# Patient Record
Sex: MALE | Race: BLACK OR AFRICAN AMERICAN | NOT HISPANIC OR LATINO | Employment: OTHER | URBAN - METROPOLITAN AREA
[De-identification: names, ages, dates, MRNs, and addresses within clinical notes are randomized per-mention and may not be internally consistent; named-entity substitution may affect disease eponyms.]

---

## 2023-06-16 LAB
C REACTIVE PROTEIN (MG/L) IN SER/PLAS: 0.15 MG/DL
CITRULLINE ANTIBODY, IGG: <1 U/ML
HEPATITIS B VIRUS SURFACE AG PRESENCE IN SERUM: NONREACTIVE
HEPATITIS C VIRUS AB PRESENCE IN SERUM: NONREACTIVE
RHEUMATOID FACTOR (IU/ML) IN SERUM OR PLASMA: <10 IU/ML (ref 0–15)
SEDIMENTATION RATE, ERYTHROCYTE: 2 MM/H (ref 0–15)

## 2023-06-19 LAB — ANTI-NUCLEAR ANTIBODY (ANA): NEGATIVE

## 2023-08-30 ENCOUNTER — HOSPITAL ENCOUNTER (OUTPATIENT)
Dept: DATA CONVERSION | Facility: HOSPITAL | Age: 43
End: 2023-08-30
Attending: RADIOLOGY
Payer: COMMERCIAL

## 2023-08-30 DIAGNOSIS — Z45.2 ENCOUNTER FOR ADJUSTMENT AND MANAGEMENT OF VASCULAR ACCESS DEVICE: ICD-10-CM

## 2023-10-02 DIAGNOSIS — D57.00 SICKLE CELL DISEASE WITH CRISIS (MULTI): Primary | ICD-10-CM

## 2023-10-02 RX ORDER — HYDROMORPHONE HYDROCHLORIDE 4 MG/1
4 TABLET ORAL EVERY 4 HOURS
COMMUNITY
End: 2023-10-02 | Stop reason: SDUPTHER

## 2023-10-02 RX ORDER — HYDROMORPHONE HYDROCHLORIDE 4 MG/1
4 TABLET ORAL EVERY 4 HOURS
Qty: 10 TABLET | Refills: 0 | Status: SHIPPED | OUTPATIENT
Start: 2023-10-02 | End: 2023-10-03 | Stop reason: SDUPTHER

## 2023-10-03 ENCOUNTER — TELEPHONE (OUTPATIENT)
Dept: HEMATOLOGY/ONCOLOGY | Facility: HOSPITAL | Age: 43
End: 2023-10-03

## 2023-10-03 DIAGNOSIS — D57.00 SICKLE CELL DISEASE WITH CRISIS (MULTI): ICD-10-CM

## 2023-10-03 RX ORDER — FAMOTIDINE 10 MG/ML
20 INJECTION INTRAVENOUS ONCE AS NEEDED
Status: CANCELLED | OUTPATIENT
Start: 2023-10-09

## 2023-10-03 RX ORDER — NITROGLYCERIN 0.4 MG/1
0.4 TABLET SUBLINGUAL ONCE
Status: CANCELLED | OUTPATIENT
Start: 2023-10-09 | End: 2023-10-09

## 2023-10-03 RX ORDER — DIPHENHYDRAMINE HYDROCHLORIDE 50 MG/ML
50 INJECTION INTRAMUSCULAR; INTRAVENOUS AS NEEDED
Status: CANCELLED | OUTPATIENT
Start: 2023-10-09

## 2023-10-03 RX ORDER — ONDANSETRON HYDROCHLORIDE 2 MG/ML
4 INJECTION, SOLUTION INTRAVENOUS ONCE
Status: CANCELLED | OUTPATIENT
Start: 2023-10-09 | End: 2023-10-09

## 2023-10-03 RX ORDER — KETOROLAC TROMETHAMINE 30 MG/ML
30 INJECTION, SOLUTION INTRAMUSCULAR; INTRAVENOUS ONCE
Status: CANCELLED | OUTPATIENT
Start: 2023-10-09 | End: 2023-10-09

## 2023-10-03 RX ORDER — HYDROMORPHONE HYDROCHLORIDE 4 MG/1
4 TABLET ORAL EVERY 4 HOURS PRN
Qty: 42 TABLET | Refills: 0 | Status: SHIPPED | OUTPATIENT
Start: 2023-10-03 | End: 2023-10-09 | Stop reason: SDUPTHER

## 2023-10-03 RX ORDER — ALBUTEROL SULFATE 0.83 MG/ML
3 SOLUTION RESPIRATORY (INHALATION) AS NEEDED
Status: CANCELLED | OUTPATIENT
Start: 2023-10-09

## 2023-10-03 RX ORDER — EPINEPHRINE 0.3 MG/.3ML
0.3 INJECTION SUBCUTANEOUS EVERY 5 MIN PRN
Status: CANCELLED | OUTPATIENT
Start: 2023-10-09

## 2023-10-03 RX ORDER — METOPROLOL TARTRATE 25 MG/1
25 TABLET, FILM COATED ORAL ONCE
Status: CANCELLED | OUTPATIENT
Start: 2023-10-09 | End: 2023-10-09

## 2023-10-07 PROBLEM — F17.200 NICOTINE USE DISORDER: Status: ACTIVE | Noted: 2023-10-07

## 2023-10-07 PROBLEM — E63.9 NUTRITIONAL DISORDER: Status: ACTIVE | Noted: 2023-10-07

## 2023-10-07 PROBLEM — F11.90 OPIOID USE DISORDER: Status: ACTIVE | Noted: 2023-10-07

## 2023-10-07 PROBLEM — M79.89 SWELLING OF RIGHT UPPER EXTREMITY: Status: ACTIVE | Noted: 2023-10-07

## 2023-10-07 PROBLEM — L30.9 DERMATITIS, UNSPECIFIED: Status: ACTIVE | Noted: 2023-07-28

## 2023-10-07 PROBLEM — Q80.0 ICHTHYOSIS VULGARIS: Status: ACTIVE | Noted: 2023-07-28

## 2023-10-07 PROBLEM — J18.9 PNEUMONIA DUE TO INFECTIOUS ORGANISM: Status: ACTIVE | Noted: 2023-10-07

## 2023-10-07 PROBLEM — D61.810 PANCYTOPENIA DUE TO CHEMOTHERAPY (CMS-HCC): Status: ACTIVE | Noted: 2023-10-07

## 2023-10-07 PROBLEM — K74.60 CIRRHOSIS OF LIVER (MULTI): Status: ACTIVE | Noted: 2023-10-07

## 2023-10-07 PROBLEM — R52 GENERALIZED PAIN: Status: ACTIVE | Noted: 2023-10-07

## 2023-10-07 PROBLEM — H40.003 GLAUCOMA SUSPECT, BOTH EYES: Status: ACTIVE | Noted: 2023-10-07

## 2023-10-07 PROBLEM — Z94.9 ORGAN OR TISSUE REPLACED BY TRANSPLANT: Status: ACTIVE | Noted: 2023-10-07

## 2023-10-07 PROBLEM — N48.30 PRIAPISM: Status: ACTIVE | Noted: 2023-10-07

## 2023-10-07 PROBLEM — F41.8 DEPRESSION WITH ANXIETY: Status: ACTIVE | Noted: 2023-10-07

## 2023-10-07 PROBLEM — L21.8 OTHER SEBORRHEIC DERMATITIS: Status: ACTIVE | Noted: 2023-07-28

## 2023-10-07 PROBLEM — A63.0 ANOGENITAL (VENEREAL) WARTS: Status: ACTIVE | Noted: 2023-07-28

## 2023-10-07 PROBLEM — M54.10 BILATERAL RADIATING LEG PAIN: Status: ACTIVE | Noted: 2023-10-07

## 2023-10-07 PROBLEM — L29.9 PRURITUS, UNSPECIFIED: Status: ACTIVE | Noted: 2023-07-28

## 2023-10-07 PROBLEM — G43.909 MIGRAINE HEADACHE: Status: ACTIVE | Noted: 2023-10-07

## 2023-10-07 PROBLEM — R93.89 ABNORMAL FINDING ON IMAGING: Status: ACTIVE | Noted: 2023-10-07

## 2023-10-07 PROBLEM — T40.2X5A CONSTIPATION DUE TO OPIOID THERAPY: Status: ACTIVE | Noted: 2023-10-07

## 2023-10-07 PROBLEM — M87.00 AVN (AVASCULAR NECROSIS OF BONE) (MULTI): Status: ACTIVE | Noted: 2023-10-07

## 2023-10-07 PROBLEM — R19.7 DIARRHEA: Status: ACTIVE | Noted: 2023-10-07

## 2023-10-07 PROBLEM — K21.9 GERD (GASTROESOPHAGEAL REFLUX DISEASE): Status: ACTIVE | Noted: 2023-10-07

## 2023-10-07 PROBLEM — M70.61 TROCHANTERIC BURSITIS OF BOTH HIPS: Status: ACTIVE | Noted: 2023-10-07

## 2023-10-07 PROBLEM — K51.00 PANCOLITIS (MULTI): Status: ACTIVE | Noted: 2023-10-07

## 2023-10-07 PROBLEM — D57.3 SICKLE CELL TRAIT (CMS-HCC): Status: ACTIVE | Noted: 2023-10-07

## 2023-10-07 PROBLEM — K61.0 PERIANAL ABSCESS: Status: ACTIVE | Noted: 2023-10-07

## 2023-10-07 PROBLEM — K12.31 MUCOSITIS DUE TO ANTINEOPLASTIC THERAPY: Status: ACTIVE | Noted: 2023-10-07

## 2023-10-07 PROBLEM — D64.9 ANEMIA: Status: ACTIVE | Noted: 2023-10-07

## 2023-10-07 PROBLEM — M71.9 BURSITIS: Status: ACTIVE | Noted: 2023-10-07

## 2023-10-07 PROBLEM — D57.1 SICKLE CELL RETINOPATHY (MULTI): Status: ACTIVE | Noted: 2023-10-07

## 2023-10-07 PROBLEM — H52.10 MYOPIA WITH ASTIGMATISM: Status: ACTIVE | Noted: 2023-10-07

## 2023-10-07 PROBLEM — E83.19 IRON OVERLOAD: Status: ACTIVE | Noted: 2023-10-07

## 2023-10-07 PROBLEM — M25.552 HIP PAIN, BILATERAL: Status: ACTIVE | Noted: 2023-10-07

## 2023-10-07 PROBLEM — R06.02 SHORTNESS OF BREATH ON EXERTION: Status: ACTIVE | Noted: 2023-10-07

## 2023-10-07 PROBLEM — M79.601 PAIN OF RIGHT UPPER EXTREMITY: Status: ACTIVE | Noted: 2023-10-07

## 2023-10-07 PROBLEM — M79.89 HAND SWELLING: Status: ACTIVE | Noted: 2023-10-07

## 2023-10-07 PROBLEM — A08.4 VIRAL GASTROENTERITIS: Status: ACTIVE | Noted: 2023-10-07

## 2023-10-07 PROBLEM — E86.0 DEHYDRATION: Status: ACTIVE | Noted: 2023-10-07

## 2023-10-07 PROBLEM — R52 ENCOUNTER FOR PAIN MANAGEMENT: Status: ACTIVE | Noted: 2023-10-07

## 2023-10-07 PROBLEM — H52.13 BILATERAL MYOPIA: Status: ACTIVE | Noted: 2023-10-07

## 2023-10-07 PROBLEM — D72.829 LEUKOCYTOSIS: Status: ACTIVE | Noted: 2023-10-07

## 2023-10-07 PROBLEM — G89.29 CHRONIC PAIN: Status: ACTIVE | Noted: 2023-10-07

## 2023-10-07 PROBLEM — K59.03 CONSTIPATION DUE TO OPIOID THERAPY: Status: ACTIVE | Noted: 2023-10-07

## 2023-10-07 PROBLEM — M54.16 LUMBAR RADICULITIS: Status: ACTIVE | Noted: 2023-10-07

## 2023-10-07 PROBLEM — M70.62 TROCHANTERIC BURSITIS OF BOTH HIPS: Status: ACTIVE | Noted: 2023-10-07

## 2023-10-07 PROBLEM — L29.9 PRURITUS: Status: ACTIVE | Noted: 2023-10-07

## 2023-10-07 PROBLEM — D70.9 NEUTROPENIC FEVER (CMS-HCC): Status: ACTIVE | Noted: 2023-10-07

## 2023-10-07 PROBLEM — M25.60 JOINT STIFFNESS: Status: ACTIVE | Noted: 2023-10-07

## 2023-10-07 PROBLEM — M25.551 HIP PAIN, BILATERAL: Status: ACTIVE | Noted: 2023-10-07

## 2023-10-07 PROBLEM — Z91.148 NONCOMPLIANCE WITH MEDICATION REGIMEN: Status: ACTIVE | Noted: 2023-10-07

## 2023-10-07 PROBLEM — R50.81 NEUTROPENIC FEVER (CMS-HCC): Status: ACTIVE | Noted: 2023-10-07

## 2023-10-07 PROBLEM — T50.905A DRUG-INDUCED NAUSEA AND VOMITING: Status: ACTIVE | Noted: 2023-10-07

## 2023-10-07 PROBLEM — K61.1 PERIRECTAL ABSCESS: Status: ACTIVE | Noted: 2023-10-07

## 2023-10-07 PROBLEM — S46.009A ROTATOR CUFF INJURY: Status: ACTIVE | Noted: 2023-10-07

## 2023-10-07 PROBLEM — L30.8 OTHER SPECIFIED DERMATITIS: Status: ACTIVE | Noted: 2023-07-28

## 2023-10-07 PROBLEM — E83.19 HEPATIC HEMOSIDEROSIS: Status: ACTIVE | Noted: 2023-10-07

## 2023-10-07 PROBLEM — R09.02 HYPOXIA: Status: ACTIVE | Noted: 2023-10-07

## 2023-10-07 PROBLEM — H52.209 MYOPIA WITH ASTIGMATISM: Status: ACTIVE | Noted: 2023-10-07

## 2023-10-07 PROBLEM — F32.9 MDD (MAJOR DEPRESSIVE DISORDER): Status: ACTIVE | Noted: 2023-10-07

## 2023-10-07 PROBLEM — H52.203 ASTIGMATISM, BILATERAL: Status: ACTIVE | Noted: 2023-10-07

## 2023-10-07 PROBLEM — R11.2 DRUG-INDUCED NAUSEA AND VOMITING: Status: ACTIVE | Noted: 2023-10-07

## 2023-10-07 PROBLEM — N52.9 ERECTILE DYSFUNCTION: Status: ACTIVE | Noted: 2023-10-07

## 2023-10-07 PROBLEM — L71.0 PERIORIFICIAL DERMATITIS: Status: ACTIVE | Noted: 2023-10-07

## 2023-10-07 PROBLEM — R51.9 HEADACHE: Status: ACTIVE | Noted: 2023-10-07

## 2023-10-07 PROBLEM — L50.9 URTICARIA, UNSPECIFIED: Status: ACTIVE | Noted: 2023-07-28

## 2023-10-07 PROBLEM — H36.89 SICKLE CELL RETINOPATHY (MULTI): Status: ACTIVE | Noted: 2023-10-07

## 2023-10-07 PROBLEM — E87.6 HYPOKALEMIA: Status: ACTIVE | Noted: 2023-10-07

## 2023-10-07 RX ORDER — NALOXONE HYDROCHLORIDE 4 MG/.1ML
4 SPRAY NASAL AS NEEDED
COMMUNITY
Start: 2020-03-19

## 2023-10-07 RX ORDER — FOLIC ACID 1 MG/1
1 TABLET ORAL DAILY
COMMUNITY
Start: 2015-12-02 | End: 2023-11-17 | Stop reason: SDUPTHER

## 2023-10-07 RX ORDER — OMEPRAZOLE 40 MG/1
40 CAPSULE, DELAYED RELEASE ORAL
COMMUNITY
Start: 2017-07-06 | End: 2023-11-17 | Stop reason: SDUPTHER

## 2023-10-07 RX ORDER — GABAPENTIN 100 MG/1
100 CAPSULE ORAL NIGHTLY
COMMUNITY
Start: 2023-01-19 | End: 2023-11-30 | Stop reason: DRUGHIGH

## 2023-10-07 RX ORDER — CETIRIZINE HYDROCHLORIDE 10 MG/1
10 TABLET ORAL DAILY
COMMUNITY
Start: 2015-10-06 | End: 2023-12-01 | Stop reason: WASHOUT

## 2023-10-07 RX ORDER — DIPHENHYDRAMINE HCL 25 MG
25 CAPSULE ORAL EVERY 6 HOURS PRN
COMMUNITY
Start: 2016-12-23

## 2023-10-07 RX ORDER — ACYCLOVIR 400 MG/1
1 TABLET ORAL 2 TIMES DAILY
COMMUNITY
Start: 2021-03-30 | End: 2023-12-01 | Stop reason: WASHOUT

## 2023-10-07 RX ORDER — DULOXETIN HYDROCHLORIDE 60 MG/1
60 CAPSULE, DELAYED RELEASE ORAL 2 TIMES DAILY
COMMUNITY
Start: 2020-05-07 | End: 2023-10-26 | Stop reason: SDUPTHER

## 2023-10-07 RX ORDER — MIRTAZAPINE 7.5 MG/1
7.5 TABLET, FILM COATED ORAL NIGHTLY
COMMUNITY
Start: 2023-01-19 | End: 2023-10-26 | Stop reason: SDUPTHER

## 2023-10-07 RX ORDER — AMITRIPTYLINE HYDROCHLORIDE 25 MG/1
25 TABLET, FILM COATED ORAL
COMMUNITY
Start: 2021-06-17

## 2023-10-07 RX ORDER — MELATONIN 10 MG/ML
1 DROPS ORAL DAILY
COMMUNITY
Start: 2023-02-21 | End: 2023-10-25 | Stop reason: SDUPTHER

## 2023-10-07 RX ORDER — TACROLIMUS 0.1% IN PSEUDOCATALASE 0.1 G/100G
CREAM TOPICAL
COMMUNITY
Start: 2021-03-30 | End: 2024-01-01

## 2023-10-07 RX ORDER — BELUMOSUDIL 200 MG/1
200 TABLET ORAL
COMMUNITY
Start: 2023-09-13 | End: 2023-10-20 | Stop reason: SDUPTHER

## 2023-10-07 RX ORDER — ERGOCALCIFEROL 1.25 MG/1
1 CAPSULE ORAL
COMMUNITY
Start: 2021-03-16

## 2023-10-07 RX ORDER — CHOLECALCIFEROL (VITAMIN D3) 50 MCG
2000 TABLET ORAL
COMMUNITY
Start: 2021-07-13 | End: 2024-05-02 | Stop reason: WASHOUT

## 2023-10-08 ENCOUNTER — HOSPITAL ENCOUNTER (EMERGENCY)
Facility: HOSPITAL | Age: 43
Discharge: HOME | End: 2023-10-09
Attending: EMERGENCY MEDICINE
Payer: COMMERCIAL

## 2023-10-08 DIAGNOSIS — D57.00 SICKLE CELL PAIN CRISIS (MULTI): Primary | ICD-10-CM

## 2023-10-08 LAB
ALBUMIN SERPL BCP-MCNC: 3.9 G/DL (ref 3.4–5)
ALP SERPL-CCNC: 110 U/L (ref 33–120)
ALT SERPL W P-5'-P-CCNC: 10 U/L (ref 10–52)
ANION GAP SERPL CALC-SCNC: 9 MMOL/L (ref 10–20)
AST SERPL W P-5'-P-CCNC: 14 U/L (ref 9–39)
BASOPHILS # BLD AUTO: 0.04 X10*3/UL (ref 0–0.1)
BASOPHILS NFR BLD AUTO: 0.3 %
BILIRUB SERPL-MCNC: 0.3 MG/DL (ref 0–1.2)
BUN SERPL-MCNC: 10 MG/DL (ref 6–23)
CALCIUM SERPL-MCNC: 8.9 MG/DL (ref 8.6–10.3)
CHLORIDE SERPL-SCNC: 106 MMOL/L (ref 98–107)
CO2 SERPL-SCNC: 27 MMOL/L (ref 21–32)
CREAT SERPL-MCNC: 0.91 MG/DL (ref 0.5–1.3)
EOSINOPHIL # BLD AUTO: 0.45 X10*3/UL (ref 0–0.7)
EOSINOPHIL NFR BLD AUTO: 3.3 %
ERYTHROCYTE [DISTWIDTH] IN BLOOD BY AUTOMATED COUNT: 12.7 % (ref 11.5–14.5)
GFR SERPL CREATININE-BSD FRML MDRD: >90 ML/MIN/1.73M*2
GLUCOSE SERPL-MCNC: 75 MG/DL (ref 74–99)
HCT VFR BLD AUTO: 40.3 % (ref 41–52)
HGB BLD-MCNC: 14.7 G/DL (ref 13.5–17.5)
HGB RETIC QN: 37 PG (ref 28–38)
IMM GRANULOCYTES # BLD AUTO: 0.05 X10*3/UL (ref 0–0.7)
IMM GRANULOCYTES NFR BLD AUTO: 0.4 % (ref 0–0.9)
IMMATURE RETIC FRACTION: 6 %
LDH SERPL L TO P-CCNC: 169 U/L (ref 84–246)
LYMPHOCYTES # BLD AUTO: 5.2 X10*3/UL (ref 1.2–4.8)
LYMPHOCYTES NFR BLD AUTO: 38.2 %
MCH RBC QN AUTO: 31.8 PG (ref 26–34)
MCHC RBC AUTO-ENTMCNC: 36.5 G/DL (ref 32–36)
MCV RBC AUTO: 87 FL (ref 80–100)
MONOCYTES # BLD AUTO: 1.13 X10*3/UL (ref 0.1–1)
MONOCYTES NFR BLD AUTO: 8.3 %
NEUTROPHILS # BLD AUTO: 6.73 X10*3/UL (ref 1.2–7.7)
NEUTROPHILS NFR BLD AUTO: 49.5 %
NRBC BLD-RTO: 0 /100 WBCS (ref 0–0)
PLATELET # BLD AUTO: 298 X10*3/UL (ref 150–450)
PMV BLD AUTO: 8.6 FL (ref 7.5–11.5)
POTASSIUM SERPL-SCNC: 4.2 MMOL/L (ref 3.5–5.3)
PROT SERPL-MCNC: 6.4 G/DL (ref 6.4–8.2)
RBC # BLD AUTO: 4.62 X10*6/UL (ref 4.5–5.9)
RETICS #: 0.05 X10*6/UL (ref 0.02–0.12)
RETICS/RBC NFR AUTO: 1.1 % (ref 0.5–2)
SODIUM SERPL-SCNC: 138 MMOL/L (ref 136–145)
WBC # BLD AUTO: 13.6 X10*3/UL (ref 4.4–11.3)

## 2023-10-08 PROCEDURE — 2500000004 HC RX 250 GENERAL PHARMACY W/ HCPCS (ALT 636 FOR OP/ED): Performed by: EMERGENCY MEDICINE

## 2023-10-08 PROCEDURE — 83615 LACTATE (LD) (LDH) ENZYME: CPT | Performed by: EMERGENCY MEDICINE

## 2023-10-08 PROCEDURE — 2580000001 HC RX 258 IV SOLUTIONS: Performed by: EMERGENCY MEDICINE

## 2023-10-08 PROCEDURE — 96376 TX/PRO/DX INJ SAME DRUG ADON: CPT

## 2023-10-08 PROCEDURE — 96375 TX/PRO/DX INJ NEW DRUG ADDON: CPT

## 2023-10-08 PROCEDURE — 85045 AUTOMATED RETICULOCYTE COUNT: CPT | Performed by: EMERGENCY MEDICINE

## 2023-10-08 PROCEDURE — 2500000001 HC RX 250 WO HCPCS SELF ADMINISTERED DRUGS (ALT 637 FOR MEDICARE OP): Performed by: EMERGENCY MEDICINE

## 2023-10-08 PROCEDURE — 99284 EMERGENCY DEPT VISIT MOD MDM: CPT | Mod: 25 | Performed by: EMERGENCY MEDICINE

## 2023-10-08 PROCEDURE — 80053 COMPREHEN METABOLIC PANEL: CPT | Performed by: EMERGENCY MEDICINE

## 2023-10-08 PROCEDURE — 85025 COMPLETE CBC W/AUTO DIFF WBC: CPT | Performed by: EMERGENCY MEDICINE

## 2023-10-08 PROCEDURE — 96374 THER/PROPH/DIAG INJ IV PUSH: CPT

## 2023-10-08 PROCEDURE — 96361 HYDRATE IV INFUSION ADD-ON: CPT

## 2023-10-08 PROCEDURE — 99291 CRITICAL CARE FIRST HOUR: CPT | Performed by: EMERGENCY MEDICINE

## 2023-10-08 RX ORDER — HYDROMORPHONE HYDROCHLORIDE 2 MG/ML
2 INJECTION, SOLUTION INTRAMUSCULAR; INTRAVENOUS; SUBCUTANEOUS EVERY 2 HOUR PRN
Status: DISCONTINUED | OUTPATIENT
Start: 2023-10-08 | End: 2023-10-09 | Stop reason: HOSPADM

## 2023-10-08 RX ORDER — HYDROMORPHONE HYDROCHLORIDE 1 MG/ML
1 INJECTION, SOLUTION INTRAMUSCULAR; INTRAVENOUS; SUBCUTANEOUS EVERY 30 MIN PRN
Status: DISCONTINUED | OUTPATIENT
Start: 2023-10-08 | End: 2023-10-09 | Stop reason: HOSPADM

## 2023-10-08 RX ORDER — DIPHENHYDRAMINE HCL 25 MG
50 CAPSULE ORAL ONCE AS NEEDED
Status: COMPLETED | OUTPATIENT
Start: 2023-10-08 | End: 2023-10-08

## 2023-10-08 RX ORDER — SODIUM CHLORIDE, SODIUM LACTATE, POTASSIUM CHLORIDE, CALCIUM CHLORIDE 600; 310; 30; 20 MG/100ML; MG/100ML; MG/100ML; MG/100ML
125 INJECTION, SOLUTION INTRAVENOUS CONTINUOUS
Status: DISCONTINUED | OUTPATIENT
Start: 2023-10-08 | End: 2023-10-09 | Stop reason: HOSPADM

## 2023-10-08 RX ORDER — ONDANSETRON HYDROCHLORIDE 2 MG/ML
4 INJECTION, SOLUTION INTRAVENOUS EVERY 30 MIN PRN
Status: DISCONTINUED | OUTPATIENT
Start: 2023-10-08 | End: 2023-10-09 | Stop reason: HOSPADM

## 2023-10-08 RX ADMIN — SODIUM CHLORIDE, POTASSIUM CHLORIDE, SODIUM LACTATE AND CALCIUM CHLORIDE 125 ML/HR: 600; 310; 30; 20 INJECTION, SOLUTION INTRAVENOUS at 23:09

## 2023-10-08 RX ADMIN — ONDANSETRON 4 MG: 2 INJECTION INTRAMUSCULAR; INTRAVENOUS at 22:25

## 2023-10-08 RX ADMIN — DIPHENHYDRAMINE HYDROCHLORIDE 50 MG: 25 CAPSULE ORAL at 22:26

## 2023-10-08 RX ADMIN — HYDROMORPHONE HYDROCHLORIDE 2 MG: 2 INJECTION INTRAMUSCULAR; INTRAVENOUS; SUBCUTANEOUS at 23:04

## 2023-10-08 ASSESSMENT — COLUMBIA-SUICIDE SEVERITY RATING SCALE - C-SSRS
1. IN THE PAST MONTH, HAVE YOU WISHED YOU WERE DEAD OR WISHED YOU COULD GO TO SLEEP AND NOT WAKE UP?: NO
2. HAVE YOU ACTUALLY HAD ANY THOUGHTS OF KILLING YOURSELF?: NO
6. HAVE YOU EVER DONE ANYTHING, STARTED TO DO ANYTHING, OR PREPARED TO DO ANYTHING TO END YOUR LIFE?: NO

## 2023-10-08 ASSESSMENT — ENCOUNTER SYMPTOMS
JOINT SWELLING: 0
COUGH: 0
EYE DISCHARGE: 0
BACK PAIN: 1
SHORTNESS OF BREATH: 0
WOUND: 0
DYSURIA: 0
NAUSEA: 0
EYE PAIN: 0
RHINORRHEA: 0
HEADACHES: 0
SORE THROAT: 0
AGITATION: 0
DIZZINESS: 0
VOMITING: 0
ABDOMINAL PAIN: 0
CONFUSION: 0
CHILLS: 0
FEVER: 0
MYALGIAS: 1
PALPITATIONS: 0

## 2023-10-08 ASSESSMENT — PAIN SCALES - GENERAL
PAINLEVEL_OUTOF10: 9
PAINLEVEL_OUTOF10: 9

## 2023-10-09 ENCOUNTER — TELEPHONE (OUTPATIENT)
Dept: ADMISSION | Facility: HOSPITAL | Age: 43
End: 2023-10-09

## 2023-10-09 ENCOUNTER — INFUSION (OUTPATIENT)
Dept: INFUSION THERAPY | Facility: CLINIC | Age: 43
End: 2023-10-09
Payer: COMMERCIAL

## 2023-10-09 VITALS
HEIGHT: 67 IN | TEMPERATURE: 98.1 F | OXYGEN SATURATION: 97 % | RESPIRATION RATE: 17 BRPM | SYSTOLIC BLOOD PRESSURE: 106 MMHG | HEART RATE: 69 BPM | DIASTOLIC BLOOD PRESSURE: 83 MMHG | BODY MASS INDEX: 22.6 KG/M2 | WEIGHT: 144 LBS

## 2023-10-09 VITALS
SYSTOLIC BLOOD PRESSURE: 135 MMHG | TEMPERATURE: 97.3 F | OXYGEN SATURATION: 97 % | DIASTOLIC BLOOD PRESSURE: 86 MMHG | RESPIRATION RATE: 16 BRPM | HEART RATE: 62 BPM

## 2023-10-09 DIAGNOSIS — D57.00 SICKLE CELL CRISIS (MULTI): ICD-10-CM

## 2023-10-09 DIAGNOSIS — R52 ENCOUNTER FOR PAIN MANAGEMENT: Primary | ICD-10-CM

## 2023-10-09 DIAGNOSIS — D57.00 SICKLE CELL DISEASE WITH CRISIS (MULTI): ICD-10-CM

## 2023-10-09 PROCEDURE — 2500000001 HC RX 250 WO HCPCS SELF ADMINISTERED DRUGS (ALT 637 FOR MEDICARE OP): Performed by: EMERGENCY MEDICINE

## 2023-10-09 PROCEDURE — 2500000004 HC RX 250 GENERAL PHARMACY W/ HCPCS (ALT 636 FOR OP/ED): Performed by: EMERGENCY MEDICINE

## 2023-10-09 PROCEDURE — 2500000004 HC RX 250 GENERAL PHARMACY W/ HCPCS (ALT 636 FOR OP/ED)

## 2023-10-09 PROCEDURE — 96368 THER/DIAG CONCURRENT INF: CPT

## 2023-10-09 PROCEDURE — 96365 THER/PROPH/DIAG IV INF INIT: CPT

## 2023-10-09 PROCEDURE — 96375 TX/PRO/DX INJ NEW DRUG ADDON: CPT

## 2023-10-09 PROCEDURE — 2500000005 HC RX 250 GENERAL PHARMACY W/O HCPCS: Performed by: NURSE PRACTITIONER

## 2023-10-09 PROCEDURE — 2500000004 HC RX 250 GENERAL PHARMACY W/ HCPCS (ALT 636 FOR OP/ED): Performed by: NURSE PRACTITIONER

## 2023-10-09 RX ORDER — NITROGLYCERIN 0.4 MG/1
0.4 TABLET SUBLINGUAL ONCE
Status: CANCELLED | OUTPATIENT
Start: 2023-10-23 | End: 2023-10-23

## 2023-10-09 RX ORDER — KETOROLAC TROMETHAMINE 30 MG/ML
INJECTION, SOLUTION INTRAMUSCULAR; INTRAVENOUS
Status: COMPLETED
Start: 2023-10-09 | End: 2023-10-09

## 2023-10-09 RX ORDER — METOPROLOL TARTRATE 25 MG/1
25 TABLET, FILM COATED ORAL ONCE
Status: CANCELLED | OUTPATIENT
Start: 2023-10-23 | End: 2023-10-23

## 2023-10-09 RX ORDER — DIPHENHYDRAMINE HYDROCHLORIDE 50 MG/ML
50 INJECTION INTRAMUSCULAR; INTRAVENOUS AS NEEDED
Status: CANCELLED | OUTPATIENT
Start: 2023-10-23

## 2023-10-09 RX ORDER — HEPARIN SODIUM,PORCINE 10 UNIT/ML
100 VIAL (ML) INTRAVENOUS ONCE
Status: ACTIVE | OUTPATIENT
Start: 2023-10-09

## 2023-10-09 RX ORDER — HYDROMORPHONE HYDROCHLORIDE 2 MG/1
8 TABLET ORAL ONCE
Status: COMPLETED | OUTPATIENT
Start: 2023-10-09 | End: 2023-10-09

## 2023-10-09 RX ORDER — HYDROMORPHONE HYDROCHLORIDE 2 MG/1
2 TABLET ORAL ONCE
Status: DISCONTINUED | OUTPATIENT
Start: 2023-10-09 | End: 2023-10-09 | Stop reason: HOSPADM

## 2023-10-09 RX ORDER — ONDANSETRON HYDROCHLORIDE 2 MG/ML
4 INJECTION, SOLUTION INTRAVENOUS ONCE
Status: CANCELLED | OUTPATIENT
Start: 2023-10-23 | End: 2023-10-23

## 2023-10-09 RX ORDER — HYDROMORPHONE HYDROCHLORIDE 4 MG/1
4 TABLET ORAL EVERY 4 HOURS PRN
Qty: 42 TABLET | Refills: 0 | Status: SHIPPED | OUTPATIENT
Start: 2023-10-09 | End: 2023-10-16 | Stop reason: SDUPTHER

## 2023-10-09 RX ORDER — HEPARIN 100 UNIT/ML
SYRINGE INTRAVENOUS
Status: COMPLETED
Start: 2023-10-09 | End: 2023-10-09

## 2023-10-09 RX ORDER — FAMOTIDINE 10 MG/ML
20 INJECTION INTRAVENOUS ONCE AS NEEDED
Status: CANCELLED | OUTPATIENT
Start: 2023-10-23

## 2023-10-09 RX ORDER — ALBUTEROL SULFATE 0.83 MG/ML
3 SOLUTION RESPIRATORY (INHALATION) AS NEEDED
Status: CANCELLED | OUTPATIENT
Start: 2023-10-23

## 2023-10-09 RX ORDER — KETOROLAC TROMETHAMINE 30 MG/ML
30 INJECTION, SOLUTION INTRAMUSCULAR; INTRAVENOUS ONCE
Status: CANCELLED | OUTPATIENT
Start: 2023-10-23 | End: 2023-10-23

## 2023-10-09 RX ORDER — EPINEPHRINE 0.3 MG/.3ML
0.3 INJECTION SUBCUTANEOUS EVERY 5 MIN PRN
Status: CANCELLED | OUTPATIENT
Start: 2023-10-23

## 2023-10-09 RX ORDER — KETOROLAC TROMETHAMINE 30 MG/ML
30 INJECTION, SOLUTION INTRAMUSCULAR; INTRAVENOUS ONCE
Status: COMPLETED | OUTPATIENT
Start: 2023-10-09 | End: 2023-10-09

## 2023-10-09 RX ADMIN — HYDROMORPHONE HYDROCHLORIDE 8 MG: 2 TABLET ORAL at 03:45

## 2023-10-09 RX ADMIN — Medication: at 10:29

## 2023-10-09 RX ADMIN — KETOROLAC TROMETHAMINE 30 MG: 30 INJECTION, SOLUTION INTRAMUSCULAR; INTRAVENOUS at 10:28

## 2023-10-09 RX ADMIN — HEPARIN 500 UNITS: 100 SYRINGE at 11:15

## 2023-10-09 RX ADMIN — HYDROMORPHONE HYDROCHLORIDE 2 MG: 2 INJECTION INTRAMUSCULAR; INTRAVENOUS; SUBCUTANEOUS at 01:12

## 2023-10-09 RX ADMIN — PROPOFOL 100 MG: 10 INJECTION, EMULSION INTRAVENOUS at 10:29

## 2023-10-09 ASSESSMENT — PAIN SCALES - GENERAL
PAINLEVEL_OUTOF10: 2
PAINLEVEL_OUTOF10: 5 - MODERATE PAIN

## 2023-10-09 ASSESSMENT — PATIENT HEALTH QUESTIONNAIRE - PHQ9
1. LITTLE INTEREST OR PLEASURE IN DOING THINGS: NOT AT ALL
SUM OF ALL RESPONSES TO PHQ9 QUESTIONS 1 AND 2: 0
2. FEELING DOWN, DEPRESSED OR HOPELESS: NOT AT ALL

## 2023-10-09 ASSESSMENT — PAIN - FUNCTIONAL ASSESSMENT: PAIN_FUNCTIONAL_ASSESSMENT: 0-10

## 2023-10-09 NOTE — ED PROVIDER NOTES
HPI   Chief Complaint   Patient presents with    Sickle Cell Pain Crisis     Pt presents to ED for SCC 9/10 generalized pain. Pt denies CP/SOB, abd pain, N/V/D.          42-year-old male, sickle cell pain.  Bilateral lower extremities typical of prior sickle cell exacerbations.  No nausea vomiting diarrhea constipation.  No fevers chills shortness of breath cough sputum or hemoptysis.  No injuries or trauma.  On Dilaudid medications at home without establish care path as best he can tell me.  No other precipitating factors identifiable.  No recent illnesses.                          No data recorded                Patient History   Past Medical History:   Diagnosis Date    Anxiety disorder, unspecified 04/17/2017    Anxiety    Depression, unspecified 04/17/2017    Depression    Family history of glaucoma 03/02/2017    Family history of glaucoma in father    Gastritis, unspecified, without bleeding 04/17/2017    Gastritis    Hematuria, unspecified 04/17/2017    Hematuria    Personal history of other diseases of the digestive system 12/02/2015    History of esophageal reflux    Priapism, unspecified 04/17/2017    Priapism    Sickle-cell disease without crisis (CMS/Former[facility]) 04/17/2017    Sickle cell anemia     Past Surgical History:   Procedure Laterality Date    GALLBLADDER SURGERY  04/12/2017    Gallbladder Surgery    IR CVC TUNNELED  4/24/2018    IR CVC TUNNELED 4/24/2018 [facility] [place] AIB LEGACY    IR CVC TUNNELED  6/5/2018    IR CVC TUNNELED 6/5/2018 [facility] [place] AIB LEGACY    IR CVC TUNNELED  3/1/2019    IR CVC TUNNELED 3/1/2019 [facility] [place] AIB LEGACY    IR CVC TUNNELED  6/4/2019    IR CVC TUNNELED 6/4/2019 [facility] CLINICAL LEGACY    IR CVC TUNNELED  4/5/2019    IR CVC TUNNELED 4/5/2019 [facility] [place] AIB LEGACY    IR CVC TUNNELED  7/17/2019    IR CVC TUNNELED 7/17/2019 [facility] [place] AIB LEGACY    IR CVC TUNNELED  8/14/2019    IR CVC TUNNELED 8/14/2019 [facility] [place] AIB LEGACY    IR CVC TUNNELED  12/18/2019    IR CVC TUNNELED 12/18/2019 [facility] CLINICAL LEGACY    IR CVC TUNNELED   10/9/2019    IR CVC TUNNELED 10/9/2019 [facility]IB LEGACY    IR CVC TUNNELED  11/13/2019    IR CVC TUNNELED 11/13/2019 [facility] CLINICAL LEGACY    IR CVC TUNNELED  1/15/2020    IR CVC TUNNELED 1/15/2020 [facility] CLINICAL LEGACY    IR CVC TUNNELED  2/19/2020    IR CVC TUNNELED 2/19/2020 [facility] CLINICAL LEGACY    IR CVC TUNNELED  1/4/2021    IR CVC TUNNELED 1/4/2021 [facility]IB [facility]GAC[facility]    IR CVC TUNNELED  1/25/2021    IR CVC TUNNELED 1/25/2021 [facility] ANCILLARY LEGACY    IR CVC TUNNELED  8/21/2018    IR CVC TUNNELED 8/21/2018 [facility]GAC[facility]    IR CVC TUNNELED  9/26/2018    IR CVC TUNNELED 9/26/2018 [facility]    IR CVC TUNNELED  11/5/2018    IR CVC TUNNELED 11/5/2018 [facility]    IR CVC TUNNELED  12/19/2018    IR CVC TUNNELED 12/19/2018 [facility]    IR VENOGRAM HEPATIC  11/8/2017    IR VENOGRAM HEPATIC 11/8/2017 [facility]    MR HEAD ANGIO WO IV CONTRAST  2/17/2017    MR HEAD ANGIO WO IV CONTRAST 2/17/2017 [facility] CLINICAL LEGACY    MR NECK ANGIO WO IV CONTRAST  2/17/2017    MR NECK ANGIO WO IV CONTRAST 2/17/2017 [facility] CLINICAL LEGACY    US GUIDED NEEDLE LIVER BIOPSY  9/8/2020    US GUIDED NEEDLE LIVER BIOPSY 9/8/2020 [facility]IB [facility]GACY     Family History   Problem Relation Name Age of Onset    Diabetes Father      Sickle cell anemia Other sibling     Cancer Other grandfather     Cancer Other uncle      Social History     Tobacco Use    Smoking status: Not on file    Smokeless tobacco: Not on file   Substance Use Topics    Alcohol use: Not on file    Drug use: Not on file       Review of Systems   Constitutional:  Negative for chills and fever.   HENT:  Negative for rhinorrhea and sore throat.    Eyes:  Negative for pain and discharge.   Respiratory:  Negative for cough and shortness of breath.    Cardiovascular:  Negative for chest pain and palpitations.   Gastrointestinal:  Negative for abdominal pain, nausea and vomiting.   Genitourinary:  Negative for dysuria and urgency.   Musculoskeletal:  Positive for back pain and  myalgias. Negative for joint swelling.        Bilateral lower extremity issues with the sickle pain as above   Skin:  Negative for rash and wound.   Neurological:  Negative for dizziness and headaches.        No focal/lateralizing weakness or numbness on review   Psychiatric/Behavioral:  Negative for agitation and confusion.         Physical Exam   ED Triage Vitals   Temp Pulse Resp BP   -- -- -- --      SpO2 Temp src Heart Rate Source Patient Position   -- -- -- --      BP Location FiO2 (%)     -- --       Physical Exam  Vitals reviewed.   Constitutional:       General: He is not in acute distress.     Appearance: He is well-developed.   HENT:      Head: Normocephalic and atraumatic.      Right Ear: External ear normal.      Left Ear: External ear normal.      Nose: Nose normal.      Mouth/Throat:      Mouth: Mucous membranes are moist.      Pharynx: Oropharynx is clear.   Eyes:      Conjunctiva/sclera: Conjunctivae normal.      Pupils: Pupils are equal, round, and reactive to light.   Neck:      Vascular: No JVD.   Cardiovascular:      Rate and Rhythm: Normal rate and regular rhythm.      Pulses: Normal pulses.   Pulmonary:      Effort: Pulmonary effort is normal. No accessory muscle usage or respiratory distress.      Breath sounds: Normal breath sounds.   Abdominal:      General: Abdomen is flat. There is no distension.      Palpations: Abdomen is soft. There is no mass.      Tenderness: There is no abdominal tenderness.   Musculoskeletal:         General: Tenderness present. No swelling, deformity or signs of injury. Normal range of motion.      Cervical back: Normal range of motion and neck supple.      Right lower leg: No edema.      Left lower leg: No edema.   Lymphadenopathy:      Cervical: No cervical adenopathy.   Skin:     General: Skin is warm and dry.      Capillary Refill: Capillary refill takes less than 2 seconds.      Comments: No zoster rash seen   Neurological:      General: No focal deficit  present.      Mental Status: He is alert. Mental status is at baseline.   Psychiatric:         Mood and Affect: Mood normal.                 ECG if performed, ordered and interpreted by ED physician: Sinus rhythm.  75 bpm.  Left axis deviation.  No acute ischemic findings.          Labs Reviewed   CBC WITH AUTO DIFFERENTIAL - Abnormal       Result Value    WBC 13.6 (*)     nRBC 0.0      RBC 4.62      Hemoglobin 14.7      Hematocrit 40.3 (*)     MCV 87      MCH 31.8      MCHC 36.5 (*)     RDW 12.7      Platelets 298      MPV 8.6      Neutrophils % 49.5      Immature Granulocytes %, Automated 0.4      Lymphocytes % 38.2      Monocytes % 8.3      Eosinophils % 3.3      Basophils % 0.3      Neutrophils Absolute 6.73      Immature Granulocytes Absolute, Automated 0.05      Lymphocytes Absolute 5.20 (*)     Monocytes Absolute 1.13 (*)     Eosinophils Absolute 0.45      Basophils Absolute 0.04     COMPREHENSIVE METABOLIC PANEL - Abnormal    Glucose 75      Sodium 138      Potassium 4.2      Chloride 106      Bicarbonate 27      Anion Gap 9 (*)     Urea Nitrogen 10      Creatinine 0.91      eGFR >90      Calcium 8.9      Albumin 3.9      Alkaline Phosphatase 110      Total Protein 6.4      AST 14      Bilirubin, Total 0.3      ALT 10     LACTATE DEHYDROGENASE - Normal         RETICULOCYTES - Normal    Retic % 1.1      Retic Absolute 0.051      Reticulocyte Hemoglobin 37      Immature Retic fraction 6.0            No orders to display            ED Course & MDM     ED Medication Administration from 10/08/2023 2124 to 10/08/2023 2346         Date/Time Order Dose Route Action Action by     10/08/2023 2210 EDT HYDROmorphone (Dilaudid) injection 1 mg 1 mg intravenous Not Given [facility], [facility]     10/08/2023 2225 EDT ondansetron (Zofran) injection 4 mg 4 mg intravenous Given [facility], [facility]     10/08/2023 2226 EDT diphenhydrAMINE (BENADryl) capsule 50 mg 50 mg oral Given [facility], [facility]     10/08/2023 2304 EDT HYDROmorphone (Dilaudid)  injection 2 mg 2 mg intravenous Given [facility]     10/08/2023 4461 EDT lactated Ringer's infusion 125 mL/hr intravenous New Bag [facility]es                   Diagnoses as of 10/08/23 2346   Sickle cell pain crisis (CMS/HCC)       Medical Decision Making  Sickle cell pain crisis typical of others.  No fevers chills.  No cough sputum hemoptysis or dyspnea or shortness of breath.  No clinical suspicion of chest syndrome.  No significant abdominal pain to suggest splenic issues acutely.  IV fluids, analgesics, antiemetics and reassessment.      Labs relatively benign for patient.  Resting comfortably overall.  Further dosing of analgesics and then likely discharged if pain controlled well enough.          Procedure  Critical Care    Performed by: Roby Rojas MD MPH  Authorized by: Roby Rojas MD MPH    Critical care provider statement:     Critical care time (minutes):  38    Critical care time was exclusive of:  Separately billable procedures and treating other patients    Critical care was necessary to treat or prevent imminent or life-threatening deterioration of the following conditions:  Dehydration (Fluid resuscitation for sickle cell pain crisis.)                   Roby Rojas MD MPH  10/08/23 2154       Roby Rojas MD MPH  10/08/23 2343       Roby Rojas MD MPH  10/08/23 2346       Roby Rojas MD MPH  10/09/23 0034       Roby Rojas MD MPH  11/05/23 0916

## 2023-10-09 NOTE — PROGRESS NOTES
S: Patient here for 3rd opioid sparing pain infusion. Patient reports he doesn't remember how much relief he got after the last infusion.     Purpose of pain infusion meds explained along with potential side effects.  Patient verbalized understanding.    B: Pain Issues    A: Patient currently has pain described on flow sheet documentation. Designated  is his wife. Patient last ate solid food 12+ hours ago, and had liquid 1 hours ago.    R: Plan; Obtain IV access, do patient risk assessment, and start opioid sparing infusion as ordered. Monitoring for S/S of adverse reactions.    1115-Post infusion teaching provided via handout and verbal instruction. Patient verbalized understanding. VSS, Patient states pain is 2/10. Will assist patient to waiting car via wheelchair.

## 2023-10-14 ENCOUNTER — HOSPITAL ENCOUNTER (EMERGENCY)
Facility: HOSPITAL | Age: 43
Discharge: HOME | End: 2023-10-14
Attending: EMERGENCY MEDICINE
Payer: COMMERCIAL

## 2023-10-14 VITALS
HEART RATE: 91 BPM | DIASTOLIC BLOOD PRESSURE: 77 MMHG | BODY MASS INDEX: 22.29 KG/M2 | WEIGHT: 142 LBS | HEIGHT: 67 IN | TEMPERATURE: 97.3 F | RESPIRATION RATE: 18 BRPM | OXYGEN SATURATION: 95 % | SYSTOLIC BLOOD PRESSURE: 116 MMHG

## 2023-10-14 DIAGNOSIS — R22.1 SWOLLEN UVULA: Primary | ICD-10-CM

## 2023-10-14 DIAGNOSIS — J02.9 ACUTE PHARYNGITIS, UNSPECIFIED ETIOLOGY: ICD-10-CM

## 2023-10-14 PROCEDURE — 99283 EMERGENCY DEPT VISIT LOW MDM: CPT | Performed by: EMERGENCY MEDICINE

## 2023-10-14 PROCEDURE — 2500000001 HC RX 250 WO HCPCS SELF ADMINISTERED DRUGS (ALT 637 FOR MEDICARE OP): Mod: SE

## 2023-10-14 PROCEDURE — 99283 EMERGENCY DEPT VISIT LOW MDM: CPT | Performed by: STUDENT IN AN ORGANIZED HEALTH CARE EDUCATION/TRAINING PROGRAM

## 2023-10-14 RX ORDER — LIDOCAINE HYDROCHLORIDE 20 MG/ML
10 SOLUTION OROPHARYNGEAL 4 TIMES DAILY
Qty: 120 ML | Refills: 0 | Status: SHIPPED | OUTPATIENT
Start: 2023-10-14 | End: 2023-10-17

## 2023-10-14 RX ORDER — HYDROMORPHONE HYDROCHLORIDE 2 MG/1
TABLET ORAL
Status: COMPLETED
Start: 2023-10-14 | End: 2023-10-14

## 2023-10-14 RX ORDER — HYDROMORPHONE HYDROCHLORIDE 2 MG/1
4 TABLET ORAL ONCE
Status: COMPLETED | OUTPATIENT
Start: 2023-10-14 | End: 2023-10-14

## 2023-10-14 RX ADMIN — HYDROMORPHONE HYDROCHLORIDE 4 MG: 2 TABLET ORAL at 16:51

## 2023-10-14 RX ADMIN — DEXAMETHASONE INTENSOL 10 MG: 1 SOLUTION, CONCENTRATE ORAL at 17:11

## 2023-10-14 ASSESSMENT — COLUMBIA-SUICIDE SEVERITY RATING SCALE - C-SSRS
6. HAVE YOU EVER DONE ANYTHING, STARTED TO DO ANYTHING, OR PREPARED TO DO ANYTHING TO END YOUR LIFE?: NO
2. HAVE YOU ACTUALLY HAD ANY THOUGHTS OF KILLING YOURSELF?: NO
1. IN THE PAST MONTH, HAVE YOU WISHED YOU WERE DEAD OR WISHED YOU COULD GO TO SLEEP AND NOT WAKE UP?: NO

## 2023-10-14 NOTE — ED PROVIDER NOTES
Emergency Department Encounter  [facility] EMERGENCY MEDICINE    Patient: Xu Maya  MRN: [id number]  : 1980  Date of Evaluation: 10/14/2023  ED Provider: CHRIS Small      Chief Complaint       Chief Complaint   Patient presents with    Sore Throat     [place]    (Location/Symptom, Timing/Onset, Context/Setting, Quality, Duration, Modifying Factors, Severity) Note limiting factors.   Limitations to History: None  Historian: Patient and wife  Records reviewed: EMR inpatient and outpatient notes, Care Everywhere      Xu Maya is a 42 y.o. male who presents to the emergency department complaining of sore throat and cough since around 3 AM today.  He reports sore throat woke him out of his sleep.  He denies any interventions.  He reports history of sickle cell, states unable to take pain medication for sickle cell due to pain with swallowing.  He denies fevers, rhinorrhea, neck swelling, inability to maintain secretions, trismus, ear pain, headache, dizziness, nausea/vomiting, abdominal pain, chest pain, shortness of breath or rash.    ROS:     Review of Systems  14 systems reviewed and otherwise acutely negative except as in the [place].          Past History     Past Medical History:   Diagnosis Date    Anxiety disorder, unspecified 2017    Anxiety    Depression, unspecified 2017    Depression    Family history of glaucoma 2017    Family history of glaucoma in father    Gastritis, unspecified, without bleeding 2017    Gastritis    Hematuria, unspecified 2017    Hematuria    Personal history of other diseases of the digestive system 2015    History of esophageal reflux    Priapism, unspecified 2017    Priapism    Sickle-cell disease without crisis (CMS/[facility]) 2017    Sickle cell anemia     Past Surgical History:   Procedure Laterality Date    GALLBLADDER SURGERY  2017    Gallbladder Surgery    IR CVC TUNNELED  2018    IR  CVC TUNNELED 4/24/2018 [facility]IB LEGACY    IR CVC TUNNELED  6/5/2018    IR CVC TUNNELED 6/5/2018 [facility]    IR CVC TUNNELED  3/1/2019    IR CVC TUNNELED 3/1/2019 [facility]    IR CVC TUNNELED  6/4/2019    IR CVC TUNNELED 6/4/2019 [facility] CLINICAL LEGACY    IR CVC TUNNELED  4/5/2019    IR CVC TUNNELED 4/5/2019 [facility]    IR CVC TUNNELED  7/17/2019    IR CVC TUNNELED 7/17/2019 [facility]    IR CVC TUNNELED  8/14/2019    IR CVC TUNNELED 8/14/2019 [facility]    IR CVC TUNNELED  12/18/2019    IR CVC TUNNELED 12/18/2019 [facility] CLINICAL LEGACY    IR CVC TUNNELED  10/9/2019    IR CVC TUNNELED 10/9/2019 [facility]    IR CVC TUNNELED  11/13/2019    IR CVC TUNNELED 11/13/2019 [facility] CLINICAL LEGACY    IR CVC TUNNELED  1/15/2020    IR CVC TUNNELED 1/15/2020 [facility] CLINICAL LEGACY    IR CVC TUNNELED  2/19/2020    IR CVC TUNNELED 2/19/2020 [facility] CLINICAL LEGACY    IR CVC TUNNELED  1/4/2021    IR CVC TUNNELED 1/4/2021 [facility]    IR CVC TUNNELED  1/25/2021    IR CVC TUNNELED 1/25/2021 [facility]CILLARY LEGAC[facility]    IR CVC TUNNELED  8/21/2018    IR CVC TUNNELED 8/21/2018 [facility]    IR CVC TUNNELED  9/26/2018    IR CVC TUNNELED 9/26/2018 [facility]    IR CVC TUNNELED  11/5/2018    IR CVC TUNNELED 11/5/2018 [facility]    IR CVC TUNNELED  12/19/2018    IR CVC TUNNELED 12/19/2018 [facility]    IR VENOGRAM HEPATIC  11/8/2017    IR VENOGRAM HEPATIC 11/8/2017 [facility]    MR HEAD ANGIO WO IV CONTRAST  2/17/2017    MR HEAD ANGIO WO IV CONTRAST 2/17/2017 [facility] CLINICAL LEGACY    MR NECK ANGIO WO IV CONTRAST  2/17/2017    MR NECK ANGIO WO IV CONTRAST 2/17/2017 [facility] CLINICAL LEGACY    US GUIDED NEEDLE LIVER BIOPSY  9/8/2020    US GUIDED NEEDLE LIVER BIOPSY 9/8/2020 [facility]     Social History     Socioeconomic History    Marital status:      Spouse name: None    Number of children: None    Years of education: None    Highest education level: None   Occupational History    None   Tobacco Use     Smoking status: Every Day     Types: Cigarettes    Smokeless tobacco: Never   Substance and Sexual Activity    Alcohol use: None    Drug use: None    Sexual activity: None   Other Topics Concern    None   Social History Narrative    None     Social Determinants of Health     Financial Resource Strain: Not on file   Food Insecurity: Not on file   Transportation Needs: Not on file   Physical Activity: Not on file   Stress: Not on file   Social Connections: Not on file   Intimate Partner Violence: Not on file   Housing Stability: Not on file       Medications/Allergies     Previous Medications    ACYCLOVIR (ZOVIRAX) 400 MG TABLET    Take 1 tablet (400 mg) by mouth 2 times a day.    AMITRIPTYLINE (ELAVIL) 25 MG TABLET    Take 1 tablet (25 mg) by mouth.    CETIRIZINE (ZYRTEC) 10 MG TABLET    Take 1 tablet (10 mg) by mouth once daily.    CHOLECALCIFEROL (VITAMIN D-3) 50 MCG (2000 UT) TABLET    Take 1 tablet (2,000 Units) by mouth.    CHOLECALCIFEROL, VITAMIN D3, 50 MCG (2,000 UNIT) TABLET,CHEWABLE    Chew 1 tablet once daily.    DIPHENHYDRAMINE (BENADRYL) 25 MG CAPSULE    Take 1 capsule (25 mg) by mouth every 6 hours if needed.    DULOXETINE (CYMBALTA) 60 MG DR CAPSULE    Take 1 capsule (60 mg) by mouth 2 times a day.    ERGOCALCIFEROL (VITAMIN D-2) 1.25 MG (53125 UT) CAPSULE    Take 1 capsule (1,250 mcg) by mouth 1 (one) time per week.    FOLIC ACID (FOLVITE) 1 MG TABLET    Take 1 tablet (1 mg) by mouth once daily.    GABAPENTIN (NEURONTIN) 100 MG CAPSULE    Take 1 capsule (100 mg) by mouth once daily at bedtime.  1 CAP(S) ORALLY AT BEDTIME FOR 7 DAYS THEN INCREASE TO TWICE A DAY    HYDROMORPHONE (DILAUDID) 4 MG TABLET    Take 1 tablet (4 mg) by mouth every 4 hours if needed for severe pain (7 - 10) for up to 7 days.    MIRTAZAPINE (REMERON) 7.5 MG TABLET    Take 1 tablet (7.5 mg) by mouth once daily at bedtime.    NALOXONE (NARCAN) 4 MG/0.1 ML NASAL SPRAY    Administer 1 spray (4 mg) into affected nostril(s) if  needed for opioid reversal or respiratory depression. 1 spray to nostril for overdose, may repeat every 2-3 minutes until medical assistance arrives    OMEPRAZOLE (PRILOSEC) 40 MG DR CAPSULE    Take 1 capsule (40 mg) by mouth once daily in the morning. Take before meals.  TAKE ONE CAPSULE BY MOUTH EVERY DAY IN THE MORNING BEFORE EATING    REZUROCK 200 MG TABLET    Take 200 mg by mouth.    TACROLIMUS-VEHICLE BASE NO.238 0.1 % CREAM    Apply topically.  Take as directed     Allergies   Allergen Reactions    Hydrocodone-Acetaminophen Unknown    Naproxen Unknown        Physical Exam       ED Triage Vitals [10/14/23 1431]   Temp Heart Rate Resp BP   36.3 °C (97.3 °F) 91 18 116/77      SpO2 Temp src Heart Rate Source Patient Position   (!) 9 % -- -- Sitting      BP Location FiO2 (%)     Right arm --         Physical Exam  Vitals and nursing note reviewed.   Constitutional:       General: He is not in acute distress.     Appearance: He is well-developed. He is not toxic-appearing.   HENT:      Head: Normocephalic and atraumatic.      Right Ear: Tympanic membrane and ear canal normal.      Left Ear: Tympanic membrane and ear canal normal.      Nose: No congestion or rhinorrhea.      Mouth/Throat:      Mouth: Mucous membranes are moist. No oral lesions.      Pharynx: Uvula midline. Posterior oropharyngeal erythema and uvula swelling present. No oropharyngeal exudate.      Tonsils: No tonsillar exudate or tonsillar abscesses. 2+ on the right. 2+ on the left.   Eyes:      Conjunctiva/sclera: Conjunctivae normal.      Pupils: Pupils are equal, round, and reactive to light.   Cardiovascular:      Rate and Rhythm: Normal rate and regular rhythm.      Heart sounds: Normal heart sounds.   Pulmonary:      Effort: Pulmonary effort is normal.      Breath sounds: Normal breath sounds.   Abdominal:      General: Bowel sounds are normal.      Palpations: Abdomen is soft.   Musculoskeletal:      Cervical back: Normal range of motion and  neck supple.   Skin:     General: Skin is warm and dry.   Neurological:      General: No focal deficit present.      Mental Status: He is alert and oriented to person, place, and time.   Psychiatric:         Mood and Affect: Mood normal.         Behavior: Behavior normal.       Diagnostics   Labs:  Labs Reviewed - No data to display  Radiographs:  No orders to display       Procedures: N/A      EKG: N/A          In brief, Xu Maya is a 42 y.o. male who presented to the emergency department with chief complaint of sore throat and cough since around 3 AM today.  Vital signs reviewed and within normal limits.  Afebrile.  Nontoxic well-appearing.  No acute distress noted.  Physical exam unremarkable, except tonsillar Erythema and swelling to uvula. Uvula is midline. No adenopathy. Centor score 0, Rapid strep Negative.  No sublingual or lingual swelling, no angioedema, lungs clear throughout, no hypoxia or dyspnea, no tachycardia, no tachypnea, no adenopathy, no swelling. No evidence of PTA, deep neck infection, Philip's angina, or Sepsis. Likely pharyngitis due to history and physical exam findings. No further labs or imaging indicated at this time.  Patient requested sickle cell pain medication for lower leg pain, states unable to take prior to to arrival due to discomfort with swallowing.  Denies chest pain or shortness of breath.  Oral dexamethasone 10 mg and Dilaudid 4 mg p.o. administered, patient tolerated well.  Care reviewed with Dr. Jason Weber. home-going with viscous lidocaine. Discussed with patient the Differential diagnoses.Educated patient on symptoms that require immediate medical intervention, Such as inability to maintain secretions, drooling, and neck swelling or sensation of throat closing. Encouraged patient to continue supportive care measures and symptom management. Educated to gargle with warm salt water, lozenges and honey or lemon tea. Encouraged patient to stay well-hydrated and  "educated on Tylenol/ibuprofen if fever develops. Educated patient on handwashing. Advised patient to follow up with PCP in3-5 days or return to clinic or go to ED with any new or worsening symptoms such as inability to maintain secretions, drooling, and neck swelling, sensation of throat closing, high fevers, chest pain or SOB. Patient verbalized understanding , agreed with plan of care and left in stable condition.        Plan   Uvula swelling/pharyngitis  1.  Dexamethasone 10 mg  2.  Viscous lidocaine  3.  Follow-up with PCP  Differentials   Swelling uvula  Strep pharyngitis  Pharyngitis  Angioedema  epiglottitis,   PTA    Ludwigs angina      ED Course   Visit Vitals  /77 (BP Location: Right arm, Patient Position: Sitting)   Pulse 91   Temp 36.3 °C (97.3 °F)   Resp 18   Ht 1.702 m (5' 7\")   Wt 64.4 kg (142 lb)   SpO2 95%   BMI 22.24 kg/m²   Smoking Status Every Day   BSA 1.74 m²       Medications   dexAMETHasone (Decadron) oral CONCENTRATE 10 mg (has no administration in time range)   HYDROmorphone (Dilaudid) tablet 4 mg (4 mg oral Given 10/14/23 1651)       Plan of care discussed with patient and wife.  Case reviewed with Dr. Jason Weber.      Final Impression      1. Swollen uvula          DISPOSITION  Disposition: Discharge  Patient condition is: Stable    Comment: Please note this report has been produced using speech recognition software and may contain errors related to that system including errors in grammar, punctuation, and spelling, as well as words and phrases that may be inappropriate.  If there are any questions or concerns please feel free to contact the dictating provider for clarification.    CHRIS Small APRN-CNP  10/14/23 1710    "

## 2023-10-14 NOTE — ED TRIAGE NOTES
Patient presents to the Emergency department with a chief complaint of sore throat since this morning

## 2023-10-14 NOTE — DISCHARGE INSTRUCTIONS
May use viscous lidocaine 4 times a day as needed for discomfort.  Drink cool fluids for comfort  May take ibuprofen/Tylenol for discomfort.  Follow-up with PCP as needed.  Return to ER for new symptoms or worsening symptoms Including inability to maintain secretions or compromise in airway.

## 2023-10-16 ENCOUNTER — OFFICE VISIT (OUTPATIENT)
Dept: HEMATOLOGY/ONCOLOGY | Facility: HOSPITAL | Age: 43
End: 2023-10-16
Payer: COMMERCIAL

## 2023-10-16 ENCOUNTER — TELEPHONE (OUTPATIENT)
Dept: ADMISSION | Facility: HOSPITAL | Age: 43
End: 2023-10-16

## 2023-10-16 VITALS
TEMPERATURE: 97.7 F | OXYGEN SATURATION: 97 % | HEART RATE: 86 BPM | DIASTOLIC BLOOD PRESSURE: 76 MMHG | SYSTOLIC BLOOD PRESSURE: 128 MMHG | RESPIRATION RATE: 18 BRPM

## 2023-10-16 DIAGNOSIS — D57.00 SICKLE CELL DISEASE WITH CRISIS (MULTI): ICD-10-CM

## 2023-10-16 DIAGNOSIS — D57.00 SICKLE CELL CRISIS (MULTI): Primary | ICD-10-CM

## 2023-10-16 DIAGNOSIS — D57.00 SICKLE CELL CRISIS (MULTI): ICD-10-CM

## 2023-10-16 DIAGNOSIS — J02.9 SORE THROAT: ICD-10-CM

## 2023-10-16 LAB
ALBUMIN SERPL BCP-MCNC: 3.7 G/DL (ref 3.4–5)
ALP SERPL-CCNC: 106 U/L (ref 33–120)
ALT SERPL W P-5'-P-CCNC: 11 U/L (ref 10–52)
ANION GAP SERPL CALC-SCNC: 12 MMOL/L (ref 10–20)
AST SERPL W P-5'-P-CCNC: 13 U/L (ref 9–39)
BASOPHILS # BLD AUTO: 0.03 X10*3/UL (ref 0–0.1)
BASOPHILS NFR BLD AUTO: 0.2 %
BILIRUB SERPL-MCNC: 0.4 MG/DL (ref 0–1.2)
BUN SERPL-MCNC: 8 MG/DL (ref 6–23)
BURR CELLS BLD QL SMEAR: NORMAL
CALCIUM SERPL-MCNC: 8.9 MG/DL (ref 8.6–10.3)
CHLORIDE SERPL-SCNC: 105 MMOL/L (ref 98–107)
CO2 SERPL-SCNC: 26 MMOL/L (ref 21–32)
CREAT SERPL-MCNC: 0.93 MG/DL (ref 0.5–1.3)
DACRYOCYTES BLD QL SMEAR: NORMAL
EOSINOPHIL # BLD AUTO: 0.16 X10*3/UL (ref 0–0.7)
EOSINOPHIL NFR BLD AUTO: 1.1 %
ERYTHROCYTE [DISTWIDTH] IN BLOOD BY AUTOMATED COUNT: 13.3 % (ref 11.5–14.5)
GFR SERPL CREATININE-BSD FRML MDRD: >90 ML/MIN/1.73M*2
GLUCOSE SERPL-MCNC: 142 MG/DL (ref 74–99)
HCT VFR BLD AUTO: 43.7 % (ref 41–52)
HGB BLD-MCNC: 15.5 G/DL (ref 13.5–17.5)
HGB RETIC QN: 37 PG (ref 28–38)
IMM GRANULOCYTES # BLD AUTO: 0.05 X10*3/UL (ref 0–0.7)
IMM GRANULOCYTES NFR BLD AUTO: 0.3 % (ref 0–0.9)
IMMATURE RETIC FRACTION: 11.5 %
LDH SERPL L TO P-CCNC: 164 U/L (ref 84–246)
LYMPHOCYTES # BLD AUTO: 5.45 X10*3/UL (ref 1.2–4.8)
LYMPHOCYTES NFR BLD AUTO: 36.4 %
MCH RBC QN AUTO: 31.3 PG (ref 26–34)
MCHC RBC AUTO-ENTMCNC: 35.5 G/DL (ref 32–36)
MCV RBC AUTO: 88 FL (ref 80–100)
MONOCYTES # BLD AUTO: 1.13 X10*3/UL (ref 0.1–1)
MONOCYTES NFR BLD AUTO: 7.5 %
NEUTROPHILS # BLD AUTO: 8.15 X10*3/UL (ref 1.2–7.7)
NEUTROPHILS NFR BLD AUTO: 54.5 %
NRBC BLD-RTO: 0 /100 WBCS (ref 0–0)
PAPPENHEIMER BOD BLD QL SMEAR: PRESENT
PLATELET # BLD AUTO: 273 X10*3/UL (ref 150–450)
PMV BLD AUTO: 8.6 FL (ref 7.5–11.5)
POLYCHROMASIA BLD QL SMEAR: NORMAL
POTASSIUM SERPL-SCNC: 3.4 MMOL/L (ref 3.5–5.3)
PROT SERPL-MCNC: 6.5 G/DL (ref 6.4–8.2)
RBC # BLD AUTO: 4.95 X10*6/UL (ref 4.5–5.9)
RBC MORPH BLD: NORMAL
RETICS #: 0.08 X10*6/UL (ref 0.02–0.12)
RETICS/RBC NFR AUTO: 1.6 % (ref 0.5–2)
SARS-COV-2 RNA RESP QL NAA+PROBE: NOT DETECTED
SODIUM SERPL-SCNC: 140 MMOL/L (ref 136–145)
TARGETS BLD QL SMEAR: NORMAL
WBC # BLD AUTO: 15 X10*3/UL (ref 4.4–11.3)

## 2023-10-16 PROCEDURE — 80053 COMPREHEN METABOLIC PANEL: CPT | Performed by: NURSE PRACTITIONER

## 2023-10-16 PROCEDURE — 85025 COMPLETE CBC W/AUTO DIFF WBC: CPT | Performed by: NURSE PRACTITIONER

## 2023-10-16 PROCEDURE — 87635 SARS-COV-2 COVID-19 AMP PRB: CPT | Mod: CMCLAB | Performed by: NURSE PRACTITIONER

## 2023-10-16 PROCEDURE — 96523 IRRIG DRUG DELIVERY DEVICE: CPT

## 2023-10-16 PROCEDURE — 36591 DRAW BLOOD OFF VENOUS DEVICE: CPT

## 2023-10-16 PROCEDURE — 83615 LACTATE (LD) (LDH) ENZYME: CPT | Performed by: NURSE PRACTITIONER

## 2023-10-16 PROCEDURE — 2500000001 HC RX 250 WO HCPCS SELF ADMINISTERED DRUGS (ALT 637 FOR MEDICARE OP): Performed by: NURSE PRACTITIONER

## 2023-10-16 PROCEDURE — 85045 AUTOMATED RETICULOCYTE COUNT: CPT | Performed by: NURSE PRACTITIONER

## 2023-10-16 PROCEDURE — 99215 OFFICE O/P EST HI 40 MIN: CPT | Performed by: NURSE PRACTITIONER

## 2023-10-16 RX ORDER — HYDROMORPHONE HYDROCHLORIDE 4 MG/1
12 TABLET ORAL ONCE
Status: COMPLETED | OUTPATIENT
Start: 2023-10-16 | End: 2023-10-16

## 2023-10-16 RX ORDER — HYDROMORPHONE HYDROCHLORIDE 4 MG/1
4 TABLET ORAL EVERY 4 HOURS PRN
Qty: 42 TABLET | Refills: 0 | Status: SHIPPED | OUTPATIENT
Start: 2023-10-16 | End: 2023-10-23 | Stop reason: SDUPTHER

## 2023-10-16 RX ORDER — CYCLOBENZAPRINE HCL 10 MG
10 TABLET ORAL 3 TIMES DAILY
Qty: 30 TABLET | Refills: 0 | Status: SHIPPED | OUTPATIENT
Start: 2023-10-16 | End: 2023-11-10 | Stop reason: SDUPTHER

## 2023-10-16 RX ORDER — CYCLOBENZAPRINE HCL 10 MG
10 TABLET ORAL ONCE
Status: COMPLETED | OUTPATIENT
Start: 2023-10-16 | End: 2023-10-16

## 2023-10-16 RX ADMIN — HYDROMORPHONE HYDROCHLORIDE 12 MG: 4 TABLET ORAL at 13:31

## 2023-10-16 RX ADMIN — HYDROMORPHONE HYDROCHLORIDE 12 MG: 4 TABLET ORAL at 10:52

## 2023-10-16 RX ADMIN — HYDROMORPHONE HYDROCHLORIDE 12 MG: 4 TABLET ORAL at 12:27

## 2023-10-16 RX ADMIN — CYCLOBENZAPRINE 10 MG: 10 TABLET, FILM COATED ORAL at 10:52

## 2023-10-16 ASSESSMENT — PAIN SCALES - GENERAL
PAINLEVEL_OUTOF10: 9
PAINLEVEL_OUTOF10: 5 - MODERATE PAIN
PAINLEVEL_OUTOF10: 9

## 2023-10-16 ASSESSMENT — ENCOUNTER SYMPTOMS
EYES NEGATIVE: 1
BACK PAIN: 1
GASTROINTESTINAL NEGATIVE: 1
PSYCHIATRIC NEGATIVE: 1
SORE THROAT: 1
HEMATOLOGIC/LYMPHATIC NEGATIVE: 1
RESPIRATORY NEGATIVE: 1
ENDOCRINE NEGATIVE: 1
FATIGUE: 1
NEUROLOGICAL NEGATIVE: 1
CARDIOVASCULAR NEGATIVE: 1

## 2023-10-16 ASSESSMENT — PAIN SCALES - PAIN ASSESSMENT IN ADVANCED DEMENTIA (PAINAD): BODYLANGUAGE: TENSE, DISTRESSED PACING, FIDGETING
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
## 2023-10-16 NOTE — PROGRESS NOTES
Patient ID: Xu Maya is a 42 y.o. male.  Referring Physician: No referring provider defined for this encounter.  Primary Care Provider: No primary care provider on file.      Subjective    HPI    Review of Systems - Oncology     Objective   BSA: There is no height or weight on file to calculate BSA.  There were no vitals taken for this visit.    Family History   Problem Relation Name Age of Onset    Diabetes Father      Sickle cell anemia Other sibling     Cancer Other grandfather     Cancer Other uncle      Oncology History    No history exists.       Xu Maya  reports that he has been smoking cigarettes. He has never used smokeless tobacco.  He  has no history on file for alcohol use.  He  has no history on file for drug use.    Physical Exam    Performance Status:  {ECOG performance status:49656}    Assessment/Plan      {TIP  This patient is 42 y.o. and has current or future reproductive potential. Ask the patient about fertility preservation.   If an oncofertility consultation is indicated, place a referral.(Optional):64139}            {Assess/PlanSmartLinks:04208}    {TIP  To refer patient to the [facility] access the Wrap-Up Tab, go to Communications and use the [facility] template or click here  [facility] Referral (Optional):33840}               [facility] ACUTE CARE CLINIC

## 2023-10-19 DIAGNOSIS — Z94.84 STEM CELLS TRANSPLANT STATUS (MULTI): Primary | ICD-10-CM

## 2023-10-20 ENCOUNTER — LAB (OUTPATIENT)
Dept: LAB | Facility: HOSPITAL | Age: 43
End: 2023-10-20
Payer: COMMERCIAL

## 2023-10-20 ENCOUNTER — APPOINTMENT (OUTPATIENT)
Dept: HEMATOLOGY/ONCOLOGY | Facility: HOSPITAL | Age: 43
End: 2023-10-20
Payer: COMMERCIAL

## 2023-10-20 ENCOUNTER — OFFICE VISIT (OUTPATIENT)
Dept: HEMATOLOGY/ONCOLOGY | Facility: HOSPITAL | Age: 43
End: 2023-10-20
Payer: COMMERCIAL

## 2023-10-20 VITALS
WEIGHT: 143.3 LBS | TEMPERATURE: 97.5 F | HEART RATE: 94 BPM | SYSTOLIC BLOOD PRESSURE: 114 MMHG | BODY MASS INDEX: 22.49 KG/M2 | OXYGEN SATURATION: 97 % | DIASTOLIC BLOOD PRESSURE: 82 MMHG | RESPIRATION RATE: 17 BRPM | HEIGHT: 67 IN

## 2023-10-20 DIAGNOSIS — Z94.84 STEM CELLS TRANSPLANT STATUS (MULTI): ICD-10-CM

## 2023-10-20 DIAGNOSIS — D89.811 CHRONIC GVHD (MULTI): Primary | ICD-10-CM

## 2023-10-20 LAB
ALBUMIN SERPL BCP-MCNC: 4 G/DL (ref 3.4–5)
ALP SERPL-CCNC: 109 U/L (ref 33–120)
ALT SERPL W P-5'-P-CCNC: 14 U/L (ref 10–52)
ANION GAP SERPL CALC-SCNC: 10 MMOL/L (ref 10–20)
AST SERPL W P-5'-P-CCNC: 13 U/L (ref 9–39)
BASOPHILS # BLD AUTO: 0.02 X10*3/UL (ref 0–0.1)
BASOPHILS NFR BLD AUTO: 0.2 %
BILIRUB SERPL-MCNC: 0.4 MG/DL (ref 0–1.2)
BUN SERPL-MCNC: 7 MG/DL (ref 6–23)
CALCIUM SERPL-MCNC: 9.4 MG/DL (ref 8.6–10.3)
CHLORIDE SERPL-SCNC: 108 MMOL/L (ref 98–107)
CO2 SERPL-SCNC: 27 MMOL/L (ref 21–32)
CREAT SERPL-MCNC: 0.79 MG/DL (ref 0.5–1.3)
EOSINOPHIL # BLD AUTO: 0.37 X10*3/UL (ref 0–0.7)
EOSINOPHIL NFR BLD AUTO: 3.5 %
ERYTHROCYTE [DISTWIDTH] IN BLOOD BY AUTOMATED COUNT: 13.6 % (ref 11.5–14.5)
GFR SERPL CREATININE-BSD FRML MDRD: >90 ML/MIN/1.73M*2
GLUCOSE SERPL-MCNC: 137 MG/DL (ref 74–99)
HCT VFR BLD AUTO: 42.9 % (ref 41–52)
HGB BLD-MCNC: 15.7 G/DL (ref 13.5–17.5)
IMM GRANULOCYTES # BLD AUTO: 0.03 X10*3/UL (ref 0–0.7)
IMM GRANULOCYTES NFR BLD AUTO: 0.3 % (ref 0–0.9)
LYMPHOCYTES # BLD AUTO: 3.71 X10*3/UL (ref 1.2–4.8)
LYMPHOCYTES NFR BLD AUTO: 34.8 %
MCH RBC QN AUTO: 32 PG (ref 26–34)
MCHC RBC AUTO-ENTMCNC: 36.6 G/DL (ref 32–36)
MCV RBC AUTO: 87 FL (ref 80–100)
MONOCYTES # BLD AUTO: 0.84 X10*3/UL (ref 0.1–1)
MONOCYTES NFR BLD AUTO: 7.9 %
NEUTROPHILS # BLD AUTO: 5.68 X10*3/UL (ref 1.2–7.7)
NEUTROPHILS NFR BLD AUTO: 53.3 %
NRBC BLD-RTO: 0 /100 WBCS (ref 0–0)
PLATELET # BLD AUTO: 240 X10*3/UL (ref 150–450)
PMV BLD AUTO: 8.5 FL (ref 7.5–11.5)
POTASSIUM SERPL-SCNC: 3.8 MMOL/L (ref 3.5–5.3)
PROT SERPL-MCNC: 6.6 G/DL (ref 6.4–8.2)
RBC # BLD AUTO: 4.91 X10*6/UL (ref 4.5–5.9)
SODIUM SERPL-SCNC: 141 MMOL/L (ref 136–145)
WBC # BLD AUTO: 10.7 X10*3/UL (ref 4.4–11.3)

## 2023-10-20 PROCEDURE — 99215 OFFICE O/P EST HI 40 MIN: CPT | Performed by: STUDENT IN AN ORGANIZED HEALTH CARE EDUCATION/TRAINING PROGRAM

## 2023-10-20 PROCEDURE — 85025 COMPLETE CBC W/AUTO DIFF WBC: CPT

## 2023-10-20 PROCEDURE — 80053 COMPREHEN METABOLIC PANEL: CPT

## 2023-10-20 PROCEDURE — 87799 DETECT AGENT NOS DNA QUANT: CPT | Mod: CMCLAB

## 2023-10-20 PROCEDURE — 36415 COLL VENOUS BLD VENIPUNCTURE: CPT

## 2023-10-20 RX ORDER — BELUMOSUDIL 200 MG/1
200 TABLET ORAL 2 TIMES DAILY
Qty: 60 TABLET | Refills: 0 | Status: SHIPPED | OUTPATIENT
Start: 2023-10-20 | End: 2023-12-20 | Stop reason: SDUPTHER

## 2023-10-20 ASSESSMENT — ENCOUNTER SYMPTOMS
DEPRESSION: 0
LOSS OF SENSATION IN FEET: 0
OCCASIONAL FEELINGS OF UNSTEADINESS: 0

## 2023-10-20 ASSESSMENT — PAIN SCALES - GENERAL: PAINLEVEL: 4

## 2023-10-22 LAB
CMV DNA SERPL NAA+PROBE-LOG IU: NORMAL {LOG_IU}/ML
LABORATORY COMMENT REPORT: NOT DETECTED
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
## 2023-10-23 ENCOUNTER — APPOINTMENT (OUTPATIENT)
Dept: HEMATOLOGY/ONCOLOGY | Facility: HOSPITAL | Age: 43
End: 2023-10-23
Payer: COMMERCIAL

## 2023-10-23 ENCOUNTER — TELEPHONE (OUTPATIENT)
Dept: ADMISSION | Facility: HOSPITAL | Age: 43
End: 2023-10-23
Payer: COMMERCIAL

## 2023-10-23 DIAGNOSIS — D57.00 SICKLE CELL DISEASE WITH CRISIS (MULTI): ICD-10-CM

## 2023-10-23 LAB
EBV DNA BLD NAA+PROBE-ACNC: 3245 IU/ML
EBV DNA BLD NAA+PROBE-LOG IU: 3.51 LOG IU/ML
LABORATORY COMMENT REPORT: DETECTED

## 2023-10-23 RX ORDER — HYDROMORPHONE HYDROCHLORIDE 4 MG/1
4 TABLET ORAL EVERY 4 HOURS PRN
Qty: 42 TABLET | Refills: 0 | Status: SHIPPED | OUTPATIENT
Start: 2023-10-23 | End: 2023-10-30 | Stop reason: SDUPTHER

## 2023-10-23 NOTE — TELEPHONE ENCOUNTER
Pt requesting a refill of dilaudid 4mg q4hrs prn, #42.  Last rx'd 10/16.  He uses [facility] on [address], [facility].

## 2023-10-25 ENCOUNTER — INFUSION (OUTPATIENT)
Dept: INFUSION THERAPY | Facility: CLINIC | Age: 43
End: 2023-10-25
Payer: COMMERCIAL

## 2023-10-25 VITALS
OXYGEN SATURATION: 94 % | HEART RATE: 82 BPM | DIASTOLIC BLOOD PRESSURE: 71 MMHG | SYSTOLIC BLOOD PRESSURE: 102 MMHG | RESPIRATION RATE: 13 BRPM | TEMPERATURE: 97.3 F

## 2023-10-25 DIAGNOSIS — D57.00 SICKLE CELL CRISIS (MULTI): ICD-10-CM

## 2023-10-25 PROCEDURE — 96365 THER/PROPH/DIAG IV INF INIT: CPT | Mod: INF

## 2023-10-25 PROCEDURE — 2500000004 HC RX 250 GENERAL PHARMACY W/ HCPCS (ALT 636 FOR OP/ED): Performed by: NURSE PRACTITIONER

## 2023-10-25 PROCEDURE — 2500000004 HC RX 250 GENERAL PHARMACY W/ HCPCS (ALT 636 FOR OP/ED)

## 2023-10-25 PROCEDURE — 2500000005 HC RX 250 GENERAL PHARMACY W/O HCPCS: Performed by: NURSE PRACTITIONER

## 2023-10-25 PROCEDURE — 96375 TX/PRO/DX INJ NEW DRUG ADDON: CPT | Mod: INF

## 2023-10-25 PROCEDURE — 96368 THER/DIAG CONCURRENT INF: CPT | Mod: INF

## 2023-10-25 RX ORDER — KETOROLAC TROMETHAMINE 30 MG/ML
30 INJECTION, SOLUTION INTRAMUSCULAR; INTRAVENOUS ONCE
Status: COMPLETED | OUTPATIENT
Start: 2023-10-25 | End: 2023-10-25

## 2023-10-25 RX ORDER — KETOROLAC TROMETHAMINE 30 MG/ML
INJECTION, SOLUTION INTRAMUSCULAR; INTRAVENOUS
Status: COMPLETED
Start: 2023-10-25 | End: 2023-10-25

## 2023-10-25 RX ORDER — EPINEPHRINE 0.3 MG/.3ML
0.3 INJECTION SUBCUTANEOUS EVERY 5 MIN PRN
OUTPATIENT
Start: 2023-11-06

## 2023-10-25 RX ORDER — DIPHENHYDRAMINE HYDROCHLORIDE 50 MG/ML
50 INJECTION INTRAMUSCULAR; INTRAVENOUS AS NEEDED
OUTPATIENT
Start: 2023-11-06

## 2023-10-25 RX ORDER — HEPARIN SODIUM (PORCINE) LOCK FLUSH IV SOLN 100 UNIT/ML 100 UNIT/ML
5 SOLUTION INTRAVENOUS AS NEEDED
Status: ACTIVE | OUTPATIENT
Start: 2023-10-25

## 2023-10-25 RX ORDER — FAMOTIDINE 10 MG/ML
20 INJECTION INTRAVENOUS ONCE AS NEEDED
OUTPATIENT
Start: 2023-11-06

## 2023-10-25 RX ORDER — METOPROLOL TARTRATE 25 MG/1
25 TABLET, FILM COATED ORAL ONCE
OUTPATIENT
Start: 2023-11-06 | End: 2023-11-06

## 2023-10-25 RX ORDER — KETOROLAC TROMETHAMINE 30 MG/ML
30 INJECTION, SOLUTION INTRAMUSCULAR; INTRAVENOUS ONCE
OUTPATIENT
Start: 2023-11-06 | End: 2023-11-06

## 2023-10-25 RX ORDER — ALBUTEROL SULFATE 0.83 MG/ML
3 SOLUTION RESPIRATORY (INHALATION) AS NEEDED
OUTPATIENT
Start: 2023-11-06

## 2023-10-25 RX ORDER — NITROGLYCERIN 0.4 MG/1
0.4 TABLET SUBLINGUAL ONCE
OUTPATIENT
Start: 2023-11-06 | End: 2023-11-06

## 2023-10-25 RX ORDER — ONDANSETRON HYDROCHLORIDE 2 MG/ML
4 INJECTION, SOLUTION INTRAVENOUS ONCE
OUTPATIENT
Start: 2023-11-06 | End: 2023-11-06

## 2023-10-25 RX ORDER — HEPARIN 100 UNIT/ML
SYRINGE INTRAVENOUS
Status: COMPLETED
Start: 2023-10-25 | End: 2023-10-25

## 2023-10-25 RX ADMIN — KETOROLAC TROMETHAMINE 30 MG: 30 INJECTION, SOLUTION INTRAMUSCULAR; INTRAVENOUS at 11:28

## 2023-10-25 RX ADMIN — PROPOFOL 100 MG: 10 INJECTION, EMULSION INTRAVENOUS at 11:29

## 2023-10-25 RX ADMIN — Medication: at 11:29

## 2023-10-25 RX ADMIN — HEPARIN 500 UNITS: 100 SYRINGE at 12:06

## 2023-10-25 RX ADMIN — KETOROLAC TROMETHAMINE 30 MG: 30 INJECTION INTRAMUSCULAR; INTRAVENOUS at 11:28

## 2023-10-25 ASSESSMENT — ENCOUNTER SYMPTOMS
LOSS OF SENSATION IN FEET: 0
OCCASIONAL FEELINGS OF UNSTEADINESS: 0
DEPRESSION: 1

## 2023-10-25 ASSESSMENT — PAIN SCALES - GENERAL
PAINLEVEL_OUTOF10: 7
PAINLEVEL_OUTOF10: 7

## 2023-10-25 ASSESSMENT — PATIENT HEALTH QUESTIONNAIRE - PHQ9
2. FEELING DOWN, DEPRESSED OR HOPELESS: NOT AT ALL
SUM OF ALL RESPONSES TO PHQ9 QUESTIONS 1 AND 2: 0
1. LITTLE INTEREST OR PLEASURE IN DOING THINGS: NOT AT ALL

## 2023-10-25 ASSESSMENT — PAIN - FUNCTIONAL ASSESSMENT: PAIN_FUNCTIONAL_ASSESSMENT: 0-10

## 2023-10-25 ASSESSMENT — PAIN DESCRIPTION - DESCRIPTORS: DESCRIPTORS: ACHING

## 2023-10-25 NOTE — PROGRESS NOTES
[facility]   infusion Clinic Note   Date: 2023   Name: Xu Maya  : 1980   MRN: [id number]         Reason for Visit: No chief complaint on file.      Accompanied by:Significant Other   Visit Type:: Infusion   Diagnosis: Sickle cell crisis (CMS/HCC)    Allergies:   Allergies as of 10/25/2023 - Reviewed 10/25/2023   Allergen Reaction Noted    Hydrocodone-acetaminophen Unknown 10/06/2015    Naproxen Unknown 10/06/2015      Current Meds has a current medication list which includes the following prescription(s): amitriptyline, cetirizine, cholecalciferol, cyclobenzaprine, diphenhydramine, duloxetine, ergocalciferol, folic acid, gabapentin, hydromorphone, mirtazapine, naloxone, omeprazole, rezurock, acyclovir, and tacrolimus-vehicle base no.238, and the following Facility-Administered Medications: heparin lock flush (porcine).        Vitals:  Vitals:    10/25/23 1130 10/25/23 1145 10/25/23 1200 10/25/23 1208   BP: 110/78 122/84 108/74 102/71   BP Location: Right arm      Patient Position: Sitting      Pulse: 84 80 80 82   Resp: 16 14 12 13   Temp: 36.3 °C (97.3 °F)      TempSrc: Skin      SpO2: 96% 96% 94% 94%      Infusion Pre-procedure Checklist   Allergies reviewed: yes   Medications reviewed: yes   Contraindications to treatment:No   Previous reaction to current treatment:No   Current Health Issues: None   Pain: '    Is the pain different from normal: No   Is the pain tolerable: yes   Is your Doctor aware: n/a   Contraindications based on patient history: No   Provider notified: Not applicable   Labs: None   Fall Risk Screening:      Review of Systems - Oncology   Negative for complaint: [] all other systems have been reviewed and are negative for complaint   Infusion Readiness:   Assessment Concerns Related to Infusion: No  Provider notified: n/a  Assess patient for the concerns below. Document provider notification as appropriate:  - Does not meet criteria to treat  N/A  - Has an active or recent infection with/without current antibiotic use N/A  - Has recent/planned dental work N/A  - Has recent/planned surgeries N/A  - Has recently received or plans to receive vaccinations N/A  - Has treatment related toxicities N/A  - Is pregnant (unless noted otherwise) N/A    Initiated By: PRABHJOT Garcia-CNP   Time: 12:12 PM     We administered (ketamine 30 mg, lidocaine (Xylocaine) 20 mg/mL (2 %) 300 mg in sodium chloride 0.9% 500 mL IV), propofol (Diprivan) 100 mg in dextrose 5 % in water (D5W) 25 mL, ketorolac, ketorolac, and heparin flush.

## 2023-10-25 NOTE — PROGRESS NOTES
S: Patient here for 4th opioid sparing pain infusion. Zero relief reported from last infusion.    Purpose of pain infusion meds explained along with potential side effects.  Patient verbalized understanding.    B: Pain Issues    A: Patient currently has pain described on flow sheet documentation. Designated  is wife. Patient last ate solid food >6 hours ago, and had liquid >1 hours ago.    R: Plan; Obtain IV access, do patient risk assessment, and start opioid sparing infusion as ordered. Monitoring for S/S of adverse reactions.    1150: Patient awake, tolerating pain infusion well, no s/s of adverse reactions.

## 2023-10-25 NOTE — PATIENT INSTRUCTIONS
Post infusion teaching provided via handout and verbal instruction. Patient verbalized understanding. VSS, Patient states pain is Unchanged. Will assist patient to waiting car via wheelchair.

## 2023-10-26 ENCOUNTER — TELEMEDICINE (OUTPATIENT)
Dept: BEHAVIORAL HEALTH | Facility: HOSPITAL | Age: 43
End: 2023-10-26
Payer: COMMERCIAL

## 2023-10-26 ENCOUNTER — PHARMACY VISIT (OUTPATIENT)
Dept: PHARMACY | Facility: CLINIC | Age: 43
End: 2023-10-26
Payer: MEDICAID

## 2023-10-26 DIAGNOSIS — G89.4 CHRONIC PAIN DISORDER: ICD-10-CM

## 2023-10-26 DIAGNOSIS — F41.9 ANXIETY: ICD-10-CM

## 2023-10-26 DIAGNOSIS — F33.1 MODERATE EPISODE OF RECURRENT MAJOR DEPRESSIVE DISORDER (MULTI): ICD-10-CM

## 2023-10-26 PROBLEM — F41.8 DEPRESSION WITH ANXIETY: Status: RESOLVED | Noted: 2023-10-07 | Resolved: 2023-10-26

## 2023-10-26 PROCEDURE — 90833 PSYTX W PT W E/M 30 MIN: CPT | Performed by: PSYCHIATRY & NEUROLOGY

## 2023-10-26 PROCEDURE — 99214 OFFICE O/P EST MOD 30 MIN: CPT | Performed by: PSYCHIATRY & NEUROLOGY

## 2023-10-26 PROCEDURE — RXMED WILLOW AMBULATORY MEDICATION CHARGE

## 2023-10-26 RX ORDER — ARIPIPRAZOLE 2 MG/1
2 TABLET ORAL DAILY
Qty: 30 TABLET | Refills: 2 | Status: SHIPPED | OUTPATIENT
Start: 2023-10-26 | End: 2023-11-28

## 2023-10-26 RX ORDER — MIRTAZAPINE 7.5 MG/1
7.5 TABLET, FILM COATED ORAL NIGHTLY
Qty: 30 TABLET | Refills: 2 | Status: SHIPPED | OUTPATIENT
Start: 2023-10-26 | End: 2024-01-04 | Stop reason: SDUPTHER

## 2023-10-26 RX ORDER — DULOXETIN HYDROCHLORIDE 60 MG/1
60 CAPSULE, DELAYED RELEASE ORAL 2 TIMES DAILY
Qty: 60 CAPSULE | Refills: 2 | Status: SHIPPED | OUTPATIENT
Start: 2023-10-26 | End: 2023-11-13 | Stop reason: SDUPTHER

## 2023-10-26 ASSESSMENT — PATIENT HEALTH QUESTIONNAIRE - PHQ9
8. MOVING OR SPEAKING SO SLOWLY THAT OTHER PEOPLE COULD HAVE NOTICED. OR THE OPPOSITE, BEING SO FIGETY OR RESTLESS THAT YOU HAVE BEEN MOVING AROUND A LOT MORE THAN USUAL: SEVERAL DAYS
9. THOUGHTS THAT YOU WOULD BE BETTER OFF DEAD, OR OF HURTING YOURSELF: NOT AT ALL
4. FEELING TIRED OR HAVING LITTLE ENERGY: MORE THAN HALF THE DAYS
1. LITTLE INTEREST OR PLEASURE IN DOING THINGS: SEVERAL DAYS
6. FEELING BAD ABOUT YOURSELF - OR THAT YOU ARE A FAILURE OR HAVE LET YOURSELF OR YOUR FAMILY DOWN: SEVERAL DAYS
7. TROUBLE CONCENTRATING ON THINGS, SUCH AS READING THE NEWSPAPER OR WATCHING TELEVISION: NOT AT ALL
3. TROUBLE FALLING OR STAYING ASLEEP OR SLEEPING TOO MUCH: SEVERAL DAYS
2. FEELING DOWN, DEPRESSED OR HOPELESS: NEARLY EVERY DAY
5. POOR APPETITE OR OVEREATING: SEVERAL DAYS
10. IF YOU CHECKED OFF ANY PROBLEMS, HOW DIFFICULT HAVE THESE PROBLEMS MADE IT FOR YOU TO DO YOUR WORK, TAKE CARE OF THINGS AT HOME, OR GET ALONG WITH OTHER PEOPLE: SOMEWHAT DIFFICULT

## 2023-10-26 ASSESSMENT — ANXIETY QUESTIONNAIRES
2. NOT BEING ABLE TO STOP OR CONTROL WORRYING: NOT AT ALL
4. TROUBLE RELAXING: SEVERAL DAYS
5. BEING SO RESTLESS THAT IT IS HARD TO SIT STILL: SEVERAL DAYS
6. BECOMING EASILY ANNOYED OR IRRITABLE: SEVERAL DAYS
IF YOU CHECKED OFF ANY PROBLEMS ON THIS QUESTIONNAIRE, HOW DIFFICULT HAVE THESE PROBLEMS MADE IT FOR YOU TO DO YOUR WORK, TAKE CARE OF THINGS AT HOME, OR GET ALONG WITH OTHER PEOPLE: SOMEWHAT DIFFICULT
3. WORRYING TOO MUCH ABOUT DIFFERENT THINGS: NEARLY EVERY DAY
7. FEELING AFRAID AS IF SOMETHING AWFUL MIGHT HAPPEN: SEVERAL DAYS
1. FEELING NERVOUS, ANXIOUS, OR ON EDGE: SEVERAL DAYS
GAD7 TOTAL SCORE: 8

## 2023-10-26 NOTE — PROGRESS NOTES
Outpatient Psychiatry FUV      Subjective   Xu Maya, a 42 y.o. male, for virtual FUV, started on video but changed to phone 2/2 connection      Assessment/Plan   Patient Discussion:  continue duloxetine 60mg 2x day  Continue mirtazapine 7.5 mg at bedtime  START aripiprazole 2mg in the AM  monitor for excess serotonin    RETURN to clinic 1/4 at 11:30AM for virtual FUV    call with questions or concerns. [phone]     Assessment:   42 y.o. BM with SCC disease with depression, sickle cell disease now s/p BMT. Previously on suboxone for management of pain/high utilization but now off since transplant, still having pain and working to start ketamine     Since last appt, pt notes increased stress related to work and home. Interested in adjusting meds, given duloxetine to help with chronic pain, will augment with abilify and continue with mirtazapine for sleep. no si/sx of excess serotonin. Follow up 2 months sooner if needed    Diagnosis:   MDD, recurrent. Moderate  WILFRID  Chronic pain disorder    Treatment Plan/Recommendations:   1. Safety Assessment: no current SI, no h/o SI/SA. has guns at home but unloaded and locked with kids at home. PF include , no previous attempts, kids, Hoahaoism. RF include depression, pain, father with completed suicide. Pt has moderate baseline risk based on static factors but is not an imminent risk and safety plan addressed    2. MDD, WILFRID  CONTINUE duloxetine 60mg PO BID, r/b/ae discussed including higher dose of SNRI, si/sx excess serotonin/SS  CONTINUE mirtazapine 7.5mg PO QHS PRN insomnia  START aripiprazole 2mg PO daily     continue supportive therapy during appt    3. opioid misuse in the context of chronic pain 2/2 sickle cell disease with acute exacerbations  continues to struggle with chronic pain, working on starting ketamine    nicotine use disorder --previous success with chantix but stopped and now smoking 3-4/day. monitor for now    4. Medical: SCC, back pain,  GERD with h/o PUD: recent notes and labs reviewed. stable.   s/p BMT 1/2021  notes and labs reviewed,   coordinate care as needed with sickle cell team, BMT as needed    5. Social: lives with kids (8th/10th grade)and wife, moved to [place] after transplant, [place]. stepfather passed away from leukemia, sister passed from sickle cell. More stress at work and home    Reason for Visit:   FUV depression and anxiety    Subjective:  Since last visit notes increased stress   owner moved and now isn't paying bills etc  Did ketamine yesterday and didn't find beneficial for pain at all  Going to talk with Jeremy at next appt to see next options  Stress at home with son  Considering opening own  with wife    Would like to change medications given increased stress impacting mood    PHQ9 10, GAD7 8    No feelings of not wanting to go     Current Medications:    Current Outpatient Medications:     acyclovir (Zovirax) 400 mg tablet, Take 1 tablet (400 mg) by mouth 2 times a day., Disp: , Rfl:     amitriptyline (Elavil) 25 mg tablet, Take 1 tablet (25 mg) by mouth., Disp: , Rfl:     cetirizine (ZyrTEC) 10 mg tablet, Take 1 tablet (10 mg) by mouth once daily., Disp: , Rfl:     cholecalciferol (Vitamin D-3) 50 MCG (2000 UT) tablet, Take 1 tablet (2,000 Units) by mouth., Disp: , Rfl:     cyclobenzaprine (Flexeril) 10 mg tablet, Take 1 tablet (10 mg) by mouth 3 times a day for 10 days., Disp: 30 tablet, Rfl: 0    diphenhydrAMINE (BENADryl) 25 mg capsule, Take 1 capsule (25 mg) by mouth every 6 hours if needed., Disp: , Rfl:     DULoxetine (Cymbalta) 60 mg DR capsule, Take 1 capsule (60 mg) by mouth 2 times a day., Disp: , Rfl:     ergocalciferol (Vitamin D-2) 1.25 MG (97006 UT) capsule, Take 1 capsule (1,250 mcg) by mouth 1 (one) time per week., Disp: , Rfl:     folic acid (Folvite) 1 mg tablet, Take 1 tablet (1 mg) by mouth once daily., Disp: , Rfl:     gabapentin (Neurontin) 100 mg capsule, Take 1  capsule (100 mg) by mouth once daily at bedtime.  1 CAP(S) ORALLY AT BEDTIME FOR 7 DAYS THEN INCREASE TO TWICE A DAY, Disp: , Rfl:     HYDROmorphone (Dilaudid) 4 mg tablet, Take 1 tablet (4 mg) by mouth every 4 hours if needed for severe pain (7 - 10) for up to 7 days., Disp: 42 tablet, Rfl: 0    mirtazapine (Remeron) 7.5 mg tablet, Take 1 tablet (7.5 mg) by mouth once daily at bedtime., Disp: , Rfl:     naloxone (Narcan) 4 mg/0.1 mL nasal spray, Administer 1 spray (4 mg) into affected nostril(s) if needed for opioid reversal or respiratory depression. 1 spray to nostril for overdose, may repeat every 2-3 minutes until medical assistance arrives, Disp: , Rfl:     omeprazole (PriLOSEC) 40 mg DR capsule, Take 1 capsule (40 mg) by mouth once daily in the morning. Take before meals.  TAKE ONE CAPSULE BY MOUTH EVERY DAY IN THE MORNING BEFORE EATING, Disp: , Rfl:     Rezurock 200 mg tablet, Take 200 mg by mouth 2 times a day., Disp: 60 tablet, Rfl: 0    tacrolimus-vehicle base no.238 0.1 % cream, Apply topically.  Take as directed, Disp: , Rfl:     Current Facility-Administered Medications:     heparin lock flush (porcine) 10 unit/mL injection 100 Units, 100 Units, intra-catheter, Once, CHRIS Garcia    heparin lock flush (porcine) injection 500 Units, 5 mL, intravenous, PRN, CHRIS Garcia  Medical History:  Past Medical History:   Diagnosis Date    Anxiety disorder, unspecified 04/17/2017    Anxiety    Depression, unspecified 04/17/2017    Depression    Family history of glaucoma 03/02/2017    Family history of glaucoma in father    Gastritis, unspecified, without bleeding 04/17/2017    Gastritis    Hematuria, unspecified 04/17/2017    Hematuria    Personal history of other diseases of the digestive system 12/02/2015    History of esophageal reflux    Priapism, unspecified 04/17/2017    Priapism    Sickle-cell disease without crisis (CMS/[facility]) 04/17/2017    Sickle cell anemia       Surgical History:  Past  Surgical History:   Procedure Laterality Date    GALLBLADDER SURGERY  04/12/2017    Gallbladder Surgery    IR CVC TUNNELED  4/24/2018    IR CVC TUNNELED 4/24/2018 [facility]    IR CVC TUNNELED  6/5/2018    IR CVC TUNNELED 6/5/2018 [facility]    IR CVC TUNNELED  3/1/2019    IR CVC TUNNELED 3/1/2019 [facility]    IR CVC TUNNELED  6/4/2019    IR CVC TUNNELED 6/4/2019 [facility]    IR CVC TUNNELED  4/5/2019    IR CVC TUNNELED 4/5/2019 [facility]    IR CVC TUNNELED  7/17/2019    IR CVC TUNNELED 7/17/2019 [facility]    IR CVC TUNNELED  8/14/2019    IR CVC TUNNELED 8/14/2019 [facility]    IR CVC TUNNELED  12/18/2019    IR CVC TUNNELED 12/18/2019 [facility]    IR CVC TUNNELED  10/9/2019    IR CVC TUNNELED 10/9/2019 [facility]    IR CVC TUNNELED  11/13/2019    IR CVC TUNNELED 11/13/2019 [facility]    IR CVC TUNNELED  1/15/2020    IR CVC TUNNELED 1/15/2020 [facility]    IR CVC TUNNELED  2/19/2020    IR CVC TUNNELED 2/19/2020 [facility]    IR CVC TUNNELED  1/4/2021    IR CVC TUNNELED 1/4/2021 [facility]    IR CVC TUNNELED  1/25/2021    IR CVC TUNNELED 1/25/2021 [facility]    IR CVC TUNNELED  8/21/2018    IR CVC TUNNELED 8/21/2018 [facility]    IR CVC TUNNELED  9/26/2018    IR CVC TUNNELED 9/26/2018 [facility]    IR CVC TUNNELED  11/5/2018    IR CVC TUNNELED 11/5/2018 [facility]    IR CVC TUNNELED  12/19/2018    IR CVC TUNNELED 12/19/2018 [facility]    IR VENOGRAM HEPATIC  11/8/2017    IR VENOGRAM HEPATIC 11/8/2017 [facility]    MR HEAD ANGIO WO IV CONTRAST  2/17/2017    MR HEAD ANGIO WO IV CONTRAST 2/17/2017 [facility]    MR NECK ANGIO WO IV CONTRAST  2/17/2017    MR NECK ANGIO WO IV CONTRAST 2/17/2017 [facility]    US GUIDED NEEDLE LIVER BIOPSY  9/8/2020    US GUIDED NEEDLE LIVER BIOPSY 9/8/2020 [facility]       Family History:  Family History   Problem Relation Name Age of Onset    Diabetes  "Father      Sickle cell anemia Other sibling     Cancer Other grandfather     Cancer Other uncle        Social History:  Social History     Socioeconomic History    Marital status:      Spouse name: Not on file    Number of children: Not on file    Years of education: Not on file    Highest education level: Not on file   Occupational History    Not on file   Tobacco Use    Smoking status: Every Day     Types: Cigarettes     Passive exposure: Current    Smokeless tobacco: Never   Substance and Sexual Activity    Alcohol use: Yes    Drug use: Yes     Types: Marijuana    Sexual activity: Not on file   Other Topics Concern    Not on file   Social History Narrative    Not on file     Social Determinants of Health     Financial Resource Strain: Not on file   Food Insecurity: Not on file   Transportation Needs: Not on file   Physical Activity: Not on file   Stress: Not on file   Social Connections: Not on file   Intimate Partner Violence: Not on file   Housing Stability: Not on file              Medical Review Of Systems:      Psychiatric Review Of Systems:         Objective   Mental Status Exam:   Appearance: appropriate g/h.   Attitude: cooperative and engaged.   Behavior: sitting in chair, good eye contact.   Motor Activity: no tremor, normal tone.   Speech: regular in rate, volume, low tone, no dysarthria, no aphasia.   Mood: \"down\"  Affect: congruent, brighter, appropriate range.   Thought Process: linear, goal directed.   Thought Content: no delusions, no SI/HI.   Thought Perception: no AVH.   Cognition: alert, oriented to person, place, time. attn intact.   Insight: fair.   Judgment: fair/intact.       Vitals:  There were no vitals filed for this visit.  No results found. However, due to the size of the patient record, not all encounters were searched. Please check Results Review for a complete set of results.  Lab Results   Component Value Date    WBC 10.7 10/20/2023    HGB 15.7 10/20/2023    HCT 42.9 " 10/20/2023    MCV 87 10/20/2023     10/20/2023     Lab Results   Component Value Date    GLUCOSE 137 (H) 10/20/2023    CALCIUM 9.4 10/20/2023     10/20/2023    K 3.8 10/20/2023    CO2 27 10/20/2023     (H) 10/20/2023    BUN 7 10/20/2023    CREATININE 0.79 10/20/2023       Time spent in therapy   supportive psychotherapy provided to target depression and anxiety, discussed recent stressors. Discussed resilience. continue with goal setting, problem solving. Discussed scare for BMT failure. Plan for continued supportive therapy at next appt with goal of decreased sx burden. Good response.   Total time spent 21 minutes    Yola Contreras MD

## 2023-10-27 ENCOUNTER — TELEPHONE (OUTPATIENT)
Dept: HEMATOLOGY/ONCOLOGY | Facility: HOSPITAL | Age: 43
End: 2023-10-27

## 2023-10-27 ENCOUNTER — OFFICE VISIT (OUTPATIENT)
Dept: PAIN MEDICINE | Facility: CLINIC | Age: 43
End: 2023-10-27
Payer: COMMERCIAL

## 2023-10-27 ENCOUNTER — SPECIALTY PHARMACY (OUTPATIENT)
Dept: PHARMACY | Facility: CLINIC | Age: 43
End: 2023-10-27

## 2023-10-27 VITALS
SYSTOLIC BLOOD PRESSURE: 113 MMHG | RESPIRATION RATE: 16 BRPM | HEART RATE: 74 BPM | WEIGHT: 148 LBS | OXYGEN SATURATION: 99 % | BODY MASS INDEX: 23.23 KG/M2 | HEIGHT: 67 IN | DIASTOLIC BLOOD PRESSURE: 73 MMHG | TEMPERATURE: 97.3 F

## 2023-10-27 DIAGNOSIS — M79.7 SECONDARY FIBROMYALGIA: ICD-10-CM

## 2023-10-27 DIAGNOSIS — G89.4 CHRONIC PAIN SYNDROME: ICD-10-CM

## 2023-10-27 DIAGNOSIS — D57.00 SICKLE CELL CRISIS (MULTI): Primary | ICD-10-CM

## 2023-10-27 PROCEDURE — 99214 OFFICE O/P EST MOD 30 MIN: CPT | Performed by: ANESTHESIOLOGY

## 2023-10-27 ASSESSMENT — PATIENT HEALTH QUESTIONNAIRE - PHQ9
SUM OF ALL RESPONSES TO PHQ9 QUESTIONS 1 AND 2: 0
1. LITTLE INTEREST OR PLEASURE IN DOING THINGS: NOT AT ALL
2. FEELING DOWN, DEPRESSED OR HOPELESS: NOT AT ALL

## 2023-10-27 ASSESSMENT — ENCOUNTER SYMPTOMS
HEMATOLOGIC/LYMPHATIC NEGATIVE: 1
LOSS OF SENSATION IN FEET: 0
MYALGIAS: 1
DEPRESSION: 0
JOINT SWELLING: 1
PSYCHIATRIC NEGATIVE: 1
CARDIOVASCULAR NEGATIVE: 1
GASTROINTESTINAL NEGATIVE: 1
EYES NEGATIVE: 1
RESPIRATORY NEGATIVE: 1
ARTHRALGIAS: 1
NEUROLOGICAL NEGATIVE: 1
OCCASIONAL FEELINGS OF UNSTEADINESS: 0
ENDOCRINE NEGATIVE: 1

## 2023-10-27 ASSESSMENT — PAIN SCALES - GENERAL: PAINLEVEL: 5

## 2023-10-27 ASSESSMENT — LIFESTYLE VARIABLES: TOTAL SCORE: 0

## 2023-10-27 NOTE — TELEPHONE ENCOUNTER
Patient is scheduled in a 0900 ARNEX spot but needs a treatment appointment 10/30.  RN noticed he has a 1:00pm appointment with Annamaria Muniz. RN called patient and left a message asking what time he would like to come for his appointment.  Voicemail left.    Callback numbers given    RN: [phone]  : [phone]

## 2023-10-27 NOTE — PROGRESS NOTES
SUBJECTIVE:  This is a pleasant 42 y.o.  male with PMH of sickle cell anemia with a chronic pain on hydromorphone 8 mg 4 times daily who is here following up for his IV infusion therapy he did not know that he is not my patient and he is Dr. Osorio patient.  This is a patient of Dr. Osorio was placed on my schedule probably by mistake who gets IV infusion therapy for sickle cell anemia pain who is on hydromorphone 4 mg x 8/day.  The IV infusion therapy only helped him for few hours.  We had today a very juana and clear discussion with him and his wife about his chronic pain management.  I explained to them that high-dose opioid may be the source of his pain and could causes opioid-induced hyperalgesia.  The patient usually do not response to IV infusion therapy if they have such high dose of opioid and their blood.  We discussed the idea of going on Suboxone for couple months and if the pain does not get better we can restart the IV infusion therapy and I showed that it would work much better.  I am sure the team from heme-onc will read my note and I will forward the note as well.      Prior office visit:  Dr. Osorio note:  This 42-year-old male here for evaluation of bilateral lower extremity, low back pain. He has been experiencing the symptoms for years, has been diagnosed with sickle cell disease currently being managed by hematology, he is getting Dilaudid for acute sickle cell crisis. He is referred here by Dr. Rajani Carter for consideration of opioid sparing ketamine, lidocaine, propofol infusion. He does not have any significant contraindications. Discussed risks, and benefits associated with ketamine, lidocaine and propofol infusion. Patient agrees to proceed with the opioid sparing infusion. We will schedule him for 1 infusions every 15 days with total of 4 infusions continue, would see him for follow-up to assess the efficacy of the infusion. Follow-up with hematology for management of his sickle cell  "disease.     Procedures:  IV infusion therapy the patient has had a 80% improvement in pain and function but only for few hours      Portions of record reviewed for pertinent issues: active problem list, medication list, allergies, family history, social history, notes from last encounter, encounters, lab results, imaging and other available records.      I have personally reviewed the [facility] report for this patient. This report is scanned into the electronic medical record. I have considered the risks of abuse, dependence, addiction and diversion. It showed: Hydromorphone 4 mg x 8 daily from palliative care  OPIOID RISK ASSESSMENT SCORE 0/26  Aberrant behavior: None        /73 (BP Location: Right arm, Patient Position: Sitting, BP Cuff Size: Adult)   Pulse 74   Temp 36.3 °C (97.3 °F) (Temporal)   Resp 16   Ht 1.702 m (5' 7\")   Wt 67.1 kg (148 lb)   SpO2 99%   BMI 23.18 kg/m²    Review of Systems   HENT: Negative.     Eyes: Negative.    Respiratory: Negative.     Cardiovascular: Negative.    Gastrointestinal: Negative.    Endocrine: Negative.    Genitourinary: Negative.    Musculoskeletal:  Positive for arthralgias, joint swelling and myalgias.   Skin: Negative.    Neurological: Negative.    Hematological: Negative.    Psychiatric/Behavioral: Negative.        Physical Exam  Vitals and nursing note reviewed.   Constitutional:       Appearance: Normal appearance.   HENT:      Head: Normocephalic and atraumatic.      Nose: Nose normal.   Eyes:      Extraocular Movements: Extraocular movements intact.      Conjunctiva/sclera: Conjunctivae normal.      Pupils: Pupils are equal, round, and reactive to light.   Cardiovascular:      Rate and Rhythm: Normal rate and regular rhythm.      Pulses: Normal pulses.      Heart sounds: Normal heart sounds.   Pulmonary:      Effort: Pulmonary effort is normal.      Breath sounds: Normal breath sounds.   Abdominal:      General: Abdomen is flat. Bowel sounds are normal.    "   Palpations: Abdomen is soft.   Musculoskeletal:         General: Swelling and tenderness present.   Skin:     General: Skin is warm.   Neurological:      General: No focal deficit present.      Mental Status: He is alert and oriented to person, place, and time.   Psychiatric:         Mood and Affect: Mood normal.         Behavior: Behavior normal.                        Plan  At least 50% of the visit was involved in the discussion of the options for treatment. We discussed exercises, medication, interventional therapies and surgery. Healthy life style is essential with patient hard work to achieve the wellness. In addition; discussion with the patient and/or family about any of the diagnostic results, impressions and/or recommended diagnostic studies, prognosis, risks and benefits of treatment options, instructions for treatment and/or follow-up, importance of compliance with chosen treatment options, risk-factor reduction, and patient/family education.         Pool therapy, walking in the pool, at least 3x per week for 30 minutes  Continue self-directed physical therapy  No more IV infusion therapy at this time  We discussed with the patient the possibility of going on the Suboxone by his palliative care team and after 2 months if his pain is still the same we can restart the IV infusion therapy  Alternative chronic pain therapies was discussed, encouraged and information was handed  Return to Clinic as needed     *Please note this report has been produced using speech recognition software and may contain errors related to that system including grammar, punctuation and spelling as well as words and phrases that may be inappropriate. If there are questions or concerns, please feel free to contact me to clarify.    Yuan Benedict MD

## 2023-10-27 NOTE — PROGRESS NOTES
This is 42 y.o.  male with who has been treated for  leg pain  . Pain is unchanged, The pain is described as achiness and throbbing sharp shooting  and is relieved by nothing. Here for follow-up.     Chief Complaint   Patient presents with    Follow-up     Ketamine infusions       Pain Therapies:  Ketamine infusion therapy patient's had 4 treatments states that he gets 60% pain relief for about 3 to 4 hours and then the pain returns all of a sudden.    Opioid Risk Assessment Score 0/26

## 2023-10-30 ENCOUNTER — TELEPHONE (OUTPATIENT)
Dept: ADMISSION | Facility: HOSPITAL | Age: 43
End: 2023-10-30
Payer: COMMERCIAL

## 2023-10-30 ENCOUNTER — TELEPHONE (OUTPATIENT)
Dept: HEMATOLOGY/ONCOLOGY | Facility: HOSPITAL | Age: 43
End: 2023-10-30

## 2023-10-30 ENCOUNTER — OFFICE VISIT (OUTPATIENT)
Dept: HEMATOLOGY/ONCOLOGY | Facility: HOSPITAL | Age: 43
End: 2023-10-30
Payer: COMMERCIAL

## 2023-10-30 ENCOUNTER — INFUSION (OUTPATIENT)
Dept: HEMATOLOGY/ONCOLOGY | Facility: HOSPITAL | Age: 43
End: 2023-10-30
Payer: COMMERCIAL

## 2023-10-30 ENCOUNTER — APPOINTMENT (OUTPATIENT)
Dept: HEMATOLOGY/ONCOLOGY | Facility: HOSPITAL | Age: 43
End: 2023-10-30
Payer: COMMERCIAL

## 2023-10-30 VITALS
DIASTOLIC BLOOD PRESSURE: 77 MMHG | WEIGHT: 144.18 LBS | SYSTOLIC BLOOD PRESSURE: 101 MMHG | BODY MASS INDEX: 22.58 KG/M2 | HEART RATE: 95 BPM | OXYGEN SATURATION: 100 % | TEMPERATURE: 97.3 F | RESPIRATION RATE: 20 BRPM

## 2023-10-30 DIAGNOSIS — Z91.89 AT RISK FOR INFERTILITY: ICD-10-CM

## 2023-10-30 DIAGNOSIS — Z94.84 HISTORY OF STEM CELL TRANSPLANT (MULTI): ICD-10-CM

## 2023-10-30 DIAGNOSIS — Z31.62 ENCOUNTER FOR FERTILITY PRESERVATION COUNSELING: ICD-10-CM

## 2023-10-30 DIAGNOSIS — D57.00 SICKLE CELL DISEASE WITH CRISIS (MULTI): ICD-10-CM

## 2023-10-30 DIAGNOSIS — D57.09 SICKLE CELL DISEASE WITH CRISIS AND OTHER COMPLICATION (MULTI): Primary | ICD-10-CM

## 2023-10-30 DIAGNOSIS — D57.00 SICKLE CELL CRISIS (MULTI): Primary | ICD-10-CM

## 2023-10-30 DIAGNOSIS — D89.811 CHRONIC GVHD (MULTI): ICD-10-CM

## 2023-10-30 DIAGNOSIS — Z94.84 STEM CELLS TRANSPLANT STATUS (MULTI): ICD-10-CM

## 2023-10-30 LAB
ALBUMIN SERPL BCP-MCNC: 4.1 G/DL (ref 3.4–5)
ALP SERPL-CCNC: 110 U/L (ref 33–120)
ALT SERPL W P-5'-P-CCNC: 11 U/L (ref 10–52)
ANION GAP SERPL CALC-SCNC: 12 MMOL/L (ref 10–20)
AST SERPL W P-5'-P-CCNC: 12 U/L (ref 9–39)
BASOPHILS # BLD AUTO: 0.04 X10*3/UL (ref 0–0.1)
BASOPHILS NFR BLD AUTO: 0.3 %
BILIRUB SERPL-MCNC: 0.6 MG/DL (ref 0–1.2)
BUN SERPL-MCNC: 8 MG/DL (ref 6–23)
CALCIUM SERPL-MCNC: 9.4 MG/DL (ref 8.6–10.3)
CHLORIDE SERPL-SCNC: 103 MMOL/L (ref 98–107)
CO2 SERPL-SCNC: 27 MMOL/L (ref 21–32)
CREAT SERPL-MCNC: 0.82 MG/DL (ref 0.5–1.3)
EOSINOPHIL # BLD AUTO: 0.21 X10*3/UL (ref 0–0.7)
EOSINOPHIL NFR BLD AUTO: 1.8 %
ERYTHROCYTE [DISTWIDTH] IN BLOOD BY AUTOMATED COUNT: 13.7 % (ref 11.5–14.5)
GFR SERPL CREATININE-BSD FRML MDRD: >90 ML/MIN/1.73M*2
GLUCOSE SERPL-MCNC: 103 MG/DL (ref 74–99)
HCT VFR BLD AUTO: 43.4 % (ref 41–52)
HGB BLD-MCNC: 15.9 G/DL (ref 13.5–17.5)
HGB RETIC QN: 35 PG (ref 28–38)
IMM GRANULOCYTES # BLD AUTO: 0.03 X10*3/UL (ref 0–0.7)
IMM GRANULOCYTES NFR BLD AUTO: 0.3 % (ref 0–0.9)
IMMATURE RETIC FRACTION: 11.5 %
LDH SERPL L TO P-CCNC: 148 U/L (ref 84–246)
LYMPHOCYTES # BLD AUTO: 3.12 X10*3/UL (ref 1.2–4.8)
LYMPHOCYTES NFR BLD AUTO: 26.4 %
MCH RBC QN AUTO: 32.3 PG (ref 26–34)
MCHC RBC AUTO-ENTMCNC: 36.6 G/DL (ref 32–36)
MCV RBC AUTO: 88 FL (ref 80–100)
MONOCYTES # BLD AUTO: 0.88 X10*3/UL (ref 0.1–1)
MONOCYTES NFR BLD AUTO: 7.5 %
NEUTROPHILS # BLD AUTO: 7.53 X10*3/UL (ref 1.2–7.7)
NEUTROPHILS NFR BLD AUTO: 63.7 %
NRBC BLD-RTO: 0 /100 WBCS (ref 0–0)
PLATELET # BLD AUTO: 316 X10*3/UL (ref 150–450)
PMV BLD AUTO: 8.4 FL (ref 7.5–11.5)
POTASSIUM SERPL-SCNC: 3.8 MMOL/L (ref 3.5–5.3)
PROT SERPL-MCNC: 7.1 G/DL (ref 6.4–8.2)
RBC # BLD AUTO: 4.93 X10*6/UL (ref 4.5–5.9)
RETICS #: 0.07 X10*6/UL (ref 0.02–0.12)
RETICS/RBC NFR AUTO: 1.5 % (ref 0.5–2)
SODIUM SERPL-SCNC: 138 MMOL/L (ref 136–145)
WBC # BLD AUTO: 11.8 X10*3/UL (ref 4.4–11.3)

## 2023-10-30 PROCEDURE — 90686 IIV4 VACC NO PRSV 0.5 ML IM: CPT | Performed by: NURSE PRACTITIONER

## 2023-10-30 PROCEDURE — 99215 OFFICE O/P EST HI 40 MIN: CPT | Performed by: NURSE PRACTITIONER

## 2023-10-30 PROCEDURE — 85045 AUTOMATED RETICULOCYTE COUNT: CPT | Performed by: NURSE PRACTITIONER

## 2023-10-30 PROCEDURE — 80053 COMPREHEN METABOLIC PANEL: CPT | Performed by: NURSE PRACTITIONER

## 2023-10-30 PROCEDURE — 96372 THER/PROPH/DIAG INJ SC/IM: CPT

## 2023-10-30 PROCEDURE — 85025 COMPLETE CBC W/AUTO DIFF WBC: CPT | Performed by: NURSE PRACTITIONER

## 2023-10-30 PROCEDURE — 90471 IMMUNIZATION ADMIN: CPT | Performed by: NURSE PRACTITIONER

## 2023-10-30 PROCEDURE — 2500000004 HC RX 250 GENERAL PHARMACY W/ HCPCS (ALT 636 FOR OP/ED): Performed by: NURSE PRACTITIONER

## 2023-10-30 PROCEDURE — 2500000001 HC RX 250 WO HCPCS SELF ADMINISTERED DRUGS (ALT 637 FOR MEDICARE OP): Performed by: NURSE PRACTITIONER

## 2023-10-30 PROCEDURE — 83615 LACTATE (LD) (LDH) ENZYME: CPT | Performed by: NURSE PRACTITIONER

## 2023-10-30 PROCEDURE — 36591 DRAW BLOOD OFF VENOUS DEVICE: CPT

## 2023-10-30 PROCEDURE — 99417 PROLNG OP E/M EACH 15 MIN: CPT | Performed by: NURSE PRACTITIONER

## 2023-10-30 RX ORDER — HYDROMORPHONE HYDROCHLORIDE 4 MG/1
4 TABLET ORAL EVERY 4 HOURS PRN
Qty: 42 TABLET | Refills: 0 | Status: SHIPPED | OUTPATIENT
Start: 2023-10-30 | End: 2023-11-06 | Stop reason: SDUPTHER

## 2023-10-30 RX ORDER — HEPARIN 100 UNIT/ML
500 SYRINGE INTRAVENOUS AS NEEDED
OUTPATIENT
Start: 2023-10-30

## 2023-10-30 RX ORDER — HYDROMORPHONE HYDROCHLORIDE 4 MG/1
12 TABLET ORAL ONCE
Status: COMPLETED | OUTPATIENT
Start: 2023-10-30 | End: 2023-10-30

## 2023-10-30 RX ORDER — HEPARIN SODIUM,PORCINE/PF 10 UNIT/ML
50 SYRINGE (ML) INTRAVENOUS AS NEEDED
OUTPATIENT
Start: 2023-10-30

## 2023-10-30 RX ORDER — CYCLOBENZAPRINE HCL 10 MG
10 TABLET ORAL ONCE
Status: COMPLETED | OUTPATIENT
Start: 2023-10-30 | End: 2023-10-30

## 2023-10-30 RX ADMIN — HYDROMORPHONE HYDROCHLORIDE 12 MG: 4 TABLET ORAL at 09:46

## 2023-10-30 RX ADMIN — HYDROMORPHONE HYDROCHLORIDE 12 MG: 4 TABLET ORAL at 11:53

## 2023-10-30 RX ADMIN — INFLUENZA VIRUS VACCINE 0.5 ML: 15; 15; 15; 15 SUSPENSION INTRAMUSCULAR at 11:52

## 2023-10-30 RX ADMIN — CYCLOBENZAPRINE 10 MG: 10 TABLET, FILM COATED ORAL at 09:46

## 2023-10-30 RX ADMIN — HYDROMORPHONE HYDROCHLORIDE 12 MG: 4 TABLET ORAL at 10:47

## 2023-10-30 ASSESSMENT — PAIN SCALES - GENERAL
PAINLEVEL_OUTOF10: 7
PAINLEVEL_OUTOF10: 7
PAINLEVEL: 9
PAINLEVEL_OUTOF10: 9

## 2023-10-30 ASSESSMENT — ENCOUNTER SYMPTOMS
EYES NEGATIVE: 1
RESPIRATORY NEGATIVE: 1
FATIGUE: 1
ARTHRALGIAS: 0
SLEEP DISTURBANCE: 0
DEPRESSION: 0
PSYCHIATRIC NEGATIVE: 1
NEUROLOGICAL NEGATIVE: 1
CARDIOVASCULAR NEGATIVE: 1
NERVOUS/ANXIOUS: 0
BACK PAIN: 1
BACK PAIN: 0
HEMATOLOGIC/LYMPHATIC NEGATIVE: 1
ENDOCRINE NEGATIVE: 1
GASTROINTESTINAL NEGATIVE: 1

## 2023-10-30 NOTE — TELEPHONE ENCOUNTER
Xu P Crayton called the refill line for Dilaudid. Requesting refills be sent to [facility] pharmacy; message sent to Sickle Cell team to submit.
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
## 2023-10-30 NOTE — PROGRESS NOTES
Patient ID: Xu Maya is a 42 y.o. male.  Referring Physician: Ev Luna MD PhD  [id number] [place] [address]      Subjective    Hematologic History: SSD since birth on hydroxyurea for 10+ years prior to transplant, with associated SSD complications. 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      PMH: Hemoglobin SS disease, gastritis/peptic ulcer disease, TRV with normal cardiac catheterization , chronic pain,   Psych History: Anxiety and Depression followed by onco-psychiatry  Surg Hx: No history of head or groin trauma    Sexual Development History:   Age at puberty: 12  Hx of STI/ tract infections: chlamydia in past tx with abx  Current sexual activity/Libido: no current issues with intimacy or sexual function, hx of priapism  Hx of infertility: with current partner x 16 years -- together they have been unable to achieve pregnancy though he has been on hydrea or s/p transplant, has achieved pregnancy in 3 other women but one had an  and two others had miscarriages  Prior SA:  none    Family History:  Hinduism heritage: none  Ethnic background:  [place]  MA breast cancer MGF cancer of unk type  VTE < 50 years: sister with blood clot in her 40's  MI or stroke < 50: unk  CF: no  MR: unk  Sickle cell disease: parents both with SST, brother with SST, 1/2 sister with SSD who ended up dying from disease, niece with SSD  SIblings: 3 full siblings 5 1/2 siblings, Children: no biologic children, Trouble conceiving or miscarriages: yes 1/2 sister with ssd had miscarriages  Genetic diseases/birth defects: unk    Social History: grew up in [place], has been with partner Leidy x 16 years and  for 4, they live in [facility] with her 2 biologic sons 16 and 13, work in   industry, rare ETOH, does smoke tobacco daily ~10/cigarettes/day has quit in the past, rare marijuana, no other recreational drug use    Partner History: Leidy Elkins  : 1989  PMH: neuromuscular optica spectrum disorder NMOSD, normal menses, easily achieved pregnancy in past  Family Hx: multiple family members with cancer including breast, lung, father with head/neck cancer  Previous pregnancies: 2 sons  Issues with intercourse: no    Review of Systems   Musculoskeletal:  Negative for arthralgias and back pain.   Psychiatric/Behavioral:  Negative for depression and sleep disturbance. The patient is not nervous/anxious.         Objective   Vital signs reviewed in ACC encounter    Performance Status:  Symptomatic; fully ambulatory    Current Outpatient Medications   Medication Instructions    acyclovir (Zovirax) 400 mg tablet 1 tablet, oral, 2 times daily    amitriptyline (ELAVIL) 25 mg, oral    ARIPiprazole (ABILIFY) 2 mg, oral, Daily    belumosudil 200 mg tablet 1 TAB(S) ORALLY 2 TIMES A DAY    belumosudil 200 mg tablet TAKE ONE (1) TABLET BY MOUTH ONCE DAILY.    cetirizine (ZYRTEC) 10 mg, oral, Daily    cholecalciferol (VITAMIN D-3) 2,000 Units, oral    cyclobenzaprine (FLEXERIL) 10 mg, oral, 3 times daily    diphenhydrAMINE (BENADRYL) 25 mg, oral, Every 6 hours PRN    DULoxetine (CYMBALTA) 60 mg, oral, 2 times daily    ergocalciferol (Vitamin D-2) 1.25 MG (74175 UT) capsule 1 capsule, oral, Weekly    folic acid (Folvite) 1 mg tablet 1 tablet, oral, Daily    gabapentin (NEURONTIN) 100 mg, oral, Nightly,  1 CAP(S) ORALLY AT BEDTIME FOR 7 DAYS THEN INCREASE TO TWICE A DAY    HYDROmorphone (DILAUDID) 4 mg, oral, Every 4 hours PRN    mirtazapine (REMERON) 7.5 mg, oral, Nightly    naloxone (NARCAN) 4 mg, nasal, As needed, 1 spray to nostril for overdose, may repeat every 2-3 minutes until medical assistance arrives<BR>    omeprazole (PRILOSEC) 40 mg, oral, Daily before breakfast,  TAKE  ONE CAPSULE BY MOUTH EVERY DAY IN THE MORNING BEFORE EATING    Rezurock 200 mg tablet Take one (1) tablet by mouth Twice Daily.    tacrolimus-vehicle base no.238 0.1 % cream Topical,  Take as directed       Family History   Problem Relation Name Age of Onset    Diabetes Father      Sickle cell anemia Other sibling     Cancer Other grandfather     Cancer Other uncle        Xu Maya  reports that he has been smoking cigarettes. He has been exposed to tobacco smoke. He has never used smokeless tobacco.  He  reports current alcohol use.  He  reports current drug use. Drug: Marijuana.    Physical Exam  Constitutional:       General: He is not in acute distress.     Appearance: Normal appearance. He is not ill-appearing.      Comments: Appears uncomfortable at times   Neurological:      Mental Status: He is alert.   Psychiatric:         Mood and Affect: Mood normal.         Behavior: Behavior normal.         Thought Content: Thought content normal.         Judgment: Judgment normal.      Comments: Worried affect       No visits with results within 1 Week(s) from this visit.   Latest known visit with results is:   Lab on 10/20/2023   Component Date Value Ref Range Status    WBC 10/20/2023 10.7  4.4 - 11.3 x10*3/uL Final    nRBC 10/20/2023 0.0  0.0 - 0.0 /100 WBCs Final    RBC 10/20/2023 4.91  4.50 - 5.90 x10*6/uL Final    Hemoglobin 10/20/2023 15.7  13.5 - 17.5 g/dL Final    Hematocrit 10/20/2023 42.9  41.0 - 52.0 % Final    MCV 10/20/2023 87  80 - 100 fL Final    MCH 10/20/2023 32.0  26.0 - 34.0 pg Final    MCHC 10/20/2023 36.6 (H)  32.0 - 36.0 g/dL Final    RDW 10/20/2023 13.6  11.5 - 14.5 % Final    Platelets 10/20/2023 240  150 - 450 x10*3/uL Final    MPV 10/20/2023 8.5  7.5 - 11.5 fL Final    Neutrophils % 10/20/2023 53.3  40.0 - 80.0 % Final    Immature Granulocytes %, Automated 10/20/2023 0.3  0.0 - 0.9 % Final    Immature Granulocyte Count (IG) includes promyelocytes, myelocytes and metamyelocytes but does  not include bands. Percent differential counts (%) should be interpreted in the context of the absolute cell counts (cells/UL).    Lymphocytes % 10/20/2023 34.8  13.0 - 44.0 % Final    Monocytes % 10/20/2023 7.9  2.0 - 10.0 % Final    Eosinophils % 10/20/2023 3.5  0.0 - 6.0 % Final    Basophils % 10/20/2023 0.2  0.0 - 2.0 % Final    Neutrophils Absolute 10/20/2023 5.68  1.20 - 7.70 x10*3/uL Final    Percent differential counts (%) should be interpreted in the context of the absolute cell counts (cells/uL).    Immature Granulocytes Absolute, Au* 10/20/2023 0.03  0.00 - 0.70 x10*3/uL Final    Lymphocytes Absolute 10/20/2023 3.71  1.20 - 4.80 x10*3/uL Final    Monocytes Absolute 10/20/2023 0.84  0.10 - 1.00 x10*3/uL Final    Eosinophils Absolute 10/20/2023 0.37  0.00 - 0.70 x10*3/uL Final    Basophils Absolute 10/20/2023 0.02  0.00 - 0.10 x10*3/uL Final    Glucose 10/20/2023 137 (H)  74 - 99 mg/dL Final    Sodium 10/20/2023 141  136 - 145 mmol/L Final    Potassium 10/20/2023 3.8  3.5 - 5.3 mmol/L Final    Chloride 10/20/2023 108 (H)  98 - 107 mmol/L Final    Bicarbonate 10/20/2023 27  21 - 32 mmol/L Final    Anion Gap 10/20/2023 10  10 - 20 mmol/L Final    Urea Nitrogen 10/20/2023 7  6 - 23 mg/dL Final    Creatinine 10/20/2023 0.79  0.50 - 1.30 mg/dL Final    eGFR 10/20/2023 >90  >60 mL/min/1.73m*2 Final    Calculations of estimated GFR are performed using the 2021 CKD-EPI Study Refit equation without the race variable for the IDMS-Traceable creatinine methods.  https://jasn.asnjournals.org/content/early/2021/09/22/ASN.3248542027    Calcium 10/20/2023 9.4  8.6 - 10.3 mg/dL Final    Albumin 10/20/2023 4.0  3.4 - 5.0 g/dL Final    Alkaline Phosphatase 10/20/2023 109  33 - 120 U/L Final    Total Protein 10/20/2023 6.6  6.4 - 8.2 g/dL Final    AST 10/20/2023 13  9 - 39 U/L Final    Bilirubin, Total 10/20/2023 0.4  0.0 - 1.2 mg/dL Final    ALT 10/20/2023 14  10 - 52 U/L Final    Patients treated with Sulfasalazine may  generate falsely decreased results for ALT.    Cytomegalovirus DNA, PCR Log IU/ML 10/20/2023    Final    Not calculated    CMV DNA Result 10/20/2023 Not Detected  Not Detected Final    EBV PCR, Quant, Whole Blood 10/20/2023 3,245 (H)  Not Detected IU/mL Final    To convert IU/mL to copies/mL multiply result by 0.49.    EBV PCR Whole Blood LOG IU/mL 10/20/2023 3.51  Log IU/mL Final    EBV Whole Blood DNA Result 10/20/2023 Detected (A)  Not Detected Final        Assessment/Plan    Xu Maya is a 42 y.o. 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     #Risk for infertility:   - We discussed the damaging effects SSD and chemotherapy/radiation therapy can have on sperm production, quantity, and quality and the potential impact of infertility  - Discussed potential for chemotherapy/radiation treatments to effect male hormones, ejaculation and/or cause erectile dysfunction  - Discussed that prior treatment with hydrea may have affected sperm counts and made it difficult for him to achieve pregnancy prior to his transplant  - Current risk to fertility is HIGH based on treatment with HSCT preparative regimen specifically high dose cytoxan and TBI   - We discussed plan for evaluating fertility with semen analysis and discussed possible findings -- high likelihood that we will find an absence of sperm, though low possibility that we will see sperm in small numbers present  - Reviewed process of submitting a sample for evaluation -- this involves collecting a sample of his semen via masturbation. This can be done at  fertility center or at home if he lives within 40 min of the [facility]. -- he prefers to collect in person.  - Patient should abstain from intercourse and masturbation for a period of 2-7 days to allow for optimal sample  - Sample will be analyzed and we will arrange a follow up to discuss findings  and next steps  - Pending presence of sperm we briefly discussed avenues for assisted reproductive technologies for him and his partner    #Psychosocial:   - Acknowledged challenges he faces as a young person who has been dealing with chronic illness and long term side effects for the entirety of his young adult life  - Encouraged patient to follow up with identified therapist for 1:1 counseling, should he want additional resources/referrals he was instructed to contact me directly    Follow up to be determined after semen analysis completed         Annamaria Muniz, APRN-CNP

## 2023-11-01 ENCOUNTER — ANCILLARY PROCEDURE (OUTPATIENT)
Dept: ENDOCRINOLOGY | Facility: CLINIC | Age: 43
End: 2023-11-01
Payer: COMMERCIAL

## 2023-11-01 DIAGNOSIS — Z94.84 HISTORY OF STEM CELL TRANSPLANT (MULTI): ICD-10-CM

## 2023-11-01 DIAGNOSIS — Z91.89 AT RISK FOR INFERTILITY: ICD-10-CM

## 2023-11-01 DIAGNOSIS — D57.09 SICKLE CELL DISEASE WITH CRISIS AND OTHER COMPLICATION (MULTI): ICD-10-CM

## 2023-11-01 DIAGNOSIS — Z31.62 ENCOUNTER FOR FERTILITY PRESERVATION COUNSELING: ICD-10-CM

## 2023-11-01 LAB
ABSTINENCE (DAYS): 3 DAYS (ref 2–7)
AGGLUTINATION (SEMEN): NO
ANALYZED TIME:: ABNORMAL
ANDROLOGY LAB ID#: ABNORMAL
CLUMPS (SEMEN): NO
COLLECTED COMPLETELY: YES
COLLECTION LOCATION:: ABNORMAL
COLLECTION METHOD:: ABNORMAL
CONCENTRATION CN (POST-WASH): 0 MILL/ML
CONCENTRATION(SEMEN): 0 MILL/ML (ref 15–?)
DEBRIS (SEMEN): YES
NON PROG. MOTILITY (SEMEN): 0 %
NON PROG. MOTILITY CN (POST-WASH): 0 %
PROG. MOTILITY (SEMEN): 0 % (ref 32–?)
PROG. MOTILITY CN (POST-WASH): 0 %
RECEIVED TIME:: ABNORMAL
SEMEN APPEARANCE: NORMAL
SEMEN LIQUEFACTION: NORMAL
SEMEN VISCOSITY: ABNORMAL
TOTAL MOTILITY (SEMEN): 0 % (ref 40–?)
TOTAL MOTILITY CN (POST-WASH): 0 %
TOTAL NO OF MOTILE (SEMEN): 0 MILL
TOTAL NO OF MOTILE CN (POST-WASH): 0 MILL
TOTAL NO OF SPERM (SEMEN): 0 MILL (ref 39–?)
TOTAL NO OF SPERM CN (POST-WASH): 0 MILL
VOLUME (SEMEN): 2.1 ML (ref 1.5–?)
VOLUME CN (POST-WASH): 0.2 ML

## 2023-11-01 PROCEDURE — 89321 SEMEN ANAL SPERM DETECTION: CPT | Performed by: OBSTETRICS & GYNECOLOGY

## 2023-11-06 DIAGNOSIS — D57.00 SICKLE CELL DISEASE WITH CRISIS (MULTI): ICD-10-CM

## 2023-11-06 RX ORDER — HYDROMORPHONE HYDROCHLORIDE 4 MG/1
4 TABLET ORAL EVERY 4 HOURS PRN
Qty: 42 TABLET | Refills: 0 | Status: SHIPPED | OUTPATIENT
Start: 2023-11-06 | End: 2023-11-10 | Stop reason: SDUPTHER

## 2023-11-06 NOTE — TELEPHONE ENCOUNTER
Patient would like a nurse to call him he states he has been in pain all day and would like his medication sent over

## 2023-11-10 ENCOUNTER — TELEMEDICINE (OUTPATIENT)
Dept: HEMATOLOGY/ONCOLOGY | Facility: HOSPITAL | Age: 43
End: 2023-11-10
Payer: COMMERCIAL

## 2023-11-10 ENCOUNTER — TELEPHONE (OUTPATIENT)
Dept: ADMISSION | Facility: HOSPITAL | Age: 43
End: 2023-11-10

## 2023-11-10 DIAGNOSIS — D57.00 SICKLE CELL CRISIS (MULTI): ICD-10-CM

## 2023-11-10 DIAGNOSIS — D57.1 SICKLE CELL DISEASE WITHOUT CRISIS (MULTI): Primary | ICD-10-CM

## 2023-11-10 DIAGNOSIS — F41.9 ANXIETY: ICD-10-CM

## 2023-11-10 DIAGNOSIS — D57.00 SICKLE CELL DISEASE WITH CRISIS (MULTI): ICD-10-CM

## 2023-11-10 DIAGNOSIS — F33.1 MODERATE EPISODE OF RECURRENT MAJOR DEPRESSIVE DISORDER (MULTI): ICD-10-CM

## 2023-11-10 DIAGNOSIS — Z94.81 S/P ALLOGENEIC BONE MARROW TRANSPLANT (MULTI): ICD-10-CM

## 2023-11-10 PROCEDURE — 99213 OFFICE O/P EST LOW 20 MIN: CPT | Mod: GT | Performed by: NURSE PRACTITIONER

## 2023-11-10 PROCEDURE — 99213 OFFICE O/P EST LOW 20 MIN: CPT | Performed by: NURSE PRACTITIONER

## 2023-11-10 RX ORDER — CYCLOBENZAPRINE HCL 10 MG
10 TABLET ORAL 3 TIMES DAILY
Qty: 30 TABLET | Refills: 0 | Status: SHIPPED | OUTPATIENT
Start: 2023-11-10 | End: 2023-12-08 | Stop reason: SDUPTHER

## 2023-11-10 RX ORDER — HYDROMORPHONE HYDROCHLORIDE 4 MG/1
4 TABLET ORAL EVERY 4 HOURS PRN
Qty: 42 TABLET | Refills: 0 | Status: SHIPPED | OUTPATIENT
Start: 2023-11-10 | End: 2023-11-17 | Stop reason: SDUPTHER

## 2023-11-10 ASSESSMENT — ENCOUNTER SYMPTOMS
BACK PAIN: 0
NERVOUS/ANXIOUS: 0
SLEEP DISTURBANCE: 0
DEPRESSION: 0
ARTHRALGIAS: 0

## 2023-11-10 NOTE — PROGRESS NOTES
Patient ID: Xu Maya is a 43 y.o. male.  Referring Physician: No referring provider defined for this encounter.      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    Returns today in follow up to discuss fertility evaluation and semen analysis.     Review of Systems   Musculoskeletal:  Negative for arthralgias and back pain.   Psychiatric/Behavioral:  Negative for depression and sleep disturbance. The patient is not nervous/anxious.       Objective   No VS obtained due to the telehealth nature of this visit     Performance Status:  Symptomatic; fully ambulatory    Current Outpatient Medications   Medication Instructions    acyclovir (Zovirax) 400 mg tablet 1 tablet, oral, 2 times daily    amitriptyline (ELAVIL) 25 mg, oral    ARIPiprazole (ABILIFY) 2 mg, oral, Daily    belumosudil 200 mg tablet 1 TAB(S) ORALLY 2 TIMES A DAY    belumosudil 200 mg tablet TAKE ONE (1) TABLET BY MOUTH ONCE DAILY.    cetirizine (ZYRTEC) 10 mg, oral, Daily    cholecalciferol (VITAMIN D-3) 2,000 Units, oral    cyclobenzaprine (FLEXERIL) 10 mg, oral, 3 times daily    diphenhydrAMINE (BENADRYL) 25 mg, oral, Every 6 hours PRN    DULoxetine (CYMBALTA) 60 mg, oral, 2 times daily    ergocalciferol (Vitamin D-2) 1.25 MG (68816 UT) capsule 1 capsule, oral, Weekly    folic acid (Folvite) 1 mg tablet 1 tablet, oral, Daily    gabapentin (NEURONTIN) 100 mg, oral, Nightly,  1 CAP(S) ORALLY AT BEDTIME FOR 7 DAYS THEN INCREASE TO TWICE A DAY    HYDROmorphone (DILAUDID) 4 mg, oral, Every 4 hours PRN    mirtazapine (REMERON) 7.5 mg, oral, Nightly    naloxone (NARCAN) 4 mg, nasal, As needed, 1 spray to nostril for overdose, may repeat  every 2-3 minutes until medical assistance arrives<BR>    omeprazole (PRILOSEC) 40 mg, oral, Daily before breakfast,  TAKE ONE CAPSULE BY MOUTH EVERY DAY IN THE MORNING BEFORE EATING    Rezurock 200 mg tablet Take one (1) tablet by mouth Twice Daily.    tacrolimus-vehicle base no.238 0.1 % cream Topical,  Take as directed       Family History   Problem Relation Name Age of Onset    Diabetes Father      Sickle cell anemia Other sibling     Cancer Other grandfather     Cancer Other uncle        Xu Maya  reports that he has been smoking cigarettes. He has been exposed to tobacco smoke. He has never used smokeless tobacco.  He  reports current alcohol use.  He  reports current drug use. Drug: Marijuana.    Physical Exam  Constitutional:       General: He is not in acute distress.     Appearance: Normal appearance. He is not ill-appearing.   Neurological:      Mental Status: He is alert.   Psychiatric:         Mood and Affect: Mood normal.         Behavior: Behavior normal.         Thought Content: Thought content normal.         Judgment: Judgment normal.      Comments: Worried affect     No visits with results within 1 Week(s) from this visit.   Latest known visit with results is:   Orders Only on 10/30/2023   Component Date Value Ref Range Status    Andrology Lab ID# 11/01/2023 [id number]   Final    Abstinence (days) 11/01/2023 3  2 - 7 days Final    Collected Completely 11/01/2023 Yes   Final    Collection Location 11/01/2023 Center   Final    Collection Method 11/01/2023 Masturbation   Final    Received time 11/01/2023 2:16 PM   Final    Analyzed Time 11/01/2023 2:34 PM   Final    Semen Appearance 11/01/2023 Normal   Final    Semen Viscosity 11/01/2023 Abnormal   Final    Semen Liquefaction 11/01/2023 Normal   Final    Debris (Semen) 11/01/2023 Yes   Final    Clumps (Semen) 11/01/2023 No   Final    Agglutination (Semen) 11/01/2023 No   Final    Volume (Semen) 11/01/2023 2.1  1.5 mL Final     Concentration(Semen) 11/01/2023 0 (A)  15 mill/mL Final    Total Motility (Semen) 11/01/2023 0 (A)  40 % Final    Prog. Motility (Semen) 11/01/2023 0 (A)  32 % Final    Prog. Motility (Semen) 11/01/2023 0  % Final    Total No of Sperm (Semen) 11/01/2023 0 (A)  39 mill Final    Total No of Motile (Semen) 11/01/2023 0  mill Final    VOLUME CN (Post-Wash) 11/01/2023 0.20  mL Final    CONCENTRATION CN (Post-Wash) 11/01/2023 0  mill/mL Final    TOTAL MOTILITY CN (Post-Wash) 11/01/2023 0  % Final    PROG. MOTILITY CN (Post-Wash) 11/01/2023 0  % Final    NON PROG. MOTILITY CN (Post-Wash) 11/01/2023 0  % Final    TOTAL NO OF SPERM CN (Post-Wash) 11/01/2023 0  mill Final    TOTAL NO OF MOTILE CN (Psot-wash) 11/01/2023 0  mill Final      Assessment/Plan    Xu Maya is a 42 y.o. 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     #Risk for infertility:   - We previously discussed the damaging effects SSD and chemotherapy/radiation therapy can have on sperm production, quantity, and quality and the potential impact of infertility  - Previously discussed potential for chemotherapy/radiation treatments to effect male hormones, ejaculation and/or cause erectile dysfunction  - Discussed that prior treatment with hydrea may have affected sperm counts and made it difficult for him to achieve pregnancy prior to his transplant  - Current risk to fertility is HIGH based on treatment with HSCT preparative regimen specifically high dose cytoxan and TBI   - We reviewed the results from 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  - Acknowledged the loss of fertility and the emotions that surround the repeated losses as a young person going through a chronic illness  - We briefly touched on alternative family building options including use of donor sperm which he was against and also adoption -- he was open to considering adoption  in the future once he has had some space and time to process his loss of fertility  - Will look into any resources (financial or practical) for sickle cell patients to assist with alternative family building services    #Psychosocial:   - Acknowledged challenges he faces as a young person who has been dealing with chronic illness and long term side effects for the entirety of his young adult life  - Discussed plan for continued follow up to help support him as a young adult d/t his unique needs  - Discussed working on the emotional aspect of his suffering and that perhaps with improvement in mood he may experience an improvement in his pain and perception of symptoms -- he was agreeable to exploring this  - Emailed  psychology providers to see if they are interested in 1:1 therapy for the YA and sickle cell population  - Connected with Sandee MILLARD for sickle cell population re the above    Follow up at in person visit in next month         Annamaria Muniz, PRABHJOT-CNP

## 2023-11-13 DIAGNOSIS — F41.9 ANXIETY: ICD-10-CM

## 2023-11-13 DIAGNOSIS — F33.1 MODERATE EPISODE OF RECURRENT MAJOR DEPRESSIVE DISORDER (MULTI): ICD-10-CM

## 2023-11-13 RX ORDER — DULOXETIN HYDROCHLORIDE 60 MG/1
60 CAPSULE, DELAYED RELEASE ORAL 2 TIMES DAILY
Qty: 60 CAPSULE | Refills: 2 | Status: SHIPPED | OUTPATIENT
Start: 2023-11-13 | End: 2023-11-24 | Stop reason: SDUPTHER

## 2023-11-17 ENCOUNTER — TELEPHONE (OUTPATIENT)
Dept: ADMISSION | Facility: HOSPITAL | Age: 43
End: 2023-11-17

## 2023-11-17 ENCOUNTER — OFFICE VISIT (OUTPATIENT)
Dept: DERMATOLOGY | Facility: CLINIC | Age: 43
End: 2023-11-17
Payer: COMMERCIAL

## 2023-11-17 DIAGNOSIS — D57.00 SICKLE CELL DISEASE WITH CRISIS (MULTI): ICD-10-CM

## 2023-11-17 DIAGNOSIS — A63.0 CONDYLOMA ACUMINATUM: Primary | ICD-10-CM

## 2023-11-17 DIAGNOSIS — D57.3 SICKLE CELL TRAIT (CMS-HCC): Primary | ICD-10-CM

## 2023-11-17 PROCEDURE — 99213 OFFICE O/P EST LOW 20 MIN: CPT | Performed by: STUDENT IN AN ORGANIZED HEALTH CARE EDUCATION/TRAINING PROGRAM

## 2023-11-17 PROCEDURE — 54056 CRYOSURGERY PENIS LESION(S): CPT | Performed by: STUDENT IN AN ORGANIZED HEALTH CARE EDUCATION/TRAINING PROGRAM

## 2023-11-17 RX ORDER — HYDROMORPHONE HYDROCHLORIDE 4 MG/1
4 TABLET ORAL EVERY 4 HOURS PRN
Qty: 42 TABLET | Refills: 0 | Status: SHIPPED | OUTPATIENT
Start: 2023-11-17 | End: 2023-11-24 | Stop reason: SDUPTHER

## 2023-11-17 RX ORDER — FOLIC ACID 1 MG/1
1 TABLET ORAL DAILY
Qty: 30 TABLET | Refills: 11 | Status: SHIPPED | OUTPATIENT
Start: 2023-11-17 | End: 2024-01-31 | Stop reason: ALTCHOICE

## 2023-11-17 RX ORDER — OMEPRAZOLE 40 MG/1
40 CAPSULE, DELAYED RELEASE ORAL
Qty: 30 CAPSULE | Refills: 2 | Status: SHIPPED | OUTPATIENT
Start: 2023-11-17 | End: 2023-11-17 | Stop reason: SDUPTHER

## 2023-11-17 RX ORDER — OMEPRAZOLE 40 MG/1
40 CAPSULE, DELAYED RELEASE ORAL
Qty: 30 CAPSULE | Refills: 2 | Status: SHIPPED | OUTPATIENT
Start: 2023-11-17 | End: 2024-02-19 | Stop reason: SDUPTHER

## 2023-11-17 NOTE — TELEPHONE ENCOUNTER
Medication Refill-  Folic acid  Omeprazole  dilaudid      Pharmacy-  [facility] [id number]

## 2023-11-17 NOTE — PROGRESS NOTES
Subjective     Xu Maya is a 43 y.o. male who presents for the following: Skin Check (Follow up).     Review of Systems:  No other skin or systemic complaints other than what is documented elsewhere in the note.    The following portions of the chart were reviewed this encounter and updated as appropriate:          Skin Cancer History  No skin cancer on file.      Specialty Problems          Dermatology Problems    Dermatitis, unspecified    Ichthyosis vulgaris    Other seborrheic dermatitis    Other specified dermatitis    Pruritus, unspecified    Urticaria, unspecified    Periorificial dermatitis    Pruritus        Objective   Well appearing patient in no apparent distress; mood and affect are within normal limits.    A focused skin examination was performed. All findings within normal limits unless otherwise noted below.    Assessment/Plan   1. Condyloma acuminatum  Pubic    Compounded rx podofilox miguelitoguicho Plunkett rx    6 lesions treated with ln2    Destr of lesion - Pubic  Complexity: simple    Destruction method: cryotherapy    Informed consent: discussed and consent obtained    Debridement: hyperkeratotic portion removed with sharp debridement    Lesion destroyed using liquid nitrogen: Yes    Cryotherapy cycles:  3  Outcome: patient tolerated procedure well with no complications    Post-procedure details: wound care instructions given          Side effects of rx regimen was reviewed  Recommended use podofilox q 2-3 days thin layer, can cover with bandaid, until reaction occurs than pause for 2-3 weeks and repeat as needed  Risk of post inflammatory changes reviewed

## 2023-11-22 ENCOUNTER — SPECIALTY PHARMACY (OUTPATIENT)
Dept: PHARMACY | Facility: CLINIC | Age: 43
End: 2023-11-22

## 2023-11-22 ENCOUNTER — PHARMACY VISIT (OUTPATIENT)
Dept: PHARMACY | Facility: CLINIC | Age: 43
End: 2023-11-22
Payer: MEDICAID

## 2023-11-22 PROCEDURE — RXMED WILLOW AMBULATORY MEDICATION CHARGE

## 2023-11-24 ENCOUNTER — TELEPHONE (OUTPATIENT)
Dept: HEMATOLOGY/ONCOLOGY | Facility: HOSPITAL | Age: 43
End: 2023-11-24
Payer: COMMERCIAL

## 2023-11-24 ENCOUNTER — APPOINTMENT (OUTPATIENT)
Dept: LAB | Facility: HOSPITAL | Age: 43
End: 2023-11-24
Payer: COMMERCIAL

## 2023-11-24 ENCOUNTER — TELEPHONE (OUTPATIENT)
Dept: ADMISSION | Facility: HOSPITAL | Age: 43
End: 2023-11-24
Payer: COMMERCIAL

## 2023-11-24 ENCOUNTER — OFFICE VISIT (OUTPATIENT)
Dept: HEMATOLOGY/ONCOLOGY | Facility: HOSPITAL | Age: 43
End: 2023-11-24
Payer: COMMERCIAL

## 2023-11-24 VITALS
OXYGEN SATURATION: 99 % | DIASTOLIC BLOOD PRESSURE: 73 MMHG | SYSTOLIC BLOOD PRESSURE: 114 MMHG | WEIGHT: 143.74 LBS | TEMPERATURE: 98.2 F | RESPIRATION RATE: 18 BRPM | BODY MASS INDEX: 22.51 KG/M2 | HEART RATE: 79 BPM

## 2023-11-24 DIAGNOSIS — F33.1 MODERATE EPISODE OF RECURRENT MAJOR DEPRESSIVE DISORDER (MULTI): ICD-10-CM

## 2023-11-24 DIAGNOSIS — Z94.84 HISTORY OF STEM CELL TRANSPLANT (MULTI): Primary | ICD-10-CM

## 2023-11-24 DIAGNOSIS — D57.00 SICKLE CELL CRISIS (MULTI): ICD-10-CM

## 2023-11-24 DIAGNOSIS — F41.9 ANXIETY: ICD-10-CM

## 2023-11-24 DIAGNOSIS — D57.00 SICKLE CELL DISEASE WITH CRISIS (MULTI): ICD-10-CM

## 2023-11-24 LAB
ALBUMIN SERPL BCP-MCNC: 4.2 G/DL (ref 3.4–5)
ALP SERPL-CCNC: 117 U/L (ref 33–120)
ALT SERPL W P-5'-P-CCNC: 12 U/L (ref 10–52)
ANION GAP SERPL CALC-SCNC: 11 MMOL/L (ref 10–20)
AST SERPL W P-5'-P-CCNC: 13 U/L (ref 9–39)
BASOPHILS # BLD AUTO: 0.03 X10*3/UL (ref 0–0.1)
BASOPHILS NFR BLD AUTO: 0.2 %
BILIRUB SERPL-MCNC: 0.6 MG/DL (ref 0–1.2)
BUN SERPL-MCNC: 9 MG/DL (ref 6–23)
CALCIUM SERPL-MCNC: 9.5 MG/DL (ref 8.6–10.3)
CHLORIDE SERPL-SCNC: 107 MMOL/L (ref 98–107)
CO2 SERPL-SCNC: 25 MMOL/L (ref 21–32)
CREAT SERPL-MCNC: 0.84 MG/DL (ref 0.5–1.3)
EOSINOPHIL # BLD AUTO: 0.15 X10*3/UL (ref 0–0.7)
EOSINOPHIL NFR BLD AUTO: 1.1 %
ERYTHROCYTE [DISTWIDTH] IN BLOOD BY AUTOMATED COUNT: 14.3 % (ref 11.5–14.5)
GFR SERPL CREATININE-BSD FRML MDRD: >90 ML/MIN/1.73M*2
GLUCOSE SERPL-MCNC: 117 MG/DL (ref 74–99)
HCT VFR BLD AUTO: 46.8 % (ref 41–52)
HGB BLD-MCNC: 16.4 G/DL (ref 13.5–17.5)
HGB RETIC QN: 34 PG (ref 28–38)
IMM GRANULOCYTES # BLD AUTO: 0.04 X10*3/UL (ref 0–0.7)
IMM GRANULOCYTES NFR BLD AUTO: 0.3 % (ref 0–0.9)
IMMATURE RETIC FRACTION: 7.1 %
LDH SERPL L TO P-CCNC: 149 U/L (ref 84–246)
LYMPHOCYTES # BLD AUTO: 3.53 X10*3/UL (ref 1.2–4.8)
LYMPHOCYTES NFR BLD AUTO: 25.8 %
MCH RBC QN AUTO: 32 PG (ref 26–34)
MCHC RBC AUTO-ENTMCNC: 35 G/DL (ref 32–36)
MCV RBC AUTO: 91 FL (ref 80–100)
MONOCYTES # BLD AUTO: 0.6 X10*3/UL (ref 0.1–1)
MONOCYTES NFR BLD AUTO: 4.4 %
NEUTROPHILS # BLD AUTO: 9.32 X10*3/UL (ref 1.2–7.7)
NEUTROPHILS NFR BLD AUTO: 68.2 %
NRBC BLD-RTO: 0 /100 WBCS (ref 0–0)
PLATELET # BLD AUTO: 286 X10*3/UL (ref 150–450)
POTASSIUM SERPL-SCNC: 3.5 MMOL/L (ref 3.5–5.3)
PROT SERPL-MCNC: 7.4 G/DL (ref 6.4–8.2)
RBC # BLD AUTO: 5.12 X10*6/UL (ref 4.5–5.9)
RETICS #: 0.07 X10*6/UL (ref 0.02–0.12)
RETICS/RBC NFR AUTO: 1.4 % (ref 0.5–2)
SODIUM SERPL-SCNC: 139 MMOL/L (ref 136–145)
WBC # BLD AUTO: 13.7 X10*3/UL (ref 4.4–11.3)

## 2023-11-24 PROCEDURE — 99417 PROLNG OP E/M EACH 15 MIN: CPT | Performed by: NURSE PRACTITIONER

## 2023-11-24 PROCEDURE — 2500000005 HC RX 250 GENERAL PHARMACY W/O HCPCS: Performed by: NURSE PRACTITIONER

## 2023-11-24 PROCEDURE — 83615 LACTATE (LD) (LDH) ENZYME: CPT

## 2023-11-24 PROCEDURE — 85045 AUTOMATED RETICULOCYTE COUNT: CPT

## 2023-11-24 PROCEDURE — 85025 COMPLETE CBC W/AUTO DIFF WBC: CPT

## 2023-11-24 PROCEDURE — 80053 COMPREHEN METABOLIC PANEL: CPT

## 2023-11-24 PROCEDURE — 36415 COLL VENOUS BLD VENIPUNCTURE: CPT | Performed by: NURSE PRACTITIONER

## 2023-11-24 PROCEDURE — 2500000001 HC RX 250 WO HCPCS SELF ADMINISTERED DRUGS (ALT 637 FOR MEDICARE OP): Performed by: NURSE PRACTITIONER

## 2023-11-24 PROCEDURE — 99215 OFFICE O/P EST HI 40 MIN: CPT | Mod: 25 | Performed by: NURSE PRACTITIONER

## 2023-11-24 RX ORDER — DIPHENHYDRAMINE HCL 25 MG
25 CAPSULE ORAL ONCE
Status: COMPLETED | OUTPATIENT
Start: 2023-11-24 | End: 2023-11-24

## 2023-11-24 RX ORDER — NALOXONE HYDROCHLORIDE 0.4 MG/ML
0.4 INJECTION, SOLUTION INTRAMUSCULAR; INTRAVENOUS; SUBCUTANEOUS
Status: DISCONTINUED | OUTPATIENT
Start: 2023-11-24 | End: 2023-11-24 | Stop reason: HOSPADM

## 2023-11-24 RX ORDER — HYDROMORPHONE HYDROCHLORIDE 4 MG/1
4 TABLET ORAL EVERY 4 HOURS PRN
Qty: 42 TABLET | Refills: 0 | Status: SHIPPED | OUTPATIENT
Start: 2023-11-24 | End: 2023-12-01 | Stop reason: SDUPTHER

## 2023-11-24 RX ORDER — ONDANSETRON HYDROCHLORIDE 8 MG/1
8 TABLET, FILM COATED ORAL ONCE
Status: COMPLETED | OUTPATIENT
Start: 2023-11-24 | End: 2023-11-24

## 2023-11-24 RX ORDER — DULOXETIN HYDROCHLORIDE 60 MG/1
60 CAPSULE, DELAYED RELEASE ORAL 2 TIMES DAILY
Qty: 60 CAPSULE | Refills: 2 | Status: SHIPPED | OUTPATIENT
Start: 2023-11-24 | End: 2023-12-22 | Stop reason: SDUPTHER

## 2023-11-24 RX ORDER — CYCLOBENZAPRINE HCL 10 MG
10 TABLET ORAL ONCE
Status: COMPLETED | OUTPATIENT
Start: 2023-11-24 | End: 2023-11-24

## 2023-11-24 RX ORDER — HYDROMORPHONE HYDROCHLORIDE 4 MG/1
12 TABLET ORAL
Status: COMPLETED | OUTPATIENT
Start: 2023-11-24 | End: 2023-11-24

## 2023-11-24 RX ORDER — ACETAMINOPHEN 325 MG/1
650 TABLET ORAL ONCE
Status: COMPLETED | OUTPATIENT
Start: 2023-11-24 | End: 2023-11-24

## 2023-11-24 RX ADMIN — HYDROMORPHONE HYDROCHLORIDE 12 MG: 4 TABLET ORAL at 14:05

## 2023-11-24 RX ADMIN — DIPHENHYDRAMINE HYDROCHLORIDE 25 MG: 25 CAPSULE ORAL at 11:27

## 2023-11-24 RX ADMIN — HYDROMORPHONE HYDROCHLORIDE 12 MG: 4 TABLET ORAL at 11:27

## 2023-11-24 RX ADMIN — CYCLOBENZAPRINE 10 MG: 10 TABLET, FILM COATED ORAL at 11:27

## 2023-11-24 RX ADMIN — ONDANSETRON HYDROCHLORIDE 8 MG: 8 TABLET, FILM COATED ORAL at 11:27

## 2023-11-24 RX ADMIN — HYDROMORPHONE HYDROCHLORIDE 12 MG: 4 TABLET ORAL at 12:32

## 2023-11-24 RX ADMIN — ACETAMINOPHEN 650 MG: 325 TABLET ORAL at 12:32

## 2023-11-24 ASSESSMENT — PAIN SCALES - GENERAL
PAINLEVEL_OUTOF10: 10 - WORST POSSIBLE PAIN
PAINLEVEL: 10-WORST PAIN EVER
PAINLEVEL_OUTOF10: 10 - WORST POSSIBLE PAIN

## 2023-11-24 ASSESSMENT — PAIN - FUNCTIONAL ASSESSMENT
PAIN_FUNCTIONAL_ASSESSMENT: 0-10
PAIN_FUNCTIONAL_ASSESSMENT: 0-10

## 2023-11-24 NOTE — PROGRESS NOTES
Patient ID:  Xu Maya is a 43 y.o. male.    Past Medical History:   Past Medical History:  04/17/2017: Anxiety disorder, unspecified      Comment:  Anxiety  04/17/2017: Depression, unspecified      Comment:  Depression  03/02/2017: Family history of glaucoma      Comment:  Family history of glaucoma in father  04/17/2017: Gastritis, unspecified, without bleeding      Comment:  Gastritis  04/17/2017: Hematuria, unspecified      Comment:  Hematuria  12/02/2015: Personal history of other diseases of the digestive system      Comment:  History of esophageal reflux  04/17/2017: Priapism, unspecified      Comment:  Priapism  04/17/2017: Sickle-cell disease without crisis (CMS/[facility])      Comment:  Sickle cell anemia   Surgical History:    Past Surgical History:   Procedure Laterality Date    GALLBLADDER SURGERY  04/12/2017    Gallbladder Surgery    IR CVC TUNNELED  4/24/2018    IR CVC TUNNELED 4/24/2018 [facility]    IR CVC TUNNELED  6/5/2018    IR CVC TUNNELED 6/5/2018 [facility]    IR CVC TUNNELED  3/1/2019    IR CVC TUNNELED 3/1/2019 [facility]    IR CVC TUNNELED  6/4/2019    IR CVC TUNNELED 6/4/2019 [facility]    IR CVC TUNNELED  4/5/2019    IR CVC TUNNELED 4/5/2019 [facility]    IR CVC TUNNELED  7/17/2019    IR CVC TUNNELED 7/17/2019 [facility]    IR CVC TUNNELED  8/14/2019    IR CVC TUNNELED 8/14/2019 [facility]    IR CVC TUNNELED  12/18/2019    IR CVC TUNNELED 12/18/2019 [facility]    IR CVC TUNNELED  10/9/2019    IR CVC TUNNELED 10/9/2019 [facility]    IR CVC TUNNELED  11/13/2019    IR CVC TUNNELED 11/13/2019 [facility]    IR CVC TUNNELED  1/15/2020    IR CVC TUNNELED 1/15/2020 [facility]    IR CVC TUNNELED  2/19/2020    IR CVC TUNNELED 2/19/2020 [facility]    IR CVC TUNNELED  1/4/2021    IR CVC TUNNELED 1/4/2021 [facility]    IR CVC TUNNELED  1/25/2021    IR CVC TUNNELED 1/25/2021 [facility]    IR CVC TUNNELED   8/21/2018    IR CVC TUNNELED 8/21/2018 [facility]    IR CVC TUNNELED  9/26/2018    IR CVC TUNNELED 9/26/2018 [facility]    IR CVC TUNNELED  11/5/2018    IR CVC TUNNELED 11/5/2018 [facility]    IR CVC TUNNELED  12/19/2018    IR CVC TUNNELED 12/19/2018 [facility]    IR VENOGRAM HEPATIC  11/8/2017    IR VENOGRAM HEPATIC 11/8/2017 [facility]    MR HEAD ANGIO WO IV CONTRAST  2/17/2017    MR HEAD ANGIO WO IV CONTRAST 2/17/2017 [facility] CL[facility]ICAL LEGAC[facility]    MR NECK ANGIO WO IV CONTRAST  2/17/2017    MR NECK ANGIO WO IV CONTRAST 2/17/2017 [facility] CL[facility]    US GUIDED NEEDLE LIVER BIOPSY  9/8/2020    US GUIDED NEEDLE LIVER BIOPSY 9/8/2020 [facility]      Family History:    Family History   Problem Relation Name Age of Onset    Diabetes Father      Sickle cell anemia Other sibling     Cancer Other grandfather     Cancer Other uncle      Family Oncology History:    Cancer-related family history includes Cancer in some other family members.  Social History:    Social History     Tobacco Use    Smoking status: Every Day     Types: Cigarettes     Passive exposure: Current    Smokeless tobacco: Never   Substance Use Topics    Alcohol use: Yes     Comment: Occasional    Drug use: Yes     Types: Marijuana          Subjective   Chief Complaint: Uncontrolled Pain    SHANKAR Mcnair is a 43 y.o. male with pmh of sickle cell disease s/p transplant, who presents to [facility] with uncontrolled pain. He reports the pain started last night. It is his typical BLE and lower back pain uncontrolled with home dilaudid. He says he last took about 4am and has a few left but is running low and called for a refill. Pt denies chest pain, cough, SOB, headaches, blurry vision, falls, fever or chills, n/v/d/abd pain, or urinary complaints.      He reports he is having a hard time recently because it is coming up on the anniversary of his sister and step father's death, he had an aunt die recently. He is also having a hard time at work. He  also reports he stopped ketamine due to it being ineffective. He plans to talk to Dr. Ricks soon about his pain plan going forward.     ROS  Review of Systems - Oncology     Allergies  Allergies   Allergen Reactions    Hydrocodone-Acetaminophen Unknown    Naproxen Unknown        Medications  Current Outpatient Medications   Medication Instructions    acyclovir (Zovirax) 400 mg tablet 1 tablet, oral, 2 times daily    amitriptyline (ELAVIL) 25 mg, oral    ARIPiprazole (ABILIFY) 2 mg, oral, Daily    belumosudil 200 mg tablet Take one (1) tablet by mouth twice daily.    belumosudil 200 mg tablet TAKE ONE (1) TABLET BY MOUTH ONCE DAILY.    cetirizine (ZYRTEC) 10 mg, oral, Daily    cholecalciferol (VITAMIN D-3) 2,000 Units, oral    cyclobenzaprine (FLEXERIL) 10 mg, oral, 3 times daily    diphenhydrAMINE (BENADRYL) 25 mg, oral, Every 6 hours PRN    DULoxetine (CYMBALTA) 60 mg, oral, 2 times daily    ergocalciferol (Vitamin D-2) 1.25 MG (49040 UT) capsule 1 capsule, oral, Weekly    folic acid (FOLVITE) 1 mg, oral, Daily    gabapentin (NEURONTIN) 100 mg, oral, Nightly,  1 CAP(S) ORALLY AT BEDTIME FOR 7 DAYS THEN INCREASE TO TWICE A DAY    HYDROmorphone (DILAUDID) 4 mg, oral, Every 4 hours PRN    mirtazapine (REMERON) 7.5 mg, oral, Nightly    naloxone (NARCAN) 4 mg, nasal, As needed, 1 spray to nostril for overdose, may repeat every 2-3 minutes until medical assistance arrives<BR>    omeprazole (PRILOSEC) 40 mg, oral, Daily before breakfast, TAKE ONE CAPSULE BY MOUTH EVERY DAY IN THE MORNING BEFORE EATING    Rezurock 200 mg tablet Take one (1) tablet by mouth Twice Daily.    tacrolimus-vehicle base no.238 0.1 % cream Topical,  Take as directed          Objective   Vitals: /73 (BP Location: Right arm, Patient Position: Sitting, BP Cuff Size: Adult)   Pulse 79   Temp 36.8 °C (98.2 °F) (Temporal)   Resp 18   Wt 65.2 kg (143 lb 11.8 oz)   SpO2 99%   BMI 22.51 kg/m²   Weight:   Vitals:    11/24/23 1108   Weight: 65.2 kg  (143 lb 11.8 oz)       Physical Exam  Vitals reviewed.   Constitutional:       Appearance: Normal appearance.   HENT:      Head: Normocephalic and atraumatic.      Nose: Nose normal.      Mouth/Throat:      Mouth: Mucous membranes are moist.   Eyes:      Conjunctiva/sclera: Conjunctivae normal.      Pupils: Pupils are equal, round, and reactive to light.   Cardiovascular:      Rate and Rhythm: Normal rate and regular rhythm.   Pulmonary:      Effort: Pulmonary effort is normal.      Breath sounds: Normal breath sounds.   Abdominal:      General: Abdomen is flat. Bowel sounds are normal.      Palpations: Abdomen is soft.   Musculoskeletal:         General: Normal range of motion.      Cervical back: Normal range of motion.   Skin:     General: Skin is warm and dry.   Neurological:      General: No focal deficit present.      Mental Status: He is alert.   Psychiatric:         Mood and Affect: Mood normal.         Behavior: Behavior normal.         Diagnostic Results     Labs                               Lab Results   Component Value Date    HGB 15.9 10/30/2023    HGB 15.7 10/20/2023    HGB 15.5 10/16/2023     10/30/2023     10/20/2023     10/16/2023     10/30/2023     10/16/2023     10/08/2023       Images  === 05/16/23 ===    CHEST 2 VIEW    - Impression -  No evidence of acute cardiopulmonary process.    I personally reviewed the images/study and I agree with the findings  as stated by resident physician Dr. Vidal Early.   === 10/23/21 ===    CT ABDOMEN PELVIS W IV CONTRAST    - Impression -  There is a pancolitis.  In a sickle cell patient who has received a  bone marrow transplant, differential includes graft versus host  disease as well as ischemic/venoocclusive colitis.  Inflammatory or  infectious colitis would also be in the differential.  No bowel  obstruction.  There is a small amount of pelvic free fluid.  No  pneumatosis or portal venous gas.    The appendix  is seen with no evidence of acute appendicitis.    Minimal patchy opacity in the lower lobes, raising the possibility of  changes of [id number] disease, pneumonia or sickle cell crisis.  Consider early or mild interstitial fibrosis.    Cholecystectomy.  No biliary ductal dilatation.  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    Consider cystitis which can also represent a manifestation of  ehcvu-[id number]-[id number] disease as well as infection.  Clinical correlation  is needed.    Osseous changes consistent with known sickle cell disease.  Mild  cardiomegaly consistent with known sickle cell disease.    NOTIFICATION:  The critical results of the study were discussed with,  and acknowledged by Dr. Bety Carr  by telephone on 10/23/2021 at  1322 hours.            Signed by Allyson Padgett MD     No echocardiogram results found for the past 12 months       Assessment/Plan   Xu Maya is a 43 y.o. male with pmh of sickle cell disease s/p transplant who presents to [facility] for evaluation of BLE pain.        ACC Course  - VSS  - Hemolysis labs near baseline, no indication of acute vaso-occlusive crisis or indication for blood transfusion   - Flexeril 10mg, given for msk pain  - dilaudid 12mg PO x3 doses given for sickle cell related pain  - Zofran 8mg PO once for opioid induced nausea/vomiting  - Benadryl 25mg PO once for opioid induced pruritus     Disposition  - Patient discharged home with no further needs following ACC Course  - Return to clinic/ED instructions given            PRABHJOT Ames-CNP

## 2023-11-25 ENCOUNTER — HOSPITAL ENCOUNTER (EMERGENCY)
Facility: HOSPITAL | Age: 43
Discharge: HOME | End: 2023-11-25
Attending: STUDENT IN AN ORGANIZED HEALTH CARE EDUCATION/TRAINING PROGRAM
Payer: COMMERCIAL

## 2023-11-25 VITALS
OXYGEN SATURATION: 92 % | DIASTOLIC BLOOD PRESSURE: 80 MMHG | RESPIRATION RATE: 10 BRPM | WEIGHT: 144 LBS | TEMPERATURE: 98.1 F | HEIGHT: 67 IN | BODY MASS INDEX: 22.6 KG/M2 | HEART RATE: 71 BPM | SYSTOLIC BLOOD PRESSURE: 115 MMHG

## 2023-11-25 DIAGNOSIS — D57.00 SICKLE CELL PAIN CRISIS (MULTI): Primary | ICD-10-CM

## 2023-11-25 PROCEDURE — 96375 TX/PRO/DX INJ NEW DRUG ADDON: CPT

## 2023-11-25 PROCEDURE — 2500000004 HC RX 250 GENERAL PHARMACY W/ HCPCS (ALT 636 FOR OP/ED): Performed by: STUDENT IN AN ORGANIZED HEALTH CARE EDUCATION/TRAINING PROGRAM

## 2023-11-25 PROCEDURE — 96361 HYDRATE IV INFUSION ADD-ON: CPT

## 2023-11-25 PROCEDURE — 99284 EMERGENCY DEPT VISIT MOD MDM: CPT | Mod: 25 | Performed by: STUDENT IN AN ORGANIZED HEALTH CARE EDUCATION/TRAINING PROGRAM

## 2023-11-25 PROCEDURE — 96374 THER/PROPH/DIAG INJ IV PUSH: CPT

## 2023-11-25 PROCEDURE — 96376 TX/PRO/DX INJ SAME DRUG ADON: CPT

## 2023-11-25 RX ORDER — HYDROMORPHONE HYDROCHLORIDE 1 MG/ML
1 INJECTION, SOLUTION INTRAMUSCULAR; INTRAVENOUS; SUBCUTANEOUS
Status: DISCONTINUED | OUTPATIENT
Start: 2023-11-25 | End: 2023-11-25

## 2023-11-25 RX ORDER — KETOROLAC TROMETHAMINE 30 MG/ML
30 INJECTION, SOLUTION INTRAMUSCULAR; INTRAVENOUS ONCE
Status: COMPLETED | OUTPATIENT
Start: 2023-11-25 | End: 2023-11-25

## 2023-11-25 RX ORDER — HYDROMORPHONE HYDROCHLORIDE 2 MG/ML
2 INJECTION, SOLUTION INTRAMUSCULAR; INTRAVENOUS; SUBCUTANEOUS
Status: COMPLETED | OUTPATIENT
Start: 2023-11-25 | End: 2023-11-25

## 2023-11-25 RX ADMIN — SODIUM CHLORIDE, SODIUM LACTATE, POTASSIUM CHLORIDE, AND CALCIUM CHLORIDE 1000 ML: 600; 310; 30; 20 INJECTION, SOLUTION INTRAVENOUS at 19:14

## 2023-11-25 RX ADMIN — HYDROMORPHONE HYDROCHLORIDE 2 MG: 2 INJECTION, SOLUTION INTRAMUSCULAR; INTRAVENOUS; SUBCUTANEOUS at 20:45

## 2023-11-25 RX ADMIN — KETOROLAC TROMETHAMINE 30 MG: 30 INJECTION, SOLUTION INTRAMUSCULAR; INTRAVENOUS at 18:54

## 2023-11-25 RX ADMIN — HYDROMORPHONE HYDROCHLORIDE 2 MG: 2 INJECTION, SOLUTION INTRAMUSCULAR; INTRAVENOUS; SUBCUTANEOUS at 21:30

## 2023-11-25 RX ADMIN — HYDROMORPHONE HYDROCHLORIDE 1 MG: 1 INJECTION, SOLUTION INTRAMUSCULAR; INTRAVENOUS; SUBCUTANEOUS at 18:54

## 2023-11-25 RX ADMIN — HYDROMORPHONE HYDROCHLORIDE 2 MG: 2 INJECTION, SOLUTION INTRAMUSCULAR; INTRAVENOUS; SUBCUTANEOUS at 19:40

## 2023-11-25 ASSESSMENT — PAIN - FUNCTIONAL ASSESSMENT
PAIN_FUNCTIONAL_ASSESSMENT: 0-10

## 2023-11-25 ASSESSMENT — PAIN SCALES - GENERAL
PAINLEVEL_OUTOF10: 4
PAINLEVEL_OUTOF10: 8
PAINLEVEL_OUTOF10: 8

## 2023-11-25 ASSESSMENT — PAIN DESCRIPTION - ORIENTATION: ORIENTATION: LEFT;RIGHT

## 2023-11-25 ASSESSMENT — PAIN DESCRIPTION - LOCATION: LOCATION: LEG

## 2023-11-25 ASSESSMENT — COLUMBIA-SUICIDE SEVERITY RATING SCALE - C-SSRS
1. IN THE PAST MONTH, HAVE YOU WISHED YOU WERE DEAD OR WISHED YOU COULD GO TO SLEEP AND NOT WAKE UP?: NO
6. HAVE YOU EVER DONE ANYTHING, STARTED TO DO ANYTHING, OR PREPARED TO DO ANYTHING TO END YOUR LIFE?: NO
2. HAVE YOU ACTUALLY HAD ANY THOUGHTS OF KILLING YOURSELF?: NO

## 2023-11-26 NOTE — ED PROVIDER NOTES
HPI:    History provided by: Patient    Patient is a 43-year-old male with past medical history of sickle cell disease who presents to the emergency department for leg pain.  Patient endorses that he has been having pain in the right lower extremity.  He endorses this feels similar to his history of sickle cell pain crisis.  Notes that he was seen at acute care yesterday, but his pain is just been worsening since then.  No traumatic injury.  Denies any leg swelling.  He is currently 8 out of 10 the pain scale, unrelieved with oxycodone.  Denies any chest pain, shortness of breath.  Endorses he had labs done yesterday which were reassuring.  Denies any other symptoms at this time.    ROS: All pertinent positives and negatives reviewed in HPI    PMHx: As above  PSHx: As above  Social: no Etoh, no tobacco, no social drugs  Social Determinants of Health impacting care: NA  Allergies: Reviewed in EMR  FMHx: Noncontributory to patient's chief complaint  Medications: Reviewed        Vital signs and triage note reviewed per nursing documentation    Physical Exam:     GEN: Sitting in no acute distress. Well-appearing, appears stated age  HEAD: Atraumatic, normocephalic  EYES: EOMI. Pupils equal and reactive.   HEENT: MMM. No oropharyngeal erythema, exudates, or uvular deviation.   Neck: Supple, full ROM  CVS: Regular rate and rhythm, no murmurs, gallops, or rubs  PULM: Clear to auscultation bilaterally. No wheezes, rales, rhonchi.   ABD: Soft, nontender to palpation. No rigidity, guarding, or tympany  BACK: No step offs or deformities, no CVA tenderness to palpation  EXT: +2 radial and DP pulses bilaterally. No pitting edema noted. No varicose veins  NEURO: Alert, keenly responsive. Moving all 4 extremities spontaneously with normal strength. Normal sensation in all 4 extremities     Emergency Department Course    Medications Ordered: IV dilaudid, IV toradol, IV LR    EKG: NA    MDM    This is a 43-year-old male with past  medical history of sickle cell disease presenting to the emergency department for right lower extremity pain.  My assessment is hemodynamically stable, uncomfortable appearing male otherwise in no acute distress.  Has no calf swelling, no varicose veins, no calf tenderness so I do not believe that he has a DVT at this time.  Labs yesterday demonstrate no evidence of aplastic anemia, so I do not see the need to repeat them at this time.  He was given analgesia per his care plan with 2 mg of Dilaudid every hour along with Toradol, IV fluids.  No signs or symptoms of acute chest at this time.  He was signed out to oncoming physician pending reevaluation after third dose of pain medication with disposition accordingly afterwards.      Consultations:    NA    Medications Prescribed:    NA    Disposition:    Discharged         Kartik Steven MD  11/26/23 [id number]

## 2023-11-26 NOTE — PROGRESS NOTES
I received Xu Maya in signout from Dr. Steven.  Please see the previous note for all HPI, PE, ED Course and MDM up until the time of signout.    In brief Xu Maya is an 43 y.o. male with history of sickle cell disease presenting to the emergency department for worsening pain consistent with typical sickle cell pain. He's had recent labs so these were not repeated today. Pain meds ordered and reevaluation pending at signout.     After receiving 3 doses of pain medications, he reported feeling better and was comfortable going home.  He remains well-appearing and has no other concerns at this time.  He was advised to follow-up with his sickle cell provider.  Discharged in stable condition.     Signed by RACHANA TRAN MD

## 2023-11-28 DIAGNOSIS — F33.1 MODERATE EPISODE OF RECURRENT MAJOR DEPRESSIVE DISORDER (MULTI): ICD-10-CM

## 2023-11-28 RX ORDER — ARIPIPRAZOLE 2 MG/1
2 TABLET ORAL DAILY
Qty: 90 TABLET | Refills: 1 | Status: SHIPPED | OUTPATIENT
Start: 2023-11-28 | End: 2024-01-04 | Stop reason: SDUPTHER

## 2023-11-29 ENCOUNTER — APPOINTMENT (OUTPATIENT)
Dept: HEMATOLOGY/ONCOLOGY | Facility: HOSPITAL | Age: 43
End: 2023-11-29
Payer: COMMERCIAL

## 2023-11-30 ENCOUNTER — HOSPITAL ENCOUNTER (OUTPATIENT)
Dept: RADIOLOGY | Facility: HOSPITAL | Age: 43
Discharge: HOME | End: 2023-11-30
Payer: COMMERCIAL

## 2023-11-30 ENCOUNTER — OFFICE VISIT (OUTPATIENT)
Dept: HEMATOLOGY/ONCOLOGY | Facility: HOSPITAL | Age: 43
End: 2023-11-30
Payer: COMMERCIAL

## 2023-11-30 ENCOUNTER — TELEPHONE (OUTPATIENT)
Dept: HEMATOLOGY/ONCOLOGY | Facility: HOSPITAL | Age: 43
End: 2023-11-30

## 2023-11-30 VITALS
OXYGEN SATURATION: 100 % | RESPIRATION RATE: 16 BRPM | DIASTOLIC BLOOD PRESSURE: 67 MMHG | BODY MASS INDEX: 21.93 KG/M2 | SYSTOLIC BLOOD PRESSURE: 105 MMHG | HEART RATE: 87 BPM | TEMPERATURE: 98.6 F | WEIGHT: 139.99 LBS

## 2023-11-30 DIAGNOSIS — R52 ACUTE PAIN: Primary | ICD-10-CM

## 2023-11-30 DIAGNOSIS — D57.00 SICKLE CELL CRISIS (MULTI): ICD-10-CM

## 2023-11-30 DIAGNOSIS — R05.1 ACUTE COUGH: ICD-10-CM

## 2023-11-30 LAB
ALBUMIN SERPL BCP-MCNC: 4.1 G/DL (ref 3.4–5)
ALP SERPL-CCNC: 103 U/L (ref 33–120)
ALT SERPL W P-5'-P-CCNC: 10 U/L (ref 10–52)
ANION GAP SERPL CALC-SCNC: 14 MMOL/L (ref 10–20)
AST SERPL W P-5'-P-CCNC: 20 U/L (ref 9–39)
BASOPHILS # BLD AUTO: 0.02 X10*3/UL (ref 0–0.1)
BASOPHILS NFR BLD AUTO: 0.2 %
BILIRUB SERPL-MCNC: 0.4 MG/DL (ref 0–1.2)
BUN SERPL-MCNC: 8 MG/DL (ref 6–23)
CALCIUM SERPL-MCNC: 9.3 MG/DL (ref 8.6–10.3)
CHLORIDE SERPL-SCNC: 106 MMOL/L (ref 98–107)
CO2 SERPL-SCNC: 23 MMOL/L (ref 21–32)
CREAT SERPL-MCNC: 0.71 MG/DL (ref 0.5–1.3)
EOSINOPHIL # BLD AUTO: 0.17 X10*3/UL (ref 0–0.7)
EOSINOPHIL NFR BLD AUTO: 2 %
ERYTHROCYTE [DISTWIDTH] IN BLOOD BY AUTOMATED COUNT: 14.4 % (ref 11.5–14.5)
FLUAV RNA RESP QL NAA+PROBE: DETECTED
FLUBV RNA RESP QL NAA+PROBE: NOT DETECTED
GFR SERPL CREATININE-BSD FRML MDRD: >90 ML/MIN/1.73M*2
GLUCOSE SERPL-MCNC: 96 MG/DL (ref 74–99)
HCT VFR BLD AUTO: 45.4 % (ref 41–52)
HGB BLD-MCNC: 16.2 G/DL (ref 13.5–17.5)
HGB RETIC QN: 32 PG (ref 28–38)
IMM GRANULOCYTES # BLD AUTO: 0.02 X10*3/UL (ref 0–0.7)
IMM GRANULOCYTES NFR BLD AUTO: 0.2 % (ref 0–0.9)
IMMATURE RETIC FRACTION: 3.1 %
LDH SERPL L TO P-CCNC: 219 U/L (ref 84–246)
LYMPHOCYTES # BLD AUTO: 3.48 X10*3/UL (ref 1.2–4.8)
LYMPHOCYTES NFR BLD AUTO: 40.6 %
MCH RBC QN AUTO: 32 PG (ref 26–34)
MCHC RBC AUTO-ENTMCNC: 35.7 G/DL (ref 32–36)
MCV RBC AUTO: 90 FL (ref 80–100)
MONOCYTES # BLD AUTO: 0.68 X10*3/UL (ref 0.1–1)
MONOCYTES NFR BLD AUTO: 7.9 %
NEUTROPHILS # BLD AUTO: 4.21 X10*3/UL (ref 1.2–7.7)
NEUTROPHILS NFR BLD AUTO: 49.1 %
NRBC BLD-RTO: 0 /100 WBCS (ref 0–0)
PLATELET # BLD AUTO: 250 X10*3/UL (ref 150–450)
POTASSIUM SERPL-SCNC: 3.9 MMOL/L (ref 3.5–5.3)
PROT SERPL-MCNC: 7.2 G/DL (ref 6.4–8.2)
RBC # BLD AUTO: 5.06 X10*6/UL (ref 4.5–5.9)
RETICS #: 0.04 X10*6/UL (ref 0.02–0.12)
RETICS/RBC NFR AUTO: 0.9 % (ref 0.5–2)
SARS-COV-2 ORF1AB RESP QL NAA+PROBE: NOT DETECTED
SODIUM SERPL-SCNC: 139 MMOL/L (ref 136–145)
WBC # BLD AUTO: 8.6 X10*3/UL (ref 4.4–11.3)

## 2023-11-30 PROCEDURE — 2500000001 HC RX 250 WO HCPCS SELF ADMINISTERED DRUGS (ALT 637 FOR MEDICARE OP): Performed by: NURSE PRACTITIONER

## 2023-11-30 PROCEDURE — 85025 COMPLETE CBC W/AUTO DIFF WBC: CPT

## 2023-11-30 PROCEDURE — 71046 X-RAY EXAM CHEST 2 VIEWS: CPT

## 2023-11-30 PROCEDURE — 71046 X-RAY EXAM CHEST 2 VIEWS: CPT | Mod: FOREIGN READ | Performed by: RADIOLOGY

## 2023-11-30 PROCEDURE — 36415 COLL VENOUS BLD VENIPUNCTURE: CPT

## 2023-11-30 PROCEDURE — 83615 LACTATE (LD) (LDH) ENZYME: CPT

## 2023-11-30 PROCEDURE — 87636 SARSCOV2 & INF A&B AMP PRB: CPT

## 2023-11-30 PROCEDURE — 99215 OFFICE O/P EST HI 40 MIN: CPT | Mod: 25,ZK | Performed by: NURSE PRACTITIONER

## 2023-11-30 PROCEDURE — 80053 COMPREHEN METABOLIC PANEL: CPT

## 2023-11-30 PROCEDURE — 85045 AUTOMATED RETICULOCYTE COUNT: CPT

## 2023-11-30 PROCEDURE — 2500000005 HC RX 250 GENERAL PHARMACY W/O HCPCS: Performed by: NURSE PRACTITIONER

## 2023-11-30 RX ORDER — GABAPENTIN 300 MG/1
300 CAPSULE ORAL 2 TIMES DAILY
Qty: 60 CAPSULE | Refills: 0 | Status: SHIPPED | OUTPATIENT
Start: 2023-11-30 | End: 2024-01-03 | Stop reason: SDUPTHER

## 2023-11-30 RX ORDER — DIPHENHYDRAMINE HCL 25 MG
25 CAPSULE ORAL ONCE
Status: COMPLETED | OUTPATIENT
Start: 2023-11-30 | End: 2023-11-30

## 2023-11-30 RX ORDER — ONDANSETRON HYDROCHLORIDE 8 MG/1
8 TABLET, FILM COATED ORAL ONCE
Status: COMPLETED | OUTPATIENT
Start: 2023-11-30 | End: 2023-11-30

## 2023-11-30 RX ORDER — CYCLOBENZAPRINE HCL 10 MG
10 TABLET ORAL ONCE
Status: COMPLETED | OUTPATIENT
Start: 2023-11-30 | End: 2023-11-30

## 2023-11-30 RX ORDER — NALOXONE HYDROCHLORIDE 0.4 MG/ML
0.4 INJECTION, SOLUTION INTRAMUSCULAR; INTRAVENOUS; SUBCUTANEOUS
Status: DISCONTINUED | OUTPATIENT
Start: 2023-11-30 | End: 2023-12-11 | Stop reason: HOSPADM

## 2023-11-30 RX ORDER — HYDROMORPHONE HYDROCHLORIDE 4 MG/1
12 TABLET ORAL
Status: COMPLETED | OUTPATIENT
Start: 2023-11-30 | End: 2023-11-30

## 2023-11-30 RX ADMIN — HYDROMORPHONE HYDROCHLORIDE 12 MG: 4 TABLET ORAL at 12:23

## 2023-11-30 RX ADMIN — CYCLOBENZAPRINE 10 MG: 10 TABLET, FILM COATED ORAL at 11:14

## 2023-11-30 RX ADMIN — DIPHENHYDRAMINE HYDROCHLORIDE 25 MG: 25 CAPSULE ORAL at 11:14

## 2023-11-30 RX ADMIN — ONDANSETRON HYDROCHLORIDE 8 MG: 8 TABLET, FILM COATED ORAL at 11:14

## 2023-11-30 RX ADMIN — HYDROMORPHONE HYDROCHLORIDE 12 MG: 4 TABLET ORAL at 13:39

## 2023-11-30 RX ADMIN — HYDROMORPHONE HYDROCHLORIDE 12 MG: 4 TABLET ORAL at 11:14

## 2023-11-30 ASSESSMENT — PAIN SCALES - GENERAL
PAINLEVEL_OUTOF10: 7
PAINLEVEL: 8
PAINLEVEL_OUTOF10: 7
PAINLEVEL_OUTOF10: 8

## 2023-11-30 ASSESSMENT — PAIN DESCRIPTION - LOCATION
LOCATION: LEG
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
## 2023-11-30 NOTE — PROGRESS NOTES
Patient ID:  Xu Maya is a 43 y.o. male.    Past Medical History:   Past Medical History:  04/17/2017: Anxiety disorder, unspecified      Comment:  Anxiety  04/17/2017: Depression, unspecified      Comment:  Depression  03/02/2017: Family history of glaucoma      Comment:  Family history of glaucoma in father  04/17/2017: Gastritis, unspecified, without bleeding      Comment:  Gastritis  04/17/2017: Hematuria, unspecified      Comment:  Hematuria  12/02/2015: Personal history of other diseases of the digestive system      Comment:  History of esophageal reflux  04/17/2017: Priapism, unspecified      Comment:  Priapism  04/17/2017: Sickle-cell disease without crisis (CMS/[facility])      Comment:  Sickle cell anemia   Surgical History:    Past Surgical History:   Procedure Laterality Date    GALLBLADDER SURGERY  04/12/2017    Gallbladder Surgery    IR CVC TUNNELED  4/24/2018    IR CVC TUNNELED 4/24/2018 [facility]    IR CVC TUNNELED  6/5/2018    IR CVC TUNNELED 6/5/2018 [facility]    IR CVC TUNNELED  3/1/2019    IR CVC TUNNELED 3/1/2019 [facility]    IR CVC TUNNELED  6/4/2019    IR CVC TUNNELED 6/4/2019 [facility]    IR CVC TUNNELED  4/5/2019    IR CVC TUNNELED 4/5/2019 [facility]    IR CVC TUNNELED  7/17/2019    IR CVC TUNNELED 7/17/2019 [facility]    IR CVC TUNNELED  8/14/2019    IR CVC TUNNELED 8/14/2019 [facility]    IR CVC TUNNELED  12/18/2019    IR CVC TUNNELED 12/18/2019 [facility]    IR CVC TUNNELED  10/9/2019    IR CVC TUNNELED 10/9/2019 [facility]    IR CVC TUNNELED  11/13/2019    IR CVC TUNNELED 11/13/2019 [facility]    IR CVC TUNNELED  1/15/2020    IR CVC TUNNELED 1/15/2020 [facility]    IR CVC TUNNELED  2/19/2020    IR CVC TUNNELED 2/19/2020 [facility]    IR CVC TUNNELED  1/4/2021    IR CVC TUNNELED 1/4/2021 [facility]    IR CVC TUNNELED  1/25/2021    IR CVC TUNNELED 1/25/2021 [facility]    IR CVC TUNNELED   8/21/2018    IR CVC TUNNELED 8/21/2018 [facility]    IR CVC TUNNELED  9/26/2018    IR CVC TUNNELED 9/26/2018 [facility]    IR CVC TUNNELED  11/5/2018    IR CVC TUNNELED 11/5/2018 [facility]    IR CVC TUNNELED  12/19/2018    IR CVC TUNNELED 12/19/2018 [facility]    IR VENOGRAM HEPATIC  11/8/2017    IR VENOGRAM HEPATIC 11/8/2017 [facility]    MR HEAD ANGIO WO IV CONTRAST  2/17/2017    MR HEAD ANGIO WO IV CONTRAST 2/17/2017 [facility]    MR NECK ANGIO WO IV CONTRAST  2/17/2017    MR NECK ANGIO WO IV CONTRAST 2/17/2017 [facility]    US GUIDED NEEDLE LIVER BIOPSY  9/8/2020    US GUIDED NEEDLE LIVER BIOPSY 9/8/2020 [facility]      Family History:    Family History   Problem Relation Name Age of Onset    Diabetes Father      Sickle cell anemia Other sibling     Cancer Other grandfather     Cancer Other uncle      Family Oncology History:    Cancer-related family history includes Cancer in some other family members.  Social History:    Social History     Tobacco Use    Smoking status: Every Day     Types: Cigarettes     Passive exposure: Current    Smokeless tobacco: Never   Substance Use Topics    Alcohol use: Yes     Comment: Occasional    Drug use: Yes     Types: Marijuana          Subjective   Chief Complaint: Uncontrolled Pain    SHANKAR Mcnair is a 43 y.o. male with  with pmh of sickle cell disease s/p transplant, who presents to [facility] with uncontrolled pain. He reports the pain started last night. It is his typical BLE and lower back pain uncontrolled with home dilaudid. He says he last took 8 mg around 6 am and has some remaining. He rates his pain as 8/10 currently, 4/10 is an acceptable level for him. Pt denies chest pain, SOB, blurry vision, falls, fever or chills, n/v/d/abd pain, or urinary complaints.  He does confirm cough and intermittent headache, he said he questioned if he had flu, he had sore throat congestion, cough that started about a week ago. He was last seen in  ED on 11/25 in ED but was not tested for flu or covid at that time.          ROS  Review of Systems - Oncology     Allergies  Allergies   Allergen Reactions    Hydrocodone-Acetaminophen Unknown    Naproxen Unknown        Medications  Current Outpatient Medications   Medication Instructions    acyclovir (Zovirax) 400 mg tablet 1 tablet, oral, 2 times daily    amitriptyline (ELAVIL) 25 mg, oral    ARIPiprazole (ABILIFY) 2 mg, oral, Daily    belumosudil 200 mg tablet Take one (1) tablet by mouth twice daily.    belumosudil 200 mg tablet TAKE ONE (1) TABLET BY MOUTH ONCE DAILY.    cetirizine (ZYRTEC) 10 mg, oral, Daily    cholecalciferol (VITAMIN D-3) 2,000 Units, oral    cyclobenzaprine (FLEXERIL) 10 mg, oral, 3 times daily    diphenhydrAMINE (BENADRYL) 25 mg, oral, Every 6 hours PRN    DULoxetine (CYMBALTA) 60 mg, oral, 2 times daily    ergocalciferol (Vitamin D-2) 1.25 MG (52388 UT) capsule 1 capsule, oral, Weekly    folic acid (FOLVITE) 1 mg, oral, Daily    gabapentin (NEURONTIN) 300 mg, oral, 2 times daily    HYDROmorphone (DILAUDID) 4 mg, oral, Every 4 hours PRN    mirtazapine (REMERON) 7.5 mg, oral, Nightly    naloxone (NARCAN) 4 mg, nasal, As needed, 1 spray to nostril for overdose, may repeat every 2-3 minutes until medical assistance arrives<BR>    omeprazole (PRILOSEC) 40 mg, oral, Daily before breakfast, TAKE ONE CAPSULE BY MOUTH EVERY DAY IN THE MORNING BEFORE EATING    tacrolimus-vehicle base no.238 0.1 % cream Topical,  Take as directed          Objective   Vitals: There were no vitals taken for this visit.  Weight: There were no vitals filed for this visit.    Physical Exam    Diagnostic Results     Labs  Results from last 7 days   Lab Units 11/30/23  1105 11/24/23  1320   WBC AUTO x10*3/uL 8.6 13.7*   HEMOGLOBIN g/dL 16.2 16.4   HEMATOCRIT % 45.4 46.8   PLATELETS AUTO x10*3/uL 250 286   NEUTROS ABS x10*3/uL 4.21 9.32*   LYMPHS ABS AUTO x10*3/uL 3.48 3.53   MONOS ABS AUTO x10*3/uL 0.68 0.60   EOS ABS  AUTO x10*3/uL 0.17 0.15   NEUTROS PCT AUTO % 49.1 68.2   LYMPHS PCT AUTO % 40.6 25.8   MONOS PCT AUTO % 7.9 4.4   EOS PCT AUTO % 2.0 1.1      Results from last 7 days   Lab Units 11/30/23  1105 11/24/23  1327   GLUCOSE mg/dL 96 117*   SODIUM mmol/L 139 139   POTASSIUM mmol/L 3.9 3.5   CHLORIDE mmol/L 106 107   CO2 mmol/L 23 25   BUN mg/dL 8 9   CREATININE mg/dL 0.71 0.84   EGFR mL/min/1.73m*2 >90 >90   CALCIUM mg/dL 9.3 9.5   ALBUMIN g/dL 4.1 4.2   PROTEIN TOTAL g/dL 7.2 7.4     Results from last 7 days   Lab Units 11/30/23  1105 11/24/23  1327   BILIRUBIN TOTAL mg/dL 0.4 0.6   ALK PHOS U/L 103 117   ALT U/L 10 12   AST U/L 20 13         Results from last 7 days   Lab Units 11/30/23  1105 11/24/23  1320   RETIC CT PCT % 0.9 1.4   RETIC CT ABS x10*6/uL 0.044 0.069   IMMATURE RETIC FRACTION % 3.1 7.1   RETIC HGB pg 32 34     Results from last 7 days   Lab Units 11/30/23  1105 11/24/23  1327   LD U/L 219 149         Lab Results   Component Value Date    HGB 16.2 11/30/2023    HGB 16.4 11/24/2023    HGB 15.9 10/30/2023     11/30/2023     11/24/2023     10/30/2023     11/30/2023     11/24/2023     10/30/2023       Images  === 11/30/23 ===    XR CHEST 2 VIEWS    - Impression -  No findings of an acute cardiopulmonary process.  Signed by Rashaad Gold MD     No echocardiogram results found for the past 12 months       Assessment/Plan   Xu Maya is a 43 y.o. male with pmh of sickle cell disease s/p transplant who presents to [facility] for evaluation of BLE pain.     Diagnoses and all orders for this visit:  Acute pain  -     Lactate Dehydrogenase; Future  -     Reticulocytes; Future  -     CBC and Auto Differential; Future  -     Comprehensive Metabolic Panel; Future  -     Sars-CoV-2 and Influenza A/B PCR; Future  -     gabapentin (Neurontin) 300 mg capsule; Take 1 capsule (300 mg) by mouth 2 times a day.  Acute cough  -     XR chest 2 views  Other orders  -     naloxone  (Narcan) injection 0.4 mg  -     HYDROmorphone (Dilaudid) tablet 12 mg  -     cyclobenzaprine (Flexeril) tablet 10 mg  -     ondansetron (Zofran) tablet 8 mg  -     diphenhydrAMINE (BENADryl) capsule 25 mg       ACC Course  - VSS  -  Hemolysis labs near baseline, no indication of acute vaso-occlusive crisis or indication for blood transfusion   - Flexeril 10 mg, given for AVN  - 12 mg dilaudid po x 3 doses  given for sickle cell related pain  - Zofran 8mg  once for opioid induced nausea/vomiting  - Benadryl 25mg once for opioid induced pruritus   - CXR ordered to r/o pneumonia with recent symptoms - negative  - Covid/flu swab sent with recent symptoms - pending results     Disposition  - Patient discharged home with no further needs following ACC Course  - Return to clinic/ED instructions given            PRABHJOT Cline-CNP

## 2023-12-01 ENCOUNTER — TELEPHONE (OUTPATIENT)
Dept: HEMATOLOGY/ONCOLOGY | Facility: HOSPITAL | Age: 43
End: 2023-12-01
Payer: COMMERCIAL

## 2023-12-01 ENCOUNTER — TELEPHONE (OUTPATIENT)
Dept: ADMISSION | Facility: HOSPITAL | Age: 43
End: 2023-12-01
Payer: COMMERCIAL

## 2023-12-01 ENCOUNTER — OFFICE VISIT (OUTPATIENT)
Dept: HEMATOLOGY/ONCOLOGY | Facility: HOSPITAL | Age: 43
End: 2023-12-01
Payer: COMMERCIAL

## 2023-12-01 VITALS
DIASTOLIC BLOOD PRESSURE: 77 MMHG | SYSTOLIC BLOOD PRESSURE: 110 MMHG | BODY MASS INDEX: 21.68 KG/M2 | HEART RATE: 91 BPM | RESPIRATION RATE: 17 BRPM | TEMPERATURE: 98.2 F | WEIGHT: 138.4 LBS | OXYGEN SATURATION: 97 %

## 2023-12-01 DIAGNOSIS — D89.811 CHRONIC GVHD (MULTI): Primary | ICD-10-CM

## 2023-12-01 DIAGNOSIS — D57.00 SICKLE CELL DISEASE WITH CRISIS (MULTI): ICD-10-CM

## 2023-12-01 DIAGNOSIS — Z94.84 STEM CELLS TRANSPLANT STATUS (MULTI): ICD-10-CM

## 2023-12-01 DIAGNOSIS — Z23 NEED FOR PROPHYLACTIC VACCINATION AND INOCULATION AGAINST INFLUENZA: ICD-10-CM

## 2023-12-01 PROCEDURE — 99215 OFFICE O/P EST HI 40 MIN: CPT | Performed by: NURSE PRACTITIONER

## 2023-12-01 RX ORDER — HYDROMORPHONE HYDROCHLORIDE 4 MG/1
4 TABLET ORAL EVERY 4 HOURS PRN
Qty: 42 TABLET | Refills: 0 | Status: SHIPPED | OUTPATIENT
Start: 2023-12-01 | End: 2023-12-08 | Stop reason: SDUPTHER

## 2023-12-01 RX ORDER — OSELTAMIVIR PHOSPHATE 75 MG/1
75 CAPSULE ORAL EVERY 12 HOURS
Qty: 10 CAPSULE | Refills: 0 | Status: SHIPPED | OUTPATIENT
Start: 2023-12-01 | End: 2023-12-06

## 2023-12-01 ASSESSMENT — ENCOUNTER SYMPTOMS
GASTROINTESTINAL NEGATIVE: 1
NEUROLOGICAL NEGATIVE: 1
DEPRESSION: 0
LOSS OF SENSATION IN FEET: 0
FATIGUE: 1
OCCASIONAL FEELINGS OF UNSTEADINESS: 0
COUGH: 1
CARDIOVASCULAR NEGATIVE: 1
HEMATOLOGIC/LYMPHATIC NEGATIVE: 1
EYES NEGATIVE: 1
PSYCHIATRIC NEGATIVE: 1
MUSCULOSKELETAL NEGATIVE: 1
ENDOCRINE NEGATIVE: 1

## 2023-12-01 ASSESSMENT — PAIN SCALES - GENERAL: PAINLEVEL: 0-NO PAIN

## 2023-12-01 NOTE — TELEPHONE ENCOUNTER
Pt requesting a refill of dilaudid 4mg every 4hrs prn, #42.  Preferred pharmacy is [facility] on [address], [facility].

## 2023-12-01 NOTE — TELEPHONE ENCOUNTER
Called and spoke to Xu and informed him of his positive lab result for influenza A. Encouraged him to wear a mask around others until asymptomatic. Encouraged to avoid spending time with young children until feeling better (he volunteers at a childcare center). Communicated to call back to clinic or go to ER if dramatic change in symptoms. Symptoms began about a week ago so  outside window for anything but supportive treatment.

## 2023-12-07 ENCOUNTER — OFFICE VISIT (OUTPATIENT)
Dept: HEMATOLOGY/ONCOLOGY | Facility: HOSPITAL | Age: 43
End: 2023-12-07
Payer: COMMERCIAL

## 2023-12-07 ENCOUNTER — TELEPHONE (OUTPATIENT)
Dept: HEMATOLOGY/ONCOLOGY | Facility: HOSPITAL | Age: 43
End: 2023-12-07

## 2023-12-07 VITALS
BODY MASS INDEX: 21.51 KG/M2 | RESPIRATION RATE: 18 BRPM | SYSTOLIC BLOOD PRESSURE: 111 MMHG | TEMPERATURE: 98.1 F | DIASTOLIC BLOOD PRESSURE: 72 MMHG | OXYGEN SATURATION: 100 % | HEART RATE: 91 BPM | WEIGHT: 137.35 LBS

## 2023-12-07 DIAGNOSIS — D57.00 SICKLE CELL CRISIS (MULTI): ICD-10-CM

## 2023-12-07 DIAGNOSIS — R52 ACUTE PAIN: Primary | ICD-10-CM

## 2023-12-07 DIAGNOSIS — D57.00 SICKLE CELL DISEASE WITH CRISIS (MULTI): ICD-10-CM

## 2023-12-07 LAB
ALBUMIN SERPL BCP-MCNC: 4 G/DL (ref 3.4–5)
ALP SERPL-CCNC: 106 U/L (ref 33–120)
ALT SERPL W P-5'-P-CCNC: 11 U/L (ref 10–52)
ANION GAP SERPL CALC-SCNC: 12 MMOL/L (ref 10–20)
AST SERPL W P-5'-P-CCNC: 14 U/L (ref 9–39)
BASOPHILS # BLD AUTO: 0 X10*3/UL (ref 0–0.1)
BASOPHILS NFR BLD AUTO: 0 %
BILIRUB SERPL-MCNC: 0.6 MG/DL (ref 0–1.2)
BUN SERPL-MCNC: 4 MG/DL (ref 6–23)
CALCIUM SERPL-MCNC: 9.3 MG/DL (ref 8.6–10.3)
CHLORIDE SERPL-SCNC: 106 MMOL/L (ref 98–107)
CO2 SERPL-SCNC: 25 MMOL/L (ref 21–32)
CREAT SERPL-MCNC: 0.72 MG/DL (ref 0.5–1.3)
EOSINOPHIL # BLD AUTO: 0.12 X10*3/UL (ref 0–0.7)
EOSINOPHIL NFR BLD AUTO: 1 %
ERYTHROCYTE [DISTWIDTH] IN BLOOD BY AUTOMATED COUNT: 12.7 % (ref 11.5–14.5)
GFR SERPL CREATININE-BSD FRML MDRD: >90 ML/MIN/1.73M*2
GLUCOSE SERPL-MCNC: 166 MG/DL (ref 74–99)
HCT VFR BLD AUTO: 40.2 % (ref 41–52)
HGB BLD-MCNC: 15.3 G/DL (ref 13.5–17.5)
HGB RETIC QN: 33 PG (ref 28–38)
IMM GRANULOCYTES # BLD AUTO: 0.03 X10*3/UL (ref 0–0.7)
IMM GRANULOCYTES NFR BLD AUTO: 0.3 % (ref 0–0.9)
IMMATURE RETIC FRACTION: 7.4 %
LDH SERPL L TO P-CCNC: 191 U/L (ref 84–246)
LYMPHOCYTES # BLD AUTO: 4.79 X10*3/UL (ref 1.2–4.8)
LYMPHOCYTES NFR BLD AUTO: 41.9 %
MCH RBC QN AUTO: 31.6 PG (ref 26–34)
MCHC RBC AUTO-ENTMCNC: 38.1 G/DL (ref 32–36)
MCV RBC AUTO: 83 FL (ref 80–100)
MONOCYTES # BLD AUTO: 0.79 X10*3/UL (ref 0.1–1)
MONOCYTES NFR BLD AUTO: 6.9 %
NEUTROPHILS # BLD AUTO: 5.7 X10*3/UL (ref 1.2–7.7)
NEUTROPHILS NFR BLD AUTO: 49.9 %
NRBC BLD-RTO: 0 /100 WBCS (ref 0–0)
PLATELET # BLD AUTO: 342 X10*3/UL (ref 150–450)
POTASSIUM SERPL-SCNC: 3.1 MMOL/L (ref 3.5–5.3)
PROT SERPL-MCNC: 6.8 G/DL (ref 6.4–8.2)
RBC # BLD AUTO: 4.84 X10*6/UL (ref 4.5–5.9)
RETICS #: 0.05 X10*6/UL (ref 0.02–0.12)
RETICS/RBC NFR AUTO: 1.1 % (ref 0.5–2)
SODIUM SERPL-SCNC: 140 MMOL/L (ref 136–145)
WBC # BLD AUTO: 11.4 X10*3/UL (ref 4.4–11.3)

## 2023-12-07 PROCEDURE — 80053 COMPREHEN METABOLIC PANEL: CPT

## 2023-12-07 PROCEDURE — 2500000004 HC RX 250 GENERAL PHARMACY W/ HCPCS (ALT 636 FOR OP/ED): Mod: MUE | Performed by: NURSE PRACTITIONER

## 2023-12-07 PROCEDURE — 2500000001 HC RX 250 WO HCPCS SELF ADMINISTERED DRUGS (ALT 637 FOR MEDICARE OP): Performed by: NURSE PRACTITIONER

## 2023-12-07 PROCEDURE — 2500000005 HC RX 250 GENERAL PHARMACY W/O HCPCS: Performed by: NURSE PRACTITIONER

## 2023-12-07 PROCEDURE — 36415 COLL VENOUS BLD VENIPUNCTURE: CPT

## 2023-12-07 PROCEDURE — 85025 COMPLETE CBC W/AUTO DIFF WBC: CPT

## 2023-12-07 PROCEDURE — 85045 AUTOMATED RETICULOCYTE COUNT: CPT

## 2023-12-07 PROCEDURE — 99215 OFFICE O/P EST HI 40 MIN: CPT | Mod: 25 | Performed by: NURSE PRACTITIONER

## 2023-12-07 PROCEDURE — 99417 PROLNG OP E/M EACH 15 MIN: CPT | Performed by: NURSE PRACTITIONER

## 2023-12-07 PROCEDURE — 83615 LACTATE (LD) (LDH) ENZYME: CPT

## 2023-12-07 RX ORDER — CYCLOBENZAPRINE HCL 10 MG
10 TABLET ORAL ONCE
Status: COMPLETED | OUTPATIENT
Start: 2023-12-07 | End: 2023-12-07

## 2023-12-07 RX ORDER — DIPHENHYDRAMINE HCL 25 MG
25 CAPSULE ORAL ONCE
Status: COMPLETED | OUTPATIENT
Start: 2023-12-07 | End: 2023-12-07

## 2023-12-07 RX ORDER — HYDROMORPHONE HYDROCHLORIDE 4 MG/1
12 TABLET ORAL
Status: COMPLETED | OUTPATIENT
Start: 2023-12-07 | End: 2023-12-07

## 2023-12-07 RX ORDER — POTASSIUM CHLORIDE 750 MG/1
40 TABLET, FILM COATED, EXTENDED RELEASE ORAL ONCE
Status: COMPLETED | OUTPATIENT
Start: 2023-12-07 | End: 2023-12-07

## 2023-12-07 RX ORDER — ACETAMINOPHEN 325 MG/1
650 TABLET ORAL ONCE
Status: COMPLETED | OUTPATIENT
Start: 2023-12-07 | End: 2023-12-07

## 2023-12-07 RX ORDER — ONDANSETRON HYDROCHLORIDE 8 MG/1
8 TABLET, FILM COATED ORAL ONCE
Status: COMPLETED | OUTPATIENT
Start: 2023-12-07 | End: 2023-12-07

## 2023-12-07 RX ORDER — NALOXONE HYDROCHLORIDE 0.4 MG/ML
0.4 INJECTION, SOLUTION INTRAMUSCULAR; INTRAVENOUS; SUBCUTANEOUS
Status: DISCONTINUED | OUTPATIENT
Start: 2023-12-07 | End: 2023-12-07 | Stop reason: HOSPADM

## 2023-12-07 RX ADMIN — POTASSIUM CHLORIDE 40 MEQ: 750 TABLET, FILM COATED, EXTENDED RELEASE ORAL at 13:12

## 2023-12-07 RX ADMIN — ACETAMINOPHEN 650 MG: 325 TABLET ORAL at 12:09

## 2023-12-07 RX ADMIN — HYDROMORPHONE HYDROCHLORIDE 12 MG: 4 TABLET ORAL at 11:06

## 2023-12-07 RX ADMIN — ONDANSETRON HYDROCHLORIDE 8 MG: 8 TABLET, FILM COATED ORAL at 11:06

## 2023-12-07 RX ADMIN — HYDROMORPHONE HYDROCHLORIDE 12 MG: 4 TABLET ORAL at 12:09

## 2023-12-07 RX ADMIN — HYDROMORPHONE HYDROCHLORIDE 12 MG: 4 TABLET ORAL at 13:12

## 2023-12-07 RX ADMIN — CYCLOBENZAPRINE 10 MG: 10 TABLET, FILM COATED ORAL at 11:06

## 2023-12-07 RX ADMIN — DIPHENHYDRAMINE HYDROCHLORIDE 25 MG: 25 CAPSULE ORAL at 11:06

## 2023-12-07 ASSESSMENT — ENCOUNTER SYMPTOMS
DEPRESSION: 0
OCCASIONAL FEELINGS OF UNSTEADINESS: 0
LOSS OF SENSATION IN FEET: 0

## 2023-12-07 ASSESSMENT — PAIN SCALES - GENERAL
PAINLEVEL_OUTOF10: 7
PAINLEVEL: 0-NO PAIN
PAINLEVEL_OUTOF10: 8
PAINLEVEL_OUTOF10: 8

## 2023-12-07 ASSESSMENT — PAIN DESCRIPTION - LOCATION
LOCATION: LEG
LOCATION: LEG

## 2023-12-07 NOTE — PROGRESS NOTES
Patient ID:  Xu Maya is a 43 y.o. male.  Subjective   Chief Complaint: Uncontrolled Pain    HPI  Xu is a 43 y.o. male with pmh of sickle cell disease s/p transplant and cure, who presents to [facility] with uncontrolled pain. He reports his pain is in his back and legs similar to his typical pain. It started yesterday and is unrelieved by home dilaudid. He reports his cough is improving. He has a mild headache without any vision changes. He also had some loose stool this week that is also improving. Pt denies chest pain, SOB, falls, fever or chills, or urinary complaints.        ROS  Review of Systems - Oncology     Allergies  Allergies   Allergen Reactions    Hydrocodone-Acetaminophen Unknown    Naproxen Unknown        Medications  Current Outpatient Medications   Medication Instructions    amitriptyline (ELAVIL) 25 mg, oral    ARIPiprazole (ABILIFY) 2 mg, oral, Daily    cholecalciferol (VITAMIN D-3) 2,000 Units, oral    cyclobenzaprine (FLEXERIL) 10 mg, oral, 3 times daily    diphenhydrAMINE (BENADRYL) 25 mg, oral, Every 6 hours PRN    DULoxetine (CYMBALTA) 60 mg, oral, 2 times daily    ergocalciferol (Vitamin D-2) 1.25 MG (79024 UT) capsule 1 capsule, oral, Weekly    folic acid (FOLVITE) 1 mg, oral, Daily    gabapentin (NEURONTIN) 300 mg, oral, 2 times daily    HYDROmorphone (DILAUDID) 4 mg, oral, Every 4 hours PRN    mirtazapine (REMERON) 7.5 mg, oral, Nightly    naloxone (NARCAN) 4 mg, nasal, As needed, 1 spray to nostril for overdose, may repeat every 2-3 minutes until medical assistance arrives<BR>    omeprazole (PRILOSEC) 40 mg, oral, Daily before breakfast, TAKE ONE CAPSULE BY MOUTH EVERY DAY IN THE MORNING BEFORE EATING    tacrolimus-vehicle base no.238 0.1 % cream Topical,  Take as directed        Past Medical History:   Past Medical History:  04/17/2017: Anxiety disorder, unspecified      Comment:  Anxiety  04/17/2017: Depression, unspecified      Comment:  Depression  03/02/2017: Family history of  glaucoma      Comment:  Family history of glaucoma in father  04/17/2017: Gastritis, unspecified, without bleeding      Comment:  Gastritis  04/17/2017: Hematuria, unspecified      Comment:  Hematuria  12/02/2015: Personal history of other diseases of the digestive system      Comment:  History of esophageal reflux  04/17/2017: Priapism, unspecified      Comment:  Priapism  04/17/2017: Sickle-cell disease without crisis (CMS/[facility])      Comment:  Sickle cell anemia   Surgical History:    Past Surgical History:   Procedure Laterality Date    GALLBLADDER SURGERY  04/12/2017    Gallbladder Surgery    IR CVC TUNNELED  4/24/2018    IR CVC TUNNELED 4/24/2018 [facility]    IR CVC TUNNELED  6/5/2018    IR CVC TUNNELED 6/5/2018 [facility]    IR CVC TUNNELED  3/1/2019    IR CVC TUNNELED 3/1/2019 [facility]    IR CVC TUNNELED  6/4/2019    IR CVC TUNNELED 6/4/2019 [facility] CLINICAL LEGACY    IR CVC TUNNELED  4/5/2019    IR CVC TUNNELED 4/5/2019 [facility]    IR CVC TUNNELED  7/17/2019    IR CVC TUNNELED 7/17/2019 [facility]    IR CVC TUNNELED  8/14/2019    IR CVC TUNNELED 8/14/2019 [facility]    IR CVC TUNNELED  12/18/2019    IR CVC TUNNELED 12/18/2019 [facility] CLINICAL LEGACY    IR CVC TUNNELED  10/9/2019    IR CVC TUNNELED 10/9/2019 [facility]    IR CVC TUNNELED  11/13/2019    IR CVC TUNNELED 11/13/2019 [facility] CLINICAL LEGACY    IR CVC TUNNELED  1/15/2020    IR CVC TUNNELED 1/15/2020 [facility] CLINICAL LEGACY    IR CVC TUNNELED  2/19/2020    IR CVC TUNNELED 2/19/2020 [facility] CLINICAL LEGACY    IR CVC TUNNELED  1/4/2021    IR CVC TUNNELED 1/4/2021 [facility]    IR CVC TUNNELED  1/25/2021    IR CVC TUNNELED 1/25/2021 [facility]    IR CVC TUNNELED  8/21/2018    IR CVC TUNNELED 8/21/2018 [facility] AIB LEGACY    IR CVC TUNNELED  9/26/2018    IR CVC TUNNELED 9/26/2018 [facility] LEGAC[facility]    IR CVC TUNNELED  11/5/2018    IR CVC TUNNELED 11/5/2018 [facility]GAC[facility]    IR CVC TUNNELED  12/19/2018    IR CVC TUNNELED 12/19/2018  [facility] AIB LEGACY    IR VENOGRAM HEPATIC  11/8/2017    IR VENOGRAM HEPATIC 11/8/2017 [facility] AIB LEGACY    MR HEAD ANGIO WO IV CONTRAST  2/17/2017    MR HEAD ANGIO WO IV CONTRAST 2/17/2017 [facility] CLINICAL LEGACY    MR NECK ANGIO WO IV CONTRAST  2/17/2017    MR NECK ANGIO WO IV CONTRAST 2/17/2017 [facility] CLINICAL LEGACY    US GUIDED NEEDLE LIVER BIOPSY  9/8/2020    US GUIDED NEEDLE LIVER BIOPSY 9/8/2020 [facility]IB LEGAC[facility]      Family History:    Family History   Problem Relation Name Age of Onset    Diabetes Father      Sickle cell anemia Other sibling     Cancer Other grandfather     Cancer Other uncle      Family Oncology History:    Cancer-related family history includes Cancer in some other family members.  Social History:    Social History     Tobacco Use    Smoking status: Every Day     Types: Cigarettes     Passive exposure: Current    Smokeless tobacco: Never   Substance Use Topics    Alcohol use: Yes     Comment: Occasional    Drug use: Yes     Types: Marijuana        Objective   Vitals: /72 (BP Location: Right arm, Patient Position: Sitting, BP Cuff Size: Adult)   Pulse 91   Temp 36.7 °C (98.1 °F) (Core)   Resp 18   Wt 62.3 kg (137 lb 5.6 oz)   SpO2 100%   BMI 21.51 kg/m²   Weight:   Vitals:    12/07/23 1040   Weight: 62.3 kg (137 lb 5.6 oz)       Physical Exam  Vitals reviewed.   Constitutional:       Appearance: Normal appearance.   HENT:      Head: Normocephalic and atraumatic.      Nose: Nose normal.      Mouth/Throat:      Mouth: Mucous membranes are moist.   Eyes:      Conjunctiva/sclera: Conjunctivae normal.      Pupils: Pupils are equal, round, and reactive to light.   Cardiovascular:      Rate and Rhythm: Normal rate and regular rhythm.   Pulmonary:      Effort: Pulmonary effort is normal.      Breath sounds: Normal breath sounds.   Abdominal:      General: Abdomen is flat. Bowel sounds are normal.      Palpations: Abdomen is soft.   Musculoskeletal:         General: Normal range of motion.       Cervical back: Normal range of motion.   Skin:     General: Skin is warm and dry.   Neurological:      General: No focal deficit present.      Mental Status: He is alert.   Psychiatric:         Mood and Affect: Mood normal.         Behavior: Behavior normal.       Diagnostic Results     Labs                               Lab Results   Component Value Date    HGB 16.2 11/30/2023    HGB 16.4 11/24/2023    HGB 15.9 10/30/2023     11/30/2023     11/24/2023     10/30/2023     11/30/2023     11/24/2023     10/30/2023       Images  === 11/30/23 ===    XR CHEST 2 VIEWS    - Impression -  No findings of an acute cardiopulmonary process.  Signed by Rashaad Gold MD   === 10/23/21 ===      No echocardiogram results found for the past 12 months       Assessment/Plan   Xu Maya is a 43 y.o. male with pmh sickle cell disease s/p transplant who presents to [facility] for uncontrolled pain         ACC Course  - VSS  - Hemolysis labs near baseline, no indication of acute vaso-occlusive crisis or indication for blood transfusion   - hypokalemia 2/2 diarrhea, replace K  - Flexeril 10mg, given for AVN  - dilaudid 12mg PO x3 doses given for sickle cell related pain  - Zofran 8mg po once for opioid induced nausea/vomiting  - Benadryl 25mg po once for opioid induced pruritus   - tylenol once for headache likely 2/2 recovering flu A infection    Disposition  - Patient discharged home with no further needs following ACC Course  - Return to clinic/ED instructions given        PRABHJOT Ames-CNP

## 2023-12-08 RX ORDER — CYCLOBENZAPRINE HCL 10 MG
10 TABLET ORAL 3 TIMES DAILY
Qty: 30 TABLET | Refills: 0 | Status: SHIPPED | OUTPATIENT
Start: 2023-12-08 | End: 2024-01-03 | Stop reason: SDUPTHER

## 2023-12-08 RX ORDER — HYDROMORPHONE HYDROCHLORIDE 4 MG/1
4 TABLET ORAL EVERY 4 HOURS PRN
Qty: 42 TABLET | Refills: 0 | Status: SHIPPED | OUTPATIENT
Start: 2023-12-08 | End: 2023-12-15 | Stop reason: SDUPTHER

## 2023-12-08 NOTE — TELEPHONE ENCOUNTER
Refill request received for Dilaudid and Cyclobenzaprine 10mg.  Preferred pharmacy is [facility] is [facility] [address] in [place].  Message sent to Sickle Cell team.

## 2023-12-15 ENCOUNTER — TELEPHONE (OUTPATIENT)
Dept: ADMISSION | Facility: HOSPITAL | Age: 43
End: 2023-12-15
Payer: COMMERCIAL

## 2023-12-15 ENCOUNTER — HOSPITAL ENCOUNTER (EMERGENCY)
Facility: HOSPITAL | Age: 43
Discharge: HOME | End: 2023-12-15
Attending: EMERGENCY MEDICINE
Payer: COMMERCIAL

## 2023-12-15 VITALS
RESPIRATION RATE: 16 BRPM | TEMPERATURE: 98.4 F | OXYGEN SATURATION: 96 % | SYSTOLIC BLOOD PRESSURE: 110 MMHG | DIASTOLIC BLOOD PRESSURE: 78 MMHG | HEART RATE: 71 BPM

## 2023-12-15 DIAGNOSIS — D57.00 SICKLE CELL DISEASE WITH CRISIS (MULTI): ICD-10-CM

## 2023-12-15 DIAGNOSIS — D57.00 SICKLE CELL DISEASE WITH CRISIS (MULTI): Primary | ICD-10-CM

## 2023-12-15 LAB
ANION GAP SERPL CALC-SCNC: 12 MMOL/L (ref 10–20)
BASOPHILS # BLD AUTO: 0.02 X10*3/UL (ref 0–0.1)
BASOPHILS NFR BLD AUTO: 0.2 %
BUN SERPL-MCNC: 5 MG/DL (ref 6–23)
CALCIUM SERPL-MCNC: 9.3 MG/DL (ref 8.6–10.6)
CHLORIDE SERPL-SCNC: 109 MMOL/L (ref 98–107)
CO2 SERPL-SCNC: 26 MMOL/L (ref 21–32)
CREAT SERPL-MCNC: 0.83 MG/DL (ref 0.5–1.3)
EOSINOPHIL # BLD AUTO: 0.19 X10*3/UL (ref 0–0.7)
EOSINOPHIL NFR BLD AUTO: 2.1 %
ERYTHROCYTE [DISTWIDTH] IN BLOOD BY AUTOMATED COUNT: 13.6 % (ref 11.5–14.5)
GFR SERPL CREATININE-BSD FRML MDRD: >90 ML/MIN/1.73M*2
GLUCOSE SERPL-MCNC: 120 MG/DL (ref 74–99)
HCT VFR BLD AUTO: 40.1 % (ref 41–52)
HGB BLD-MCNC: 14.8 G/DL (ref 13.5–17.5)
HGB RETIC QN: 34 PG (ref 28–38)
IMM GRANULOCYTES # BLD AUTO: 0.02 X10*3/UL (ref 0–0.7)
IMM GRANULOCYTES NFR BLD AUTO: 0.2 % (ref 0–0.9)
IMMATURE RETIC FRACTION: 11.1 %
LDH SERPL L TO P-CCNC: 164 U/L (ref 84–246)
LYMPHOCYTES # BLD AUTO: 3.92 X10*3/UL (ref 1.2–4.8)
LYMPHOCYTES NFR BLD AUTO: 42.6 %
MCH RBC QN AUTO: 32.2 PG (ref 26–34)
MCHC RBC AUTO-ENTMCNC: 36.9 G/DL (ref 32–36)
MCV RBC AUTO: 87 FL (ref 80–100)
MONOCYTES # BLD AUTO: 0.84 X10*3/UL (ref 0.1–1)
MONOCYTES NFR BLD AUTO: 9.1 %
NEUTROPHILS # BLD AUTO: 4.21 X10*3/UL (ref 1.2–7.7)
NEUTROPHILS NFR BLD AUTO: 45.8 %
NRBC BLD-RTO: 0 /100 WBCS (ref 0–0)
PLATELET # BLD AUTO: 354 X10*3/UL (ref 150–450)
POTASSIUM SERPL-SCNC: 3.5 MMOL/L (ref 3.5–5.3)
RBC # BLD AUTO: 4.6 X10*6/UL (ref 4.5–5.9)
RETICS #: 0.07 X10*6/UL (ref 0.02–0.12)
RETICS/RBC NFR AUTO: 1.6 % (ref 0.5–2)
SODIUM SERPL-SCNC: 143 MMOL/L (ref 136–145)
WBC # BLD AUTO: 9.2 X10*3/UL (ref 4.4–11.3)

## 2023-12-15 PROCEDURE — 99284 EMERGENCY DEPT VISIT MOD MDM: CPT | Performed by: EMERGENCY MEDICINE

## 2023-12-15 PROCEDURE — 2500000004 HC RX 250 GENERAL PHARMACY W/ HCPCS (ALT 636 FOR OP/ED): Mod: SE

## 2023-12-15 PROCEDURE — 85025 COMPLETE CBC W/AUTO DIFF WBC: CPT

## 2023-12-15 PROCEDURE — 99285 EMERGENCY DEPT VISIT HI MDM: CPT | Performed by: EMERGENCY MEDICINE

## 2023-12-15 PROCEDURE — 36415 COLL VENOUS BLD VENIPUNCTURE: CPT

## 2023-12-15 PROCEDURE — 99283 EMERGENCY DEPT VISIT LOW MDM: CPT

## 2023-12-15 PROCEDURE — 96372 THER/PROPH/DIAG INJ SC/IM: CPT

## 2023-12-15 PROCEDURE — 80048 BASIC METABOLIC PNL TOTAL CA: CPT

## 2023-12-15 PROCEDURE — 83615 LACTATE (LD) (LDH) ENZYME: CPT

## 2023-12-15 PROCEDURE — 85045 AUTOMATED RETICULOCYTE COUNT: CPT

## 2023-12-15 RX ORDER — HYDROMORPHONE HYDROCHLORIDE 1 MG/ML
2 INJECTION, SOLUTION INTRAMUSCULAR; INTRAVENOUS; SUBCUTANEOUS ONCE
Status: COMPLETED | OUTPATIENT
Start: 2023-12-15 | End: 2023-12-15

## 2023-12-15 RX ORDER — HYDROMORPHONE HYDROCHLORIDE 1 MG/ML
2 INJECTION, SOLUTION INTRAMUSCULAR; INTRAVENOUS; SUBCUTANEOUS ONCE
Status: DISCONTINUED | OUTPATIENT
Start: 2023-12-15 | End: 2023-12-15

## 2023-12-15 RX ORDER — KETOROLAC TROMETHAMINE 15 MG/ML
INJECTION, SOLUTION INTRAMUSCULAR; INTRAVENOUS
Status: COMPLETED
Start: 2023-12-15 | End: 2023-12-15

## 2023-12-15 RX ORDER — HYDROMORPHONE HYDROCHLORIDE 4 MG/1
4 TABLET ORAL EVERY 4 HOURS PRN
Qty: 42 TABLET | Refills: 0 | Status: SHIPPED | OUTPATIENT
Start: 2023-12-15 | End: 2023-12-21 | Stop reason: SDUPTHER

## 2023-12-15 RX ORDER — KETOROLAC TROMETHAMINE 15 MG/ML
15 INJECTION, SOLUTION INTRAMUSCULAR; INTRAVENOUS ONCE
Status: COMPLETED | OUTPATIENT
Start: 2023-12-15 | End: 2023-12-15

## 2023-12-15 RX ORDER — HYDROMORPHONE HYDROCHLORIDE 1 MG/ML
2 INJECTION, SOLUTION INTRAMUSCULAR; INTRAVENOUS; SUBCUTANEOUS
Status: DISCONTINUED | OUTPATIENT
Start: 2023-12-15 | End: 2023-12-15

## 2023-12-15 RX ORDER — KETOROLAC TROMETHAMINE 15 MG/ML
15 INJECTION, SOLUTION INTRAMUSCULAR; INTRAVENOUS ONCE
Status: DISCONTINUED | OUTPATIENT
Start: 2023-12-15 | End: 2023-12-15

## 2023-12-15 RX ORDER — HYDROMORPHONE HYDROCHLORIDE 1 MG/ML
INJECTION, SOLUTION INTRAMUSCULAR; INTRAVENOUS; SUBCUTANEOUS
Status: COMPLETED
Start: 2023-12-15 | End: 2023-12-15

## 2023-12-15 RX ORDER — HYDROMORPHONE HYDROCHLORIDE 1 MG/ML
2 INJECTION, SOLUTION INTRAMUSCULAR; INTRAVENOUS; SUBCUTANEOUS ONCE
Status: DISCONTINUED | OUTPATIENT
Start: 2023-12-15 | End: 2023-12-15 | Stop reason: HOSPADM

## 2023-12-15 RX ORDER — HYDROMORPHONE HYDROCHLORIDE 1 MG/ML
2 INJECTION, SOLUTION INTRAMUSCULAR; INTRAVENOUS; SUBCUTANEOUS
Status: COMPLETED | OUTPATIENT
Start: 2023-12-15 | End: 2023-12-15

## 2023-12-15 RX ADMIN — HYDROMORPHONE HYDROCHLORIDE 2 MG: 1 INJECTION, SOLUTION INTRAMUSCULAR; INTRAVENOUS; SUBCUTANEOUS at 16:56

## 2023-12-15 RX ADMIN — KETOROLAC TROMETHAMINE 15 MG: 15 INJECTION INTRAMUSCULAR; INTRAVENOUS at 17:01

## 2023-12-15 RX ADMIN — HYDROMORPHONE HYDROCHLORIDE 2 MG: 1 INJECTION, SOLUTION INTRAMUSCULAR; INTRAVENOUS; SUBCUTANEOUS at 18:19

## 2023-12-15 RX ADMIN — HYDROMORPHONE HYDROCHLORIDE 2 MG: 1 INJECTION, SOLUTION INTRAMUSCULAR; INTRAVENOUS; SUBCUTANEOUS at 19:37

## 2023-12-15 RX ADMIN — KETOROLAC TROMETHAMINE 15 MG: 15 INJECTION, SOLUTION INTRAMUSCULAR; INTRAVENOUS at 17:01

## 2023-12-15 ASSESSMENT — PAIN - FUNCTIONAL ASSESSMENT: PAIN_FUNCTIONAL_ASSESSMENT: 0-10

## 2023-12-15 ASSESSMENT — PAIN SCALES - GENERAL
PAINLEVEL_OUTOF10: 7
PAINLEVEL_OUTOF10: 7

## 2023-12-15 ASSESSMENT — COLUMBIA-SUICIDE SEVERITY RATING SCALE - C-SSRS
6. HAVE YOU EVER DONE ANYTHING, STARTED TO DO ANYTHING, OR PREPARED TO DO ANYTHING TO END YOUR LIFE?: NO
1. IN THE PAST MONTH, HAVE YOU WISHED YOU WERE DEAD OR WISHED YOU COULD GO TO SLEEP AND NOT WAKE UP?: NO
2. HAVE YOU ACTUALLY HAD ANY THOUGHTS OF KILLING YOURSELF?: NO

## 2023-12-15 NOTE — ED PROVIDER NOTES
CC: Sickle Cell Pain Crisis     HPI: Xu Maya is an 43 y.o. male with history including sickle cell disease presenting to the emergency department for pain crisis.  Patient reports pain in his bilateral lower extremity that began this morning.  He has been taking Dilaudid at home with his last dose of being 8 AM without improvement.  Denies weakness or numbness.  Denies chest pain, shortness of breath, fevers or chills or cough.    Limitations to History: None  Additional History Obtained from: Chart review of prior ED visits to determine care feeling    PMHx/PSHx:  Per HPI.   - has a past medical history of Anxiety disorder, unspecified (04/17/2017), Depression, unspecified (04/17/2017), Family history of glaucoma (03/02/2017), Gastritis, unspecified, without bleeding (04/17/2017), Hematuria, unspecified (04/17/2017), Personal history of other diseases of the digestive system (12/02/2015), Priapism, unspecified (04/17/2017), and Sickle-cell disease without crisis (CMS/HCC) (04/17/2017).  - has a past surgical history that includes Gallbladder surgery (04/12/2017); IR CVC tunneled (4/24/2018); IR CVC tunneled (6/5/2018); IR CVC tunneled (3/1/2019); IR CVC tunneled (6/4/2019); IR CVC tunneled (4/5/2019); IR CVC tunneled (7/17/2019); IR CVC tunneled (8/14/2019); IR CVC tunneled (12/18/2019); IR CVC tunneled (10/9/2019); IR CVC tunneled (11/13/2019); IR CVC tunneled (1/15/2020); IR CVC tunneled (2/19/2020); US guided needle liver biopsy (9/8/2020); IR CVC tunneled (1/4/2021); IR CVC tunneled (1/25/2021); MR angio head wo IV contrast (2/17/2017); MR angio neck wo IV contrast (2/17/2017); IR venogram hepatic (11/8/2017); IR CVC tunneled (8/21/2018); IR CVC tunneled (9/26/2018); IR CVC tunneled (11/5/2018); and IR CVC tunneled (12/19/2018).    Social History:  - Tobacco:  reports that he has been smoking cigarettes. He has been exposed to tobacco smoke. He has never used smokeless tobacco.   - Alcohol:  reports  current alcohol use.   - Drugs:  reports current drug use. Drug: Marijuana.     Medications: Reviewed in EMR.     Allergies:  Hydrocodone-acetaminophen and Naproxen    ???????????????????????????????????????????????????????????????  Triage Vitals:  T 36.9 °C (98.4 °F)  HR 77  /78  RR 18  O2 99 %      Physical Exam  Constitutional:       General: He is not in acute distress.  HENT:      Head: Normocephalic.   Cardiovascular:      Rate and Rhythm: Normal rate.      Comments: 2+ DP and PT pulses.  Pulmonary:      Effort: Pulmonary effort is normal. No respiratory distress.   Abdominal:      General: Abdomen is flat.   Musculoskeletal:         General: Normal range of motion.   Skin:     General: Skin is dry.   Neurological:      Mental Status: He is alert and oriented to person, place, and time. Mental status is at baseline.       ???????????????????????????????????????????????????????????????    ED Course/Medical Decision Making:    Diagnoses as of 12/15/23 1934   Sickle cell disease with crisis (CMS/[facility])      Xu Maya is a 43 y.o. male with a past medical history of sickle cell disease who is presenting with a sickle cell pain crisis. Low concern for acute chest, stroke, splenic sequestration, avascular necrosis and acute limb ischemia. Low concern for traumatic injury, infection,  MSK pain or thrombosis. Patient treated per their care plan with dilaudid 2mg q1h toradol and IVF.  Patient was improved after their pain medications and discharged home with follow up with their hematologist.      External records reviewed: recent inpatient, clinic, and prior ED notes  Diagnostic imaging independently reviewed/interpreted by me (as reflected in MDM) includes: lab wokr  Social Determinants Affecting Care:   Discussion of management with other providers: na  Prescription Drug Consideration: dilaudid, toradol  Escalation of Care: na    Impression:   Sickle cell pain crisis    Disposition: dc      Procedures  ? SmartLinks last updated 12/15/2023 4:13 PM     Attending Note:  The patient was seen by the resident/fellow/FLORENTINO.  I have personally performed a substantive portion of the encounter.  I have seen and examined the patient; agree with the workup, evaluation, MDM, management and diagnosis with the exception/addition of the following.  The care plan has been discussed with the resident/fellow; I have reviewed the resident/fellow’s note and agree with the documented findings with the exception/addition of the following:       HPI:   Xu Maya is a 43 year old male with history of anxiety, depression, gastritis/GERD, priapism, and sickle cell disease presenting to the ED for concern for pain crisis. The patient reports pain consistent with prior sickle cell pain crisis in bilateral lower extremities starting this AM.  He tried home Dilaudid without resolution of pain, with last dose approximately 0800 per patient. He denies focal weakness, numbness, parasthesia, leg swelling, chest pain, SOB, back pain, nausea, vomiting or diarrhea.  Denies active cough, reports he recovered from the flu approximately 2 weeks ago, but denies residual symptoms.  No fevers, chills or night sweats.     Additional History Obtained from: See HPI       Physical Exam:  General: Grossly well-developed, awake, alert, NAD    Head: Normocephalic, no overt external signs of trauma    ENT: Mucus membranes moist, nares patent    Neck: Supple, trachea midline    Neurologic: A&Ox4, FS, TM, EOMI, PERRL, CALZADA x 4 with grossly symmetric strength, 5/5, SILT grossly intact BUE and BLE    Cardiovascular: RRR, pulses grossly symmetric    Respiratory: CTA B/L, no wheeze, rales or rhonchi    Abdomen: Soft, not appreciably tender, no evident rebound/guarding, no pulsatile masses palpable.     Back: No apparent CVA TTP, no midline TLS spine TTP, stepoffs, or acute deformities    MSK: No gross bony deformities in BUE and BLE. No calf pain or palpable cord.  Grossly symmetric radial and pedal pulses.     Skin/Integumentary: Warm and dry    Psychiatric: Calm and cooperative. Normal mood and affect.      Differential Diagnoses Considered:  See MDM below    Chronic Medical Conditions Significantly Affecting Care:  See MDM below    External Records Reviewed:  I reviewed recent and relevant outside records as noted in HPI above and MDM below.     Independent Interpretation of Studies:    Laboratory/Imaging Studies:  Laboratory results personally reviewed and independently interpreted:  CBC without significant anemia, thrombocytopenia or leukocytosis  Metabolic panel without BELA, HAGMA or significant electrolyte anomalies  LDH WNL  Retic 1.6% (prior 1.1)    Social Determinants of Health Significantly Affecting Care:  See HPI/MDM    Prescription Drug Consideration:  See MDM    Diagnostic testing considered:  See MDM and relevant sections      Medical Decision Making/Course:  43 year old male with history of anxiety, depression, gastritis/GERD, priapism, and sickle cell disease presenting to the ED for concern for pain crisis. The patient reports typical pain in extremities. He is neurovascularly intact without signs of arterial insufficiency, radiculopathy, fracture, or DVT.  He had no clear signs of acute chest (denies CP), no signs of aplastic crisis, nor signs of acute infectious etiology at this time.  Given care plan analgesia with improvement of symptoms. He requested discharge home. Strict warning signs/precautions for return to the ED were discussed.  Instructed to follow-up with hematology and PCP/FP/IM clinic.  The patient was instructed to follow-up as discussed.  All available ED results from this visit were discussed. Any new prescription medications from the ED were discussed including common side effects/instructions for use. They were also instructed to return to the ED immediately if symptoms worsened, failed to improve, or if they developed any new  symptoms/concerns.  An opportunity to ask questions was provided and all were addressed at that time.  The patient verbalized understanding and was in agreement with plan.          Heidi Amato MD  Resident  12/15/23 1935       Orlin Gordillo MD  12/18/23 1925

## 2023-12-16 NOTE — DISCHARGE INSTRUCTIONS
You are seen today for your sickle cell pain.  Please take your opioids as prescribed by your sickle cell clinic doctor.  Please return to the ED if your pain is uncontrolled, chest pain, shortness of breath, fevers or chills.

## 2023-12-19 ENCOUNTER — APPOINTMENT (OUTPATIENT)
Dept: HEMATOLOGY/ONCOLOGY | Facility: HOSPITAL | Age: 43
End: 2023-12-19
Payer: COMMERCIAL

## 2023-12-20 DIAGNOSIS — D89.811 CHRONIC GVHD (MULTI): ICD-10-CM

## 2023-12-20 RX ORDER — BELUMOSUDIL 200 MG/1
200 TABLET ORAL 2 TIMES DAILY
Qty: 60 TABLET | Refills: 0 | Status: SHIPPED | OUTPATIENT
Start: 2023-12-20 | End: 2024-02-04 | Stop reason: SDUPTHER

## 2023-12-21 ENCOUNTER — SPECIALTY PHARMACY (OUTPATIENT)
Dept: PHARMACY | Facility: CLINIC | Age: 43
End: 2023-12-21

## 2023-12-21 ENCOUNTER — TELEPHONE (OUTPATIENT)
Dept: ADMISSION | Facility: HOSPITAL | Age: 43
End: 2023-12-21
Payer: COMMERCIAL

## 2023-12-21 DIAGNOSIS — D57.00 SICKLE CELL DISEASE WITH CRISIS (MULTI): ICD-10-CM

## 2023-12-21 PROCEDURE — RXMED WILLOW AMBULATORY MEDICATION CHARGE

## 2023-12-21 RX ORDER — HYDROMORPHONE HYDROCHLORIDE 4 MG/1
4 TABLET ORAL EVERY 4 HOURS PRN
Qty: 42 TABLET | Refills: 0 | Status: SHIPPED | OUTPATIENT
Start: 2023-12-21 | End: 2023-12-28 | Stop reason: SDUPTHER

## 2023-12-21 NOTE — TELEPHONE ENCOUNTER
Xu P Crayton called the refill line for Dilaudid. Requesting refills be sent to [facility] pharmacy; message sent to Sickle Cell team to submit.

## 2023-12-22 ENCOUNTER — TELEPHONE (OUTPATIENT)
Dept: ADMISSION | Facility: HOSPITAL | Age: 43
End: 2023-12-22
Payer: COMMERCIAL

## 2023-12-22 DIAGNOSIS — F41.9 ANXIETY: ICD-10-CM

## 2023-12-22 DIAGNOSIS — F33.1 MODERATE EPISODE OF RECURRENT MAJOR DEPRESSIVE DISORDER (MULTI): ICD-10-CM

## 2023-12-22 RX ORDER — DULOXETIN HYDROCHLORIDE 60 MG/1
60 CAPSULE, DELAYED RELEASE ORAL 2 TIMES DAILY
Qty: 60 CAPSULE | Refills: 2 | Status: SHIPPED | OUTPATIENT
Start: 2023-12-22 | End: 2024-02-01 | Stop reason: SDUPTHER

## 2023-12-22 NOTE — TELEPHONE ENCOUNTER
Pt requesting that his Duloxetine 60mg be resent to the [facility] on [address]. It is showing as 2 refills on file, but he said he spoke to them and they are saying they don't have any. Message sent to the team.

## 2023-12-28 ENCOUNTER — OFFICE VISIT (OUTPATIENT)
Dept: HEMATOLOGY/ONCOLOGY | Facility: HOSPITAL | Age: 43
End: 2023-12-28
Payer: COMMERCIAL

## 2023-12-28 ENCOUNTER — TELEPHONE (OUTPATIENT)
Dept: HEMATOLOGY/ONCOLOGY | Facility: HOSPITAL | Age: 43
End: 2023-12-28

## 2023-12-28 ENCOUNTER — TELEPHONE (OUTPATIENT)
Dept: ADMISSION | Facility: HOSPITAL | Age: 43
End: 2023-12-28
Payer: COMMERCIAL

## 2023-12-28 VITALS
OXYGEN SATURATION: 95 % | BODY MASS INDEX: 21.82 KG/M2 | WEIGHT: 139.33 LBS | HEART RATE: 92 BPM | DIASTOLIC BLOOD PRESSURE: 80 MMHG | RESPIRATION RATE: 18 BRPM | TEMPERATURE: 98.6 F | SYSTOLIC BLOOD PRESSURE: 117 MMHG

## 2023-12-28 DIAGNOSIS — D57.00 SICKLE CELL CRISIS (MULTI): Primary | ICD-10-CM

## 2023-12-28 DIAGNOSIS — R52 ACUTE PAIN: Primary | ICD-10-CM

## 2023-12-28 DIAGNOSIS — D57.00 SICKLE CELL DISEASE WITH CRISIS (MULTI): ICD-10-CM

## 2023-12-28 LAB
ALBUMIN SERPL BCP-MCNC: 4.1 G/DL (ref 3.4–5)
ALP SERPL-CCNC: 112 U/L (ref 33–120)
ALT SERPL W P-5'-P-CCNC: 10 U/L (ref 10–52)
ANION GAP SERPL CALC-SCNC: 11 MMOL/L (ref 10–20)
AST SERPL W P-5'-P-CCNC: 16 U/L (ref 9–39)
BASOPHILS # BLD AUTO: 0.01 X10*3/UL (ref 0–0.1)
BASOPHILS NFR BLD AUTO: 0.1 %
BILIRUB SERPL-MCNC: 0.6 MG/DL (ref 0–1.2)
BUN SERPL-MCNC: 6 MG/DL (ref 6–23)
CALCIUM SERPL-MCNC: 9.1 MG/DL (ref 8.6–10.3)
CHLORIDE SERPL-SCNC: 104 MMOL/L (ref 98–107)
CO2 SERPL-SCNC: 26 MMOL/L (ref 21–32)
CREAT SERPL-MCNC: 0.84 MG/DL (ref 0.5–1.3)
EOSINOPHIL # BLD AUTO: 0.24 X10*3/UL (ref 0–0.7)
EOSINOPHIL NFR BLD AUTO: 2.4 %
ERYTHROCYTE [DISTWIDTH] IN BLOOD BY AUTOMATED COUNT: 14.6 % (ref 11.5–14.5)
GFR SERPL CREATININE-BSD FRML MDRD: >90 ML/MIN/1.73M*2
GLUCOSE SERPL-MCNC: 110 MG/DL (ref 74–99)
HCT VFR BLD AUTO: 43.1 % (ref 41–52)
HGB BLD-MCNC: 15.4 G/DL (ref 13.5–17.5)
HGB RETIC QN: 35 PG (ref 28–38)
IMM GRANULOCYTES # BLD AUTO: 0.02 X10*3/UL (ref 0–0.7)
IMM GRANULOCYTES NFR BLD AUTO: 0.2 % (ref 0–0.9)
IMMATURE RETIC FRACTION: 9.3 %
LDH SERPL L TO P-CCNC: 219 U/L (ref 84–246)
LYMPHOCYTES # BLD AUTO: 3.7 X10*3/UL (ref 1.2–4.8)
LYMPHOCYTES NFR BLD AUTO: 37.1 %
MCH RBC QN AUTO: 32.2 PG (ref 26–34)
MCHC RBC AUTO-ENTMCNC: 35.7 G/DL (ref 32–36)
MCV RBC AUTO: 90 FL (ref 80–100)
MONOCYTES # BLD AUTO: 0.66 X10*3/UL (ref 0.1–1)
MONOCYTES NFR BLD AUTO: 6.6 %
NEUTROPHILS # BLD AUTO: 5.35 X10*3/UL (ref 1.2–7.7)
NEUTROPHILS NFR BLD AUTO: 53.6 %
NRBC BLD-RTO: 0 /100 WBCS (ref 0–0)
PLATELET # BLD AUTO: 283 X10*3/UL (ref 150–450)
POTASSIUM SERPL-SCNC: 4.4 MMOL/L (ref 3.5–5.3)
PROT SERPL-MCNC: 6.9 G/DL (ref 6.4–8.2)
RBC # BLD AUTO: 4.79 X10*6/UL (ref 4.5–5.9)
RETICS #: 0.07 X10*6/UL (ref 0.02–0.12)
RETICS/RBC NFR AUTO: 1.5 % (ref 0.5–2)
SODIUM SERPL-SCNC: 137 MMOL/L (ref 136–145)
WBC # BLD AUTO: 10 X10*3/UL (ref 4.4–11.3)

## 2023-12-28 PROCEDURE — 83615 LACTATE (LD) (LDH) ENZYME: CPT

## 2023-12-28 PROCEDURE — 85045 AUTOMATED RETICULOCYTE COUNT: CPT

## 2023-12-28 PROCEDURE — 36415 COLL VENOUS BLD VENIPUNCTURE: CPT

## 2023-12-28 PROCEDURE — 99417 PROLNG OP E/M EACH 15 MIN: CPT | Performed by: NURSE PRACTITIONER

## 2023-12-28 PROCEDURE — 2500000005 HC RX 250 GENERAL PHARMACY W/O HCPCS: Performed by: NURSE PRACTITIONER

## 2023-12-28 PROCEDURE — 80053 COMPREHEN METABOLIC PANEL: CPT

## 2023-12-28 PROCEDURE — 99215 OFFICE O/P EST HI 40 MIN: CPT | Mod: 25 | Performed by: NURSE PRACTITIONER

## 2023-12-28 PROCEDURE — 2500000001 HC RX 250 WO HCPCS SELF ADMINISTERED DRUGS (ALT 637 FOR MEDICARE OP): Performed by: NURSE PRACTITIONER

## 2023-12-28 PROCEDURE — 85025 COMPLETE CBC W/AUTO DIFF WBC: CPT

## 2023-12-28 RX ORDER — HYDROMORPHONE HYDROCHLORIDE 4 MG/1
4 TABLET ORAL EVERY 4 HOURS PRN
Qty: 42 TABLET | Refills: 0 | Status: SHIPPED | OUTPATIENT
Start: 2023-12-28 | End: 2024-01-03 | Stop reason: SDUPTHER

## 2023-12-28 RX ORDER — ONDANSETRON HYDROCHLORIDE 8 MG/1
8 TABLET, FILM COATED ORAL ONCE
Status: COMPLETED | OUTPATIENT
Start: 2023-12-28 | End: 2023-12-28

## 2023-12-28 RX ORDER — HYDROMORPHONE HYDROCHLORIDE 4 MG/1
12 TABLET ORAL
Status: COMPLETED | OUTPATIENT
Start: 2023-12-28 | End: 2023-12-28

## 2023-12-28 RX ORDER — ACETAMINOPHEN 325 MG/1
650 TABLET ORAL ONCE
Status: COMPLETED | OUTPATIENT
Start: 2023-12-28 | End: 2023-12-28

## 2023-12-28 RX ORDER — NALOXONE HYDROCHLORIDE 0.4 MG/ML
0.4 INJECTION, SOLUTION INTRAMUSCULAR; INTRAVENOUS; SUBCUTANEOUS
Status: DISCONTINUED | OUTPATIENT
Start: 2023-12-28 | End: 2023-12-28 | Stop reason: HOSPADM

## 2023-12-28 RX ORDER — CYCLOBENZAPRINE HCL 10 MG
10 TABLET ORAL ONCE
Status: COMPLETED | OUTPATIENT
Start: 2023-12-28 | End: 2023-12-28

## 2023-12-28 RX ORDER — DIPHENHYDRAMINE HCL 25 MG
25 CAPSULE ORAL ONCE
Status: COMPLETED | OUTPATIENT
Start: 2023-12-28 | End: 2023-12-28

## 2023-12-28 RX ADMIN — HYDROMORPHONE HYDROCHLORIDE 12 MG: 4 TABLET ORAL at 13:21

## 2023-12-28 RX ADMIN — DIPHENHYDRAMINE HYDROCHLORIDE 25 MG: 25 CAPSULE ORAL at 12:15

## 2023-12-28 RX ADMIN — ACETAMINOPHEN 650 MG: 325 TABLET ORAL at 12:36

## 2023-12-28 RX ADMIN — HYDROMORPHONE HYDROCHLORIDE 12 MG: 4 TABLET ORAL at 12:15

## 2023-12-28 RX ADMIN — HYDROMORPHONE HYDROCHLORIDE 12 MG: 4 TABLET ORAL at 14:23

## 2023-12-28 RX ADMIN — ONDANSETRON HYDROCHLORIDE 8 MG: 8 TABLET, FILM COATED ORAL at 12:15

## 2023-12-28 RX ADMIN — CYCLOBENZAPRINE 10 MG: 10 TABLET, FILM COATED ORAL at 12:15

## 2023-12-28 ASSESSMENT — ENCOUNTER SYMPTOMS
LOSS OF SENSATION IN FEET: 0
OCCASIONAL FEELINGS OF UNSTEADINESS: 0
DEPRESSION: 0

## 2023-12-28 ASSESSMENT — PAIN SCALES - GENERAL
PAINLEVEL_OUTOF10: 8
PAINLEVEL_OUTOF10: 3
PAINLEVEL: 8
PAINLEVEL_OUTOF10: 7
PAINLEVEL_OUTOF10: 6

## 2023-12-28 ASSESSMENT — PAIN - FUNCTIONAL ASSESSMENT
PAIN_FUNCTIONAL_ASSESSMENT: 0-10

## 2023-12-28 ASSESSMENT — PAIN DESCRIPTION - LOCATION
LOCATION: OTHER (COMMENT)
LOCATION: HEAD

## 2023-12-28 NOTE — PROGRESS NOTES
Patient ID:  Xu Maya is a 43 y.o. male.  Subjective   Chief Complaint: Uncontrolled Pain    HPI  Xu is a 43 y.o. male with pmh of sickle cell disease s/p transplant, who presents to [facility] with BLE and R shoulder pain typical of his baseline pain. He reports it started yesterday he has take his home dilaudid 3x without enough relief and is running out. Pt denies chest pain, cough, SOB, headaches, blurry vision, falls, fever or chills, n/v/d/abd pain, or urinary complaints. He also reports he has been extra tired lately, dozing off easily. He reports getting 8+ hours of sleep most nights. 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 He is taking his neurontin but does not think that is the cause of his fatigue. He denies any illicit drug use.       ROS  Review of Systems - Oncology     Allergies  Allergies   Allergen Reactions    Hydrocodone-Acetaminophen Unknown    Naproxen Unknown        Medications  Current Outpatient Medications   Medication Instructions    amitriptyline (ELAVIL) 25 mg, oral    ARIPiprazole (ABILIFY) 2 mg, oral, Daily    cholecalciferol (VITAMIN D-3) 2,000 Units, oral    cyclobenzaprine (FLEXERIL) 10 mg, oral, 3 times daily    diphenhydrAMINE (BENADRYL) 25 mg, oral, Every 6 hours PRN    DULoxetine (CYMBALTA) 60 mg, oral, 2 times daily    ergocalciferol (Vitamin D-2) 1.25 MG (29021 UT) capsule 1 capsule, oral, Weekly    folic acid (FOLVITE) 1 mg, oral, Daily    gabapentin (NEURONTIN) 300 mg, oral, 2 times daily    HYDROmorphone (DILAUDID) 4 mg, oral, Every 4 hours PRN, Must attend next appt for any additional refills    mirtazapine (REMERON) 7.5 mg, oral, Nightly    naloxone (NARCAN) 4 mg, nasal, As needed, 1 spray to nostril for overdose, may repeat every 2-3 minutes until medical assistance arrives<BR>    omeprazole (PRILOSEC) 40 mg, oral, Daily before breakfast, TAKE ONE CAPSULE BY MOUTH EVERY DAY IN THE MORNING BEFORE EATING    Rezurock 200 mg tablet Take one (1) tablet  by mouth twice daily    tacrolimus-vehicle base no.238 0.1 % cream Topical,  Take as directed        Past Medical History:   Past Medical History:  04/17/2017: Anxiety disorder, unspecified      Comment:  Anxiety  04/17/2017: Depression, unspecified      Comment:  Depression  03/02/2017: Family history of glaucoma      Comment:  Family history of glaucoma in father  04/17/2017: Gastritis, unspecified, without bleeding      Comment:  Gastritis  04/17/2017: Hematuria, unspecified      Comment:  Hematuria  12/02/2015: Personal history of other diseases of the digestive system      Comment:  History of esophageal reflux  04/17/2017: Priapism, unspecified      Comment:  Priapism  04/17/2017: Sickle-cell disease without crisis (CMS/[facility])      Comment:  Sickle cell anemia   Surgical History:    Past Surgical History:   Procedure Laterality Date    GALLBLADDER SURGERY  04/12/2017    Gallbladder Surgery    IR CVC TUNNELED  4/24/2018    IR CVC TUNNELED 4/24/2018 [facility]IB LEGACY    IR CVC TUNNELED  6/5/2018    IR CVC TUNNELED 6/5/2018 [facility]    IR CVC TUNNELED  3/1/2019    IR CVC TUNNELED 3/1/2019 [facility] AIB LEGACY    IR CVC TUNNELED  6/4/2019    IR CVC TUNNELED 6/4/2019 [facility] CLINICAL LEGACY    IR CVC TUNNELED  4/5/2019    IR CVC TUNNELED 4/5/2019 [facility]    IR CVC TUNNELED  7/17/2019    IR CVC TUNNELED 7/17/2019 [facility]    IR CVC TUNNELED  8/14/2019    IR CVC TUNNELED 8/14/2019 [facility]    IR CVC TUNNELED  12/18/2019    IR CVC TUNNELED 12/18/2019 [facility] CLINICAL LEGACY    IR CVC TUNNELED  10/9/2019    IR CVC TUNNELED 10/9/2019 [facility]    IR CVC TUNNELED  11/13/2019    IR CVC TUNNELED 11/13/2019 [facility] CLINICAL LEGACY    IR CVC TUNNELED  1/15/2020    IR CVC TUNNELED 1/15/2020 [facility] CLINICAL LEGACY    IR CVC TUNNELED  2/19/2020    IR CVC TUNNELED 2/19/2020 [facility] CLINICAL LEGACY    IR CVC TUNNELED  1/4/2021    IR CVC TUNNELED 1/4/2021 [facility]    IR CVC TUNNELED  1/25/2021    IR CVC TUNNELED  1/25/2021 [facility] ANCILLARY LEGACY    IR CVC TUNNELED  8/21/2018    IR CVC TUNNELED 8/21/2018 [facility]    IR CVC TUNNELED  9/26/2018    IR CVC TUNNELED 9/26/2018 [facility]    IR CVC TUNNELED  11/5/2018    IR CVC TUNNELED 11/5/2018 [facility]    IR CVC TUNNELED  12/19/2018    IR CVC TUNNELED 12/19/2018 [facility]    IR VENOGRAM HEPATIC  11/8/2017    IR VENOGRAM HEPATIC 11/8/2017 [facility]    MR HEAD ANGIO WO IV CONTRAST  2/17/2017    MR HEAD ANGIO WO IV CONTRAST 2/17/2017 [email or web address] CLINICAL LEGACY    MR NECK ANGIO WO IV CONTRAST  2/17/2017    MR NECK ANGIO WO IV CONTRAST 2/17/2017 [email or web address] CLINICAL LEGACY    US GUIDED NEEDLE LIVER BIOPSY  9/8/2020    US GUIDED NEEDLE LIVER BIOPSY 9/8/2020 [facility]      Family History:    Family History   Problem Relation Name Age of Onset    Diabetes Father      Sickle cell anemia Other sibling     Cancer Other grandfather     Cancer Other uncle      Family Oncology History:    Cancer-related family history includes Cancer in some other family members.  Social History:    Social History     Tobacco Use    Smoking status: Every Day     Types: Cigarettes     Passive exposure: Current    Smokeless tobacco: Never   Substance Use Topics    Alcohol use: Yes     Comment: Occasional    Drug use: Yes     Types: Marijuana        Objective   Vitals: /80 (BP Location: Right arm, Patient Position: Sitting)   Pulse 92   Temp 37 °C (98.6 °F) (Core)   Resp 18   Wt 63.2 kg (139 lb 5.3 oz)   SpO2 95%   BMI 21.82 kg/m²   Weight:   Vitals:    12/28/23 1202   Weight: 63.2 kg (139 lb 5.3 oz)       Physical Exam  Vitals reviewed.   Constitutional:       Appearance: Normal appearance.   HENT:      Head: Normocephalic and atraumatic.      Nose: Nose normal.      Mouth/Throat:      Mouth: Mucous membranes are moist.   Eyes:      Conjunctiva/sclera: Conjunctivae normal.      Pupils: Pupils are equal, round, and reactive to light.   Cardiovascular:      Rate and Rhythm: Normal rate  and regular rhythm.   Pulmonary:      Effort: Pulmonary effort is normal.      Breath sounds: Normal breath sounds.   Abdominal:      General: Abdomen is flat. Bowel sounds are normal.      Palpations: Abdomen is soft.   Musculoskeletal:         General: Normal range of motion.      Cervical back: Normal range of motion.   Skin:     General: Skin is warm and dry.   Neurological:      General: No focal deficit present.      Mental Status: He is alert.   Psychiatric:         Mood and Affect: Mood normal.         Behavior: Behavior normal.         Diagnostic Results     Labs  Results from last 7 days   Lab Units 12/28/23  1211   WBC AUTO x10*3/uL 10.0   HEMOGLOBIN g/dL 15.4   HEMATOCRIT % 43.1   PLATELETS AUTO x10*3/uL 283   NEUTROS ABS x10*3/uL 5.35   LYMPHS ABS AUTO x10*3/uL 3.70   MONOS ABS AUTO x10*3/uL 0.66   EOS ABS AUTO x10*3/uL 0.24   NEUTROS PCT AUTO % 53.6   LYMPHS PCT AUTO % 37.1   MONOS PCT AUTO % 6.6   EOS PCT AUTO % 2.4      Results from last 7 days   Lab Units 12/28/23  1211   GLUCOSE mg/dL 110*   SODIUM mmol/L 137   POTASSIUM mmol/L 4.4   CHLORIDE mmol/L 104   CO2 mmol/L 26   BUN mg/dL 6   CREATININE mg/dL 0.84   EGFR mL/min/1.73m*2 >90   CALCIUM mg/dL 9.1   ALBUMIN g/dL 4.1   PROTEIN TOTAL g/dL 6.9     Results from last 7 days   Lab Units 12/28/23  1211   BILIRUBIN TOTAL mg/dL 0.6   ALK PHOS U/L 112   ALT U/L 10   AST U/L 16         Results from last 7 days   Lab Units 12/28/23  1211   RETIC CT PCT % 1.5   RETIC CT ABS x10*6/uL 0.073   IMMATURE RETIC FRACTION % 9.3   RETIC HGB pg 35     Results from last 7 days   Lab Units 12/28/23  1211   LD U/L 219         Lab Results   Component Value Date    HGB 15.4 12/28/2023    HGB 14.8 12/15/2023    HGB 15.3 12/07/2023     12/28/2023     12/15/2023     12/07/2023     12/28/2023     12/15/2023     12/07/2023       Images  === 11/30/23 ===    XR CHEST 2 VIEWS    - Impression -  No findings of an acute cardiopulmonary  process.  Signed by Rashaad Gold MD   === 10/23/21 ===    CT ABDOMEN PELVIS W IV CONTRAST    - Impression -  There is a pancolitis.  In a sickle cell patient who has received a  bone marrow transplant, differential includes graft versus host  disease as well as ischemic/venoocclusive colitis.  Inflammatory or  infectious colitis would also be in the differential.  No bowel  obstruction.  There is a small amount of pelvic free fluid.  No  pneumatosis or portal venous gas.    The appendix is seen with no evidence of acute appendicitis.    Minimal patchy opacity in the lower lobes, raising the possibility of  changes of hpwlj-dvtwjk-kmev disease, pneumonia or sickle cell crisis.  Consider early or mild interstitial fibrosis.    Cholecystectomy.  No biliary ductal dilatation.  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    Consider cystitis which can also represent a manifestation of  kmmnk-tbgyqi-eecv disease as well as infection.  Clinical correlation  is needed.    Osseous changes consistent with known sickle cell disease.  Mild  cardiomegaly consistent with known sickle cell disease.    NOTIFICATION:  The critical results of the study were discussed with,  and acknowledged by Dr. Bety Carr  by telephone on 10/23/2021 at  1322 hours.            Signed by Allyson Padgett MD     No echocardiogram results found for the past 12 months       Assessment/Plan   Xu Maya is a 43 y.o. male with pmh sickle cell disease s/p transplant who presents to [facility] for uncontrolled pain.          ACC Course  - VSS  -  Hemolysis labs near baseline, no indication of acute vaso-occlusive crisis or indication for blood transfusion   - Flexeril 10mg and tylenol 650mg once given for AVN  - dilaudid 12mg PO x3 given for sickle cell related pain   - Zofran 8mg po once for opioid induced nausea/vomiting  - Benadryl 25mg po once for opioid induced pruritus   - b12, vit D and TSH added on for ongoing  fatigue    Disposition  - Patient discharged home with no further needs following ACC Course  - Return to clinic/ED instructions given  - pt given list of future appointments and encouraged to schedule with ortho for recurrent shoulder pain          Nelida Willis, APRN-CNP

## 2023-12-28 NOTE — TELEPHONE ENCOUNTER
Pt called requesting a refill on his Dilaudid 4mg to be sent to [facility] on file. Message sent to the team.

## 2024-01-01 PROBLEM — G47.00 INSOMNIA, UNSPECIFIED: Status: ACTIVE | Noted: 2023-09-19

## 2024-01-01 PROBLEM — D89.813: Status: ACTIVE | Noted: 2023-09-19

## 2024-01-01 PROBLEM — D69.6 THROMBOCYTOPENIA, UNSPECIFIED (CMS-HCC): Status: ACTIVE | Noted: 2023-09-19

## 2024-01-01 PROBLEM — K59.09 OTHER CONSTIPATION: Status: ACTIVE | Noted: 2023-09-19

## 2024-01-01 PROBLEM — F41.9 ANXIETY DISORDER, UNSPECIFIED: Status: ACTIVE | Noted: 2023-09-19

## 2024-01-01 PROBLEM — F32.A DEPRESSION, UNSPECIFIED: Status: ACTIVE | Noted: 2023-09-19

## 2024-01-01 PROBLEM — K21.00 GASTRO-ESOPHAGEAL REFLUX DISEASE WITH ESOPHAGITIS, WITHOUT BLEEDING: Status: ACTIVE | Noted: 2023-09-19

## 2024-01-01 PROBLEM — G89.29 OTHER CHRONIC PAIN: Status: ACTIVE | Noted: 2023-09-19

## 2024-01-01 PROBLEM — F17.200 NICOTINE DEPENDENCE, UNSPECIFIED, UNCOMPLICATED: Status: ACTIVE | Noted: 2023-09-19

## 2024-01-01 PROBLEM — M54.9 DORSALGIA, UNSPECIFIED: Status: ACTIVE | Noted: 2023-09-19

## 2024-01-01 PROBLEM — T45.1X5A ANTINEOPLASTIC CHEMOTHERAPY INDUCED PANCYTOPENIA (CMS-HCC): Status: ACTIVE | Noted: 2023-09-19

## 2024-01-01 PROBLEM — D57.1 SICKLE CELL DISEASE WITHOUT CRISIS (MULTI): Status: ACTIVE | Noted: 2023-09-19

## 2024-01-01 PROBLEM — M25.511 PAIN IN RIGHT SHOULDER: Status: ACTIVE | Noted: 2023-09-19

## 2024-01-01 PROBLEM — D61.810 ANTINEOPLASTIC CHEMOTHERAPY INDUCED PANCYTOPENIA (CMS-HCC): Status: ACTIVE | Noted: 2023-09-19

## 2024-01-01 PROBLEM — E55.9 VITAMIN D DEFICIENCY, UNSPECIFIED: Status: ACTIVE | Noted: 2023-09-19

## 2024-01-01 PROBLEM — M79.604 PAIN IN RIGHT LEG: Status: ACTIVE | Noted: 2023-09-19

## 2024-01-01 PROBLEM — M79.605 PAIN IN LEFT LEG: Status: ACTIVE | Noted: 2023-09-19

## 2024-01-03 ENCOUNTER — TELEPHONE (OUTPATIENT)
Dept: ADMISSION | Facility: HOSPITAL | Age: 44
End: 2024-01-03
Payer: COMMERCIAL

## 2024-01-03 DIAGNOSIS — R52 ACUTE PAIN: ICD-10-CM

## 2024-01-03 DIAGNOSIS — D57.00 SICKLE CELL DISEASE WITH CRISIS (MULTI): ICD-10-CM

## 2024-01-03 DIAGNOSIS — D57.00 SICKLE CELL CRISIS (MULTI): ICD-10-CM

## 2024-01-03 RX ORDER — GABAPENTIN 300 MG/1
300 CAPSULE ORAL 2 TIMES DAILY
Qty: 60 CAPSULE | Refills: 0 | Status: SHIPPED | OUTPATIENT
Start: 2024-01-03 | End: 2024-01-31 | Stop reason: SDUPTHER

## 2024-01-03 RX ORDER — CYCLOBENZAPRINE HCL 10 MG
10 TABLET ORAL 3 TIMES DAILY
Qty: 30 TABLET | Refills: 0 | Status: SHIPPED | OUTPATIENT
Start: 2024-01-03 | End: 2024-01-31 | Stop reason: SDUPTHER

## 2024-01-03 RX ORDER — HYDROMORPHONE HYDROCHLORIDE 4 MG/1
4 TABLET ORAL EVERY 4 HOURS PRN
Qty: 42 TABLET | Refills: 0 | Status: SHIPPED | OUTPATIENT
Start: 2024-01-03 | End: 2024-01-10 | Stop reason: SDUPTHER

## 2024-01-03 NOTE — TELEPHONE ENCOUNTER
Patient requesting refills:  Dilaudid 4mg q4h prn  Gabapentin 300mg twice daily  Flexeril 10mg three times daily  Pharmacy on file verified.

## 2024-01-04 ENCOUNTER — TELEMEDICINE (OUTPATIENT)
Dept: BEHAVIORAL HEALTH | Facility: HOSPITAL | Age: 44
End: 2024-01-04
Payer: COMMERCIAL

## 2024-01-04 ENCOUNTER — PHARMACY VISIT (OUTPATIENT)
Dept: PHARMACY | Facility: CLINIC | Age: 44
End: 2024-01-04
Payer: MEDICAID

## 2024-01-04 DIAGNOSIS — F33.1 MODERATE EPISODE OF RECURRENT MAJOR DEPRESSIVE DISORDER (MULTI): ICD-10-CM

## 2024-01-04 DIAGNOSIS — F41.9 ANXIETY: ICD-10-CM

## 2024-01-04 PROCEDURE — 99214 OFFICE O/P EST MOD 30 MIN: CPT | Performed by: PSYCHIATRY & NEUROLOGY

## 2024-01-04 PROCEDURE — 90833 PSYTX W PT W E/M 30 MIN: CPT | Performed by: PSYCHIATRY & NEUROLOGY

## 2024-01-04 RX ORDER — ARIPIPRAZOLE 5 MG/1
5 TABLET ORAL DAILY
Qty: 30 TABLET | Refills: 2 | Status: SHIPPED | OUTPATIENT
Start: 2024-01-04 | End: 2024-02-01 | Stop reason: SDUPTHER

## 2024-01-04 RX ORDER — MIRTAZAPINE 7.5 MG/1
7.5 TABLET, FILM COATED ORAL NIGHTLY
Qty: 30 TABLET | Refills: 2 | Status: SHIPPED | OUTPATIENT
Start: 2024-01-04

## 2024-01-04 ASSESSMENT — PATIENT HEALTH QUESTIONNAIRE - PHQ9
SUM OF ALL RESPONSES TO PHQ9 QUESTIONS 1 & 2: 2
1. LITTLE INTEREST OR PLEASURE IN DOING THINGS: SEVERAL DAYS
2. FEELING DOWN, DEPRESSED OR HOPELESS: SEVERAL DAYS
10. IF YOU CHECKED OFF ANY PROBLEMS, HOW DIFFICULT HAVE THESE PROBLEMS MADE IT FOR YOU TO DO YOUR WORK, TAKE CARE OF THINGS AT HOME, OR GET ALONG WITH OTHER PEOPLE: SOMEWHAT DIFFICULT

## 2024-01-04 NOTE — PROGRESS NOTES
Outpatient Psychiatry FUV      Subjective   Xu Maya, a 43 y.o. male, for virtual FUV      Assessment/Plan   Patient Discussion:  continue duloxetine 60mg 2x day  Continue mirtazapine 7.5 mg at bedtime  INCREASE to aripiprazole 5mg in the AM  monitor for excess serotonin    RETURN to clinic 2/1 at 10AM for virtual FUV    call with questions or concerns. [phone]     Assessment:   43 y.o. BM with SCC disease with depression, sickle cell disease now s/p BMT. Previously on suboxone for management of pain/high utilization but now off since transplant, still having pain and working to start ketamine     Since last appt, pt notes limited change with addition of abilify but no a/e. Will further titrate and continue monthly follow up to address dynamics at home. no si/sx of excess serotonin. Follow up 1 months sooner if needed    Diagnosis:   MDD, recurrent. Moderate  WILFRID  Chronic pain disorder    Treatment Plan/Recommendations:   1. Safety Assessment: no current SI, no h/o SI/SA. has guns at home but unloaded and locked with kids at home. PF include , no previous attempts, kids, Gnosticism. RF include depression, pain, father with completed suicide. Pt has moderate baseline risk based on static factors but is not an imminent risk and safety plan addressed    2. MDD, WILFRID  CONTINUE duloxetine 60mg PO BID, r/b/ae discussed including higher dose of SNRI, si/sx excess serotonin/SS  CONTINUE mirtazapine 7.5mg PO QHS PRN insomnia  INCREASE to aripiprazole 5mg PO daily     continue supportive therapy during appt    3. opioid misuse in the context of chronic pain 2/2 sickle cell disease with acute exacerbations  continues to struggle with chronic pain, working on starting ketamine    nicotine use disorder --previous success with chantix but stopped and now smoking 3-4/day. monitor for now    4. Medical: SCC, back pain, GERD with h/o PUD: recent notes and labs reviewed. stable.   s/p BMT 1/2021  notes and labs  reviewed,   coordinate care as needed with sickle cell team, BMT as needed  Ongoing pain, managed by sickle cell    5. Social: lives with kids and wife, moved to [place] after transplant, [place]. stepfather passed away from leukemia, sister passed from sickle cell. 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    Ongoing stress at home, feels stuck and not sure how to move forward  Discussed considering family therapy, not sure if others will be receptive but will send resources    Rates depression 5/10    No feelings of not wanting to go     Current Medications:    Current Outpatient Medications:     amitriptyline (Elavil) 25 mg tablet, Take 1 tablet (25 mg) by mouth., Disp: , Rfl:     ARIPiprazole (Abilify) 2 mg tablet, TAKE 1 TABLET BY MOUTH ONCE DAILY., Disp: 90 tablet, Rfl: 1    cholecalciferol (Vitamin D-3) 50 MCG (2000 UT) tablet, Take 1 tablet (2,000 Units) by mouth., Disp: , Rfl:     cyclobenzaprine (Flexeril) 10 mg tablet, Take 1 tablet (10 mg) by mouth 3 times a day for 10 days., Disp: 30 tablet, Rfl: 0    diphenhydrAMINE (BENADryl) 25 mg capsule, Take 1 capsule (25 mg) by mouth every 6 hours if needed., Disp: , Rfl:     DULoxetine (Cymbalta) 60 mg DR capsule, Take 1 capsule (60 mg) by mouth 2 times a day., Disp: 60 capsule, Rfl: 2    ergocalciferol (Vitamin D-2) 1.25 MG (55054 UT) capsule, Take 1 capsule (1,250 mcg) by mouth 1 (one) time per week., Disp: , Rfl:     folic acid (Folvite) 1 mg tablet, Take 1 tablet (1 mg) by mouth once daily., Disp: 30 tablet, Rfl: 11    gabapentin (Neurontin) 300 mg capsule, Take 1 capsule (300 mg) by mouth 2 times a day., Disp: 60 capsule, Rfl: 0    HYDROmorphone (Dilaudid) 4 mg tablet, Take 1 tablet (4 mg) by mouth every 4 hours if needed for  severe pain (7 - 10) for up to 7 days. Must attend next appt for any additional refills, Disp: 42 tablet, Rfl: 0    mirtazapine (Remeron) 7.5 mg tablet, Take 1 tablet (7.5 mg) by mouth once daily at bedtime., Disp: 30 tablet, Rfl: 2    naloxone (Narcan) 4 mg/0.1 mL nasal spray, Administer 1 spray (4 mg) into affected nostril(s) if needed for opioid reversal or respiratory depression. 1 spray to nostril for overdose, may repeat every 2-3 minutes until medical assistance arrives, Disp: , Rfl:     omeprazole (PriLOSEC) 40 mg DR capsule, Take 1 capsule (40 mg) by mouth once daily in the morning. Take before meals. TAKE ONE CAPSULE BY MOUTH EVERY DAY IN THE MORNING BEFORE EATING, Disp: 30 capsule, Rfl: 2    Rezurock 200 mg tablet, Take one (1) tablet by mouth twice daily, Disp: 60 tablet, Rfl: 0    Current Facility-Administered Medications:     heparin lock flush (porcine) 10 unit/mL injection 100 Units, 100 Units, intra-catheter, Once, CHRIS Garcia    heparin lock flush (porcine) injection 500 Units, 5 mL, intravenous, PRN, CHRIS Garcia  Medical History:  Past Medical History:   Diagnosis Date    Anxiety disorder, unspecified 04/17/2017    Anxiety    Depression, unspecified 04/17/2017    Depression    Family history of glaucoma 03/02/2017    Family history of glaucoma in father    Gastritis, unspecified, without bleeding 04/17/2017    Gastritis    Hematuria, unspecified 04/17/2017    Hematuria    Personal history of other diseases of the digestive system 12/02/2015    History of esophageal reflux    Priapism, unspecified 04/17/2017    Priapism    Sickle-cell disease without crisis (CMS/[facility]) 04/17/2017    Sickle cell anemia       Surgical History:  Past Surgical History:   Procedure Laterality Date    GALLBLADDER SURGERY  04/12/2017    Gallbladder Surgery    IR CVC TUNNELED  4/24/2018    IR CVC TUNNELED 4/24/2018 [facility]    IR CVC TUNNELED  6/5/2018    IR CVC TUNNELED 6/5/2018 [facility]     IR CVC TUNNELED  3/1/2019    IR CVC TUNNELED 3/1/2019 [facility]    IR CVC TUNNELED  6/4/2019    IR CVC TUNNELED 6/4/2019 [facility] CLINICAL LEGACY    IR CVC TUNNELED  4/5/2019    IR CVC TUNNELED 4/5/2019 [facility]    IR CVC TUNNELED  7/17/2019    IR CVC TUNNELED 7/17/2019 [facility]    IR CVC TUNNELED  8/14/2019    IR CVC TUNNELED 8/14/2019 [facility]    IR CVC TUNNELED  12/18/2019    IR CVC TUNNELED 12/18/2019 [facility] CLINICAL LEGACY    IR CVC TUNNELED  10/9/2019    IR CVC TUNNELED 10/9/2019 [facility]    IR CVC TUNNELED  11/13/2019    IR CVC TUNNELED 11/13/2019 [facility] CLINICAL LEGACY    IR CVC TUNNELED  1/15/2020    IR CVC TUNNELED 1/15/2020 [facility] CLINICAL LEGACY    IR CVC TUNNELED  2/19/2020    IR CVC TUNNELED 2/19/2020 [facility] CLINICAL LEGACY    IR CVC TUNNELED  1/4/2021    IR CVC TUNNELED 1/4/2021 [facility]    IR CVC TUNNELED  1/25/2021    IR CVC TUNNELED 1/25/2021 [facility]CILLARY LEGACY    IR CVC TUNNELED  8/21/2018    IR CVC TUNNELED 8/21/2018 [facility]GAC[facility]    IR CVC TUNNELED  9/26/2018    IR CVC TUNNELED 9/26/2018 [facility]    IR CVC TUNNELED  11/5/2018    IR CVC TUNNELED 11/5/2018 [facility]    IR CVC TUNNELED  12/19/2018    IR CVC TUNNELED 12/19/2018 [facility]    IR VENOGRAM HEPATIC  11/8/2017    IR VENOGRAM HEPATIC 11/8/2017 [facility]    MR HEAD ANGIO WO IV CONTRAST  2/17/2017    MR HEAD ANGIO WO IV CONTRAST 2/17/2017 [facility] CLINICAL LEGACY    MR NECK ANGIO WO IV CONTRAST  2/17/2017    MR NECK ANGIO WO IV CONTRAST 2/17/2017 [facility] CLINICAL LEGACY    US GUIDED NEEDLE LIVER BIOPSY  9/8/2020    US GUIDED NEEDLE LIVER BIOPSY 9/8/2020 [facility]       Family History:  Family History   Problem Relation Name Age of Onset    Diabetes Father      Sickle cell anemia Other sibling     Cancer Other grandfather     Cancer Other uncle        Social History:  Social History     Socioeconomic History    Marital status:      Spouse name: Not on file    Number of children: Not on file  "   Years of education: Not on file    Highest education level: Not on file   Occupational History    Not on file   Tobacco Use    Smoking status: Every Day     Types: Cigarettes     Passive exposure: Current    Smokeless tobacco: Never   Substance and Sexual Activity    Alcohol use: Yes     Comment: Occasional    Drug use: Yes     Types: Marijuana    Sexual activity: Not on file   Other Topics Concern    Not on file   Social History Narrative    Not on file     Social Determinants of Health     Financial Resource Strain: Not on file   Food Insecurity: Not on file   Transportation Needs: Not on file   Physical Activity: Not on file   Stress: Not on file   Social Connections: Not on file   Intimate Partner Violence: Not on file   Housing Stability: Not on file              Medical Review Of Systems:  +pain worse with weather  +increased break outs    Psychiatric Review Of Systems:  Depression unchanged       Objective   Mental Status Exam:   Appearance: appropriate g/h.   Attitude: cooperative and engaged.   Behavior: sitting in chair, good eye contact.   Motor Activity: no tremor, normal tone.   Speech: regular in rate, volume, low tone, no dysarthria, no aphasia.   Mood: \"down\"  Affect: congruent, dysthymic, appropriate range.   Thought Process: linear, goal directed.   Thought Content: no delusions, no SI/HI.   Thought Perception: no AVH.   Cognition: alert, oriented to person, place, time. attn intact.   Insight: fair.   Judgment: fair/intact.       Vitals:  There were no vitals filed for this visit.  No results found. However, due to the size of the patient record, not all encounters were searched. Please check Results Review for a complete set of results.  Lab Results   Component Value Date    WBC 10.0 12/28/2023    HGB 15.4 12/28/2023    HCT 43.1 12/28/2023    MCV 90 12/28/2023     12/28/2023     Lab Results   Component Value Date    GLUCOSE 110 (H) 12/28/2023    CALCIUM 9.1 12/28/2023     " 12/28/2023    K 4.4 12/28/2023    CO2 26 12/28/2023     12/28/2023    BUN 6 12/28/2023    CREATININE 0.84 12/28/2023       Time spent in therapy 22 minutes  Type: supportive, insight oriented  Target: mood, anxiety  Strategies: problem solving, insight oriented  Goal: decreased sx burden  Follow up: next visit 1 month  Response: mayuri Contreras MD

## 2024-01-05 ENCOUNTER — APPOINTMENT (OUTPATIENT)
Dept: HEMATOLOGY/ONCOLOGY | Facility: HOSPITAL | Age: 44
End: 2024-01-05
Payer: COMMERCIAL

## 2024-01-10 ENCOUNTER — TELEPHONE (OUTPATIENT)
Dept: ADMISSION | Facility: HOSPITAL | Age: 44
End: 2024-01-10
Payer: COMMERCIAL

## 2024-01-10 RX ORDER — HYDROMORPHONE HYDROCHLORIDE 4 MG/1
4 TABLET ORAL EVERY 4 HOURS PRN
Qty: 42 TABLET | Refills: 0 | Status: SHIPPED | OUTPATIENT
Start: 2024-01-10 | End: 2024-01-17 | Stop reason: SDUPTHER

## 2024-01-10 NOTE — TELEPHONE ENCOUNTER
Pt requests a refill of dilaudid 4mg every 4hrs prn, #42.  Last prescription written 1/3/24.  Preferred pharmacy is [facility] on [address], [facility].

## 2024-01-17 ENCOUNTER — TELEPHONE (OUTPATIENT)
Dept: ADMISSION | Facility: HOSPITAL | Age: 44
End: 2024-01-17
Payer: COMMERCIAL

## 2024-01-17 DIAGNOSIS — D57.00 SICKLE CELL DISEASE WITH CRISIS (MULTI): ICD-10-CM

## 2024-01-17 RX ORDER — HYDROMORPHONE HYDROCHLORIDE 4 MG/1
4 TABLET ORAL EVERY 4 HOURS PRN
Qty: 42 TABLET | Refills: 0 | Status: SHIPPED | OUTPATIENT
Start: 2024-01-17 | End: 2024-01-24 | Stop reason: SDUPTHER

## 2024-01-17 NOTE — TELEPHONE ENCOUNTER
Refill request received for Dilaudid 4mg.  Preferred pharmacy is [facility] at [id number] [place] [address] in [place].  Message sent to Sickle Cell team.

## 2024-01-19 ENCOUNTER — OFFICE VISIT (OUTPATIENT)
Dept: DERMATOLOGY | Facility: CLINIC | Age: 44
End: 2024-01-19
Payer: COMMERCIAL

## 2024-01-19 DIAGNOSIS — R21 RASH AND OTHER NONSPECIFIC SKIN ERUPTION: ICD-10-CM

## 2024-01-19 DIAGNOSIS — A63.0 CONDYLOMA: Primary | ICD-10-CM

## 2024-01-19 PROCEDURE — 99214 OFFICE O/P EST MOD 30 MIN: CPT | Performed by: STUDENT IN AN ORGANIZED HEALTH CARE EDUCATION/TRAINING PROGRAM

## 2024-01-19 PROCEDURE — 54056 CRYOSURGERY PENIS LESION(S): CPT | Performed by: STUDENT IN AN ORGANIZED HEALTH CARE EDUCATION/TRAINING PROGRAM

## 2024-01-19 PROCEDURE — 17110 DESTRUCTION B9 LES UP TO 14: CPT

## 2024-01-19 RX ORDER — TACROLIMUS 1 MG/G
OINTMENT TOPICAL
Qty: 30 G | Refills: 1 | Status: SHIPPED | OUTPATIENT
Start: 2024-01-19 | End: 2024-01-30

## 2024-01-19 RX ORDER — KETOCONAZOLE 20 MG/G
CREAM TOPICAL 2 TIMES DAILY
Qty: 30 G | Refills: 11 | Status: SHIPPED | OUTPATIENT
Start: 2024-01-19

## 2024-01-19 NOTE — PATIENT INSTRUCTIONS
-START Ketoconazole 2% cream: apply to rash on face twice a day   -START tacrolimus ointment: apply to rash on face 1-2 times per week

## 2024-01-19 NOTE — PROGRESS NOTES
Subjective     Xu Maya is a 43 y.o. male who presents for the following: Condyloma acuminatum (Follow up). Patient states he was not able to get the podofilox to use at home.     He has also noticed a recurrence of the rash on the cheeks. He has restarted sing desonide twice a day for the last month with no change in the rash.     Review of Systems:  No other skin or systemic complaints other than what is documented elsewhere in the note.    The following portions of the chart were reviewed this encounter and updated as appropriate:          Skin Cancer History  No skin cancer on file.      Specialty Problems          Dermatology Problems    Dermatitis, unspecified    Ichthyosis vulgaris    Other seborrheic dermatitis    Other specified dermatitis    Pruritus, unspecified    Urticaria, unspecified    Periorificial dermatitis    Pruritus        Objective   Well appearing patient in no apparent distress; mood and affect are within normal limits.    A focused skin examination was performed. All findings within normal limits unless otherwise noted below.    Assessment/Plan   1. Condyloma  Pubic  Keratotic papules of the suprapubic region     -patient was not able to receive podofilox from [facility]   -Gave patient [facility] phone number so he could have it filled and delivered   Recommended use podofilox q 2-3 days thin layer, can cover with bandaid, until reaction occurs than pause for 2-3 weeks and repeat as needed  Risk of post inflammatory changes reviewed    Destr of lesion - Pubic    Destruction method: cryotherapy    Informed consent: discussed and consent obtained    Lesion destroyed using liquid nitrogen: Yes    Outcome: patient tolerated procedure well with no complications      Related Procedures  Follow Up In Dermatology - Established Patient    2. Rash and other nonspecific skin eruption  Head - Anterior (Face)  Flesh colored papules with background hypopigmented patches     Suspect Seborrheic  Dermatitis   -Instructed to discontinue desonide   -START Ketoconazole 2% cream: apply to rash on face twice a day   -START tacrolimus ointment: apply to rash on face 1-2 times per week     ketoconazole (NIZOral) 2 % cream - Head - Anterior (Face)  Apply topically 2 times a day.    tacrolimus (Protopic) 0.1 % ointment - Head - Anterior (Face)  Apply 1-2 times per week      Follow up in 2 months.     Rae Rooney DO   Dermatology Resident, PGY-2     I was present during all portions of visit and supervised resident directly   Including any discussion and performing of procedures as well as medical management and follow up care

## 2024-01-24 ENCOUNTER — TELEPHONE (OUTPATIENT)
Dept: ADMISSION | Facility: HOSPITAL | Age: 44
End: 2024-01-24
Payer: COMMERCIAL

## 2024-01-24 DIAGNOSIS — D57.00 SICKLE CELL DISEASE WITH CRISIS (MULTI): ICD-10-CM

## 2024-01-24 RX ORDER — HYDROMORPHONE HYDROCHLORIDE 4 MG/1
4 TABLET ORAL EVERY 4 HOURS PRN
Qty: 42 TABLET | Refills: 0 | Status: SHIPPED | OUTPATIENT
Start: 2024-01-24 | End: 2024-01-31 | Stop reason: SDUPTHER

## 2024-01-24 NOTE — TELEPHONE ENCOUNTER
Xu P Crayton called the refill line for Dilaudid (asking for an early refill; due tomorrow but needs it filled today due to increased pain/inability to come to [facility]). Requesting refills be sent to [facility] pharmacy; message sent to Sickle Cell team to submit.

## 2024-01-28 ENCOUNTER — HOSPITAL ENCOUNTER (EMERGENCY)
Facility: HOSPITAL | Age: 44
Discharge: HOME | End: 2024-01-28
Attending: STUDENT IN AN ORGANIZED HEALTH CARE EDUCATION/TRAINING PROGRAM
Payer: COMMERCIAL

## 2024-01-28 VITALS
HEART RATE: 85 BPM | TEMPERATURE: 98.5 F | WEIGHT: 142 LBS | HEIGHT: 67 IN | BODY MASS INDEX: 22.29 KG/M2 | RESPIRATION RATE: 17 BRPM | DIASTOLIC BLOOD PRESSURE: 75 MMHG | SYSTOLIC BLOOD PRESSURE: 110 MMHG | OXYGEN SATURATION: 96 %

## 2024-01-28 DIAGNOSIS — D57.00 SICKLE CELL PAIN CRISIS (MULTI): Primary | ICD-10-CM

## 2024-01-28 PROCEDURE — 2500000004 HC RX 250 GENERAL PHARMACY W/ HCPCS (ALT 636 FOR OP/ED): Performed by: STUDENT IN AN ORGANIZED HEALTH CARE EDUCATION/TRAINING PROGRAM

## 2024-01-28 PROCEDURE — 96372 THER/PROPH/DIAG INJ SC/IM: CPT

## 2024-01-28 PROCEDURE — 99283 EMERGENCY DEPT VISIT LOW MDM: CPT

## 2024-01-28 PROCEDURE — 99284 EMERGENCY DEPT VISIT MOD MDM: CPT | Performed by: STUDENT IN AN ORGANIZED HEALTH CARE EDUCATION/TRAINING PROGRAM

## 2024-01-28 RX ORDER — HYDROMORPHONE HYDROCHLORIDE 2 MG/ML
2 INJECTION, SOLUTION INTRAMUSCULAR; INTRAVENOUS; SUBCUTANEOUS
Status: COMPLETED | OUTPATIENT
Start: 2024-01-28 | End: 2024-01-28

## 2024-01-28 RX ORDER — KETOROLAC TROMETHAMINE 30 MG/ML
15 INJECTION, SOLUTION INTRAMUSCULAR; INTRAVENOUS ONCE
Status: COMPLETED | OUTPATIENT
Start: 2024-01-28 | End: 2024-01-28

## 2024-01-28 RX ADMIN — HYDROMORPHONE HYDROCHLORIDE 2 MG: 2 INJECTION INTRAMUSCULAR; INTRAVENOUS; SUBCUTANEOUS at 05:14

## 2024-01-28 RX ADMIN — KETOROLAC TROMETHAMINE 15 MG: 30 INJECTION, SOLUTION INTRAMUSCULAR; INTRAVENOUS at 04:38

## 2024-01-28 RX ADMIN — HYDROMORPHONE HYDROCHLORIDE 2 MG: 2 INJECTION INTRAMUSCULAR; INTRAVENOUS; SUBCUTANEOUS at 05:49

## 2024-01-28 RX ADMIN — HYDROMORPHONE HYDROCHLORIDE 2 MG: 2 INJECTION INTRAMUSCULAR; INTRAVENOUS; SUBCUTANEOUS at 04:38

## 2024-01-28 ASSESSMENT — PAIN SCALES - GENERAL: PAINLEVEL_OUTOF10: 8

## 2024-01-28 ASSESSMENT — PAIN - FUNCTIONAL ASSESSMENT: PAIN_FUNCTIONAL_ASSESSMENT: 0-10

## 2024-01-29 NOTE — ED PROVIDER NOTES
HPI:    History provided by: Patient      43-year-old male with a past medical history of sickle cell disease who presents to the emergency department for sickle cell pain crisis.  He states that he has been experiencing pain in his left leg throughout the day, now progressing to his right leg as well.  Unrelieved with home analgesia.  He states that he usually takes oral Dilaudid, and this has not been working.  He notes that he thinks it is the weather change that is caused his pain crisis at this time.  No leg swelling, chest pain, shortness of breath, fevers, cough.  Pain is currently 8 of 10 the pain scale, sharp in nature and he states that he would like to get it down to a 4 out of 10 to feel comfortable going home.  No issues with ambulation, no trauma.        ROS: All pertinent positives and negatives reviewed in HPI    PMHx: Reviewed  PSHx: Reviewed  Social: no Etoh, no tobacco, no social drugs  Social Determinants of Health impacting care: NA  Allergies: Reviewed in EMR  FMHx: Noncontributory to patient's chief complaint  Medications: Reviewed        Vital signs and triage note reviewed per nursing documentation    Physical Exam:     GEN: Sitting in no acute distress. Well-appearing, appears stated age  HEAD: Atraumatic, normocephalic  EYES: EOMI. Pupils equal and reactive.   HEENT: MMM. No oropharyngeal erythema, exudates, or uvular deviation.   Neck: Supple, full ROM  CVS: Regular rate and rhythm, no murmurs, gallops, or rubs  PULM: Clear to auscultation bilaterally. No wheezes, rales, rhonchi.   ABD: Soft, nontender to palpation. No rigidity, guarding, or tympany  EXT: +2 radial and DP pulses bilaterally. No pitting edema noted. No varicose veins  NEURO: Alert, keenly responsive. Moving all 4 extremities spontaneously with normal strength. Normal sensation in all 4 extremities     Emergency Department Course    Medications Ordered: subq dilaudid, IM toradol    EKG: NA    MDM      This is a 43-year-old  male with history of sickle cell disease who presents to the emergency department for sickle cell pain crisis with pain in the bilateral lower extremities.  My assessment feels a hemodynamically stable, well-appearing male in no acute distress.  No signs or symptoms of aplastic anemia at this time.  Calves are nonswollen, and he does not have any calf pain so I am not concerned for deep venous thrombus.  Symptoms consistent with his chronic history of sickle cell disease, so he will be treated with his care plan with 3 doses of Dilaudid, along with IM ketorolac.  Patient's pain significantly improved after doses of medications, and he will be discharged home with sickle cell clinic follow-up.    Consultations:    LAN    Medications Prescribed:    NA    Disposition:    Discharged         Kartik Steven MD  01/28/24 6527

## 2024-01-30 ENCOUNTER — TELEPHONE (OUTPATIENT)
Dept: HEMATOLOGY/ONCOLOGY | Facility: HOSPITAL | Age: 44
End: 2024-01-30

## 2024-01-30 ENCOUNTER — OFFICE VISIT (OUTPATIENT)
Dept: HEMATOLOGY/ONCOLOGY | Facility: HOSPITAL | Age: 44
End: 2024-01-30
Payer: COMMERCIAL

## 2024-01-30 ENCOUNTER — TELEPHONE (OUTPATIENT)
Dept: ADMISSION | Facility: HOSPITAL | Age: 44
End: 2024-01-30

## 2024-01-30 VITALS
SYSTOLIC BLOOD PRESSURE: 117 MMHG | RESPIRATION RATE: 18 BRPM | OXYGEN SATURATION: 99 % | WEIGHT: 146.39 LBS | TEMPERATURE: 98.1 F | HEART RATE: 71 BPM | BODY MASS INDEX: 22.93 KG/M2 | DIASTOLIC BLOOD PRESSURE: 82 MMHG

## 2024-01-30 DIAGNOSIS — D57.00 SICKLE CELL CRISIS (MULTI): ICD-10-CM

## 2024-01-30 DIAGNOSIS — D57.00 SICKLE CELL CRISIS (MULTI): Primary | ICD-10-CM

## 2024-01-30 DIAGNOSIS — R52 ACUTE PAIN: Primary | ICD-10-CM

## 2024-01-30 LAB
ALBUMIN SERPL BCP-MCNC: 3.9 G/DL (ref 3.4–5)
ALP SERPL-CCNC: 107 U/L (ref 33–120)
ALT SERPL W P-5'-P-CCNC: 15 U/L (ref 10–52)
ANION GAP SERPL CALC-SCNC: 12 MMOL/L (ref 10–20)
AST SERPL W P-5'-P-CCNC: 16 U/L (ref 9–39)
BASOPHILS # BLD AUTO: 0.03 X10*3/UL (ref 0–0.1)
BASOPHILS NFR BLD AUTO: 0.2 %
BILIRUB SERPL-MCNC: 0.6 MG/DL (ref 0–1.2)
BUN SERPL-MCNC: 7 MG/DL (ref 6–23)
CALCIUM SERPL-MCNC: 8.8 MG/DL (ref 8.6–10.3)
CHLORIDE SERPL-SCNC: 109 MMOL/L (ref 98–107)
CO2 SERPL-SCNC: 24 MMOL/L (ref 21–32)
CREAT SERPL-MCNC: 0.77 MG/DL (ref 0.5–1.3)
EGFRCR SERPLBLD CKD-EPI 2021: >90 ML/MIN/1.73M*2
EOSINOPHIL # BLD AUTO: 0.13 X10*3/UL (ref 0–0.7)
EOSINOPHIL NFR BLD AUTO: 0.9 %
ERYTHROCYTE [DISTWIDTH] IN BLOOD BY AUTOMATED COUNT: 13.5 % (ref 11.5–14.5)
GLUCOSE SERPL-MCNC: 94 MG/DL (ref 74–99)
HCT VFR BLD AUTO: 40.9 % (ref 41–52)
HGB BLD-MCNC: 14.6 G/DL (ref 13.5–17.5)
HGB RETIC QN: 34 PG (ref 28–38)
IMM GRANULOCYTES # BLD AUTO: 0.04 X10*3/UL (ref 0–0.7)
IMM GRANULOCYTES NFR BLD AUTO: 0.3 % (ref 0–0.9)
IMMATURE RETIC FRACTION: 7 %
LDH SERPL L TO P-CCNC: 155 U/L (ref 84–246)
LYMPHOCYTES # BLD AUTO: 3.26 X10*3/UL (ref 1.2–4.8)
LYMPHOCYTES NFR BLD AUTO: 21.4 %
MCH RBC QN AUTO: 31.8 PG (ref 26–34)
MCHC RBC AUTO-ENTMCNC: 35.7 G/DL (ref 32–36)
MCV RBC AUTO: 89 FL (ref 80–100)
MONOCYTES # BLD AUTO: 0.87 X10*3/UL (ref 0.1–1)
MONOCYTES NFR BLD AUTO: 5.7 %
NEUTROPHILS # BLD AUTO: 10.93 X10*3/UL (ref 1.2–7.7)
NEUTROPHILS NFR BLD AUTO: 71.5 %
NRBC BLD-RTO: 0 /100 WBCS (ref 0–0)
PLATELET # BLD AUTO: 278 X10*3/UL (ref 150–450)
POTASSIUM SERPL-SCNC: 3.6 MMOL/L (ref 3.5–5.3)
PROT SERPL-MCNC: 6.4 G/DL (ref 6.4–8.2)
RBC # BLD AUTO: 4.59 X10*6/UL (ref 4.5–5.9)
RETICS #: 0.05 X10*6/UL (ref 0.02–0.12)
RETICS/RBC NFR AUTO: 1.2 % (ref 0.5–2)
SODIUM SERPL-SCNC: 141 MMOL/L (ref 136–145)
WBC # BLD AUTO: 15.3 X10*3/UL (ref 4.4–11.3)

## 2024-01-30 PROCEDURE — 99215 OFFICE O/P EST HI 40 MIN: CPT | Mod: 25,ZK | Performed by: NURSE PRACTITIONER

## 2024-01-30 PROCEDURE — 99215 OFFICE O/P EST HI 40 MIN: CPT | Performed by: NURSE PRACTITIONER

## 2024-01-30 PROCEDURE — 85045 AUTOMATED RETICULOCYTE COUNT: CPT

## 2024-01-30 PROCEDURE — 2500000005 HC RX 250 GENERAL PHARMACY W/O HCPCS: Performed by: NURSE PRACTITIONER

## 2024-01-30 PROCEDURE — 83615 LACTATE (LD) (LDH) ENZYME: CPT

## 2024-01-30 PROCEDURE — 80053 COMPREHEN METABOLIC PANEL: CPT

## 2024-01-30 PROCEDURE — 2500000001 HC RX 250 WO HCPCS SELF ADMINISTERED DRUGS (ALT 637 FOR MEDICARE OP): Performed by: NURSE PRACTITIONER

## 2024-01-30 PROCEDURE — 85025 COMPLETE CBC W/AUTO DIFF WBC: CPT

## 2024-01-30 PROCEDURE — 99417 PROLNG OP E/M EACH 15 MIN: CPT | Performed by: NURSE PRACTITIONER

## 2024-01-30 PROCEDURE — 99417 PROLNG OP E/M EACH 15 MIN: CPT | Mod: ZK | Performed by: NURSE PRACTITIONER

## 2024-01-30 PROCEDURE — 36415 COLL VENOUS BLD VENIPUNCTURE: CPT

## 2024-01-30 RX ORDER — ONDANSETRON HYDROCHLORIDE 8 MG/1
8 TABLET, FILM COATED ORAL ONCE
Status: COMPLETED | OUTPATIENT
Start: 2024-01-30 | End: 2024-01-30

## 2024-01-30 RX ORDER — TACROLIMUS 1 MG/G
OINTMENT TOPICAL
Qty: 30 G | Refills: 1 | Status: SHIPPED | OUTPATIENT
Start: 2024-01-30

## 2024-01-30 RX ORDER — NALOXONE HYDROCHLORIDE 0.4 MG/ML
0.4 INJECTION, SOLUTION INTRAMUSCULAR; INTRAVENOUS; SUBCUTANEOUS
Status: DISCONTINUED | OUTPATIENT
Start: 2024-01-30 | End: 2024-01-30 | Stop reason: HOSPADM

## 2024-01-30 RX ORDER — DIPHENHYDRAMINE HCL 25 MG
25 CAPSULE ORAL ONCE
Status: COMPLETED | OUTPATIENT
Start: 2024-01-30 | End: 2024-01-30

## 2024-01-30 RX ORDER — ACETAMINOPHEN 325 MG/1
650 TABLET ORAL ONCE
Status: COMPLETED | OUTPATIENT
Start: 2024-01-30 | End: 2024-01-30

## 2024-01-30 RX ORDER — HYDROMORPHONE HYDROCHLORIDE 4 MG/1
12 TABLET ORAL
Status: COMPLETED | OUTPATIENT
Start: 2024-01-30 | End: 2024-01-30

## 2024-01-30 RX ADMIN — ACETAMINOPHEN 650 MG: 325 TABLET ORAL at 10:19

## 2024-01-30 RX ADMIN — ONDANSETRON HYDROCHLORIDE 8 MG: 8 TABLET, FILM COATED ORAL at 10:12

## 2024-01-30 RX ADMIN — HYDROMORPHONE HYDROCHLORIDE 12 MG: 4 TABLET ORAL at 11:14

## 2024-01-30 RX ADMIN — DIPHENHYDRAMINE HYDROCHLORIDE 25 MG: 25 CAPSULE ORAL at 10:12

## 2024-01-30 RX ADMIN — HYDROMORPHONE HYDROCHLORIDE 12 MG: 4 TABLET ORAL at 10:12

## 2024-01-30 RX ADMIN — HYDROMORPHONE HYDROCHLORIDE 12 MG: 4 TABLET ORAL at 12:12

## 2024-01-30 ASSESSMENT — PAIN - FUNCTIONAL ASSESSMENT
PAIN_FUNCTIONAL_ASSESSMENT: 0-10

## 2024-01-30 ASSESSMENT — PAIN DESCRIPTION - LOCATION
LOCATION: HEAD
LOCATION: BACK

## 2024-01-30 ASSESSMENT — PAIN SCALES - GENERAL
PAINLEVEL: 8
PAINLEVEL_OUTOF10: 8
PAINLEVEL_OUTOF10: 7
PAINLEVEL_OUTOF10: 5 - MODERATE PAIN
PAINLEVEL_OUTOF10: 6

## 2024-01-30 ASSESSMENT — ENCOUNTER SYMPTOMS
LOSS OF SENSATION IN FEET: 0
DEPRESSION: 0
OCCASIONAL FEELINGS OF UNSTEADINESS: 0

## 2024-01-30 ASSESSMENT — PATIENT HEALTH QUESTIONNAIRE - PHQ9
1. LITTLE INTEREST OR PLEASURE IN DOING THINGS: NOT AT ALL
2. FEELING DOWN, DEPRESSED OR HOPELESS: NOT AT ALL
SUM OF ALL RESPONSES TO PHQ9 QUESTIONS 1 AND 2: 0

## 2024-01-30 NOTE — PROGRESS NOTES
Patient ID:  Xu Maya is a 43 y.o. male.  Subjective   Chief Complaint: Uncontrolled Pain    HPI  Xu is a 43 y.o. male with history of anxiety, depression, gastritis/GERD, priapism, and sickle cell disease, who presents to [facility] with uncontrolled pain. He reports the pain started 2 days ago. It is his typical BLE and lower back pain uncontrolled with home dilaudid. . He rates his pain as 8/10 currently, 4/10 is an acceptable level for him. Pt denies chest pain, SOB, blurry vision, falls, fever or chills, n/v/d/abd pain, or urinary complaints       ROS  Review of Systems - Oncology     Allergies  Allergies   Allergen Reactions    Hydrocodone-Acetaminophen Unknown    Naproxen Unknown        Medications  Current Outpatient Medications   Medication Instructions    amitriptyline (ELAVIL) 25 mg, oral    ARIPiprazole (ABILIFY) 5 mg, oral, Daily    cholecalciferol (VITAMIN D-3) 2,000 Units, oral    cyclobenzaprine (FLEXERIL) 10 mg, oral, 3 times daily    diphenhydrAMINE (BENADRYL) 25 mg, oral, Every 6 hours PRN    DULoxetine (CYMBALTA) 60 mg, oral, 2 times daily    ergocalciferol (Vitamin D-2) 1.25 MG (14686 UT) capsule 1 capsule, oral, Weekly    folic acid (FOLVITE) 1 mg, oral, Daily    gabapentin (NEURONTIN) 300 mg, oral, 2 times daily    HYDROmorphone (DILAUDID) 4 mg, oral, Every 4 hours PRN, Must attend next appt for any additional refills    ketoconazole (NIZOral) 2 % cream Topical, 2 times daily    mirtazapine (REMERON) 7.5 mg, oral, Nightly    naloxone (NARCAN) 4 mg, nasal, As needed, 1 spray to nostril for overdose, may repeat every 2-3 minutes until medical assistance arrives<BR>    omeprazole (PRILOSEC) 40 mg, oral, Daily before breakfast, TAKE ONE CAPSULE BY MOUTH EVERY DAY IN THE MORNING BEFORE EATING    Rezurock 200 mg tablet Take one (1) tablet by mouth twice daily    tacrolimus (Protopic) 0.1 % ointment APPLY 1-2 TIMES PER WEEK        Past Medical History:   Past Medical History:  04/17/2017: Anxiety  disorder, unspecified      Comment:  Anxiety  04/17/2017: Depression, unspecified      Comment:  Depression  03/02/2017: Family history of glaucoma      Comment:  Family history of glaucoma in father  04/17/2017: Gastritis, unspecified, without bleeding      Comment:  Gastritis  04/17/2017: Hematuria, unspecified      Comment:  Hematuria  12/02/2015: Personal history of other diseases of the digestive system      Comment:  History of esophageal reflux  04/17/2017: Priapism, unspecified      Comment:  Priapism  04/17/2017: Sickle-cell disease without crisis (CMS/[facility])      Comment:  Sickle cell anemia   Surgical History:    Past Surgical History:   Procedure Laterality Date    GALLBLADDER SURGERY  04/12/2017    Gallbladder Surgery    IR CVC TUNNELED  4/24/2018    IR CVC TUNNELED 4/24/2018 [facility]    IR CVC TUNNELED  6/5/2018    IR CVC TUNNELED 6/5/2018 [facility]    IR CVC TUNNELED  3/1/2019    IR CVC TUNNELED 3/1/2019 [facility] AIB LEGAC[facility]    IR CVC TUNNELED  6/4/2019    IR CVC TUNNELED 6/4/2019 [facility] CLINICAL LEGAC[facility]    IR CVC TUNNELED  4/5/2019    IR CVC TUNNELED 4/5/2019 [facility] AIB LEGAC[facility]    IR CVC TUNNELED  7/17/2019    IR CVC TUNNELED 7/17/2019 [facility] A[facility]GAC[facility]    IR CVC TUNNELED  8/14/2019    IR CVC TUNNELED 8/14/2019 [facility]    IR CVC TUNNELED  12/18/2019    IR CVC TUNNELED 12/18/2019 [facility] CL[facility]ICAL [facility]GAC[facility]    IR CVC TUNNELED  10/9/2019    IR CVC TUNNELED 10/9/2019 [facility]    IR CVC TUNNELED  11/13/2019    IR CVC TUNNELED 11/13/2019 [facility] CLINICAL LEGAC[facility]    IR CVC TUNNELED  1/15/2020    IR CVC TUNNELED 1/15/2020 [facility] CL[facility]ICAL [facility]GAC[facility]    IR CVC TUNNELED  2/19/2020    IR CVC TUNNELED 2/19/2020 [facility] CL[facility]ICAL [facility]GAC[facility]    IR CVC TUNNELED  1/4/2021    IR CVC TUNNELED 1/4/2021 [facility] AIB LEGAC[facility]    IR CVC TUNNELED  1/25/2021    IR CVC TUNNELED 1/25/2021 [facility]    IR CVC TUNNELED  8/21/2018    IR CVC TUNNELED 8/21/2018 [facility]    IR CVC TUNNELED  9/26/2018    IR CVC TUNNELED 9/26/2018 [facility]    IR CVC TUNNELED  11/5/2018    IR CVC TUNNELED 11/5/2018 [facility]    IR CVC TUNNELED  12/19/2018    IR CVC TUNNELED 12/19/2018 [facility]    IR VENOGRAM HEPATIC  11/8/2017    IR VENOGRAM HEPATIC 11/8/2017 [facility]    MR HEAD ANGIO WO IV CONTRAST  2/17/2017    MR HEAD ANGIO WO IV CONTRAST 2/17/2017 [facility] CLINICAL LEGACY    MR NECK ANGIO WO IV CONTRAST  2/17/2017    MR NECK ANGIO WO IV CONTRAST 2/17/2017 [facility] CLINICAL LEGAC[facility]    US GUIDED NEEDLE LIVER BIOPSY  9/8/2020    US GUIDED NEEDLE LIVER BIOPSY 9/8/2020 [facility]      Family History:    Family History   Problem Relation Name Age of Onset    Diabetes Father      Sickle cell anemia Other sibling     Cancer Other grandfather     Cancer Other uncle      Family Oncology History:    Cancer-related family history includes Cancer in some other family members.  Social History:    Social History     Tobacco Use    Smoking status: Every Day     Types: Cigarettes     Passive exposure: Current    Smokeless tobacco: Never   Substance Use Topics    Alcohol use: Yes     Comment: Occasional    Drug use: Yes     Types: Marijuana        Objective   Vitals: /82 (BP Location: Right arm, Patient Position: Sitting, BP Cuff Size: Adult)   Pulse 71   Temp 36.7 °C (98.1 °F) (Temporal)   Resp 18   Wt 66.4 kg (146 lb 6.2 oz)   SpO2 99%   BMI 22.93 kg/m²   Weight:   Vitals:    01/30/24 0958   Weight: 66.4 kg (146 lb 6.2 oz)       Physical Exam  Vitals reviewed.   Constitutional:       Appearance: He is normal weight.   HENT:      Mouth/Throat:      Mouth: Mucous membranes are moist.      Pharynx: Oropharynx is clear.   Eyes:      Conjunctiva/sclera: Conjunctivae normal.      Pupils: Pupils are equal, round, and reactive to light.   Cardiovascular:      Rate and Rhythm: Normal rate and regular rhythm.      Pulses: Normal pulses.      Heart sounds: Normal heart sounds.   Pulmonary:      Effort: Pulmonary effort is normal.      Breath sounds: Normal  breath sounds.   Abdominal:      General: Abdomen is flat. Bowel sounds are normal.      Palpations: Abdomen is soft.   Musculoskeletal:         General: Normal range of motion.   Skin:     General: Skin is warm and dry.   Neurological:      General: No focal deficit present.      Mental Status: He is alert and oriented to person, place, and time.   Psychiatric:         Behavior: Behavior normal.         Diagnostic Results     Labs  Results from last 7 days   Lab Units 01/30/24  1008   WBC AUTO x10*3/uL 15.3*   HEMOGLOBIN g/dL 14.6   HEMATOCRIT % 40.9*   PLATELETS AUTO x10*3/uL 278   NEUTROS ABS x10*3/uL 10.93*   LYMPHS ABS AUTO x10*3/uL 3.26   MONOS ABS AUTO x10*3/uL 0.87   EOS ABS AUTO x10*3/uL 0.13   NEUTROS PCT AUTO % 71.5   LYMPHS PCT AUTO % 21.4   MONOS PCT AUTO % 5.7   EOS PCT AUTO % 0.9      Results from last 7 days   Lab Units 01/30/24  1008   GLUCOSE mg/dL 94   SODIUM mmol/L 141   POTASSIUM mmol/L 3.6   CHLORIDE mmol/L 109*   CO2 mmol/L 24   BUN mg/dL 7   CREATININE mg/dL 0.77   EGFR mL/min/1.73m*2 >90   CALCIUM mg/dL 8.8   ALBUMIN g/dL 3.9   PROTEIN TOTAL g/dL 6.4     Results from last 7 days   Lab Units 01/30/24  1008   BILIRUBIN TOTAL mg/dL 0.6   ALK PHOS U/L 107   ALT U/L 15   AST U/L 16         Results from last 7 days   Lab Units 01/30/24  1008   RETIC CT PCT % 1.2   RETIC CT ABS x10*6/uL 0.054   IMMATURE RETIC FRACTION % 7.0   RETIC HGB pg 34     Results from last 7 days   Lab Units 01/30/24  1008   LD U/L 155         Lab Results   Component Value Date    HGB 14.6 01/30/2024    HGB 15.4 12/28/2023    HGB 14.8 12/15/2023     01/30/2024     12/28/2023     12/15/2023     01/30/2024     12/28/2023     12/15/2023       Images  === 11/30/23 ===    XR CHEST 2 VIEWS    - Impression -  No findings of an acute cardiopulmonary process.  Signed by Rashaad Gold MD   === 10/23/21 ===    CT ABDOMEN PELVIS W IV CONTRAST    - Impression -  There is a pancolitis.  In a  sickle cell patient who has received a  bone marrow transplant, differential includes graft versus host  disease as well as ischemic/venoocclusive colitis.  Inflammatory or  infectious colitis would also be in the differential.  No bowel  obstruction.  There is a small amount of pelvic free fluid.  No  pneumatosis or portal venous gas.    The appendix is seen with no evidence of acute appendicitis.    Minimal patchy opacity in the lower lobes, raising the possibility of  changes of lkdhn-[id number]-rctv disease, pneumonia or sickle cell crisis.  Consider early or mild interstitial fibrosis.    Cholecystectomy.  No biliary ductal dilatation.  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    Consider cystitis which can also represent a manifestation of  sjydp-kbkxei-ontz disease as well as infection.  Clinical correlation  is needed.    Osseous changes consistent with known sickle cell disease.  Mild  cardiomegaly consistent with known sickle cell disease.    NOTIFICATION:  The critical results of the study were discussed with,  and acknowledged by Dr. Bety Carr  by telephone on 10/23/2021 at  1322 hours.            Signed by Allyson Padgett MD     No echocardiogram results found for the past 12 months       Assessment/Plan   Xu Maya is a 43 y.o. male with history of anxiety, depression, gastritis/GERD, priapism, and sickle cell disease, who presents to [facility] with uncontrolled pain..          [facility] Course  - VSS  - Hemolysis labs near baseline, no indication of acute vaso-occlusive crisis or indication for blood transfusion   - dilaudid 12mg PO x3 doses given for sickle cell related pain  - Zofran 8mg po once for opioid induced nausea/vomiting  - Benadryl 25mg po once for opioid induced pruritus     Disposition  - Patient discharged home with no further needs following [facility] Course  - Return to clinic/ED instructions given            Bre Carson, [id number]-CNP

## 2024-01-31 ENCOUNTER — OFFICE VISIT (OUTPATIENT)
Dept: HEMATOLOGY/ONCOLOGY | Facility: HOSPITAL | Age: 44
End: 2024-01-31
Payer: COMMERCIAL

## 2024-01-31 VITALS
OXYGEN SATURATION: 100 % | RESPIRATION RATE: 18 BRPM | SYSTOLIC BLOOD PRESSURE: 133 MMHG | DIASTOLIC BLOOD PRESSURE: 80 MMHG | HEART RATE: 72 BPM | WEIGHT: 145.94 LBS | TEMPERATURE: 97.2 F | BODY MASS INDEX: 22.86 KG/M2

## 2024-01-31 DIAGNOSIS — R52 ACUTE PAIN: ICD-10-CM

## 2024-01-31 DIAGNOSIS — D57.00 SICKLE CELL DISEASE WITH CRISIS (MULTI): ICD-10-CM

## 2024-01-31 DIAGNOSIS — K59.03 DRUG-INDUCED CONSTIPATION: Primary | ICD-10-CM

## 2024-01-31 DIAGNOSIS — D57.00 SICKLE CELL CRISIS (MULTI): ICD-10-CM

## 2024-01-31 DIAGNOSIS — D89.813 GRAFT-VERSUS-HOST DISEASE, UNSPECIFIED (MULTI): Primary | ICD-10-CM

## 2024-01-31 PROCEDURE — 99214 OFFICE O/P EST MOD 30 MIN: CPT | Performed by: PEDIATRICS

## 2024-01-31 RX ORDER — HYDROMORPHONE HYDROCHLORIDE 4 MG/1
4 TABLET ORAL EVERY 4 HOURS PRN
Qty: 42 TABLET | Refills: 0 | Status: SHIPPED | OUTPATIENT
Start: 2024-01-31 | End: 2024-02-07 | Stop reason: SDUPTHER

## 2024-01-31 RX ORDER — CYCLOBENZAPRINE HCL 10 MG
10 TABLET ORAL 3 TIMES DAILY
Qty: 30 TABLET | Refills: 0 | Status: SHIPPED | OUTPATIENT
Start: 2024-01-31 | End: 2024-02-19 | Stop reason: SDUPTHER

## 2024-01-31 RX ORDER — GABAPENTIN 300 MG/1
300 CAPSULE ORAL 2 TIMES DAILY
Qty: 60 CAPSULE | Refills: 2 | Status: SHIPPED | OUTPATIENT
Start: 2024-01-31 | End: 2024-02-22 | Stop reason: SDUPTHER

## 2024-01-31 ASSESSMENT — PAIN SCALES - GENERAL: PAINLEVEL: 5

## 2024-02-01 ENCOUNTER — TELEMEDICINE (OUTPATIENT)
Dept: BEHAVIORAL HEALTH | Facility: HOSPITAL | Age: 44
End: 2024-02-01
Payer: COMMERCIAL

## 2024-02-01 DIAGNOSIS — F33.1 MODERATE EPISODE OF RECURRENT MAJOR DEPRESSIVE DISORDER (MULTI): ICD-10-CM

## 2024-02-01 DIAGNOSIS — F41.9 ANXIETY: ICD-10-CM

## 2024-02-01 PROCEDURE — 90833 PSYTX W PT W E/M 30 MIN: CPT | Performed by: PSYCHIATRY & NEUROLOGY

## 2024-02-01 PROCEDURE — 99214 OFFICE O/P EST MOD 30 MIN: CPT | Performed by: PSYCHIATRY & NEUROLOGY

## 2024-02-01 RX ORDER — ARIPIPRAZOLE 5 MG/1
5 TABLET ORAL DAILY
Qty: 30 TABLET | Refills: 2 | Status: SHIPPED | OUTPATIENT
Start: 2024-02-01 | End: 2024-03-07 | Stop reason: SDUPTHER

## 2024-02-01 RX ORDER — DULOXETIN HYDROCHLORIDE 60 MG/1
60 CAPSULE, DELAYED RELEASE ORAL 2 TIMES DAILY
Qty: 60 CAPSULE | Refills: 2 | Status: SHIPPED | OUTPATIENT
Start: 2024-02-01 | End: 2024-05-02 | Stop reason: SDUPTHER

## 2024-02-01 NOTE — PROGRESS NOTES
Outpatient Psychiatry FUV      Subjective   Xu Maya, a 43 y.o. male, for virtual FUV. On break at work today    Virtual or Telephone Consent    A telephone visit (audio only) between the patient (at the originating site) and the provider (at the distant site) was utilized to provide this telehealth service.   Verbal consent was requested and obtained from Xu Maya on this date, 02/01/24 for a telehealth visit.         Assessment/Plan   Patient Discussion:  continue duloxetine 60mg 2x day  Continue mirtazapine 7.5 mg at bedtime  CONTINUE aripiprazole 5mg in the AM  monitor for excess serotonin    RETURN to clinic 3/7 at 10:30AM for virtual FUV    call with questions or concerns. [phone]     Assessment:   43 y.o. BM with SCC disease with depression, sickle cell disease now s/p BMT. Previously on suboxone for management of pain/high utilization but now off since transplant, still having pain and working to start ketamine     Since last appt, pt notes limited change with addition of abilify but no a/e. Will further titrate and continue monthly follow up for supportive therapy. no si/sx of excess serotonin. Follow up 1 months sooner if needed    Diagnosis:   MDD, recurrent. Moderate  WILFRID  Chronic pain disorder    Treatment Plan/Recommendations:   1. Safety Assessment: no current SI, no h/o SI/SA. has guns at home but unloaded and locked with kids at home. PF include , no previous attempts, kids, [employer]. RF include depression, pain, father with completed suicide. Pt has moderate baseline risk based on static factors but is not an imminent risk and safety plan addressed    2. MDD, WILFRID  CONTINUE duloxetine 60mg PO BID, r/b/ae discussed including higher dose of SNRI, si/sx excess serotonin/SS  CONTINUE mirtazapine 7.5mg PO QHS PRN insomnia  CONTINUE aripiprazole 5mg PO daily     continue supportive therapy during appt    3. opioid misuse in the context of chronic pain 2/2 sickle cell disease with  acute exacerbations  continues to struggle with chronic pain, has appt with Dr. Andrew    nicotine use disorder --previous success with chantix but stopped and now smoking 3-4/day. monitor for now    4. Medical: SCC, back pain, GERD with h/o PUD: recent notes and labs reviewed. stable.   s/p BMT 1/2021  notes and labs reviewed,   coordinate care as needed with sickle cell team, BMT as needed  Ongoing pain, managed by sickle cell    5. Social: lives with kids and wife, moved to [place] after transplant, [place]. stepfather passed away from leukemia, sister passed from sickle cell. Better at home, work    Reason for Visit:   FUV depression and anxiety    Subjective:  Last visit 1 month ago  Since then doing pretty good  Unsure about abilify helping but no a/e  Notes things have been better at home  Nothing changed but in a better phase  Sleep doing well, occ mirtazapine use    Still having a lot of pain, trying to manage at home  Has had to go to ED and acute care  Has appt with Dr. Andrew later this month  Rates depression 5/10    No feelings of not wanting to go     Current Medications:    Current Outpatient Medications:     amitriptyline (Elavil) 25 mg tablet, Take 1 tablet (25 mg) by mouth., Disp: , Rfl:     ARIPiprazole (Abilify) 5 mg tablet, Take 1 tablet (5 mg) by mouth once daily., Disp: 30 tablet, Rfl: 2    cholecalciferol (Vitamin D-3) 50 MCG (2000 UT) tablet, Take 1 tablet (2,000 Units) by mouth., Disp: , Rfl:     cyclobenzaprine (Flexeril) 10 mg tablet, Take 1 tablet (10 mg) by mouth 3 times a day for 10 days., Disp: 30 tablet, Rfl: 0    diphenhydrAMINE (BENADryl) 25 mg capsule, Take 1 capsule (25 mg) by mouth every 6 hours if needed., Disp: , Rfl:     DULoxetine (Cymbalta) 60 mg DR capsule, Take 1 capsule (60 mg) by mouth 2 times a day., Disp: 60 capsule, Rfl: 2    ergocalciferol (Vitamin D-2) 1.25 MG (55882 UT) capsule, Take 1 capsule (1,250 mcg) by mouth 1 (one) time per week., Disp: ,  Rfl:     gabapentin (Neurontin) 300 mg capsule, Take 1 capsule (300 mg) by mouth 2 times a day., Disp: 60 capsule, Rfl: 2    HYDROmorphone (Dilaudid) 4 mg tablet, Take 1 tablet (4 mg) by mouth every 4 hours if needed for severe pain (7 - 10) for up to 7 days. Must attend next appt for any additional refills, Disp: 42 tablet, Rfl: 0    ketoconazole (NIZOral) 2 % cream, Apply topically 2 times a day., Disp: 30 g, Rfl: 11    mirtazapine (Remeron) 7.5 mg tablet, Take 1 tablet (7.5 mg) by mouth once daily at bedtime., Disp: 30 tablet, Rfl: 2    naloxone (Narcan) 4 mg/0.1 mL nasal spray, Administer 1 spray (4 mg) into affected nostril(s) if needed for opioid reversal or respiratory depression. 1 spray to nostril for overdose, may repeat every 2-3 minutes until medical assistance arrives, Disp: , Rfl:     omeprazole (PriLOSEC) 40 mg DR capsule, Take 1 capsule (40 mg) by mouth once daily in the morning. Take before meals. TAKE ONE CAPSULE BY MOUTH EVERY DAY IN THE MORNING BEFORE EATING, Disp: 30 capsule, Rfl: 2    Rezurock 200 mg tablet, Take one (1) tablet by mouth twice daily, Disp: 60 tablet, Rfl: 0    tacrolimus (Protopic) 0.1 % ointment, APPLY 1-2 TIMES PER WEEK, Disp: 30 g, Rfl: 1    Current Facility-Administered Medications:     heparin lock flush (porcine) 10 unit/mL injection 100 Units, 100 Units, intra-catheter, Once, CHRIS Garcia    heparin lock flush (porcine) injection 500 Units, 5 mL, intravenous, PRN, CHRIS Garcia  Medical History:  Past Medical History:   Diagnosis Date    Anxiety disorder, unspecified 04/17/2017    Anxiety    Depression, unspecified 04/17/2017    Depression    Family history of glaucoma 03/02/2017    Family history of glaucoma in father    Gastritis, unspecified, without bleeding 04/17/2017    Gastritis    Hematuria, unspecified 04/17/2017    Hematuria    Personal history of other diseases of the digestive system 12/02/2015    History of esophageal reflux    Priapism,  unspecified 04/17/2017    Priapism    Sickle-cell disease without crisis (CMS/HCC) 04/17/2017    Sickle cell anemia       Surgical History:  Past Surgical History:   Procedure Laterality Date    GALLBLADDER SURGERY  04/12/2017    Gallbladder Surgery    IR CVC TUNNELED  4/24/2018    IR CVC TUNNELED 4/24/2018 [facility]    IR CVC TUNNELED  6/5/2018    IR CVC TUNNELED 6/5/2018 [facility]    IR CVC TUNNELED  3/1/2019    IR CVC TUNNELED 3/1/2019 [facility]    IR CVC TUNNELED  6/4/2019    IR CVC TUNNELED 6/4/2019 [facility]    IR CVC TUNNELED  4/5/2019    IR CVC TUNNELED 4/5/2019 [facility]    IR CVC TUNNELED  7/17/2019    IR CVC TUNNELED 7/17/2019 [facility]    IR CVC TUNNELED  8/14/2019    IR CVC TUNNELED 8/14/2019 [facility]    IR CVC TUNNELED  12/18/2019    IR CVC TUNNELED 12/18/2019 [facility]    IR CVC TUNNELED  10/9/2019    IR CVC TUNNELED 10/9/2019 [facility]    IR CVC TUNNELED  11/13/2019    IR CVC TUNNELED 11/13/2019 [facility]    IR CVC TUNNELED  1/15/2020    IR CVC TUNNELED 1/15/2020 [facility]    IR CVC TUNNELED  2/19/2020    IR CVC TUNNELED 2/19/2020 [facility]    IR CVC TUNNELED  1/4/2021    IR CVC TUNNELED 1/4/2021 [facility]    IR CVC TUNNELED  1/25/2021    IR CVC TUNNELED 1/25/2021 [facility]    IR CVC TUNNELED  8/21/2018    IR CVC TUNNELED 8/21/2018 [facility]    IR CVC TUNNELED  9/26/2018    IR CVC TUNNELED 9/26/2018 [facility]    IR CVC TUNNELED  11/5/2018    IR CVC TUNNELED 11/5/2018 [facility]    IR CVC TUNNELED  12/19/2018    IR CVC TUNNELED 12/19/2018 [facility]    IR VENOGRAM HEPATIC  11/8/2017    IR VENOGRAM HEPATIC 11/8/2017 [facility]    MR HEAD ANGIO WO IV CONTRAST  2/17/2017    MR HEAD ANGIO WO IV CONTRAST 2/17/2017 [facility]    MR NECK ANGIO WO IV CONTRAST  2/17/2017    MR NECK ANGIO WO IV CONTRAST 2/17/2017 [facility]    US GUIDED NEEDLE LIVER BIOPSY   "9/8/2020     GUIDED NEEDLE LIVER BIOPSY 9/8/2020 [facility]       Family History:  Family History   Problem Relation Name Age of Onset    Diabetes Father      Sickle cell anemia Other sibling     Cancer Other grandfather     Cancer Other uncle        Social History:  Social History     Socioeconomic History    Marital status:      Spouse name: Not on file    Number of children: Not on file    Years of education: Not on file    Highest education level: Not on file   Occupational History    Not on file   Tobacco Use    Smoking status: Every Day     Types: Cigarettes     Passive exposure: Current    Smokeless tobacco: Never   Substance and Sexual Activity    Alcohol use: Yes     Comment: Occasional    Drug use: Yes     Types: Marijuana    Sexual activity: Not on file   Other Topics Concern    Not on file   Social History Narrative    Not on file     Social Determinants of Health     Financial Resource Strain: Not on file   Food Insecurity: Not on file   Transportation Needs: Not on file   Physical Activity: Not on file   Stress: Not on file   Social Connections: Not on file   Intimate Partner Violence: Not on file   Housing Stability: Not on file              Medical Review Of Systems:  +pain worse with weather  +increased break outs    Psychiatric Review Of Systems:  Depression unchanged       Objective   Mental Status Exam:   Appearance: appropriate g/h.   Attitude: cooperative and engaged.   Behavior: sitting in chair, good eye contact.   Motor Activity: no tremor, normal tone.   Speech: regular in rate, volume, low tone, no dysarthria, no aphasia.   Mood: \"down\"  Affect: congruent, dysthymic, appropriate range.   Thought Process: linear, goal directed.   Thought Content: no delusions, no SI/HI.   Thought Perception: no AVH.   Cognition: alert, oriented to person, place, time. attn intact.   Insight: fair.   Judgment: fair/intact.       Vitals:  There were no vitals filed for this visit.  No results " found. However, due to the size of the patient record, not all encounters were searched. Please check Results Review for a complete set of results.  Lab Results   Component Value Date    WBC 15.3 (H) 01/30/2024    HGB 14.6 01/30/2024    HCT 40.9 (L) 01/30/2024    MCV 89 01/30/2024     01/30/2024     Lab Results   Component Value Date    GLUCOSE 94 01/30/2024    CALCIUM 8.8 01/30/2024     01/30/2024    K 3.6 01/30/2024    CO2 24 01/30/2024     (H) 01/30/2024    BUN 7 01/30/2024    CREATININE 0.77 01/30/2024       Time spent in therapy 17 minutes  Type: supportive, insight oriented  Target: mood, anxiety  Strategies: problem solving, insight oriented  Goal: decreased sx burden  Follow up: next visit 1 month  Response: good    Phone visit time: 23 minutes    Yola Contreras MD

## 2024-02-02 ENCOUNTER — OFFICE VISIT (OUTPATIENT)
Dept: HEMATOLOGY/ONCOLOGY | Facility: HOSPITAL | Age: 44
End: 2024-02-02
Payer: COMMERCIAL

## 2024-02-02 VITALS
SYSTOLIC BLOOD PRESSURE: 126 MMHG | RESPIRATION RATE: 17 BRPM | DIASTOLIC BLOOD PRESSURE: 86 MMHG | HEART RATE: 84 BPM | TEMPERATURE: 97 F | OXYGEN SATURATION: 98 % | BODY MASS INDEX: 22.69 KG/M2 | WEIGHT: 144.84 LBS

## 2024-02-02 DIAGNOSIS — R05.1 ACUTE COUGH: Primary | ICD-10-CM

## 2024-02-02 DIAGNOSIS — D89.811 CHRONIC GVHD (MULTI): ICD-10-CM

## 2024-02-02 LAB — SARS-COV-2 RNA RESP QL NAA+PROBE: NOT DETECTED

## 2024-02-02 PROCEDURE — 87632 RESP VIRUS 6-11 TARGETS: CPT | Performed by: STUDENT IN AN ORGANIZED HEALTH CARE EDUCATION/TRAINING PROGRAM

## 2024-02-02 PROCEDURE — 87635 SARS-COV-2 COVID-19 AMP PRB: CPT | Performed by: STUDENT IN AN ORGANIZED HEALTH CARE EDUCATION/TRAINING PROGRAM

## 2024-02-02 PROCEDURE — 99215 OFFICE O/P EST HI 40 MIN: CPT | Performed by: STUDENT IN AN ORGANIZED HEALTH CARE EDUCATION/TRAINING PROGRAM

## 2024-02-02 ASSESSMENT — ENCOUNTER SYMPTOMS
ENDOCRINE NEGATIVE: 1
EYES NEGATIVE: 1
CARDIOVASCULAR NEGATIVE: 1
PSYCHIATRIC NEGATIVE: 1
GASTROINTESTINAL NEGATIVE: 1
FATIGUE: 1
NEUROLOGICAL NEGATIVE: 1
MUSCULOSKELETAL NEGATIVE: 1
HEMATOLOGIC/LYMPHATIC NEGATIVE: 1
COUGH: 1

## 2024-02-02 ASSESSMENT — PAIN SCALES - GENERAL: PAINLEVEL: 5
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
## 2024-02-03 ENCOUNTER — SPECIALTY PHARMACY (OUTPATIENT)
Dept: PHARMACY | Facility: CLINIC | Age: 44
End: 2024-02-03

## 2024-02-03 LAB
ADENOVIRUS RVP, VIRC: NOT DETECTED
ENTEROVIRUS/RHINOVIRUS RVP, VIRC: NOT DETECTED
HUMAN BOCAVIRUS RVP, VIRC: NOT DETECTED
HUMAN CORONAVIRUS RVP, VIRC: NOT DETECTED
INFLUENZA A , VIRC: NOT DETECTED
INFLUENZA A H1N1-09 , VIRC: NOT DETECTED
INFLUENZA B PCR, VIRC: NOT DETECTED
METAPNEUMOVIRUS , VIRC: NOT DETECTED
PARAINFLUENZA PCR, VIRC: NOT DETECTED
RSV PCR, RVP, VIRC: NOT DETECTED

## 2024-02-04 RX ORDER — BELUMOSUDIL 200 MG/1
200 TABLET ORAL 2 TIMES DAILY
Qty: 60 TABLET | Refills: 0 | Status: SHIPPED | OUTPATIENT
Start: 2024-02-04 | End: 2024-02-22 | Stop reason: SDUPTHER

## 2024-02-04 RX ORDER — BELUMOSUDIL 200 MG/1
200 TABLET ORAL 2 TIMES DAILY
Qty: 60 TABLET | Refills: 0 | Status: SHIPPED | OUTPATIENT
Start: 2024-02-04 | End: 2024-02-04 | Stop reason: SDUPTHER

## 2024-02-05 PROCEDURE — RXMED WILLOW AMBULATORY MEDICATION CHARGE

## 2024-02-05 ASSESSMENT — ENCOUNTER SYMPTOMS
ABDOMINAL PAIN: 0
CHEST TIGHTNESS: 0
UNEXPECTED WEIGHT CHANGE: 0
SCLERAL ICTERUS: 0
DIAPHORESIS: 0
NECK PAIN: 0
WHEEZING: 0
HEMATOLOGIC/LYMPHATIC NEGATIVE: 1
HEADACHES: 0
NUMBNESS: 0
SHORTNESS OF BREATH: 1
DEPRESSION: 0
LIGHT-HEADEDNESS: 0
PALPITATIONS: 0
APPETITE CHANGE: 0
EYE PROBLEMS: 0
MYALGIAS: 1
CHILLS: 0
WOUND: 0
SORE THROAT: 0
FEVER: 0
HEMOPTYSIS: 0
ARTHRALGIAS: 1
CONSTIPATION: 1
VOMITING: 0
COUGH: 0
BACK PAIN: 1
FATIGUE: 1
HEMATURIA: 0
FLANK PAIN: 0
DIARRHEA: 0
NERVOUS/ANXIOUS: 0
NAUSEA: 0
LEG SWELLING: 0

## 2024-02-05 NOTE — PROGRESS NOTES
SICKLE CELL OUTPATIENT NOTE  Patient ID: Xu Maya   Visit Type: Follow up visit       ASSESSMENT AND PLAN  Xu Maya is 43 y.o. male with     History of hemoglobin SS sickle cell disease, status post matched sibling haploidentical (6/12 HLA) stem cell transplant, on 2/4/2021.  His sibling was hemoglobin AS (SCT). Conditioning regimen was with flu/Cy/TBI, and posttransplant prophylaxis included siro/MMF/PTCy. He is in continued remission post BMT and without any complications including GvHD. Xu continues to have chronic pain mainly in his lower back and thighs requiring opioids daily. He presents with acute on chronic pain involving his lower back. He continues to have frequent ACC and ED visits all for pain control. He completed ketamine infusions last year and did not think these were beneficial. He was also previously on buprenorphine, but this has since been discontinued because did not help either. No changes in his current home oral pain regimen and this will be with   Oral Tylenol and or ibuprofen as needed for mild to moderate pain   Oral hydromorphone 8 mg every 3-4 hours as needed   Continue gabapentin 300 mg BID    Discussed non pharmacologic methods of pain control   Reviewed OARRS  Plan to discuss his ongoing severe chronic pain with palliative care: ADDENDUM: Discussed with palliative care. We will get imaging with MRI of the lower back and leg. Would also consider increasing gabapentin to 300 mg in am and 600 mg in pm. Also consider scrambler therapy for nerve pain.     History of MDD and WILFRID   Continue abilify at recently increased dose of 5 mg daily in am as prescribed   Cymbalta CR 60 mg BID  Mirtazipine 7.5 mg at bedtime.    Follow up with behavioral health as scheduled      Chronic constipation secondary to chronic opioid use   Senna and or miralax as needed     Chronic migraine headaches, currently well controlled with prophylaxis medications    Oral amitriptyline 25 mg daily       Follow up office visit will be in 3 months time or sooner if indicated      Chief Complaint: Follow up for Hemoglobin SS SCD post HSCT and inadequately controlled chronic pain post transplant       Interval History: Xu Maya has a past history of hemoglobin SS sickle cell disease, status post haplo hematopoietic stem cell transplant, about 3 years ago. 4, 2021. He presents with complaint of acute pain in his lower back as well as chronic pain in his thighs and knees. He rates pain at 8-9/10. He has taken his home oral pain medications with dilaudid which did not help this morning. He usually takes about 2-3 doses a day of dilaudid. He continues to have multiple ED or ACC visits for pain control. He completed ketamine infusions a couple of months ago which did not help. He is due for an eye examination and denies worsening of his vision. He has not had priapism in more than a year. He denies focal neurologic deficits or skin ulcers. He denies fevers, cough, runny nose or congestion. He has not had any nausea or vomiting. Migraine headaches have been well controlled.     Review of System:   Review of Systems   Constitutional:  Positive for fatigue. Negative for appetite change, chills, diaphoresis, fever and unexpected weight change.   HENT:   Negative for mouth sores, nosebleeds and sore throat.    Eyes:  Negative for eye problems and icterus.   Respiratory:  Positive for shortness of breath. Negative for chest tightness, cough, hemoptysis and wheezing.    Cardiovascular:  Negative for chest pain, leg swelling and palpitations.   Gastrointestinal:  Positive for constipation. Negative for abdominal pain, diarrhea, nausea and vomiting.   Genitourinary:  Negative for hematuria.    Musculoskeletal:  Positive for arthralgias, back pain and myalgias. Negative for flank pain and neck pain.   Skin:  Negative for itching, rash and wound.   Neurological:  Negative for headaches, light-headedness and numbness.    Hematological: Negative.    Psychiatric/Behavioral:  Negative for depression. The patient is not nervous/anxious.         Allergies:   Allergies   Allergen Reactions    Hydrocodone-Acetaminophen Unknown    Naproxen Unknown       Current Medications:   Outpatient Encounter Medications as of 1/31/2024   Medication Sig    amitriptyline (Elavil) 25 mg tablet Take 1 tablet (25 mg) by mouth.    cholecalciferol (Vitamin D-3) 50 MCG (2000 UT) tablet Take 1 tablet (2,000 Units) by mouth.    cyclobenzaprine (Flexeril) 10 mg tablet Take 1 tablet (10 mg) by mouth 3 times a day for 10 days.    diphenhydrAMINE (BENADryl) 25 mg capsule Take 1 capsule (25 mg) by mouth every 6 hours if needed.    ergocalciferol (Vitamin D-2) 1.25 MG (51024 UT) capsule Take 1 capsule (1,250 mcg) by mouth 1 (one) time per week.    gabapentin (Neurontin) 300 mg capsule Take 1 capsule (300 mg) by mouth 2 times a day.    HYDROmorphone (Dilaudid) 4 mg tablet Take 1 tablet (4 mg) by mouth every 4 hours if needed for severe pain (7 - 10) for up to 7 days. Must attend next appt for any additional refills    ketoconazole (NIZOral) 2 % cream Apply topically 2 times a day.    mirtazapine (Remeron) 7.5 mg tablet Take 1 tablet (7.5 mg) by mouth once daily at bedtime.    naloxone (Narcan) 4 mg/0.1 mL nasal spray Administer 1 spray (4 mg) into affected nostril(s) if needed for opioid reversal or respiratory depression. 1 spray to nostril for overdose, may repeat every 2-3 minutes until medical assistance arrives    omeprazole (PriLOSEC) 40 mg DR capsule Take 1 capsule (40 mg) by mouth once daily in the morning. Take before meals. TAKE ONE CAPSULE BY MOUTH EVERY DAY IN THE MORNING BEFORE EATING    tacrolimus (Protopic) 0.1 % ointment APPLY 1-2 TIMES PER WEEK    [DISCONTINUED] ARIPiprazole (Abilify) 5 mg tablet Take 1 tablet (5 mg) by mouth once daily.    [DISCONTINUED] cyclobenzaprine (Flexeril) 10 mg tablet Take 1 tablet (10 mg) by mouth 3 times a day for 10  days.    [DISCONTINUED] DULoxetine (Cymbalta) 60 mg DR capsule Take 1 capsule (60 mg) by mouth 2 times a day.    [DISCONTINUED] folic acid (Folvite) 1 mg tablet Take 1 tablet (1 mg) by mouth once daily.    [DISCONTINUED] gabapentin (Neurontin) 300 mg capsule Take 1 capsule (300 mg) by mouth 2 times a day.    [DISCONTINUED] HYDROmorphone (Dilaudid) 4 mg tablet Take 1 tablet (4 mg) by mouth every 4 hours if needed for severe pain (7 - 10) for up to 7 days. Must attend next appt for any additional refills    [DISCONTINUED] Rezurock 200 mg tablet Take one (1) tablet by mouth twice daily    [DISCONTINUED] tacrolimus (Protopic) 0.1 % ointment Apply 1-2 times per week     Facility-Administered Encounter Medications as of 1/31/2024   Medication Dose Route Frequency Provider Last Rate Last Admin    heparin lock flush (porcine) 10 unit/mL injection 100 Units  100 Units intra-catheter Once CHRIS Garcia        heparin lock flush (porcine) injection 500 Units  5 mL intravenous PRN CHRIS Garcia           Past Medical History:    has a past medical history of Anxiety disorder, unspecified (04/17/2017), Depression, unspecified (04/17/2017), Family history of glaucoma (03/02/2017), Gastritis, unspecified, without bleeding (04/17/2017), Hematuria, unspecified (04/17/2017), Personal history of other diseases of the digestive system (12/02/2015), Priapism, unspecified (04/17/2017), and Sickle-cell disease without crisis (CMS/HCC) (04/17/2017).    Past Surgical History:    has a past surgical history that includes Gallbladder surgery (04/12/2017); IR CVC tunneled (4/24/2018); IR CVC tunneled (6/5/2018); IR CVC tunneled (3/1/2019); IR CVC tunneled (6/4/2019); IR CVC tunneled (4/5/2019); IR CVC tunneled (7/17/2019); IR CVC tunneled (8/14/2019); IR CVC tunneled (12/18/2019); IR CVC tunneled (10/9/2019); IR CVC tunneled (11/13/2019); IR CVC tunneled (1/15/2020); IR CVC tunneled (2/19/2020); US guided needle liver biopsy  (9/8/2020); IR CVC tunneled (1/4/2021); IR CVC tunneled (1/25/2021); MR angio head wo IV contrast (2/17/2017); MR angio neck wo IV contrast (2/17/2017); IR venogram hepatic (11/8/2017); IR CVC tunneled (8/21/2018); IR CVC tunneled (9/26/2018); IR CVC tunneled (11/5/2018); and IR CVC tunneled (12/19/2018).    Family History:   Family History   Problem Relation Name Age of Onset    Diabetes Father      Sickle cell anemia Other sibling     Cancer Other grandfather     Cancer Other uncle        Social History:   Xu Maya  reports that he has been smoking cigarettes. He has been exposed to tobacco smoke. He has never used smokeless tobacco.  reports current drug use. Drug: Marijuana.    EXAMINATION FINDINGS   /80 (BP Location: Left arm, Patient Position: Sitting)   Pulse 72   Temp 36.2 °C (97.2 °F) (Core)   Resp 18   Wt 66.2 kg (145 lb 15.1 oz)   SpO2 100%   BMI 22.86 kg/m²   1.77 meters squared    Physical Exam  Vitals and nursing note reviewed.   Constitutional:       General: He is in acute distress.      Comments: He appears to be in pain    HENT:      Nose: Nose normal.      Mouth/Throat:      Mouth: Mucous membranes are moist.      Pharynx: Oropharynx is clear.   Eyes:      General: No scleral icterus.        Right eye: No discharge.         Left eye: No discharge.      Extraocular Movements: Extraocular movements intact.      Pupils: Pupils are equal, round, and reactive to light.   Cardiovascular:      Rate and Rhythm: Normal rate and regular rhythm.      Pulses: Normal pulses.      Heart sounds: Normal heart sounds. No murmur heard.     No gallop.   Pulmonary:      Effort: No respiratory distress.      Breath sounds: No wheezing or rales.   Abdominal:      General: Abdomen is flat. Bowel sounds are normal. There is no distension.      Palpations: Abdomen is soft.      Tenderness: There is no abdominal tenderness.   Musculoskeletal:         General: No swelling or deformity.      Cervical back:  Normal range of motion and neck supple.      Right lower leg: No edema.      Left lower leg: No edema.   Skin:     Capillary Refill: Capillary refill takes less than 2 seconds.   Neurological:      General: No focal deficit present.      Mental Status: He is alert and oriented to person, place, and time. Mental status is at baseline.      Cranial Nerves: No cranial nerve deficit.      Motor: No weakness.      Gait: Gait normal.   Psychiatric:         Behavior: Behavior normal.      Comments: Has a flat affect        LABS   Office Visit on 01/30/2024   Component Date Value Ref Range Status    WBC 01/30/2024 15.3 (H)  4.4 - 11.3 x10*3/uL Final    nRBC 01/30/2024 0.0  0.0 - 0.0 /100 WBCs Final    RBC 01/30/2024 4.59  4.50 - 5.90 x10*6/uL Final    Hemoglobin 01/30/2024 14.6  13.5 - 17.5 g/dL Final    Hematocrit 01/30/2024 40.9 (L)  41.0 - 52.0 % Final    MCV 01/30/2024 89  80 - 100 fL Final    MCH 01/30/2024 31.8  26.0 - 34.0 pg Final    MCHC 01/30/2024 35.7  32.0 - 36.0 g/dL Final    RDW 01/30/2024 13.5  11.5 - 14.5 % Final    Platelets 01/30/2024 278  150 - 450 x10*3/uL Final    Neutrophils % 01/30/2024 71.5  40.0 - 80.0 % Final    Immature Granulocytes %, Automated 01/30/2024 0.3  0.0 - 0.9 % Final    Immature Granulocyte Count (IG) includes promyelocytes, myelocytes and metamyelocytes but does not include bands. Percent differential counts (%) should be interpreted in the context of the absolute cell counts (cells/UL).    Lymphocytes % 01/30/2024 21.4  13.0 - 44.0 % Final    Monocytes % 01/30/2024 5.7  2.0 - 10.0 % Final    Eosinophils % 01/30/2024 0.9  0.0 - 6.0 % Final    Basophils % 01/30/2024 0.2  0.0 - 2.0 % Final    Neutrophils Absolute 01/30/2024 10.93 (H)  1.20 - 7.70 x10*3/uL Final    Percent differential counts (%) should be interpreted in the context of the absolute cell counts (cells/uL).    Immature Granulocytes Absolute, Au* 01/30/2024 0.04  0.00 - 0.70 x10*3/uL Final    Lymphocytes Absolute 01/30/2024  "3.26  1.20 - 4.80 x10*3/uL Final    Monocytes Absolute 01/30/2024 0.87  0.10 - 1.00 x10*3/uL Final    Eosinophils Absolute 01/30/2024 0.13  0.00 - 0.70 x10*3/uL Final    Basophils Absolute 01/30/2024 0.03  0.00 - 0.10 x10*3/uL Final    Glucose 01/30/2024 94  74 - 99 mg/dL Final    Sodium 01/30/2024 141  136 - 145 mmol/L Final    Potassium 01/30/2024 3.6  3.5 - 5.3 mmol/L Final    Chloride 01/30/2024 109 (H)  98 - 107 mmol/L Final    Bicarbonate 01/30/2024 24  21 - 32 mmol/L Final    Anion Gap 01/30/2024 12  10 - 20 mmol/L Final    Urea Nitrogen 01/30/2024 7  6 - 23 mg/dL Final    Creatinine 01/30/2024 0.77  0.50 - 1.30 mg/dL Final    eGFR 01/30/2024 >90  >60 mL/min/1.73m*2 Final    Calculations of estimated GFR are performed using the 2021 CKD-EPI Study Refit equation without the race variable for the IDMS-Traceable creatinine methods.  https://jasn.asnjournals.org/content/early/2021/09/22/ASN.7693716743    Calcium 01/30/2024 8.8  8.6 - 10.3 mg/dL Final    Albumin 01/30/2024 3.9  3.4 - 5.0 g/dL Final    Alkaline Phosphatase 01/30/2024 107  33 - 120 U/L Final    Total Protein 01/30/2024 6.4  6.4 - 8.2 g/dL Final    AST 01/30/2024 16  9 - 39 U/L Final    Bilirubin, Total 01/30/2024 0.6  0.0 - 1.2 mg/dL Final    ALT 01/30/2024 15  10 - 52 U/L Final    Patients treated with Sulfasalazine may generate falsely decreased results for ALT.    LDH 01/30/2024 155  84 - 246 U/L Final    Retic % 01/30/2024 1.2  0.5 - 2.0 % Final    Retic Absolute 01/30/2024 0.054  0.022 - 0.118 x10*6/uL Final    Reticulocyte Hemoglobin 01/30/2024 34  28 - 38 pg Final    Immature Retic fraction 01/30/2024 7.0  <=16.0 % Final    Reticulocytes are measured based on a fluorescent technique. The IRF, or immature reticulocyte fraction, is the percent of reticulocytes that show medium (MFR) or high (HFR) fluorescence.  This value can be used to assess the relative maturity of the reticulocyte population in response to anemia. The \"shift reticulocytes\" " are not measured by this technique, eliminating the need for their correction in the reticulocyte index.            Mihir Ricks MD

## 2024-02-06 ENCOUNTER — TELEPHONE (OUTPATIENT)
Dept: HEMATOLOGY/ONCOLOGY | Facility: HOSPITAL | Age: 44
End: 2024-02-06
Payer: COMMERCIAL

## 2024-02-06 NOTE — TELEPHONE ENCOUNTER
----- Message from Ev Luna MD PhD sent at 2/5/2024  5:36 PM EST -----  Malcolm Baker,  Would you mind letting Xu know his viral panel was negative?  If he's still feeling bad, we can try empiric abx for a secondary infection.  Just let me know if so.  Thanks!  Ev    ----- Message -----  From: Lab, Background User  Sent: 2/2/2024  12:21 PM EST  To: Ev Luna MD PhD

## 2024-02-07 ENCOUNTER — APPOINTMENT (OUTPATIENT)
Dept: CARDIOLOGY | Facility: HOSPITAL | Age: 44
End: 2024-02-07
Payer: COMMERCIAL

## 2024-02-07 ENCOUNTER — TELEPHONE (OUTPATIENT)
Dept: DERMATOLOGY | Facility: CLINIC | Age: 44
End: 2024-02-07
Payer: COMMERCIAL

## 2024-02-07 ENCOUNTER — SPECIALTY PHARMACY (OUTPATIENT)
Dept: HEMATOLOGY/ONCOLOGY | Facility: HOSPITAL | Age: 44
End: 2024-02-07
Payer: COMMERCIAL

## 2024-02-07 ENCOUNTER — APPOINTMENT (OUTPATIENT)
Dept: RADIOLOGY | Facility: HOSPITAL | Age: 44
End: 2024-02-07
Payer: COMMERCIAL

## 2024-02-07 ENCOUNTER — TELEPHONE (OUTPATIENT)
Dept: HEMATOLOGY/ONCOLOGY | Facility: HOSPITAL | Age: 44
End: 2024-02-07
Payer: COMMERCIAL

## 2024-02-07 ENCOUNTER — HOSPITAL ENCOUNTER (EMERGENCY)
Facility: HOSPITAL | Age: 44
Discharge: HOME | End: 2024-02-07
Attending: EMERGENCY MEDICINE
Payer: COMMERCIAL

## 2024-02-07 ENCOUNTER — TELEPHONE (OUTPATIENT)
Dept: ADMISSION | Facility: HOSPITAL | Age: 44
End: 2024-02-07
Payer: COMMERCIAL

## 2024-02-07 VITALS
SYSTOLIC BLOOD PRESSURE: 113 MMHG | HEIGHT: 67 IN | WEIGHT: 146 LBS | OXYGEN SATURATION: 98 % | TEMPERATURE: 98 F | BODY MASS INDEX: 22.91 KG/M2 | DIASTOLIC BLOOD PRESSURE: 80 MMHG | RESPIRATION RATE: 18 BRPM | HEART RATE: 69 BPM

## 2024-02-07 DIAGNOSIS — D57.00 SICKLE CELL PAIN CRISIS (MULTI): Primary | ICD-10-CM

## 2024-02-07 DIAGNOSIS — D57.00 SICKLE CELL DISEASE WITH CRISIS (MULTI): ICD-10-CM

## 2024-02-07 LAB
ALBUMIN SERPL BCP-MCNC: 3.8 G/DL (ref 3.4–5)
ALP SERPL-CCNC: 103 U/L (ref 33–120)
ALT SERPL W P-5'-P-CCNC: 10 U/L (ref 10–52)
ANION GAP SERPL CALC-SCNC: 10 MMOL/L (ref 10–20)
AST SERPL W P-5'-P-CCNC: 22 U/L (ref 9–39)
ATRIAL RATE: 75 BPM
BASOPHILS # BLD AUTO: 0.03 X10*3/UL (ref 0–0.1)
BASOPHILS NFR BLD AUTO: 0.2 %
BILIRUB SERPL-MCNC: 0.4 MG/DL (ref 0–1.2)
BUN SERPL-MCNC: 5 MG/DL (ref 6–23)
CALCIUM SERPL-MCNC: 8.9 MG/DL (ref 8.6–10.3)
CHLORIDE SERPL-SCNC: 107 MMOL/L (ref 98–107)
CO2 SERPL-SCNC: 24 MMOL/L (ref 21–32)
CREAT SERPL-MCNC: 0.71 MG/DL (ref 0.5–1.3)
EGFRCR SERPLBLD CKD-EPI 2021: >90 ML/MIN/1.73M*2
EOSINOPHIL # BLD AUTO: 0.28 X10*3/UL (ref 0–0.7)
EOSINOPHIL NFR BLD AUTO: 2.2 %
ERYTHROCYTE [DISTWIDTH] IN BLOOD BY AUTOMATED COUNT: 14.4 % (ref 11.5–14.5)
FLUAV RNA RESP QL NAA+PROBE: NOT DETECTED
FLUBV RNA RESP QL NAA+PROBE: NOT DETECTED
GLUCOSE SERPL-MCNC: 94 MG/DL (ref 74–99)
HCT VFR BLD AUTO: 41 % (ref 41–52)
HGB BLD-MCNC: 14 G/DL (ref 13.5–17.5)
HGB RETIC QN: 29 PG (ref 28–38)
HOLD SPECIMEN: NORMAL
IMM GRANULOCYTES # BLD AUTO: 0.03 X10*3/UL (ref 0–0.7)
IMM GRANULOCYTES NFR BLD AUTO: 0.2 % (ref 0–0.9)
IMMATURE RETIC FRACTION: 10.3 %
LDH SERPL L TO P-CCNC: 369 U/L (ref 84–246)
LYMPHOCYTES # BLD AUTO: 3.69 X10*3/UL (ref 1.2–4.8)
LYMPHOCYTES NFR BLD AUTO: 29.5 %
MCH RBC QN AUTO: 31 PG (ref 26–34)
MCHC RBC AUTO-ENTMCNC: 34.1 G/DL (ref 32–36)
MCV RBC AUTO: 91 FL (ref 80–100)
MONOCYTES # BLD AUTO: 0.96 X10*3/UL (ref 0.1–1)
MONOCYTES NFR BLD AUTO: 7.7 %
NEUTROPHILS # BLD AUTO: 7.52 X10*3/UL (ref 1.2–7.7)
NEUTROPHILS NFR BLD AUTO: 60.2 %
NRBC BLD-RTO: 0 /100 WBCS (ref 0–0)
P AXIS: 71 DEGREES
P OFFSET: 200 MS
P ONSET: 143 MS
PLATELET # BLD AUTO: 297 X10*3/UL (ref 150–450)
POTASSIUM SERPL-SCNC: 4.4 MMOL/L (ref 3.5–5.3)
PR INTERVAL: 162 MS
PROT SERPL-MCNC: 6.8 G/DL (ref 6.4–8.2)
Q ONSET: 224 MS
QRS COUNT: 12 BEATS
QRS DURATION: 98 MS
QT INTERVAL: 416 MS
QTC CALCULATION(BAZETT): 464 MS
QTC FREDERICIA: 447 MS
R AXIS: -49 DEGREES
RBC # BLD AUTO: 4.52 X10*6/UL (ref 4.5–5.9)
RETICS #: 0.09 X10*6/UL (ref 0.02–0.12)
RETICS/RBC NFR AUTO: 2.1 % (ref 0.5–2)
RSV RNA RESP QL NAA+PROBE: NOT DETECTED
SARS-COV-2 RNA RESP QL NAA+PROBE: NOT DETECTED
SODIUM SERPL-SCNC: 137 MMOL/L (ref 136–145)
T AXIS: -38 DEGREES
T OFFSET: 432 MS
VENTRICULAR RATE: 75 BPM
WBC # BLD AUTO: 12.5 X10*3/UL (ref 4.4–11.3)

## 2024-02-07 PROCEDURE — 2500000004 HC RX 250 GENERAL PHARMACY W/ HCPCS (ALT 636 FOR OP/ED)

## 2024-02-07 PROCEDURE — 87637 SARSCOV2&INF A&B&RSV AMP PRB: CPT

## 2024-02-07 PROCEDURE — 83615 LACTATE (LD) (LDH) ENZYME: CPT

## 2024-02-07 PROCEDURE — 85045 AUTOMATED RETICULOCYTE COUNT: CPT

## 2024-02-07 PROCEDURE — 36415 COLL VENOUS BLD VENIPUNCTURE: CPT

## 2024-02-07 PROCEDURE — 80053 COMPREHEN METABOLIC PANEL: CPT

## 2024-02-07 PROCEDURE — 85025 COMPLETE CBC W/AUTO DIFF WBC: CPT

## 2024-02-07 PROCEDURE — 71046 X-RAY EXAM CHEST 2 VIEWS: CPT

## 2024-02-07 PROCEDURE — 71046 X-RAY EXAM CHEST 2 VIEWS: CPT | Performed by: SURGERY

## 2024-02-07 PROCEDURE — 99283 EMERGENCY DEPT VISIT LOW MDM: CPT | Mod: 25

## 2024-02-07 PROCEDURE — 99284 EMERGENCY DEPT VISIT MOD MDM: CPT | Mod: 25 | Performed by: EMERGENCY MEDICINE

## 2024-02-07 PROCEDURE — 93005 ELECTROCARDIOGRAM TRACING: CPT

## 2024-02-07 PROCEDURE — 96372 THER/PROPH/DIAG INJ SC/IM: CPT

## 2024-02-07 RX ORDER — HYDROMORPHONE HYDROCHLORIDE 4 MG/1
4 TABLET ORAL EVERY 4 HOURS PRN
Qty: 42 TABLET | Refills: 0 | Status: SHIPPED | OUTPATIENT
Start: 2024-02-07 | End: 2024-02-13 | Stop reason: SDUPTHER

## 2024-02-07 RX ORDER — HYDROMORPHONE HYDROCHLORIDE 2 MG/ML
2 INJECTION, SOLUTION INTRAMUSCULAR; INTRAVENOUS; SUBCUTANEOUS ONCE
Status: DISCONTINUED | OUTPATIENT
Start: 2024-02-07 | End: 2024-02-07

## 2024-02-07 RX ORDER — HYDROMORPHONE HYDROCHLORIDE 1 MG/ML
1 INJECTION, SOLUTION INTRAMUSCULAR; INTRAVENOUS; SUBCUTANEOUS
Status: DISCONTINUED | OUTPATIENT
Start: 2024-02-07 | End: 2024-02-07

## 2024-02-07 RX ORDER — HYDROMORPHONE HYDROCHLORIDE 2 MG/ML
2 INJECTION, SOLUTION INTRAMUSCULAR; INTRAVENOUS; SUBCUTANEOUS
Status: COMPLETED | OUTPATIENT
Start: 2024-02-07 | End: 2024-02-07

## 2024-02-07 RX ADMIN — HYDROMORPHONE HYDROCHLORIDE 2 MG: 2 INJECTION INTRAMUSCULAR; INTRAVENOUS; SUBCUTANEOUS at 04:36

## 2024-02-07 RX ADMIN — HYDROMORPHONE HYDROCHLORIDE 2 MG: 2 INJECTION INTRAMUSCULAR; INTRAVENOUS; SUBCUTANEOUS at 05:23

## 2024-02-07 RX ADMIN — HYDROMORPHONE HYDROCHLORIDE 2 MG: 2 INJECTION INTRAMUSCULAR; INTRAVENOUS; SUBCUTANEOUS at 06:32

## 2024-02-07 ASSESSMENT — PAIN DESCRIPTION - PAIN TYPE: TYPE: ACUTE PAIN

## 2024-02-07 ASSESSMENT — COLUMBIA-SUICIDE SEVERITY RATING SCALE - C-SSRS
2. HAVE YOU ACTUALLY HAD ANY THOUGHTS OF KILLING YOURSELF?: NO
6. HAVE YOU EVER DONE ANYTHING, STARTED TO DO ANYTHING, OR PREPARED TO DO ANYTHING TO END YOUR LIFE?: NO
1. IN THE PAST MONTH, HAVE YOU WISHED YOU WERE DEAD OR WISHED YOU COULD GO TO SLEEP AND NOT WAKE UP?: NO

## 2024-02-07 ASSESSMENT — PAIN SCALES - GENERAL
PAINLEVEL_OUTOF10: 0 - NO PAIN
PAINLEVEL_OUTOF10: 8

## 2024-02-07 ASSESSMENT — PAIN - FUNCTIONAL ASSESSMENT: PAIN_FUNCTIONAL_ASSESSMENT: 0-10

## 2024-02-07 NOTE — ED PROVIDER NOTES
HPI   Chief Complaint   Patient presents with    Sickle Cell Pain Crisis       43-year-old male past medical history sickle cell disease presents the ED today with a chief concern of sickle cell crisis.  Patient states started about 11 hours ago.  He states that he has cough with clear sputum production, nasal congestion, rhinorrhea, and bilateral leg pain.  He states that this is typical for his sickle cell crisis flareups.  He denies any chest pain or shortness of breath.  Denies hemoptysis.  Denies any lightheadedness, dizziness, or syncope.  Denies any back pain.  He denies any numbness or tingling or weakness.  States this is very typical for his sickle cell flareups.  He states that he took some Dilaudid at home did not fully improve his symptoms.  He states that he has been outside in the cold recently think this may have caused his sickle cell crisis.  Denies fever/chills, nausea/vomiting.  Denies any abdominal pain, diarrhea, or hematochezia.  No other symptoms or concerns this time.      History provided by:  Patient   used: No                        Roseann Coma Scale Score: 15                     Patient History   Past Medical History:   Diagnosis Date    Anxiety disorder, unspecified 04/17/2017    Anxiety    Depression, unspecified 04/17/2017    Depression    Family history of glaucoma 03/02/2017    Family history of glaucoma in father    Gastritis, unspecified, without bleeding 04/17/2017    Gastritis    Hematuria, unspecified 04/17/2017    Hematuria    Personal history of other diseases of the digestive system 12/02/2015    History of esophageal reflux    Priapism, unspecified 04/17/2017    Priapism    Sickle-cell disease without crisis (CMS/[facility]) 04/17/2017    Sickle cell anemia     Past Surgical History:   Procedure Laterality Date    GALLBLADDER SURGERY  04/12/2017    Gallbladder Surgery    IR CVC TUNNELED  4/24/2018    IR CVC TUNNELED 4/24/2018 [facility]GAC[facility]    IR CVC TUNNELED   6/5/2018    IR CVC TUNNELED 6/5/2018 [facility]IB [facility]GAC[facility]    IR CVC TUNNELED  3/1/2019    IR CVC TUNNELED 3/1/2019 [facility]    IR CVC TUNNELED  6/4/2019    IR CVC TUNNELED 6/4/2019 [facility]GAC[facility]    IR CVC TUNNELED  4/5/2019    IR CVC TUNNELED 4/5/2019 [facility]    IR CVC TUNNELED  7/17/2019    IR CVC TUNNELED 7/17/2019 [facility]    IR CVC TUNNELED  8/14/2019    IR CVC TUNNELED 8/14/2019 [facility]    IR CVC TUNNELED  12/18/2019    IR CVC TUNNELED 12/18/2019 [facility]GAC[facility]    IR CVC TUNNELED  10/9/2019    IR CVC TUNNELED 10/9/2019 [facility]    IR CVC TUNNELED  11/13/2019    IR CVC TUNNELED 11/13/2019 [facility]GAC[facility]    IR CVC TUNNELED  1/15/2020    IR CVC TUNNELED 1/15/2020 [facility]    IR CVC TUNNELED  2/19/2020    IR CVC TUNNELED 2/19/2020 [facility]    IR CVC TUNNELED  1/4/2021    IR CVC TUNNELED 1/4/2021 [facility]    IR CVC TUNNELED  1/25/2021    IR CVC TUNNELED 1/25/2021 [facility] ANCILLARY LEGAC[facility]    IR CVC TUNNELED  8/21/2018    IR CVC TUNNELED 8/21/2018 [facility]    IR CVC TUNNELED  9/26/2018    IR CVC TUNNELED 9/26/2018 [facility]    IR CVC TUNNELED  11/5/2018    IR CVC TUNNELED 11/5/2018 [facility]    IR CVC TUNNELED  12/19/2018    IR CVC TUNNELED 12/19/2018 [facility]    IR VENOGRAM HEPATIC  11/8/2017    IR VENOGRAM HEPATIC 11/8/2017 [facility]    MR HEAD ANGIO WO IV CONTRAST  2/17/2017    MR HEAD ANGIO WO IV CONTRAST 2/17/2017 [facility]ICAL [facility]GAC[facility]    MR NECK ANGIO WO IV CONTRAST  2/17/2017    MR NECK ANGIO WO IV CONTRAST 2/17/2017 [facility]GAC[facility]    US GUIDED NEEDLE LIVER BIOPSY  9/8/2020    US GUIDED NEEDLE LIVER BIOPSY 9/8/2020 [facility]     Family History   Problem Relation Name Age of Onset    Diabetes Father      Sickle cell anemia Other sibling     Cancer Other grandfather     Cancer Other uncle      Social History     Tobacco Use    Smoking status: Every Day     Types: Cigarettes     Passive exposure: Current     Smokeless tobacco: Never   Substance Use Topics    Alcohol use: Yes     Comment: Occasional    Drug use: Yes     Types: Marijuana       Physical Exam   ED Triage Vitals [02/07/24 0239]   Temp Heart Rate Respirations BP   -- 68 18 111/72      Pulse Ox Temp src Heart Rate Source Patient Position   98 % -- Monitor Sitting      BP Location FiO2 (%)     Right arm --       Physical Exam  Constitutional: Vital signs per nursing notes.  Well developed, well nourished.  No acute distress.    Psychiatric: alert and oriented to person, place, and time; no abnormalities of mood or affect; memory intact  Eyes: PERRL; conjunctivae and lids normal; EOMI  ENT: Ears normal externally; face symmetric. voice normal  Neck: neck supple, no meningismus; trachea midline without deviation  Respiratory: normal respiratory effort and excursion; no rales, rhonchi, or wheezes; equal air entry  Cardiovascular: RRR, 2+ radial and dorsalis pedis pulses extremities   Neurological: normal speech; CN II-XII grossly intact, normal motor and sensory function; no nystagmus; ; no ataxia.  GI: no distention, soft, nontender  Vascular: No peripheral edema.  No calf tenderness.  2+ symmetric dorsalis pedis pulses.  No cyanosis.  Compartments are soft.  : Deferred  Musculoskeletal: normal gait and station; normal digits and nails; normal to palpation; normal strength/tone; neurovascular status intact.  Skin: normal to inspection; normal to palpation; no rash    ED Course & MDM   ED Course as of 02/08/24 0322   Wed Feb 07, 2024   0427 Patient has a ride home today will not be driving himself home. []   0446 CARDIOMEDIASTINAL SILHOUETTE:  Cardiomediastinal silhouette is normal in size and configuration.      LUNGS:  No focal consolidation, pleural effusion, or pneumothorax. Lungs are  hyperinflated as before, with flattening of the hemidiaphragms  compatible with emphysema.      ABDOMEN:  No remarkable upper abdominal findings.      BONES:  No acute  osseous changes.      IMPRESSION:  1.  No evidence of acute cardiopulmonary process. Emphysema.              MACRO:  None      Signed by: Jared Barbara 2/7/2024 4:42 AM  Dictation workstation:   [id number]   []   0508 Patient will be receiving 2 mg Dilaudid every 1 hour up to 3 doses per his care plan. [MC]   0508 RSV, COVID, influenza negative []   0535 CBC shows white blood cell count of 12.5 with no left shift.  CMP shows no BELA or acute liver injury.  Reticulocyte count 2.1.  This is up from his last reticulocyte count 8 days ago which was 1.2. Lactate dehydrogenase 8 days ago was 155 today it is 369.   []   0556 ECG 12 lead  EKG interpreted by me.  2/7/2024 at 0529.  Sinus rhythm 75 bpm.   QRS 98 QTc 464.  No ST elevation.  T wave inversion inferior lateral leads similar to previous from 10/9/2023. [MC-2]      ED Course User Index  [MC] Russell Davis PA-C  [-2] Rashaad Qiu MD         Diagnoses as of 02/08/24 0322   Sickle cell pain crisis (CMS/[facility])       Medical Decision Making  43-year-old male past medical history of sickle cell disease presents the ED today with a chief concern of sickle cell crisis.  Vital signs reassuring.  Patient overall appears well and is nontoxic-appearing.  Initial temperature not performed.  Asked for this immediately.  Temp reassuring.  No fever.  Patient is full range of motion of the neck without any meningismus.  Satting well on room air.  No systemic signs or symptoms.  Patient has a ride home today will not be driving himself home.  Patient has 2+ symmetric dorsalis pedis pulses.  No calf tenderness.  I am not concerned for any DVT or acute arterial occlusion at this time.  Low suspicion for vascular pathology at this time.  X-ray shows no infiltrate.  He is not having any chest pain or shortness of breath.  My concern for any acute chest syndrome at this time.  Labs stable.  Reticulocyte count and LDH slightly increased.  Patient feeling better after initial  dose of Dilaudid.  Pending repeat evaluation.  Handoff to Dr. Chapman at 6AM.    Differential diagnosis: Sickle cell crisis, acute chest syndrome, DVT, acute arterial occlusion, COVID, influenza, RSV meningitis    Disposition/treatment  1.  Sickle cell pain crisis    Handoff to Dr. Chapman at 6AM.          Procedure  Procedures     Russell Davis PA-C  02/08/24 0324

## 2024-02-07 NOTE — ED TRIAGE NOTES
Patient arrived to ED with pain primarily in his legs from a SSC. Denies CP at this time. Rates his pain 8/10.

## 2024-02-07 NOTE — TELEPHONE ENCOUNTER
Left message for patient letting him know the prior authorization for Tacrolimus ointment was denied by his insurance. Explained that the cheapest way to get the prescription would be to use a [employer] card. Left Dr. Tenorio's voicemail number for patient to call back with any questions or if he would like the prescription sent to another pharmacy.

## 2024-02-07 NOTE — PROGRESS NOTES
Patient is a 43-year-old male who presented to the emergency room with a chief complaint of sickle cell pain.  He was initially seen and evaluated by Dr. Qiu.  He was signed out to me pending reevaluation after pain medication.  On reevaluation patient feels well and is without complaints.  He is ambulating without difficulty.  He is discharged in stable condition with instructions to follow-up with his primary care doctor and sickle cell clinic.  He is educated on signs and symptoms that should prompt immediate turn to emergency room.  Madina Caldera MD

## 2024-02-07 NOTE — DISCHARGE INSTRUCTIONS
Take your medications as indicated for length of time instructed.  Follow-up with your primary care physician.  Return immediately if concerning symptoms, as discussed

## 2024-02-08 ENCOUNTER — TELEPHONE (OUTPATIENT)
Dept: HEMATOLOGY/ONCOLOGY | Facility: HOSPITAL | Age: 44
End: 2024-02-08
Payer: COMMERCIAL

## 2024-02-08 ENCOUNTER — SPECIALTY PHARMACY (OUTPATIENT)
Dept: PHARMACY | Facility: CLINIC | Age: 44
End: 2024-02-08

## 2024-02-08 NOTE — TELEPHONE ENCOUNTER
RN called and spoke to the patient. He said he is feeling a little better but still having respiratory symptoms. He said he would like the antibiotic sent [facility] to [facility] if possible. Message sent to MD Luna.

## 2024-02-12 ENCOUNTER — OFFICE VISIT (OUTPATIENT)
Dept: ORTHOPEDIC SURGERY | Facility: HOSPITAL | Age: 44
End: 2024-02-12
Payer: COMMERCIAL

## 2024-02-12 DIAGNOSIS — M25.511 CHRONIC RIGHT SHOULDER PAIN: Primary | ICD-10-CM

## 2024-02-12 DIAGNOSIS — R52 ACUTE PAIN: ICD-10-CM

## 2024-02-12 DIAGNOSIS — G89.29 CHRONIC RIGHT SHOULDER PAIN: Primary | ICD-10-CM

## 2024-02-12 PROCEDURE — 20610 DRAIN/INJ JOINT/BURSA W/O US: CPT | Performed by: ORTHOPAEDIC SURGERY

## 2024-02-12 PROCEDURE — 2500000004 HC RX 250 GENERAL PHARMACY W/ HCPCS (ALT 636 FOR OP/ED): Performed by: ORTHOPAEDIC SURGERY

## 2024-02-12 PROCEDURE — 99212 OFFICE O/P EST SF 10 MIN: CPT | Performed by: ORTHOPAEDIC SURGERY

## 2024-02-12 PROCEDURE — 2500000005 HC RX 250 GENERAL PHARMACY W/O HCPCS: Performed by: ORTHOPAEDIC SURGERY

## 2024-02-12 RX ORDER — LIDOCAINE HYDROCHLORIDE 10 MG/ML
4 INJECTION INFILTRATION; PERINEURAL
Status: COMPLETED | OUTPATIENT
Start: 2024-02-12 | End: 2024-02-12

## 2024-02-12 RX ORDER — TRIAMCINOLONE ACETONIDE 40 MG/ML
40 INJECTION, SUSPENSION INTRA-ARTICULAR; INTRAMUSCULAR
Status: COMPLETED | OUTPATIENT
Start: 2024-02-12 | End: 2024-02-12

## 2024-02-12 RX ADMIN — TRIAMCINOLONE ACETONIDE 40 MG: 400 INJECTION, SUSPENSION INTRA-ARTICULAR; INTRAMUSCULAR at 10:27

## 2024-02-12 RX ADMIN — LIDOCAINE HYDROCHLORIDE 4 ML: 10 INJECTION, SOLUTION INFILTRATION; PERINEURAL at 10:27

## 2024-02-13 ENCOUNTER — PHARMACY VISIT (OUTPATIENT)
Dept: PHARMACY | Facility: CLINIC | Age: 44
End: 2024-02-13
Payer: MEDICAID

## 2024-02-13 ENCOUNTER — TELEPHONE (OUTPATIENT)
Dept: ADMISSION | Facility: HOSPITAL | Age: 44
End: 2024-02-13
Payer: COMMERCIAL

## 2024-02-13 DIAGNOSIS — D57.00 SICKLE CELL DISEASE WITH CRISIS (MULTI): ICD-10-CM

## 2024-02-13 RX ORDER — HYDROMORPHONE HYDROCHLORIDE 4 MG/1
4 TABLET ORAL EVERY 4 HOURS PRN
Qty: 42 TABLET | Refills: 0 | Status: SHIPPED | OUTPATIENT
Start: 2024-02-13 | End: 2024-02-19 | Stop reason: SDUPTHER

## 2024-02-13 NOTE — TELEPHONE ENCOUNTER
Xu P Crayton called the refill line for dilaudid. Requesting refills be sent to [facility] pharmacy; message sent to Sickle Cell team to submit.

## 2024-02-15 ENCOUNTER — TELEPHONE (OUTPATIENT)
Dept: BEHAVIORAL HEALTH | Facility: HOSPITAL | Age: 44
End: 2024-02-15
Payer: COMMERCIAL

## 2024-02-15 NOTE — TELEPHONE ENCOUNTER
Spoke with Leidy who wanted to see if any of pt's psych meds would react for a drug test. States that oxycodone was present on recent UDS as part of adoption consideration for niece. Discussed that current psych meds would not react. She inquired about UDS in system. Discussed that pt can access these through his [employer]. Recommended that they call Sickle Cell team to discuss UDS results in context of his current opioid regimen.

## 2024-02-19 ENCOUNTER — TELEPHONE (OUTPATIENT)
Dept: ADMISSION | Facility: HOSPITAL | Age: 44
End: 2024-02-19
Payer: COMMERCIAL

## 2024-02-19 ENCOUNTER — HOSPITAL ENCOUNTER (EMERGENCY)
Facility: HOSPITAL | Age: 44
Discharge: LEFT BEFORE DISCHARGE | End: 2024-02-19
Payer: COMMERCIAL

## 2024-02-19 VITALS
SYSTOLIC BLOOD PRESSURE: 115 MMHG | HEIGHT: 67 IN | HEART RATE: 78 BPM | TEMPERATURE: 97.7 F | DIASTOLIC BLOOD PRESSURE: 69 MMHG | BODY MASS INDEX: 22.76 KG/M2 | WEIGHT: 145 LBS | RESPIRATION RATE: 18 BRPM

## 2024-02-19 DIAGNOSIS — D57.00 SICKLE CELL DISEASE WITH CRISIS (MULTI): ICD-10-CM

## 2024-02-19 DIAGNOSIS — D57.3 SICKLE CELL TRAIT (CMS-HCC): ICD-10-CM

## 2024-02-19 DIAGNOSIS — D57.00 SICKLE CELL CRISIS (MULTI): ICD-10-CM

## 2024-02-19 PROCEDURE — 99281 EMR DPT VST MAYX REQ PHY/QHP: CPT

## 2024-02-19 RX ORDER — OMEPRAZOLE 40 MG/1
40 CAPSULE, DELAYED RELEASE ORAL
Qty: 30 CAPSULE | Refills: 2 | Status: SHIPPED | OUTPATIENT
Start: 2024-02-19

## 2024-02-19 RX ORDER — CYCLOBENZAPRINE HCL 10 MG
10 TABLET ORAL 3 TIMES DAILY
Qty: 30 TABLET | Refills: 0 | Status: SHIPPED | OUTPATIENT
Start: 2024-02-19 | End: 2024-03-25 | Stop reason: SDUPTHER

## 2024-02-19 RX ORDER — HYDROMORPHONE HYDROCHLORIDE 4 MG/1
4 TABLET ORAL EVERY 4 HOURS PRN
Qty: 42 TABLET | Refills: 0 | Status: SHIPPED | OUTPATIENT
Start: 2024-02-19 | End: 2024-02-26 | Stop reason: SDUPTHER

## 2024-02-19 ASSESSMENT — PAIN DESCRIPTION - ORIENTATION: ORIENTATION: RIGHT;LEFT

## 2024-02-19 ASSESSMENT — PAIN SCALES - GENERAL: PAINLEVEL_OUTOF10: 8

## 2024-02-19 ASSESSMENT — PAIN DESCRIPTION - FREQUENCY: FREQUENCY: CONSTANT/CONTINUOUS

## 2024-02-19 ASSESSMENT — PAIN DESCRIPTION - ONSET: ONSET: PROGRESSIVE

## 2024-02-19 ASSESSMENT — PAIN DESCRIPTION - DESCRIPTORS: DESCRIPTORS: ACHING

## 2024-02-19 ASSESSMENT — PAIN - FUNCTIONAL ASSESSMENT: PAIN_FUNCTIONAL_ASSESSMENT: 0-10

## 2024-02-19 ASSESSMENT — PAIN DESCRIPTION - PROGRESSION: CLINICAL_PROGRESSION: NOT CHANGED

## 2024-02-19 ASSESSMENT — PAIN DESCRIPTION - LOCATION: LOCATION: LEG

## 2024-02-19 NOTE — TELEPHONE ENCOUNTER
Pt requests refills of dilaudid 4mg every 4hrs, #42, cyclobenzaprine 10mg TID, #30 and omeprazole 40mg every day, #30.  Preferred pharmacy is [facility] on [address].

## 2024-02-22 ENCOUNTER — TELEPHONE (OUTPATIENT)
Dept: ADMISSION | Facility: HOSPITAL | Age: 44
End: 2024-02-22
Payer: COMMERCIAL

## 2024-02-22 ENCOUNTER — SPECIALTY PHARMACY (OUTPATIENT)
Dept: HEMATOLOGY/ONCOLOGY | Facility: HOSPITAL | Age: 44
End: 2024-02-22
Payer: COMMERCIAL

## 2024-02-22 DIAGNOSIS — D89.811 CHRONIC GVHD (MULTI): ICD-10-CM

## 2024-02-22 DIAGNOSIS — R52 ACUTE PAIN: ICD-10-CM

## 2024-02-22 RX ORDER — GABAPENTIN 300 MG/1
300 CAPSULE ORAL 2 TIMES DAILY
Qty: 60 CAPSULE | Refills: 0 | Status: SHIPPED | OUTPATIENT
Start: 2024-02-22 | End: 2024-03-23

## 2024-02-22 NOTE — PROGRESS NOTES
[facility] Clinical Note    Xu Maya is a 43 y.o. male, who is on the specialty pharmacy service for management of: Oncology Core with status of: (Enrolled)     Xu was contacted on 2/22/2024.    Refer to the encounter summary report for documentation details about patient counseling and education.    [facility] Clinical Note    Xu Maya is a 43 y.o. male, who is on the specialty pharmacy service for management of: Oncology Core with status of: (Enrolled)     Xu was contacted on 2/22/2024.    Refer to the encounter summary report for documentation details about patient counseling and education.      Medication Adherence  The importance of adherence was discussed with the patient and they were advised to take the medication as prescribed by their provider. Xu was encouraged to call his physician's office if they have a question regarding a missed dose.        Patient advised to contact the pharmacy if there are any changes to her medication list, including prescriptions, OTC medications, herbal products, or supplements. Patient was advised of [facility]’s dispensing process, refill timeline, contact information ([phone]), and patient management follow up. Patient confirmed understanding of education conducted during assessment. All patient questions and concerns were addressed to the best of my ability. Patient was encouraged to contact the specialty pharmacy with any questions or concerns.    Confirmed follow-up outreaches are properly scheduled. Reviewed goals of therapy in the program targets.    August Henson, PharmD

## 2024-02-22 NOTE — TELEPHONE ENCOUNTER
[facility] pharmacy is requesting to change prescription for gabapentin 300mg BID to a 90 day supply.  [facility] on [address], [facility].

## 2024-02-24 RX ORDER — BELUMOSUDIL 200 MG/1
200 TABLET ORAL 2 TIMES DAILY
Qty: 60 TABLET | Refills: 5 | Status: SHIPPED | OUTPATIENT
Start: 2024-02-24 | End: 2024-08-22

## 2024-02-26 ENCOUNTER — TELEPHONE (OUTPATIENT)
Dept: ADMISSION | Facility: HOSPITAL | Age: 44
End: 2024-02-26
Payer: COMMERCIAL

## 2024-02-26 ENCOUNTER — OFFICE VISIT (OUTPATIENT)
Dept: HEMATOLOGY/ONCOLOGY | Facility: HOSPITAL | Age: 44
End: 2024-02-26
Payer: COMMERCIAL

## 2024-02-26 ENCOUNTER — TELEPHONE (OUTPATIENT)
Dept: HEMATOLOGY/ONCOLOGY | Facility: HOSPITAL | Age: 44
End: 2024-02-26

## 2024-02-26 VITALS
TEMPERATURE: 97.7 F | DIASTOLIC BLOOD PRESSURE: 70 MMHG | BODY MASS INDEX: 22.93 KG/M2 | SYSTOLIC BLOOD PRESSURE: 109 MMHG | WEIGHT: 146.39 LBS | HEART RATE: 93 BPM | OXYGEN SATURATION: 99 % | RESPIRATION RATE: 18 BRPM

## 2024-02-26 DIAGNOSIS — D57.00 SICKLE CELL DISEASE WITH CRISIS (MULTI): ICD-10-CM

## 2024-02-26 DIAGNOSIS — D57.00 SICKLE CELL CRISIS (MULTI): ICD-10-CM

## 2024-02-26 DIAGNOSIS — R52 ACUTE PAIN: Primary | ICD-10-CM

## 2024-02-26 LAB
ALBUMIN SERPL BCP-MCNC: 4.1 G/DL (ref 3.4–5)
ALP SERPL-CCNC: 120 U/L (ref 33–120)
ALT SERPL W P-5'-P-CCNC: 8 U/L (ref 10–52)
ANION GAP SERPL CALC-SCNC: 12 MMOL/L (ref 10–20)
AST SERPL W P-5'-P-CCNC: 12 U/L (ref 9–39)
BASOPHILS # BLD AUTO: 0.03 X10*3/UL (ref 0–0.1)
BASOPHILS NFR BLD AUTO: 0.3 %
BILIRUB SERPL-MCNC: 0.4 MG/DL (ref 0–1.2)
BUN SERPL-MCNC: 7 MG/DL (ref 6–23)
CALCIUM SERPL-MCNC: 9 MG/DL (ref 8.6–10.3)
CHLORIDE SERPL-SCNC: 107 MMOL/L (ref 98–107)
CO2 SERPL-SCNC: 24 MMOL/L (ref 21–32)
CREAT SERPL-MCNC: 0.82 MG/DL (ref 0.5–1.3)
EGFRCR SERPLBLD CKD-EPI 2021: >90 ML/MIN/1.73M*2
EOSINOPHIL # BLD AUTO: 0.33 X10*3/UL (ref 0–0.7)
EOSINOPHIL NFR BLD AUTO: 2.8 %
ERYTHROCYTE [DISTWIDTH] IN BLOOD BY AUTOMATED COUNT: 12.8 % (ref 11.5–14.5)
GLUCOSE SERPL-MCNC: 107 MG/DL (ref 74–99)
HCT VFR BLD AUTO: 42.7 % (ref 41–52)
HGB BLD-MCNC: 16.1 G/DL (ref 13.5–17.5)
HGB RETIC QN: 34 PG (ref 28–38)
IMM GRANULOCYTES # BLD AUTO: 0.02 X10*3/UL (ref 0–0.7)
IMM GRANULOCYTES NFR BLD AUTO: 0.2 % (ref 0–0.9)
IMMATURE RETIC FRACTION: 10.3 %
LDH SERPL L TO P-CCNC: 149 U/L (ref 84–246)
LYMPHOCYTES # BLD AUTO: 4.4 X10*3/UL (ref 1.2–4.8)
LYMPHOCYTES NFR BLD AUTO: 37.7 %
MCH RBC QN AUTO: 31.7 PG (ref 26–34)
MCHC RBC AUTO-ENTMCNC: 37.7 G/DL (ref 32–36)
MCV RBC AUTO: 84 FL (ref 80–100)
MONOCYTES # BLD AUTO: 0.87 X10*3/UL (ref 0.1–1)
MONOCYTES NFR BLD AUTO: 7.5 %
NEUTROPHILS # BLD AUTO: 6.01 X10*3/UL (ref 1.2–7.7)
NEUTROPHILS NFR BLD AUTO: 51.5 %
NRBC BLD-RTO: 0 /100 WBCS (ref 0–0)
PLATELET # BLD AUTO: 293 X10*3/UL (ref 150–450)
POTASSIUM SERPL-SCNC: 3.6 MMOL/L (ref 3.5–5.3)
PROT SERPL-MCNC: 7.1 G/DL (ref 6.4–8.2)
RBC # BLD AUTO: 5.08 X10*6/UL (ref 4.5–5.9)
RETICS #: 0.06 X10*6/UL (ref 0.02–0.12)
RETICS/RBC NFR AUTO: 1.3 % (ref 0.5–2)
SODIUM SERPL-SCNC: 139 MMOL/L (ref 136–145)
WBC # BLD AUTO: 11.7 X10*3/UL (ref 4.4–11.3)

## 2024-02-26 PROCEDURE — 83615 LACTATE (LD) (LDH) ENZYME: CPT

## 2024-02-26 PROCEDURE — 85045 AUTOMATED RETICULOCYTE COUNT: CPT

## 2024-02-26 PROCEDURE — 99215 OFFICE O/P EST HI 40 MIN: CPT | Performed by: NURSE PRACTITIONER

## 2024-02-26 PROCEDURE — 99417 PROLNG OP E/M EACH 15 MIN: CPT

## 2024-02-26 PROCEDURE — 85025 COMPLETE CBC W/AUTO DIFF WBC: CPT

## 2024-02-26 PROCEDURE — 99417 PROLNG OP E/M EACH 15 MIN: CPT | Performed by: NURSE PRACTITIONER

## 2024-02-26 PROCEDURE — 99215 OFFICE O/P EST HI 40 MIN: CPT | Mod: ZK | Performed by: NURSE PRACTITIONER

## 2024-02-26 PROCEDURE — 2500000005 HC RX 250 GENERAL PHARMACY W/O HCPCS: Performed by: NURSE PRACTITIONER

## 2024-02-26 PROCEDURE — 36415 COLL VENOUS BLD VENIPUNCTURE: CPT

## 2024-02-26 PROCEDURE — 2500000001 HC RX 250 WO HCPCS SELF ADMINISTERED DRUGS (ALT 637 FOR MEDICARE OP): Performed by: NURSE PRACTITIONER

## 2024-02-26 PROCEDURE — 99417 PROLNG OP E/M EACH 15 MIN: CPT | Mod: ZK | Performed by: NURSE PRACTITIONER

## 2024-02-26 PROCEDURE — 80053 COMPREHEN METABOLIC PANEL: CPT

## 2024-02-26 RX ORDER — NALOXONE HYDROCHLORIDE 0.4 MG/ML
0.4 INJECTION, SOLUTION INTRAMUSCULAR; INTRAVENOUS; SUBCUTANEOUS
Status: DISCONTINUED | OUTPATIENT
Start: 2024-02-26 | End: 2024-02-26 | Stop reason: HOSPADM

## 2024-02-26 RX ORDER — DIPHENHYDRAMINE HCL 25 MG
25 CAPSULE ORAL ONCE
Status: COMPLETED | OUTPATIENT
Start: 2024-02-26 | End: 2024-02-26

## 2024-02-26 RX ORDER — HYDROMORPHONE HYDROCHLORIDE 4 MG/1
4 TABLET ORAL EVERY 4 HOURS PRN
Qty: 42 TABLET | Refills: 0 | Status: SHIPPED | OUTPATIENT
Start: 2024-02-26 | End: 2024-03-04 | Stop reason: SDUPTHER

## 2024-02-26 RX ORDER — ONDANSETRON HYDROCHLORIDE 8 MG/1
8 TABLET, FILM COATED ORAL ONCE
Status: COMPLETED | OUTPATIENT
Start: 2024-02-26 | End: 2024-02-26

## 2024-02-26 RX ORDER — CYCLOBENZAPRINE HCL 10 MG
10 TABLET ORAL ONCE
Status: COMPLETED | OUTPATIENT
Start: 2024-02-26 | End: 2024-02-26

## 2024-02-26 RX ORDER — HYDROMORPHONE HYDROCHLORIDE 4 MG/1
12 TABLET ORAL
Status: COMPLETED | OUTPATIENT
Start: 2024-02-26 | End: 2024-02-26

## 2024-02-26 RX ADMIN — HYDROMORPHONE HYDROCHLORIDE 12 MG: 4 TABLET ORAL at 12:46

## 2024-02-26 RX ADMIN — ONDANSETRON HYDROCHLORIDE 8 MG: 8 TABLET, FILM COATED ORAL at 10:33

## 2024-02-26 RX ADMIN — HYDROMORPHONE HYDROCHLORIDE 12 MG: 4 TABLET ORAL at 10:33

## 2024-02-26 RX ADMIN — CYCLOBENZAPRINE 10 MG: 10 TABLET, FILM COATED ORAL at 10:33

## 2024-02-26 RX ADMIN — DIPHENHYDRAMINE HYDROCHLORIDE 25 MG: 25 CAPSULE ORAL at 10:33

## 2024-02-26 RX ADMIN — HYDROMORPHONE HYDROCHLORIDE 12 MG: 4 TABLET ORAL at 11:34

## 2024-02-26 ASSESSMENT — PAIN DESCRIPTION - LOCATION
LOCATION: LEG

## 2024-02-26 ASSESSMENT — PAIN - FUNCTIONAL ASSESSMENT
PAIN_FUNCTIONAL_ASSESSMENT: 0-10

## 2024-02-26 ASSESSMENT — PAIN DESCRIPTION - ORIENTATION
ORIENTATION: RIGHT;LEFT
ORIENTATION: LEFT;RIGHT
ORIENTATION: LEFT;RIGHT

## 2024-02-26 ASSESSMENT — PAIN SCALES - GENERAL
PAINLEVEL: 8
PAINLEVEL_OUTOF10: 8
PAINLEVEL_OUTOF10: 7
PAINLEVEL_OUTOF10: 8

## 2024-02-26 NOTE — TELEPHONE ENCOUNTER
Pt requesting refill   Dilaudid 4mg. 1 tablet every 4 hrs prn  Last FUV 1/31.   Pt also called the support team requesting ACC appt today for pain-

## 2024-02-26 NOTE — PROGRESS NOTES
Patient ID:  Xu Maya is a 43 y.o. male.  Subjective   Chief Complaint: Uncontrolled Pain    HPI  Xu is a 43 y.o. male with  history of anxiety, depression, gastritis/GERD, priapism, and sickle cell disease, who presents to [facility] with uncontrolled pain. He reports the pain started on Friday. It is his typical sickle cell pain in BLE pain uncontrolled with home dilaudid and flexeril. Pt denies chest pain, SOB, blurry vision, falls, fever or chills, n/v/d/abd pain, or urinary complaints       ROS  Review of Systems - Oncology     Allergies  Allergies   Allergen Reactions    Hydrocodone-Acetaminophen Unknown    Naproxen Unknown        Medications  Current Outpatient Medications   Medication Instructions    amitriptyline (ELAVIL) 25 mg, oral    ARIPiprazole (ABILIFY) 5 mg, oral, Daily    cholecalciferol (VITAMIN D-3) 2,000 Units, oral    cyclobenzaprine (FLEXERIL) 10 mg, oral, 3 times daily    diphenhydrAMINE (BENADRYL) 25 mg, oral, Every 6 hours PRN    DULoxetine (CYMBALTA) 60 mg, oral, 2 times daily    ergocalciferol (Vitamin D-2) 1.25 MG (22929 UT) capsule 1 capsule, oral, Weekly    gabapentin (NEURONTIN) 300 mg, oral, 2 times daily    HYDROmorphone (DILAUDID) 4 mg, oral, Every 4 hours PRN, Must attend next appt for any additional refills    ketoconazole (NIZOral) 2 % cream Topical, 2 times daily    mirtazapine (REMERON) 7.5 mg, oral, Nightly    naloxone (NARCAN) 4 mg, nasal, As needed, 1 spray to nostril for overdose, may repeat every 2-3 minutes until medical assistance arrives<BR>    omeprazole (PRILOSEC) 40 mg, oral, Daily before breakfast, TAKE ONE CAPSULE BY MOUTH EVERY DAY IN THE MORNING BEFORE EATING    Rezurock 200 mg tablet Take one (1) tablet by mouth twice daily    tacrolimus (Protopic) 0.1 % ointment APPLY 1-2 TIMES PER WEEK        Past Medical History:   Past Medical History:  04/17/2017: Anxiety disorder, unspecified      Comment:  Anxiety  04/17/2017: Depression, unspecified      Comment:   Depression  03/02/2017: Family history of glaucoma      Comment:  Family history of glaucoma in father  04/17/2017: Gastritis, unspecified, without bleeding      Comment:  Gastritis  04/17/2017: Hematuria, unspecified      Comment:  Hematuria  12/02/2015: Personal history of other diseases of the digestive system      Comment:  History of esophageal reflux  04/17/2017: Priapism, unspecified      Comment:  Priapism  04/17/2017: Sickle-cell disease without crisis (CMS/[facility])      Comment:  Sickle cell anemia   Surgical History:    Past Surgical History:   Procedure Laterality Date    GALLBLADDER SURGERY  04/12/2017    Gallbladder Surgery    IR CVC TUNNELED  4/24/2018    IR CVC TUNNELED 4/24/2018 [facility]    IR CVC TUNNELED  6/5/2018    IR CVC TUNNELED 6/5/2018 [facility]    IR CVC TUNNELED  3/1/2019    IR CVC TUNNELED 3/1/2019 [facility]    IR CVC TUNNELED  6/4/2019    IR CVC TUNNELED 6/4/2019 [facility]    IR CVC TUNNELED  4/5/2019    IR CVC TUNNELED 4/5/2019 [facility]    IR CVC TUNNELED  7/17/2019    IR CVC TUNNELED 7/17/2019 [facility]    IR CVC TUNNELED  8/14/2019    IR CVC TUNNELED 8/14/2019 [facility]    IR CVC TUNNELED  12/18/2019    IR CVC TUNNELED 12/18/2019 [facility]    IR CVC TUNNELED  10/9/2019    IR CVC TUNNELED 10/9/2019 [facility]    IR CVC TUNNELED  11/13/2019    IR CVC TUNNELED 11/13/2019 [facility]    IR CVC TUNNELED  1/15/2020    IR CVC TUNNELED 1/15/2020 [facility]    IR CVC TUNNELED  2/19/2020    IR CVC TUNNELED 2/19/2020 [facility]    IR CVC TUNNELED  1/4/2021    IR CVC TUNNELED 1/4/2021 [facility]    IR CVC TUNNELED  1/25/2021    IR CVC TUNNELED 1/25/2021 [facility]    IR CVC TUNNELED  8/21/2018    IR CVC TUNNELED 8/21/2018 [facility]    IR CVC TUNNELED  9/26/2018    IR CVC TUNNELED 9/26/2018 [facility]    IR CVC TUNNELED  11/5/2018    IR CVC TUNNELED 11/5/2018 [facility]    IR CVC TUNNELED   12/19/2018    IR CVC TUNNELED 12[place]19/2018 [facility]    IR VENOGRAM HEPATIC  11/8/2017    IR VENOGRAM HEPATIC 11/8/2017 [facility]    MR HEAD ANGIO WO IV CONTRAST  2/17/2017    MR HEAD ANGIO WO IV CONTRAST 2/17/2017 [facility]GAC[facility]    MR NECK ANGIO WO IV CONTRAST  2/17/2017    MR NECK ANGIO WO IV CONTRAST 2/17/2017 [facility]    US GUIDED NEEDLE LIVER BIOPSY  9/8/2020    US GUIDED NEEDLE LIVER BIOPSY 9/8/2020 [facility]      Family History:    Family History   Problem Relation Name Age of Onset    Diabetes Father      Sickle cell anemia Other sibling     Cancer Other grandfather     Cancer Other uncle      Family Oncology History:    Cancer-related family history includes Cancer in some other family members.  Social History:    Social History     Tobacco Use    Smoking status: Every Day     Types: Cigarettes     Passive exposure: Current    Smokeless tobacco: Never   Substance Use Topics    Alcohol use: Yes     Comment: Occasional    Drug use: Yes     Types: Marijuana        Objective   Vitals: There were no vitals taken for this visit.  Weight: There were no vitals filed for this visit.    Physical Exam  Vitals reviewed.   Constitutional:       Appearance: Normal appearance.   HENT:      Mouth/Throat:      Mouth: Mucous membranes are moist.      Pharynx: Oropharynx is clear.   Eyes:      Conjunctiva/sclera: Conjunctivae normal.      Pupils: Pupils are equal, round, and reactive to light.   Cardiovascular:      Rate and Rhythm: Normal rate and regular rhythm.   Pulmonary:      Effort: Pulmonary effort is normal.      Breath sounds: Normal breath sounds.   Abdominal:      General: Abdomen is flat. Bowel sounds are normal. There is no distension.      Palpations: Abdomen is soft.      Tenderness: There is no abdominal tenderness.   Musculoskeletal:      Right lower leg: No edema.      Left lower leg: No edema.   Skin:     General: Skin is warm and dry.   Neurological:      General: No focal  deficit present.      Mental Status: He is alert and oriented to person, place, and time.         Diagnostic Results     Labs  Results from last 7 days   Lab Units 02/26/24  1025   WBC AUTO x10*3/uL 11.7*   HEMOGLOBIN g/dL 16.1   HEMATOCRIT % 42.7   PLATELETS AUTO x10*3/uL 293   NEUTROS ABS x10*3/uL 6.01   LYMPHS ABS AUTO x10*3/uL 4.40   MONOS ABS AUTO x10*3/uL 0.87   EOS ABS AUTO x10*3/uL 0.33   NEUTROS PCT AUTO % 51.5   LYMPHS PCT AUTO % 37.7   MONOS PCT AUTO % 7.5   EOS PCT AUTO % 2.8      Results from last 7 days   Lab Units 02/26/24  1025   GLUCOSE mg/dL 107*   SODIUM mmol/L 139   POTASSIUM mmol/L 3.6   CHLORIDE mmol/L 107   CO2 mmol/L 24   BUN mg/dL 7   CREATININE mg/dL 0.82   EGFR mL/min/1.73m*2 >90   CALCIUM mg/dL 9.0   ALBUMIN g/dL 4.1   PROTEIN TOTAL g/dL 7.1     Results from last 7 days   Lab Units 02/26/24  1025   BILIRUBIN TOTAL mg/dL 0.4   ALK PHOS U/L 120   ALT U/L 8*   AST U/L 12                     Lab Results   Component Value Date    HGB 14.0 02/07/2024    HGB 14.6 01/30/2024    HGB 15.4 12/28/2023     02/07/2024     01/30/2024     12/28/2023     (H) 02/07/2024     01/30/2024     12/28/2023       Images  === 02/07/24 ===    XR CHEST 2 VIEWS    - Impression -  1.  No evidence of acute cardiopulmonary process. Emphysema.        MACRO:  None    Signed by: Jared Jimenez 2/7/2024 4:42 AM  Dictation workstation:   [id number]   === 10/23/21 ===    CT ABDOMEN PELVIS W IV CONTRAST    - Impression -  There is a pancolitis.  In a sickle cell patient who has received a  bone marrow transplant, differential includes graft versus host  disease as well as ischemic/venoocclusive colitis.  Inflammatory or  infectious colitis would also be in the differential.  No bowel  obstruction.  There is a small amount of pelvic free fluid.  No  pneumatosis or portal venous gas.    The appendix is seen with no evidence of acute appendicitis.    Minimal patchy opacity in the lower lobes,  raising the possibility of  changes of vzctu-nowxgx-usbr disease, pneumonia or sickle cell crisis.  Consider early or mild interstitial fibrosis.    Cholecystectomy.  No biliary ductal dilatation.  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    Consider cystitis which can also represent a manifestation of  zufso-wmzcrj-bzhm disease as well as infection.  Clinical correlation  is needed.    Osseous changes consistent with known sickle cell disease.  Mild  cardiomegaly consistent with known sickle cell disease.    NOTIFICATION:  The critical results of the study were discussed with,  and acknowledged by Dr. Bety Carr  by telephone on 10/23/2021 at  1322 hours.            Signed by Allyson Padgett MD     No echocardiogram results found for the past 12 months       Assessment/Plan   Xu Maya is a 43 y.o. male with history of anxiety, depression, gastritis/GERD, priapism, and sickle cell disease, who presents to [facility] with uncontrolled pain.         ACC Course  - VSS  -  Hemolysis labs near baseline, no indication of acute vaso-occlusive crisis or indication for blood transfusion   - Flexeril 10 mg, given for AVN  - dilaudid 12 mg PO x3 doses given for sickle cell related pain  - Zofran 8mg once for opioid induced nausea/vomiting  - Benadryl 25mg once for opioid induced pruritus     Disposition  - Patient discharged home with no further needs following ACC Course  - Return to clinic/ED instructions given            PRABHJOT Cline-CNP

## 2024-02-28 ENCOUNTER — TELEPHONE (OUTPATIENT)
Dept: PALLIATIVE MEDICINE | Facility: HOSPITAL | Age: 44
End: 2024-02-28
Payer: COMMERCIAL

## 2024-02-28 ENCOUNTER — OFFICE VISIT (OUTPATIENT)
Dept: PALLIATIVE MEDICINE | Facility: HOSPITAL | Age: 44
End: 2024-02-28
Payer: COMMERCIAL

## 2024-02-28 VITALS
BODY MASS INDEX: 22.48 KG/M2 | HEART RATE: 86 BPM | RESPIRATION RATE: 18 BRPM | OXYGEN SATURATION: 97 % | WEIGHT: 143.52 LBS | TEMPERATURE: 97.2 F | SYSTOLIC BLOOD PRESSURE: 116 MMHG | DIASTOLIC BLOOD PRESSURE: 76 MMHG

## 2024-02-28 DIAGNOSIS — R52 ACUTE PAIN: ICD-10-CM

## 2024-02-28 DIAGNOSIS — Z51.5 PALLIATIVE CARE ENCOUNTER: Primary | ICD-10-CM

## 2024-02-28 DIAGNOSIS — D57.00 SICKLE-CELL DISEASE WITH PAIN (MULTI): ICD-10-CM

## 2024-02-28 PROCEDURE — 99214 OFFICE O/P EST MOD 30 MIN: CPT | Mod: GC | Performed by: INTERNAL MEDICINE

## 2024-02-28 PROCEDURE — 99204 OFFICE O/P NEW MOD 45 MIN: CPT | Performed by: INTERNAL MEDICINE

## 2024-02-28 RX ORDER — METHADONE HYDROCHLORIDE 5 MG/1
5 TABLET ORAL EVERY 12 HOURS
Qty: 16 TABLET | Refills: 0 | Status: SHIPPED | OUTPATIENT
Start: 2024-02-28 | End: 2024-03-13 | Stop reason: SDUPTHER

## 2024-02-28 ASSESSMENT — PAIN SCALES - GENERAL: PAINLEVEL: 5

## 2024-02-28 NOTE — PROGRESS NOTES
SUPPORTIVE AND PALLIATIVE ONCOLOGY CONSULT - OUTPATIENT      SERVICE DATE: 2/28/2024    Medical Oncologist: MD Ev Lopes MD PhD       REASON FOR CONSULT/CHIEF CONSULT COMPLAINT: pain management    Subjective   HISTORY OF PRESENT ILLNESS: Xu Maya is a 43-year-old man with history of sick cell anemia associated with chronic pain on hydromorphone 4-8mg q4h PRN for severe pain. He has experienced chronic back pain and bilateral leg pain for many years. He is status post haploidentical stem cell transplant on 2/4/2021. He endorses improvement of frequency of acute pain crises after HSCT therapy in 2021, however he continues to have chronic uncontrolled pain. He has tried ketamine therapy in the past but states it has not worked for him.     Yesterday he presented to [facility] with uncontrolled pain, labs were not consistent with acute vaso-occlusive crises or indication for blood transfusion. He was given flexeril 10mg, dilaudid 12mg PO x3 doses for sickle-cell related pain, zofran 8mg x1 for opioid induced nausea/vomiting, and benadryl 25mg x1 for opioid induced pruritus. He was then discharged to home with no further adjustments made to his home regimen of pain medications.     He is currently taking amitriptyline, aripiprazole 5mg daily, cyclobenzaprine 10mg TID PRN, duloxetine ER 60mg BID, gabapentin 300mg BID, hydromorphone 4mg q4h PRN for severe pain, Rezurock 200mg BID.      He lives at home with his wife and children. 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       Pain Assessment:  Onset: many years ago   Location: Bilateral lower leg and chronic lower back pain  Duration: Constant, chronic with intermittent worsening severity  Characteristics: Moderate to severe pain in his lower back and bilateral legs     Symptom Assessment:  Pain:very much   Anxiety: somewhat  Depression: a  little  Shortness of breath: none  Lack of appetite: none   Nausea: none  Vomiting: none  Constipation: none  Numbness or tingling in hands/feet/other: somewhat        Information obtained from: interview of patient  ______________________________________________________________________     Oncology History    No history exists.       Past Medical History:   Diagnosis Date    Anxiety disorder, unspecified 04/17/2017    Anxiety    Depression, unspecified 04/17/2017    Depression    Family history of glaucoma 03/02/2017    Family history of glaucoma in father    Gastritis, unspecified, without bleeding 04/17/2017    Gastritis    Hematuria, unspecified 04/17/2017    Hematuria    Personal history of other diseases of the digestive system 12/02/2015    History of esophageal reflux    Priapism, unspecified 04/17/2017    Priapism    Sickle-cell disease without crisis (CMS/[facility]) 04/17/2017    Sickle cell anemia     Past Surgical History:   Procedure Laterality Date    GALLBLADDER SURGERY  04/12/2017    Gallbladder Surgery    IR CVC TUNNELED  4/24/2018    IR CVC TUNNELED 4/24/2018 [facility] AIB LEGACY    IR CVC TUNNELED  6/5/2018    IR CVC TUNNELED 6/5/2018 [facility] AIB LEGACY    IR CVC TUNNELED  3/1/2019    IR CVC TUNNELED 3/1/2019 [facility] AIB LEGACY    IR CVC TUNNELED  6/4/2019    IR CVC TUNNELED 6/4/2019 [facility] CLINICAL LEGACY    IR CVC TUNNELED  4/5/2019    IR CVC TUNNELED 4/5/2019 [facility] AIB LEGACY    IR CVC TUNNELED  7/17/2019    IR CVC TUNNELED 7/17/2019 [facility] AIB LEGACY    IR CVC TUNNELED  8/14/2019    IR CVC TUNNELED 8/14/2019 [facility] AIB LEGACY    IR CVC TUNNELED  12/18/2019    IR CVC TUNNELED 12/18/2019 [facility] CLINICAL LEGACY    IR CVC TUNNELED  10/9/2019    IR CVC TUNNELED 10/9/2019 [facility] AIB LEGACY    IR CVC TUNNELED  11/13/2019    IR CVC TUNNELED 11/13/2019 [facility] CLINICAL LEGACY    IR CVC TUNNELED  1/15/2020    IR CVC TUNNELED 1/15/2020 [facility] CLINICAL LEGACY    IR CVC TUNNELED  2/19/2020    IR CVC TUNNELED 2/19/2020 [facility] CLINICAL LEGACY    IR  CVC TUNNELED  1/4/2021    IR CVC TUNNELED 1/4/2021 [facility] AIB LEGACY    IR CVC TUNNELED  1/25/2021    IR CVC TUNNELED 1/25/2021 [facility] ANCILLARY LEGACY    IR CVC TUNNELED  8/21/2018    IR CVC TUNNELED 8/21/2018 [facility] AIB LEGACY    IR CVC TUNNELED  9/26/2018    IR CVC TUNNELED 9/26/2018 [facility]IB LEGACY    IR CVC TUNNELED  11/5/2018    IR CVC TUNNELED 11/5/2018 [facility] AIB LEGACY    IR CVC TUNNELED  12/19/2018    IR CVC TUNNELED 12/19/2018 [facility] AIB LEGACY    IR VENOGRAM HEPATIC  11/8/2017    IR VENOGRAM HEPATIC 11/8/2017 [facility] AIB LEGACY    MR HEAD ANGIO WO IV CONTRAST  2/17/2017    MR HEAD ANGIO WO IV CONTRAST 2/17/2017 [facility] CLINICAL LEGACY    MR NECK ANGIO WO IV CONTRAST  2/17/2017    MR NECK ANGIO WO IV CONTRAST 2/17/2017 [facility] CLINICAL LEGACY    US GUIDED NEEDLE LIVER BIOPSY  9/8/2020    US GUIDED NEEDLE LIVER BIOPSY 9/8/2020 [facility] AIB LEGACY     Family History   Problem Relation Name Age of Onset    Diabetes Father      Sickle cell anemia Other sibling     Cancer Other grandfather     Cancer Other uncle         SOCIAL HISTORY  Social History:  reports that he has been smoking cigarettes. He has been exposed to tobacco smoke. He has never used smokeless tobacco. He reports that he does not currently use alcohol. He reports that he does not currently use drugs after having used the following drugs: Marijuana. He has smoked tobacco since age 18, less than 10 cigarettes per day, he has tried tobacco cessation but has not been successful.     REVIEW OF SYSTEMS  Review of systems negative unless noted in HPI.       Objective     Palliative Performance Scale % (PPS)  ~90%     Current Outpatient Medications   Medication Instructions    amitriptyline (ELAVIL) 25 mg, oral    ARIPiprazole (ABILIFY) 5 mg, oral, Daily    cholecalciferol (VITAMIN D-3) 2,000 Units, oral    cyclobenzaprine (FLEXERIL) 10 mg, oral, 3 times daily    diphenhydrAMINE (BENADRYL) 25 mg, oral, Every 6 hours PRN    DULoxetine (CYMBALTA) 60 mg, oral, 2 times daily     ergocalciferol (Vitamin D-2) 1.25 MG (07993 UT) capsule 1 capsule, oral, Weekly    gabapentin (NEURONTIN) 300 mg, oral, 2 times daily    HYDROmorphone (DILAUDID) 4 mg, oral, Every 4 hours PRN, Must attend next appt for any additional refills    ketoconazole (NIZOral) 2 % cream Topical, 2 times daily    methadone (DOLOPHINE) 5 mg, oral, Every 12 hours    mirtazapine (REMERON) 7.5 mg, oral, Nightly    naloxone (NARCAN) 4 mg, nasal, As needed, 1 spray to nostril for overdose, may repeat every 2-3 minutes until medical assistance arrives<BR>    omeprazole (PRILOSEC) 40 mg, oral, Daily before breakfast, TAKE ONE CAPSULE BY MOUTH EVERY DAY IN THE MORNING BEFORE EATING    Rezurock 200 mg tablet Take one (1) tablet by mouth twice daily    tacrolimus (Protopic) 0.1 % ointment APPLY 1-2 TIMES PER WEEK       Allergies:   Allergies   Allergen Reactions    Hydrocodone-Acetaminophen Unknown    Naproxen Unknown                PHYSICAL EXAMINATION  Vital Signs:   Vital signs reviewed  Vitals:    02/28/24 1134   BP: 116/76   Pulse: 86   Resp: 18   Temp: 36.2 °C (97.2 °F)   SpO2: 97%          Physical Exam  Constitutional:       General: He is awake. He is not in acute distress.     Appearance: He is not ill-appearing.   HENT:      Head: Normocephalic and atraumatic.   Cardiovascular:      Rate and Rhythm: Normal rate and regular rhythm.      Heart sounds: S1 normal and S2 normal.   Pulmonary:      Effort: Pulmonary effort is normal. No tachypnea or respiratory distress.      Breath sounds: Normal breath sounds. No decreased breath sounds.      Comments: Clear to auscultation bilaterally.  Abdominal:      Palpations: Abdomen is soft.      Tenderness: There is no abdominal tenderness.   Musculoskeletal:      Comments: Bilateral dactylitis   Skin:     General: Skin is warm and dry.   Neurological:      General: No focal deficit present.      Mental Status: He is alert and oriented to person, place, and time.      Cranial Nerves:  Cranial nerves 2-12 are intact.   Psychiatric:         Behavior: Behavior is cooperative.               ASSESSMENT/PLAN  Xu Maya is a 43-year-old man with history of sick cell anemia associated with chronic pain that is suboptimally controlled.. He has experienced chronic back pain and bilateral leg pain for many years. He is status post haploidentical stem cell transplant on 2/4/2021. He endorses improvement of frequency of acute pain crises after HSCT therapy in 2021, however he continues to have chronic uncontrolled pain. He has tried ketamine therapy in the past but states it has not worked for him. He presented to palliative medicine clinic today for adjustment of his current home pain regimen with hopes to receive more adequate pain control.       Chronic Pain  Pain is: chronic  Type: somatic and neuropathic  Pain control: sub-optimally controlled  Home regimen: amitriptyline, aripiprazole 5mg daily, cyclobenzaprine 10mg TID PRN, duloxetine ER 60mg BID, gabapentin 300mg BID, hydromorphone 4mg q4h PRN for severe pain  Intolerances/previously tried: Ketamine injection  - Recommend starting methadone 5mg every 12 hours to improve pain control, can use hydromorphone 4mg PO q4h PRN for severe pain. Patient has Naloxone at home.   - Will have patient return to clinic in 1 week on March 6, 2024, at 9:30AM for follow-up appointment to evaluate adjusted pain regimen      Opioid Use  Medication Management:   - OARRS report reviewed with no aberrant behavior; consistent with  prescriptions/records and patient history  - Patient already has prescription for naloxone at home  - Red Flags: None        Introduction to Supportive and Palliative Oncology:  Spoke with the patient Mr. Xu Maya.  Introduced the role and philosophy of Supportive and Palliative oncology in the evaluation and management of symptoms during cancer treatment  Palliative care was introduced as a service for patients with serious illness  to help with symptoms, assist with goals of care conversations, navigate complex decision making, improve quality of life for patients, and provide support both patients and families.  Patient seemed to appreciate the extra layer of support.    Medical Decision Making/Goals of Care/Advance Care Planning:  Patient's current clinical condition, including diagnosis, prognosis, and management plan, and goals of care were discussed.   Life limiting disease:  Sick cell anemia  Family: Supportive - wife, mother, and children  Performance status: Major limitations due to pain  Joys/meaning/strength: spending time with loved ones, working current  job  Understanding of health: Demonstrates good prognostic understanding of disease process, understands plan for pain management  Information:Wants full disclosure  Prognosis: Fair  Goals: symptom control  Worries and fears now and future: ongoing symptoms     Advance Directives  Code Status: Full code    Next Follow-Up Visit:  Return to clinic in 1 week.    Signature and billing  Thank you for allowing us to participate in the care of this patient. Recommendations will be communicated back to the consulting service by way of shared electronic medical record or face-to-face.    Medical complexity was high level due to due to complexity of problems, extensive data review, and high risk of management/treatment.  Time was spent on the following: Prep Time, Time Directly with Patient/Family/Caregiver, Documentation Time. Total time spent: 45 minutes.      DATA   Diagnostic tests and information reviewed for today's visit:  Most recent labs and imaging results, Most recent labs, Most recent EKG, Medications             Plan of Care discussed with: attending physician Dr. Tabitha Andrew      SIGNATURE: Rosa Osorio MD    Contact information:  Supportive and Palliative Oncology  Monday-Friday 8 AM-5 PM  Phone:  [phone], press option #5, then option #1.   Or Epic  Secure Chat

## 2024-03-04 ENCOUNTER — TELEPHONE (OUTPATIENT)
Dept: HEMATOLOGY/ONCOLOGY | Facility: HOSPITAL | Age: 44
End: 2024-03-04
Payer: COMMERCIAL

## 2024-03-04 DIAGNOSIS — D57.00 SICKLE CELL DISEASE WITH CRISIS (MULTI): ICD-10-CM

## 2024-03-04 RX ORDER — HYDROMORPHONE HYDROCHLORIDE 4 MG/1
4 TABLET ORAL EVERY 4 HOURS PRN
Qty: 42 TABLET | Refills: 0 | Status: SHIPPED | OUTPATIENT
Start: 2024-03-04 | End: 2024-03-11 | Stop reason: SDUPTHER

## 2024-03-04 NOTE — TELEPHONE ENCOUNTER
Refill request received for Dilaudid 4mg.  Preferred pharmacy is [facility] at [address] in [place].  Message sent to Sickle Cell team.

## 2024-03-06 ENCOUNTER — OFFICE VISIT (OUTPATIENT)
Dept: PALLIATIVE MEDICINE | Facility: HOSPITAL | Age: 44
End: 2024-03-06
Payer: COMMERCIAL

## 2024-03-06 ENCOUNTER — SPECIALTY PHARMACY (OUTPATIENT)
Dept: PHARMACY | Facility: CLINIC | Age: 44
End: 2024-03-06

## 2024-03-06 VITALS
TEMPERATURE: 98.1 F | HEART RATE: 79 BPM | RESPIRATION RATE: 20 BRPM | OXYGEN SATURATION: 97 % | DIASTOLIC BLOOD PRESSURE: 78 MMHG | SYSTOLIC BLOOD PRESSURE: 117 MMHG | BODY MASS INDEX: 21.96 KG/M2 | WEIGHT: 140.21 LBS

## 2024-03-06 DIAGNOSIS — D89.813 GRAFT-VERSUS-HOST DISEASE, UNSPECIFIED (MULTI): ICD-10-CM

## 2024-03-06 DIAGNOSIS — D57.00 SICKLE-CELL DISEASE WITH PAIN (MULTI): ICD-10-CM

## 2024-03-06 DIAGNOSIS — Z51.5 PALLIATIVE CARE ENCOUNTER: Primary | ICD-10-CM

## 2024-03-06 PROCEDURE — 99213 OFFICE O/P EST LOW 20 MIN: CPT | Performed by: INTERNAL MEDICINE

## 2024-03-06 ASSESSMENT — PAIN SCALES - GENERAL: PAINLEVEL: 5

## 2024-03-06 NOTE — LETTER
March 6, 2024     Patient: Xu Maya   YOB: 1980   Date of Visit: 3/6/2024       To Whom It May Concern:    Xu Maya was seen in my clinic on 3/6/2024 at 9:30 am. Please excuse Xu for his absence from work on this day to make the appointment.  Please allow for him to do light-duty (including no driving), for the dates 3/18/2024 through 3/29/2024 as we are adjusting his medications.     If you have any questions or concerns, please don't hesitate to call [phone] or email us at the following email addresses: [email or web address] or [email or web address]          Sincerely,         Tabitha Andrew MD  Clinical Director Palliative Medicine,  [facility]      CC: No Recipients

## 2024-03-06 NOTE — TELEPHONE ENCOUNTER
Email to [facility] to check on PA status as patient in clinic today and hasn't received medication. I called pharmacy who states it is showing PA required still - would cost patient $11.99 to buy OOP if didn't wait for PA approval. I will update patient and leave up to him if wants to fill now and pay or wait until PA approved.

## 2024-03-06 NOTE — TELEPHONE ENCOUNTER
Patient is going to wait to start Methadone during spring break as he won't have to drive then and doesn't want to miss work. Letter given to patient for light duty 3/18-3/29. Patient has FUV with Dr. Andrew on 4/3 to assess pain since starting Methadone.

## 2024-03-07 ENCOUNTER — TELEMEDICINE (OUTPATIENT)
Dept: BEHAVIORAL HEALTH | Facility: HOSPITAL | Age: 44
End: 2024-03-07
Payer: COMMERCIAL

## 2024-03-07 DIAGNOSIS — F33.1 MODERATE EPISODE OF RECURRENT MAJOR DEPRESSIVE DISORDER (MULTI): ICD-10-CM

## 2024-03-07 DIAGNOSIS — F41.9 ANXIETY: ICD-10-CM

## 2024-03-07 PROCEDURE — 99214 OFFICE O/P EST MOD 30 MIN: CPT | Performed by: PSYCHIATRY & NEUROLOGY

## 2024-03-07 RX ORDER — ARIPIPRAZOLE 5 MG/1
5 TABLET ORAL DAILY
Qty: 90 TABLET | Refills: 1 | Status: SHIPPED | OUTPATIENT
Start: 2024-03-07 | End: 2024-05-02 | Stop reason: SDUPTHER

## 2024-03-07 NOTE — PROGRESS NOTES
Patient advised to call if any problems, questions, or concerns. Supportive Oncology Established Patient Note    Patient ID: Xu Maya is a 43 y.o. male who presents for Follow-up.    HPI  Methadone wasn't picked up as patient needs a prior authorization. Has been taking dilaudid 4 mg po q4 prn.   Regular Bms.   Fair appetite.       Allergies  Hydrocodone-acetaminophen and Naproxen     Objective:  Last Recorded Vitals  Blood pressure 117/78, pulse 79, temperature 36.7 °C (98.1 °F), temperature source Core, resp. rate 20, weight 63.6 kg (140 lb 3.4 oz), SpO2 97 %.      Physical Exam:  Constitutional:       General: Patient is not in acute distress.  HENT:      Head: Normocephalic.      Mouth: Mucous membranes are moist.   Eyes:      Conjunctiva/sclera: Conjunctivae clear, sclerae white. No discharge.     Pupils: Pupils are equal, round, and reactive to light.   Neck:      Vascular: No carotid bruit.   Cardiovascular:      Rate and Rhythm: Normal rate and regular rhythm.      Heart sounds: No murmur heard.  Pulmonary:      Effort: No respiratory distress.      Breath sounds: Clear to auscultation  Abdominal:      General: There is no distension.      Tenderness: There is no abdominal tenderness. There is no guarding.   Musculoskeletal:         General: No deformity.   Skin:     Coloration: Skin is not jaundiced.   Neurological:      General: No focal deficit present.      Mental Status: He is oriented to person, place, and time.   Psychiatric:         Behavior: Behavior normal. Behavior is cooperative.      Relevant Results  Lab Results   Component Value Date    WBC 11.7 (H) 02/26/2024    HGB 16.1 02/26/2024    HCT 42.7 02/26/2024    MCV 84 02/26/2024     02/26/2024      Lab Results   Component Value Date    GLUCOSE 107 (H) 02/26/2024    CALCIUM 9.0 02/26/2024     02/26/2024    K 3.6 02/26/2024    CO2 24 02/26/2024     02/26/2024    BUN 7 02/26/2024    CREATININE 0.82 02/26/2024      Lab Results   Component Value Date    ALT 8 (L) 02/26/2024    AST 12  02/26/2024    ALKPHOS 120 02/26/2024    BILITOT 0.4 02/26/2024        Relevant Imaging   No results found for this or any previous visit from the past 1000 days.     No image results found.       Medications:   Current Outpatient Medications   Medication Instructions    amitriptyline (ELAVIL) 25 mg, oral    ARIPiprazole (ABILIFY) 5 mg, oral, Daily    cholecalciferol (VITAMIN D-3) 2,000 Units, oral    cyclobenzaprine (FLEXERIL) 10 mg, oral, 3 times daily    diphenhydrAMINE (BENADRYL) 25 mg, oral, Every 6 hours PRN    DULoxetine (CYMBALTA) 60 mg, oral, 2 times daily    ergocalciferol (Vitamin D-2) 1.25 MG (86563 UT) capsule 1 capsule, oral, Weekly    gabapentin (NEURONTIN) 300 mg, oral, 2 times daily    HYDROmorphone (DILAUDID) 4 mg, oral, Every 4 hours PRN    ketoconazole (NIZOral) 2 % cream Topical, 2 times daily    methadone (DOLOPHINE) 5 mg, oral, Every 12 hours    mirtazapine (REMERON) 7.5 mg, oral, Nightly    naloxone (NARCAN) 4 mg, nasal, As needed, 1 spray to nostril for overdose, may repeat every 2-3 minutes until medical assistance arrives<BR>    omeprazole (PRILOSEC) 40 mg, oral, Daily before breakfast, TAKE ONE CAPSULE BY MOUTH EVERY DAY IN THE MORNING BEFORE EATING    Rezurock 200 mg tablet Take one (1) tablet by mouth twice daily    tacrolimus (Protopic) 0.1 % ointment APPLY 1-2 TIMES PER WEEK        Assessment and Plan  Edi Maya is a 43-year-old man with history of sick cell anemia associated with chronic pain that is suboptimally controlled.. He has experienced chronic back pain and bilateral leg pain for many years. He is status post haploidentical stem cell transplant on 2/4/2021. He endorses improvement of frequency of acute pain crises after HSCT therapy in 2021, however he continues to have chronic uncontrolled pain. He has tried ketamine therapy in the past but states it has not worked for him. He presented to palliative medicine clinic today for adjustment of his current home pain  regimen with hopes to receive more adequate pain control.        Chronic Pain  Pain is: chronic  Type: somatic and neuropathic  Pain control: sub-optimally controlled  Home regimen: amitriptyline, aripiprazole 5mg daily, cyclobenzaprine 10mg TID PRN, duloxetine ER 60mg BID, gabapentin 300mg BID, hydromorphone 4mg q4h PRN for severe pain  Intolerances/previously tried: Ketamine injection  - Methadone still isn't approved.   - Will try and get PA approval- methadone 5mg every 12 hours to improve pain control  - Continue hydromorphone 4mg PO q4h PRN for severe pain. Patient has Naloxone at home.       Opioid Use  Medication Management:   - OARRS report reviewed with no aberrant behavior; consistent with  prescriptions/records and patient history  - Patient already has prescription for naloxone at home  - Red Flags: None    Tabitha Andrew MD

## 2024-03-07 NOTE — TELEPHONE ENCOUNTER
Received denial for Methadone from [facility]. I contacted Dr. Andrew and Dr. Tolliver to see if they would like to prescribe alternative medication or do [id number].

## 2024-03-07 NOTE — PROGRESS NOTES
Outpatient Psychiatry FUV      Subjective   Xu Maya, a 43 y.o. male, for virtual FUV. At home today      Assessment/Plan   Patient Discussion:  continue duloxetine 60mg 2x day  CAN USE mirtazapine 7.5 mg at bedtime as needed  CONTINUE aripiprazole 5mg in the AM  monitor for excess serotonin    RETURN to clinic 5/2 at 12PM for virtual FUV    call with questions or concerns. [phone]     Assessment:   43 y.o. BM with SCC disease with depression, sickle cell disease now s/p BMT. Previously on suboxone for management of pain/high utilization but now off since transplant, still having pain and working to start ketamine     Since last appt, pt notes recent stress around attempt to adopt cousin. Working through, mood ok, more tired. . no si/sx of excess serotonin. Follow up 1 months sooner if needed    Diagnosis:   MDD, recurrent. Moderate  WILFRID  Chronic pain disorder    Treatment Plan/Recommendations:   1. Safety Assessment: no current SI, no h/o SI/SA. has guns at home but unloaded and locked with kids at home. PF include , no previous attempts, kids, Bahai. RF include depression, pain, father with completed suicide. Pt has moderate baseline risk based on static factors but is not an imminent risk and safety plan addressed    2. MDD, WILFRID  CONTINUE duloxetine 60mg PO BID, r/b/ae discussed including higher dose of SNRI, si/sx excess serotonin/SS  CONTINUE mirtazapine 7.5mg PO QHS PRN insomnia  CONTINUE aripiprazole 5mg PO daily     continue supportive therapy during appt    3. opioid misuse in the context of chronic pain 2/2 sickle cell disease with acute exacerbations  continues to struggle with chronic pain, has appt with Dr. Andrew    nicotine use disorder --previous success with chantix but stopped and now smoking 3-4/day. monitor for now    4. Medical: SCC, back pain, GERD with h/o PUD: recent notes and labs reviewed. stable.   s/p BMT 1/2021  notes and labs reviewed,   coordinate care as needed  with sickle cell team, BMT as needed  Ongoing pain, managed by sickle cell    5. Social: lives with kids and wife, moved to [place] after [place], [place]. stepfather passed away from leukemia, sister passed from sickle cell. Better at home, work    Reason for Visit:   FUV depression and anxiety    Subjective:  Last visit 1 month ago  Since then, was contacted by adoption agency re: commenting on depression and pt's motivation to adopt a relative  Ultimately, decision was against him and wife  UDS +oxycodone  Caused conflict but they have worked through  Going to see if have other legal options  Discussed that +oxy may be cross reactant from dilaudid    Pt met with Dr. Andrew and plans to transition to methadone, will start over spring break in 2 weeks to see how he tolerates it    Rates depression 6-7/10 on occ bad days, most days are ok    No feelings of not wanting to go, did have feelings of things are too much/give up but no self harm in context of arguments with wife, none since    Pt notes has been more tired, not taking mirtazapine, doesn't need for sleep. Son has been sleeping more too    Current Medications:    Current Outpatient Medications:     amitriptyline (Elavil) 25 mg tablet, Take 1 tablet (25 mg) by mouth., Disp: , Rfl:     ARIPiprazole (Abilify) 5 mg tablet, Take 1 tablet (5 mg) by mouth once daily., Disp: 30 tablet, Rfl: 2    cholecalciferol (Vitamin D-3) 50 MCG (2000 UT) tablet, Take 1 tablet (2,000 Units) by mouth., Disp: , Rfl:     cyclobenzaprine (Flexeril) 10 mg tablet, Take 1 tablet (10 mg) by mouth 3 times a day for 10 days., Disp: 30 tablet, Rfl: 0    diphenhydrAMINE (BENADryl) 25 mg capsule, Take 1 capsule (25 mg) by mouth every 6 hours if needed., Disp: , Rfl:     DULoxetine (Cymbalta) 60 mg DR capsule, Take 1 capsule (60 mg) by mouth 2 times a day., Disp: 60 capsule, Rfl: 2    ergocalciferol (Vitamin D-2) 1.25 MG (45470 UT) capsule, Take 1 capsule (1,250 mcg) by  mouth 1 (one) time per week., Disp: , Rfl:     gabapentin (Neurontin) 300 mg capsule, Take 1 capsule (300 mg) by mouth 2 times a day., Disp: 60 capsule, Rfl: 0    HYDROmorphone (Dilaudid) 4 mg tablet, Take 1 tablet (4 mg) by mouth every 4 hours if needed for severe pain (7 - 10) for up to 7 days., Disp: 42 tablet, Rfl: 0    ketoconazole (NIZOral) 2 % cream, Apply topically 2 times a day., Disp: 30 g, Rfl: 11    mirtazapine (Remeron) 7.5 mg tablet, Take 1 tablet (7.5 mg) by mouth once daily at bedtime., Disp: 30 tablet, Rfl: 2    naloxone (Narcan) 4 mg/0.1 mL nasal spray, Administer 1 spray (4 mg) into affected nostril(s) if needed for opioid reversal or respiratory depression. 1 spray to nostril for overdose, may repeat every 2-3 minutes until medical assistance arrives, Disp: , Rfl:     omeprazole (PriLOSEC) 40 mg DR capsule, Take 1 capsule (40 mg) by mouth once daily in the morning. Take before meals. TAKE ONE CAPSULE BY MOUTH EVERY DAY IN THE MORNING BEFORE EATING, Disp: 30 capsule, Rfl: 2    Rezurock 200 mg tablet, Take one (1) tablet by mouth twice daily, Disp: 60 tablet, Rfl: 5    tacrolimus (Protopic) 0.1 % ointment, APPLY 1-2 TIMES PER WEEK, Disp: 30 g, Rfl: 1    Current Facility-Administered Medications:     heparin lock flush (porcine) 10 unit/mL injection 100 Units, 100 Units, intra-catheter, Once, CHRIS Garcia    heparin lock flush (porcine) injection 500 Units, 5 mL, intravenous, PRN, CHRIS Garcia  Medical History:  Past Medical History:   Diagnosis Date    Anxiety disorder, unspecified 04/17/2017    Anxiety    Depression, unspecified 04/17/2017    Depression    Family history of glaucoma 03/02/2017    Family history of glaucoma in father    Gastritis, unspecified, without bleeding 04/17/2017    Gastritis    Hematuria, unspecified 04/17/2017    Hematuria    Personal history of other diseases of the digestive system 12/02/2015    History of esophageal reflux    Priapism, unspecified  04/17/2017    Priapism    Sickle-cell disease without crisis (CMS/HCC) 04/17/2017    Sickle cell anemia       Surgical History:  Past Surgical History:   Procedure Laterality Date    GALLBLADDER SURGERY  04/12/2017    Gallbladder Surgery    IR CVC TUNNELED  4/24/2018    IR CVC TUNNELED 4/24/2018 [facility]    IR CVC TUNNELED  6/5/2018    IR CVC TUNNELED 6/5/2018 [facility]    IR CVC TUNNELED  3/1/2019    IR CVC TUNNELED 3/1/2019 [facility]    IR CVC TUNNELED  6/4/2019    IR CVC TUNNELED 6/4/2019 [facility]    IR CVC TUNNELED  4/5/2019    IR CVC TUNNELED 4/5/2019 [facility]    IR CVC TUNNELED  7/17/2019    IR CVC TUNNELED 7/17/2019 [facility]    IR CVC TUNNELED  8/14/2019    IR CVC TUNNELED 8/14/2019 [facility]    IR CVC TUNNELED  12/18/2019    IR CVC TUNNELED 12/18/2019 [facility]    IR CVC TUNNELED  10/9/2019    IR CVC TUNNELED 10/9/2019 [facility]    IR CVC TUNNELED  11/13/2019    IR CVC TUNNELED 11/13/2019 [facility]    IR CVC TUNNELED  1/15/2020    IR CVC TUNNELED 1/15/2020 [facility]    IR CVC TUNNELED  2/19/2020    IR CVC TUNNELED 2/19/2020 [facility]    IR CVC TUNNELED  1/4/2021    IR CVC TUNNELED 1/4/2021 [facility]    IR CVC TUNNELED  1/25/2021    IR CVC TUNNELED 1/25/2021 [facility]    IR CVC TUNNELED  8/21/2018    IR CVC TUNNELED 8/21/2018 [facility]    IR CVC TUNNELED  9/26/2018    IR CVC TUNNELED 9/26/2018 [facility]    IR CVC TUNNELED  11/5/2018    IR CVC TUNNELED 11/5/2018 [facility]    IR CVC TUNNELED  12/19/2018    IR CVC TUNNELED 12/19/2018 [facility]    IR VENOGRAM HEPATIC  11/8/2017    IR VENOGRAM HEPATIC 11/8/2017 [facility]    MR HEAD ANGIO WO IV CONTRAST  2/17/2017    MR HEAD ANGIO WO IV CONTRAST 2/17/2017 [facility]    MR NECK ANGIO WO IV CONTRAST  2/17/2017    MR NECK ANGIO WO IV CONTRAST 2/17/2017 [facility]    US GUIDED NEEDLE LIVER BIOPSY  9/8/2020    US  "GUIDED NEEDLE LIVER BIOPSY 9/8/2020 [facility]       Family History:  Family History   Problem Relation Name Age of Onset    Diabetes Father      Sickle cell anemia Other sibling     Cancer Other grandfather     Cancer Other uncle        Social History:  Social History     Socioeconomic History    Marital status:      Spouse name: Not on file    Number of children: Not on file    Years of education: Not on file    Highest education level: Not on file   Occupational History    Not on file   Tobacco Use    Smoking status: Every Day     Types: Cigarettes     Passive exposure: Current    Smokeless tobacco: Never   Substance and Sexual Activity    Alcohol use: Not Currently     Comment: Occasional    Drug use: Not Currently     Types: Marijuana    Sexual activity: Not on file   Other Topics Concern    Not on file   Social History Narrative    Not on file     Social Determinants of Health     Financial Resource Strain: Not on file   Food Insecurity: Not on file   Transportation Needs: Not on file   Physical Activity: Not on file   Stress: Not on file   Social Connections: Not on file   Intimate Partner Violence: Not on file   Housing Stability: Not on file              Medical Review Of Systems:  +pain worse with weather  +fatigue    Psychiatric Review Of Systems:  Depression ok       Objective   Mental Status Exam:   Appearance: appropriate g/h.   Attitude: cooperative and engaged.   Behavior: sitting in chair, good eye contact.   Motor Activity: no tremor, normal tone.   Speech: regular in rate, volume, low tone, no dysarthria, no aphasia.   Mood: \"ok\"  Affect: congruent, appropriate range.   Thought Process: linear, goal directed.   Thought Content: no delusions, no SI/HI.   Thought Perception: no AVH.   Cognition: alert, oriented to person, place, time. attn intact.   Insight: fair.   Judgment: fair/intact.       Vitals:  There were no vitals filed for this visit.  Encounter Date: 02/07/24   ECG 12 lead "   Result Value    Ventricular Rate 75    Atrial Rate 75    NM Interval 162    QRS Duration 98    QT Interval 416    QTC Calculation(Bazett) 464    P Axis 71    R Axis -49    T Axis -38    QRS Count 12    Q Onset 224    P Onset 143    P Offset 200    T Offset 432    QTC Fredericia 447    Narrative    Normal sinus rhythm  Possible Left atrial enlargement  Incomplete right bundle branch block  Left anterior fascicular block  Nonspecific T wave abnormality  Prolonged QT  Abnormal ECG  When compared with ECG of 09-OCT-2023 00:30,  Previous ECG has undetermined rhythm, needs review  Inverted T waves have replaced nonspecific T wave abnormality in Inferior leads  T wave inversion now evident in Lateral leads  See ED provider note for full interpretation and clinical correlation  Confirmed by Rocio Martinez ([id number]) on 2/7/2024 9:21:17 PM     Lab Results   Component Value Date    WBC 11.7 (H) 02/26/2024    HGB 16.1 02/26/2024    HCT 42.7 02/26/2024    MCV 84 02/26/2024     02/26/2024     Lab Results   Component Value Date    GLUCOSE 107 (H) 02/26/2024    CALCIUM 9.0 02/26/2024     02/26/2024    K 3.6 02/26/2024    CO2 24 02/26/2024     02/26/2024    BUN 7 02/26/2024    CREATININE 0.82 02/26/2024     Psychotherapy       Time: 19 minutes  Type: supportive, insight oriented  Target: mood, anxiety  Strategies: problem solving, insight oriented  Goal: decreased sx burden  Follow up: next visit 1 month  Response: mayuri Contreras MD

## 2024-03-08 ENCOUNTER — OFFICE VISIT (OUTPATIENT)
Dept: PAIN MEDICINE | Facility: CLINIC | Age: 44
End: 2024-03-08
Payer: COMMERCIAL

## 2024-03-08 VITALS
DIASTOLIC BLOOD PRESSURE: 70 MMHG | HEART RATE: 82 BPM | HEIGHT: 67 IN | RESPIRATION RATE: 22 BRPM | BODY MASS INDEX: 22.29 KG/M2 | WEIGHT: 142 LBS | SYSTOLIC BLOOD PRESSURE: 90 MMHG | OXYGEN SATURATION: 98 %

## 2024-03-08 DIAGNOSIS — D57.1 SICKLE CELL DISEASE WITHOUT CRISIS (MULTI): Primary | ICD-10-CM

## 2024-03-08 PROCEDURE — 99204 OFFICE O/P NEW MOD 45 MIN: CPT | Performed by: ANESTHESIOLOGY

## 2024-03-08 PROCEDURE — 99214 OFFICE O/P EST MOD 30 MIN: CPT | Performed by: ANESTHESIOLOGY

## 2024-03-08 ASSESSMENT — PAIN DESCRIPTION - DESCRIPTORS: DESCRIPTORS: SHARP;DISCOMFORT

## 2024-03-08 ASSESSMENT — PATIENT HEALTH QUESTIONNAIRE - PHQ9
1. LITTLE INTEREST OR PLEASURE IN DOING THINGS: NOT AT ALL
2. FEELING DOWN, DEPRESSED OR HOPELESS: NOT AT ALL
SUM OF ALL RESPONSES TO PHQ9 QUESTIONS 1 & 2: 0

## 2024-03-08 ASSESSMENT — PAIN SCALES - GENERAL
PAINLEVEL_OUTOF10: 4
PAINLEVEL: 4

## 2024-03-08 ASSESSMENT — LIFESTYLE VARIABLES
TOTAL SCORE: 2
HOW MANY STANDARD DRINKS CONTAINING ALCOHOL DO YOU HAVE ON A TYPICAL DAY: PATIENT DOES NOT DRINK
SKIP TO QUESTIONS 9-10: 1
HOW OFTEN DO YOU HAVE SIX OR MORE DRINKS ON ONE OCCASION: NEVER
AUDIT-C TOTAL SCORE: 0
HOW OFTEN DO YOU HAVE A DRINK CONTAINING ALCOHOL: NEVER

## 2024-03-08 ASSESSMENT — ENCOUNTER SYMPTOMS
MYALGIAS: 1
ACTIVITY CHANGE: 1

## 2024-03-08 ASSESSMENT — PAIN - FUNCTIONAL ASSESSMENT: PAIN_FUNCTIONAL_ASSESSMENT: 0-10

## 2024-03-08 NOTE — TELEPHONE ENCOUNTER
Dr. Andrew given information if he wants to complete peer to peer (does not need scheduled in advance). I will follow up next week.

## 2024-03-08 NOTE — PROGRESS NOTES
The patient is a 43-year-old male with bilateral leg pain.  The pain radiates from the hips to the ankles.  He has had this pain for as long as he can remember.  The patient has sickle cell anemia.  The pain becomes acutely worse during a sickle crisis.  He describes the pain as sharp burning and aching.  He denies weakness.  He denies numbness and tingling.  He has difficulty walking when the pain is more severe the patient has great difficulty with his activities of daily living. His quality of life is unacceptable.  He gets some relief with the use of medications    Review of Systems   Constitutional:  Positive for activity change.   Musculoskeletal:  Positive for gait problem and myalgias.   All other systems reviewed and are negative.    GENERAL: alert and appropriate, in no distress, well-hydrated, well nourished, interactive         SKIN: no rash noted         HEAD: normocephalic, no abnormality or lesion noted         EYES: no injection and visual acuity is grossly normal         EARS: external ears normal, no mastoid tenderness         NOSE: external nose normal without rhinorrhea         OROPHARYNX: moist mucus membranes, no tonsillar hypertrophy/exudate, uvula midline and pharynx non-erythematous, lips, teeth and gums are without obvious lesion         NECK: Adequate ROM, no cervical LNs noted         RESPIRATORY: breathing non-labored and no grunting/flaring/retractions         CHEST: equal chest rise with normal respiratory effort         ABDOMEN: soft and non-tender         BACK: back normal in appearance, cervical and lumbar spine with adequate ROM         EXTREMITIES: strength intact, SYLVIA negative         NEUROLOGIC: gait antalgic, SLR negative, sensation grossly intact    Assessment and Plan    -Chronicity--chronic pain    -Diagnostics--no new imaging ordered    -Pharmacologic--no change    -Psychologic--no need for psychologic intervention from my standpoint    -Physical--we discussed the  importance of physical therapy and exercise.  We discussed avoidance and modification techniques.    -Intervention--the patient may benefit from scrambler therapy.  I explained the risks benefits and alternatives of the procedure to the patient.  The patient wishes to proceed.    I spent time educating the patient on the condition including the treatment and the prognosis.  I invited the patient to call at anytime with any questions.

## 2024-03-10 ENCOUNTER — SPECIALTY PHARMACY (OUTPATIENT)
Dept: PHARMACY | Facility: CLINIC | Age: 44
End: 2024-03-10

## 2024-03-10 PROCEDURE — RXMED WILLOW AMBULATORY MEDICATION CHARGE

## 2024-03-11 ENCOUNTER — TELEPHONE (OUTPATIENT)
Dept: ADMISSION | Facility: HOSPITAL | Age: 44
End: 2024-03-11
Payer: COMMERCIAL

## 2024-03-11 DIAGNOSIS — D57.00 SICKLE CELL DISEASE WITH CRISIS (MULTI): ICD-10-CM

## 2024-03-11 RX ORDER — HYDROMORPHONE HYDROCHLORIDE 4 MG/1
4 TABLET ORAL EVERY 4 HOURS PRN
Qty: 42 TABLET | Refills: 0 | Status: SHIPPED | OUTPATIENT
Start: 2024-03-11 | End: 2024-03-18 | Stop reason: SDUPTHER

## 2024-03-12 ENCOUNTER — PHARMACY VISIT (OUTPATIENT)
Dept: PHARMACY | Facility: CLINIC | Age: 44
End: 2024-03-12
Payer: MEDICAID

## 2024-03-13 ENCOUNTER — APPOINTMENT (OUTPATIENT)
Dept: PALLIATIVE MEDICINE | Facility: HOSPITAL | Age: 44
End: 2024-03-13
Payer: COMMERCIAL

## 2024-03-13 RX ORDER — METHADONE HYDROCHLORIDE 5 MG/1
5 TABLET ORAL EVERY 12 HOURS
Qty: 60 TABLET | Refills: 0 | Status: SHIPPED | OUTPATIENT
Start: 2024-03-13 | End: 2024-04-03 | Stop reason: SDUPTHER

## 2024-03-13 NOTE — TELEPHONE ENCOUNTER
Pharmacy states now insurance will not cover Methadone as insurance will only cover 5 controlled substance claims/month and this would exceed that limit as patient fills 7 day supplies of Dilaudid and 30 day supplies of Gabapentin, as well. Dr. Lrorie adorno to send 30 day supply of Methadone instead of current 8 day supply and he will discuss with sickle cell team to see if they will send 2 week supplies of Dilaudid to try to avoid this issue in the future. Methadone pended to Dr. Andrew. I will update patient once I speak to pharmacy to see cost of Methadone OOP/with discount card if available.

## 2024-03-13 NOTE — TELEPHONE ENCOUNTER
Pharmacy says 30 day supply of Methadone will cost $20.54 with discount card. Patient is agreeable to pay this. No need to adjust dosing other scripts at this time (if patient is going to be on this long term and gets to therapeutic dose, will discuss with sickle cell team days supply of meds prescribed). Pharmacy is ordering medication in for this Friday - patient plans to start medication on Monday. Patient will call with any questions/concerns and is aware of FUV on 4/3/24.

## 2024-03-13 NOTE — TELEPHONE ENCOUNTER
Dr. Andrew completed peer to peer and insurance approved max dose of Methadone 10mg BID. Patient currently prescribed Methadone 5mg BID. I will call pharmacy to verify medication will go through patient's insurance.

## 2024-03-18 ENCOUNTER — OFFICE VISIT (OUTPATIENT)
Dept: DERMATOLOGY | Facility: CLINIC | Age: 44
End: 2024-03-18
Payer: COMMERCIAL

## 2024-03-18 ENCOUNTER — TELEPHONE (OUTPATIENT)
Dept: ADMISSION | Facility: HOSPITAL | Age: 44
End: 2024-03-18

## 2024-03-18 ENCOUNTER — TELEPHONE (OUTPATIENT)
Dept: HEMATOLOGY/ONCOLOGY | Facility: HOSPITAL | Age: 44
End: 2024-03-18

## 2024-03-18 DIAGNOSIS — D57.00 SICKLE CELL DISEASE WITH CRISIS (MULTI): ICD-10-CM

## 2024-03-18 DIAGNOSIS — A63.0 CONDYLOMA: ICD-10-CM

## 2024-03-18 PROCEDURE — 54056 CRYOSURGERY PENIS LESION(S): CPT | Performed by: STUDENT IN AN ORGANIZED HEALTH CARE EDUCATION/TRAINING PROGRAM

## 2024-03-18 RX ORDER — HYDROMORPHONE HYDROCHLORIDE 4 MG/1
4 TABLET ORAL EVERY 4 HOURS PRN
Qty: 42 TABLET | Refills: 0 | Status: SHIPPED | OUTPATIENT
Start: 2024-03-18 | End: 2024-03-22 | Stop reason: SDUPTHER

## 2024-03-18 ASSESSMENT — DERMATOLOGY QUALITY OF LIFE (QOL) ASSESSMENT
RATE HOW EMOTIONALLY BOTHERED YOU ARE BY YOUR SKIN PROBLEM (FOR EXAMPLE, WORRY, EMBARRASSMENT, FRUSTRATION): 0 - NEVER BOTHERED
RATE HOW BOTHERED YOU ARE BY SYMPTOMS OF YOUR SKIN PROBLEM (EG, ITCHING, STINGING BURNING, HURTING OR SKIN IRRITATION): 0 - NEVER BOTHERED
WHAT SINGLE SKIN CONDITION LISTED BELOW IS THE PATIENT ANSWERING THE QUALITY-OF-LIFE ASSESSMENT QUESTIONS ABOUT: NONE OF THE ABOVE
RATE HOW BOTHERED YOU ARE BY EFFECTS OF YOUR SKIN PROBLEMS ON YOUR ACTIVITIES (EG, GOING OUT, ACCOMPLISHING WHAT YOU WANT, WORK ACTIVITIES OR YOUR RELATIONSHIPS WITH OTHERS): 0 - NEVER BOTHERED
ARE THERE EXCLUSIONS OR EXCEPTIONS FOR THE QUALITY OF LIFE ASSESSMENT: NO
DATE THE QUALITY-OF-LIFE ASSESSMENT WAS COMPLETED: 66917

## 2024-03-18 ASSESSMENT — DERMATOLOGY PATIENT ASSESSMENT
HAVE YOU HAD OR DO YOU HAVE A STAPH INFECTION: NO
HAVE YOU HAD OR DO YOU HAVE VASCULAR DISEASE: NO
DO YOU USE A TANNING BED: NO
ARE YOU AN ORGAN TRANSPLANT RECIPIENT: NO
DO YOU HAVE ANY NEW OR CHANGING LESIONS: NO

## 2024-03-18 ASSESSMENT — PATIENT GLOBAL ASSESSMENT (PGA): PATIENT GLOBAL ASSESSMENT: PATIENT GLOBAL ASSESSMENT:  1 - CLEAR

## 2024-03-18 ASSESSMENT — ITCH NUMERIC RATING SCALE: HOW SEVERE IS YOUR ITCHING?: 0

## 2024-03-18 NOTE — PROGRESS NOTES
Subjective     Xu Maya is a 43 y.o. male who presents for the following: Condyloma (Follow up).     Review of Systems:  No other skin or systemic complaints other than what is documented elsewhere in the note.    The following portions of the chart were reviewed this encounter and updated as appropriate:          Skin Cancer History  No skin cancer on file.      Specialty Problems          Dermatology Problems    Dermatitis, unspecified    Ichthyosis vulgaris    Other seborrheic dermatitis    Other specified dermatitis    Pruritus, unspecified    Urticaria, unspecified    Periorificial dermatitis    Pruritus        Objective   Well appearing patient in no apparent distress; mood and affect are within normal limits.    A focused skin examination was performed. All findings within normal limits unless otherwise noted below.    Assessment/Plan   1. Condyloma (6)  Pubic    6condylomatous lesions treated with liquid nitrogen    Related Procedures  Follow Up In Dermatology - Established Patient    Improved  2 cycles of cryotherapy,10 seconds each, all in suprapubic area  Podofilox was not received so will forgo additional attempts to re-send since cryoptherapy is working well

## 2024-03-18 NOTE — TELEPHONE ENCOUNTER
Xu P Crayton called the refill line for Dilaudid. Requesting refills be sent to [facility] pharmacy; message sent to Sickle Cell team to submit.

## 2024-03-22 ENCOUNTER — TELEPHONE (OUTPATIENT)
Dept: ADMISSION | Facility: HOSPITAL | Age: 44
End: 2024-03-22
Payer: COMMERCIAL

## 2024-03-22 DIAGNOSIS — D57.00 SICKLE CELL DISEASE WITH CRISIS (MULTI): ICD-10-CM

## 2024-03-22 RX ORDER — HYDROMORPHONE HYDROCHLORIDE 4 MG/1
4 TABLET ORAL EVERY 4 HOURS PRN
Qty: 42 TABLET | Refills: 0 | Status: SHIPPED | OUTPATIENT
Start: 2024-03-22 | End: 2024-03-25 | Stop reason: SDUPTHER

## 2024-03-25 ENCOUNTER — TELEPHONE (OUTPATIENT)
Dept: ADMISSION | Facility: HOSPITAL | Age: 44
End: 2024-03-25
Payer: COMMERCIAL

## 2024-03-25 DIAGNOSIS — D57.00 SICKLE CELL CRISIS (MULTI): ICD-10-CM

## 2024-03-25 DIAGNOSIS — D57.00 SICKLE CELL DISEASE WITH CRISIS (MULTI): ICD-10-CM

## 2024-03-25 RX ORDER — CYCLOBENZAPRINE HCL 10 MG
10 TABLET ORAL 3 TIMES DAILY
Qty: 30 TABLET | Refills: 0 | OUTPATIENT
Start: 2024-03-25 | End: 2024-05-14

## 2024-03-25 RX ORDER — HYDROMORPHONE HYDROCHLORIDE 4 MG/1
4 TABLET ORAL EVERY 4 HOURS PRN
Qty: 42 TABLET | Refills: 0 | Status: SHIPPED | OUTPATIENT
Start: 2024-03-25 | End: 2024-04-01 | Stop reason: SDUPTHER

## 2024-04-01 ENCOUNTER — TELEPHONE (OUTPATIENT)
Dept: HEMATOLOGY/ONCOLOGY | Facility: HOSPITAL | Age: 44
End: 2024-04-01

## 2024-04-01 ENCOUNTER — TELEPHONE (OUTPATIENT)
Dept: ADMISSION | Facility: HOSPITAL | Age: 44
End: 2024-04-01
Payer: COMMERCIAL

## 2024-04-01 ENCOUNTER — OFFICE VISIT (OUTPATIENT)
Dept: HEMATOLOGY/ONCOLOGY | Facility: HOSPITAL | Age: 44
End: 2024-04-01
Payer: COMMERCIAL

## 2024-04-01 VITALS
TEMPERATURE: 97.7 F | BODY MASS INDEX: 22.24 KG/M2 | WEIGHT: 141.98 LBS | SYSTOLIC BLOOD PRESSURE: 108 MMHG | RESPIRATION RATE: 16 BRPM | HEART RATE: 75 BPM | OXYGEN SATURATION: 96 % | DIASTOLIC BLOOD PRESSURE: 75 MMHG

## 2024-04-01 DIAGNOSIS — D57.00 SICKLE CELL CRISIS (MULTI): Primary | ICD-10-CM

## 2024-04-01 DIAGNOSIS — D57.00 SICKLE CELL CRISIS (MULTI): ICD-10-CM

## 2024-04-01 DIAGNOSIS — D57.00 SICKLE CELL DISEASE WITH CRISIS (MULTI): ICD-10-CM

## 2024-04-01 DIAGNOSIS — R52 ACUTE PAIN: Primary | ICD-10-CM

## 2024-04-01 LAB
ALBUMIN SERPL BCP-MCNC: 4.2 G/DL (ref 3.4–5)
ALP SERPL-CCNC: 102 U/L (ref 33–120)
ALT SERPL W P-5'-P-CCNC: 11 U/L (ref 10–52)
ANION GAP SERPL CALC-SCNC: 12 MMOL/L (ref 10–20)
AST SERPL W P-5'-P-CCNC: 14 U/L (ref 9–39)
BASOPHILS # BLD AUTO: 0.04 X10*3/UL (ref 0–0.1)
BASOPHILS NFR BLD AUTO: 0.4 %
BILIRUB SERPL-MCNC: 0.7 MG/DL (ref 0–1.2)
BUN SERPL-MCNC: 9 MG/DL (ref 6–23)
CALCIUM SERPL-MCNC: 9.2 MG/DL (ref 8.6–10.3)
CHLORIDE SERPL-SCNC: 107 MMOL/L (ref 98–107)
CO2 SERPL-SCNC: 26 MMOL/L (ref 21–32)
CREAT SERPL-MCNC: 0.86 MG/DL (ref 0.5–1.3)
EGFRCR SERPLBLD CKD-EPI 2021: >90 ML/MIN/1.73M*2
EOSINOPHIL # BLD AUTO: 0.28 X10*3/UL (ref 0–0.7)
EOSINOPHIL NFR BLD AUTO: 2.9 %
ERYTHROCYTE [DISTWIDTH] IN BLOOD BY AUTOMATED COUNT: 13.3 % (ref 11.5–14.5)
GLUCOSE SERPL-MCNC: 89 MG/DL (ref 74–99)
HCT VFR BLD AUTO: 44.4 % (ref 41–52)
HGB BLD-MCNC: 15.8 G/DL (ref 13.5–17.5)
HGB RETIC QN: 34 PG (ref 28–38)
IMM GRANULOCYTES # BLD AUTO: 0.03 X10*3/UL (ref 0–0.7)
IMM GRANULOCYTES NFR BLD AUTO: 0.3 % (ref 0–0.9)
IMMATURE RETIC FRACTION: 4.5 %
LDH SERPL L TO P-CCNC: 158 U/L (ref 84–246)
LYMPHOCYTES # BLD AUTO: 3.85 X10*3/UL (ref 1.2–4.8)
LYMPHOCYTES NFR BLD AUTO: 39.8 %
MCH RBC QN AUTO: 31.7 PG (ref 26–34)
MCHC RBC AUTO-ENTMCNC: 35.6 G/DL (ref 32–36)
MCV RBC AUTO: 89 FL (ref 80–100)
MONOCYTES # BLD AUTO: 0.75 X10*3/UL (ref 0.1–1)
MONOCYTES NFR BLD AUTO: 7.7 %
NEUTROPHILS # BLD AUTO: 4.73 X10*3/UL (ref 1.2–7.7)
NEUTROPHILS NFR BLD AUTO: 48.9 %
NRBC BLD-RTO: 0 /100 WBCS (ref 0–0)
PLATELET # BLD AUTO: 241 X10*3/UL (ref 150–450)
POTASSIUM SERPL-SCNC: 3.9 MMOL/L (ref 3.5–5.3)
PROT SERPL-MCNC: 6.8 G/DL (ref 6.4–8.2)
RBC # BLD AUTO: 4.99 X10*6/UL (ref 4.5–5.9)
RETICS #: 0.06 X10*6/UL (ref 0.02–0.12)
RETICS/RBC NFR AUTO: 1.3 % (ref 0.5–2)
SODIUM SERPL-SCNC: 141 MMOL/L (ref 136–145)
WBC # BLD AUTO: 9.7 X10*3/UL (ref 4.4–11.3)

## 2024-04-01 PROCEDURE — 2500000005 HC RX 250 GENERAL PHARMACY W/O HCPCS: Performed by: NURSE PRACTITIONER

## 2024-04-01 PROCEDURE — 36415 COLL VENOUS BLD VENIPUNCTURE: CPT

## 2024-04-01 PROCEDURE — 99215 OFFICE O/P EST HI 40 MIN: CPT | Performed by: NURSE PRACTITIONER

## 2024-04-01 PROCEDURE — 99417 PROLNG OP E/M EACH 15 MIN: CPT

## 2024-04-01 PROCEDURE — 83615 LACTATE (LD) (LDH) ENZYME: CPT

## 2024-04-01 PROCEDURE — 85045 AUTOMATED RETICULOCYTE COUNT: CPT

## 2024-04-01 PROCEDURE — 80053 COMPREHEN METABOLIC PANEL: CPT

## 2024-04-01 PROCEDURE — 2500000001 HC RX 250 WO HCPCS SELF ADMINISTERED DRUGS (ALT 637 FOR MEDICARE OP): Performed by: NURSE PRACTITIONER

## 2024-04-01 PROCEDURE — 99215 OFFICE O/P EST HI 40 MIN: CPT | Mod: ZK | Performed by: NURSE PRACTITIONER

## 2024-04-01 PROCEDURE — 99417 PROLNG OP E/M EACH 15 MIN: CPT | Mod: ZK | Performed by: NURSE PRACTITIONER

## 2024-04-01 PROCEDURE — 85025 COMPLETE CBC W/AUTO DIFF WBC: CPT

## 2024-04-01 PROCEDURE — 99417 PROLNG OP E/M EACH 15 MIN: CPT | Performed by: NURSE PRACTITIONER

## 2024-04-01 RX ORDER — HYDROMORPHONE HYDROCHLORIDE 4 MG/1
12 TABLET ORAL
Status: COMPLETED | OUTPATIENT
Start: 2024-04-01 | End: 2024-04-01

## 2024-04-01 RX ORDER — ONDANSETRON HYDROCHLORIDE 8 MG/1
8 TABLET, FILM COATED ORAL ONCE
Status: COMPLETED | OUTPATIENT
Start: 2024-04-01 | End: 2024-04-01

## 2024-04-01 RX ORDER — HYDROMORPHONE HYDROCHLORIDE 4 MG/1
4 TABLET ORAL EVERY 4 HOURS PRN
Qty: 42 TABLET | Refills: 0 | Status: SHIPPED | OUTPATIENT
Start: 2024-04-01 | End: 2024-04-03 | Stop reason: ALTCHOICE

## 2024-04-01 RX ORDER — NALOXONE HYDROCHLORIDE 0.4 MG/ML
0.4 INJECTION, SOLUTION INTRAMUSCULAR; INTRAVENOUS; SUBCUTANEOUS
Status: DISCONTINUED | OUTPATIENT
Start: 2024-04-01 | End: 2024-04-01 | Stop reason: HOSPADM

## 2024-04-01 RX ORDER — DIPHENHYDRAMINE HCL 25 MG
25 CAPSULE ORAL ONCE
Status: COMPLETED | OUTPATIENT
Start: 2024-04-01 | End: 2024-04-01

## 2024-04-01 RX ADMIN — DIPHENHYDRAMINE HYDROCHLORIDE 25 MG: 25 CAPSULE ORAL at 10:53

## 2024-04-01 RX ADMIN — HYDROMORPHONE HYDROCHLORIDE 12 MG: 4 TABLET ORAL at 12:05

## 2024-04-01 RX ADMIN — HYDROMORPHONE HYDROCHLORIDE 12 MG: 4 TABLET ORAL at 13:07

## 2024-04-01 RX ADMIN — ONDANSETRON HYDROCHLORIDE 8 MG: 8 TABLET, FILM COATED ORAL at 10:53

## 2024-04-01 RX ADMIN — HYDROMORPHONE HYDROCHLORIDE 12 MG: 4 TABLET ORAL at 10:52

## 2024-04-01 ASSESSMENT — PAIN DESCRIPTION - ORIENTATION: ORIENTATION: LEFT;RIGHT

## 2024-04-01 ASSESSMENT — PAIN SCALES - GENERAL
PAINLEVEL: 8
PAINLEVEL_OUTOF10: 8
PAINLEVEL_OUTOF10: 5 - MODERATE PAIN
PAINLEVEL_OUTOF10: 7

## 2024-04-01 ASSESSMENT — PAIN - FUNCTIONAL ASSESSMENT
PAIN_FUNCTIONAL_ASSESSMENT: 0-10
PAIN_FUNCTIONAL_ASSESSMENT: 0-10

## 2024-04-01 ASSESSMENT — PAIN DESCRIPTION - LOCATION: LOCATION: LEG

## 2024-04-01 NOTE — PROGRESS NOTES
Patient ID:  Xu Maya is a 43 y.o. male.  Subjective   Chief Complaint: Uncontrolled Pain    HPI  Xu is a 43 y.o. male with pmh of sickle cell disease s/p transplant, who presents to [facility] with pain in bilat legs and lower back pain typical of his baseline pain. He reports it started yesterday he has taken his home dilaudid, last at 0400 8 mg without enough relief. Pt denies chest pain, cough, SOB, headaches, blurry vision, falls, fever or chills, n/v/d/abd pain, or urinary complaints. He denies any illicit drug use.      ROS  Review of Systems - Oncology     Allergies  Allergies   Allergen Reactions    Hydrocodone-Acetaminophen Unknown    Naproxen Unknown        Medications  Current Outpatient Medications   Medication Instructions    amitriptyline (ELAVIL) 25 mg, oral    ARIPiprazole (ABILIFY) 5 mg, oral, Daily    cholecalciferol (VITAMIN D-3) 2,000 Units, oral    cyclobenzaprine (FLEXERIL) 10 mg, oral, 3 times daily    diphenhydrAMINE (BENADRYL) 25 mg, oral, Every 6 hours PRN    DULoxetine (CYMBALTA) 60 mg, oral, 2 times daily    ergocalciferol (Vitamin D-2) 1.25 MG (29930 UT) capsule 1 capsule, oral, Weekly    gabapentin (NEURONTIN) 300 mg, oral, 2 times daily    HYDROmorphone (DILAUDID) 4 mg, oral, Every 4 hours PRN    ketoconazole (NIZOral) 2 % cream Topical, 2 times daily    methadone (DOLOPHINE) 5 mg, oral, Every 12 hours    mirtazapine (REMERON) 7.5 mg, oral, Nightly    naloxone (NARCAN) 4 mg, nasal, As needed, 1 spray to nostril for overdose, may repeat every 2-3 minutes until medical assistance arrives<BR>    omeprazole (PRILOSEC) 40 mg, oral, Daily before breakfast, TAKE ONE CAPSULE BY MOUTH EVERY DAY IN THE MORNING BEFORE EATING    Rezurock 200 mg tablet Take one (1) tablet by mouth twice daily    tacrolimus (Protopic) 0.1 % ointment APPLY 1-2 TIMES PER WEEK        Past Medical History:   Past Medical History:  04/17/2017: Anxiety disorder, unspecified      Comment:  Anxiety  04/17/2017:  Depression, unspecified      Comment:  Depression  03/02/2017: Family history of glaucoma      Comment:  Family history of glaucoma in father  04/17/2017: Gastritis, unspecified, without bleeding      Comment:  Gastritis  04/17/2017: Hematuria, unspecified      Comment:  Hematuria  12/02/2015: Personal history of other diseases of the digestive system      Comment:  History of esophageal reflux  04/17/2017: Priapism, unspecified      Comment:  Priapism  04/17/2017: Sickle-cell disease without crisis (CMS/[facility])      Comment:  Sickle cell anemia   Surgical History:    Past Surgical History:   Procedure Laterality Date    GALLBLADDER SURGERY  04/12/2017    Gallbladder Surgery    IR CVC TUNNELED  4/24/2018    IR CVC TUNNELED 4/24/2018 [facility]    IR CVC TUNNELED  6/5/2018    IR CVC TUNNELED 6/5/2018 [facility]    IR CVC TUNNELED  3/1/2019    IR CVC TUNNELED 3/1/2019 [facility]    IR CVC TUNNELED  6/4/2019    IR CVC TUNNELED 6/4/2019 [facility]    IR CVC TUNNELED  4/5/2019    IR CVC TUNNELED 4/5/2019 [facility]    IR CVC TUNNELED  7/17/2019    IR CVC TUNNELED 7/17/2019 [facility]    IR CVC TUNNELED  8/14/2019    IR CVC TUNNELED 8/14/2019 [facility]    IR CVC TUNNELED  12/18/2019    IR CVC TUNNELED 12/18/2019 [facility]    IR CVC TUNNELED  10/9/2019    IR CVC TUNNELED 10/9/2019 [facility]    IR CVC TUNNELED  11/13/2019    IR CVC TUNNELED 11/13/2019 [facility] CL[facility]    IR CVC TUNNELED  1/15/2020    IR CVC TUNNELED 1/15/2020 [facility] CL[facility]ICAL [facility]    IR CVC TUNNELED  2/19/2020    IR CVC TUNNELED 2/19/2020 [facility] CL[facility]    IR CVC TUNNELED  1/4/2021    IR CVC TUNNELED 1/4/2021 [facility]    IR CVC TUNNELED  1/25/2021    IR CVC TUNNELED 1/25/2021 [facility]    IR CVC TUNNELED  8/21/2018    IR CVC TUNNELED 8/21/2018 [facility]    IR CVC TUNNELED  9/26/2018    IR CVC TUNNELED 9/26/2018 [facility]    IR CVC TUNNELED  11/5/2018    IR CVC TUNNELED  11/5/2018 [facility]    IR CVC TUNNELED  12/19/2018    IR CVC TUNNELED 12/19/2018 [facility]    IR VENOGRAM HEPATIC  11/8/2017    IR VENOGRAM HEPATIC 11/8/2017 [facility]    MR HEAD ANGIO WO IV CONTRAST  2/17/2017    MR HEAD ANGIO WO IV CONTRAST 2/17/2017 [facility] CL[facility]ICAL [facility]GAC[facility]    MR NECK ANGIO WO IV CONTRAST  2/17/2017    MR NECK ANGIO WO IV CONTRAST 2/17/2017 [facility] CL[facility]ICAL [facility]    US GUIDED NEEDLE LIVER BIOPSY  9/8/2020    US GUIDED NEEDLE LIVER BIOPSY 9/8/2020 [facility]      Family History:    Family History   Problem Relation Name Age of Onset    Diabetes Father      Sickle cell anemia Other sibling     Cancer Other grandfather     Cancer Other uncle      Family Oncology History:    Cancer-related family history includes Cancer in some other family members.  Social History:    Social History     Tobacco Use    Smoking status: Every Day     Types: Cigarettes     Start date: 1/1/1998     Passive exposure: Current    Smokeless tobacco: Never   Substance Use Topics    Alcohol use: Not Currently     Comment: Occasional    Drug use: Not Currently     Types: Marijuana     Comment: Last use sometime in 2023.  No intention to re-start.        Objective   Vitals: /75 (BP Location: Right arm, Patient Position: Sitting, BP Cuff Size: Adult)   Pulse 75   Temp 36.5 °C (97.7 °F) (Temporal)   Resp 16   Wt 64.4 kg (141 lb 15.6 oz)   SpO2 96%   BMI 22.24 kg/m²   Weight:   Vitals:    04/01/24 1038   Weight: 64.4 kg (141 lb 15.6 oz)       Physical Exam  Vitals reviewed.   Constitutional:       Appearance: Normal appearance.   HENT:      Nose: Nose normal.      Mouth/Throat:      Mouth: Mucous membranes are moist.      Pharynx: Oropharynx is clear.   Eyes:      Conjunctiva/sclera: Conjunctivae normal.      Pupils: Pupils are equal, round, and reactive to light.   Cardiovascular:      Rate and Rhythm: Normal rate and regular rhythm.      Pulses: Normal pulses.      Heart sounds: Normal heart sounds.    Pulmonary:      Effort: Pulmonary effort is normal.      Breath sounds: Normal breath sounds.   Abdominal:      General: Abdomen is flat. Bowel sounds are normal.      Palpations: Abdomen is soft.   Skin:     General: Skin is warm and dry.   Neurological:      General: No focal deficit present.      Mental Status: He is alert and oriented to person, place, and time.   Psychiatric:         Mood and Affect: Mood normal.         Behavior: Behavior normal.         Diagnostic Results     Labs  Results from last 7 days   Lab Units 04/01/24  1048   WBC AUTO x10*3/uL 9.7   HEMOGLOBIN g/dL 15.8   HEMATOCRIT % 44.4   PLATELETS AUTO x10*3/uL 241   NEUTROS ABS x10*3/uL 4.73   LYMPHS ABS AUTO x10*3/uL 3.85   MONOS ABS AUTO x10*3/uL 0.75   EOS ABS AUTO x10*3/uL 0.28   NEUTROS PCT AUTO % 48.9   LYMPHS PCT AUTO % 39.8   MONOS PCT AUTO % 7.7   EOS PCT AUTO % 2.9      Results from last 7 days   Lab Units 04/01/24  1048   GLUCOSE mg/dL 89   SODIUM mmol/L 141   POTASSIUM mmol/L 3.9   CHLORIDE mmol/L 107   CO2 mmol/L 26   BUN mg/dL 9   CREATININE mg/dL 0.86   EGFR mL/min/1.73m*2 >90   CALCIUM mg/dL 9.2   ALBUMIN g/dL 4.2   PROTEIN TOTAL g/dL 6.8     Results from last 7 days   Lab Units 04/01/24  1048   BILIRUBIN TOTAL mg/dL 0.7   ALK PHOS U/L 102   ALT U/L 11   AST U/L 14         Results from last 7 days   Lab Units 04/01/24  1048   RETIC CT PCT % 1.3   RETIC CT ABS x10*6/uL 0.063   IMMATURE RETIC FRACTION % 4.5   RETIC HGB pg 34     Results from last 7 days   Lab Units 04/01/24  1048   LD U/L 158         Lab Results   Component Value Date    HGB 15.8 04/01/2024    HGB 16.1 02/26/2024    HGB 14.0 02/07/2024     04/01/2024     02/26/2024     02/07/2024     04/01/2024     02/26/2024     (H) 02/07/2024       Images  === 02/07/24 ===    XR CHEST 2 VIEWS    - Impression -  1.  No evidence of acute cardiopulmonary process. Emphysema.        MACRO:  None    Signed by: Jared Jimenez 2/7/2024 4:42  AM  Dictation workstation:   [id number]   === 10/23/21 ===    CT ABDOMEN PELVIS W IV CONTRAST    - Impression -  There is a pancolitis.  In a sickle cell patient who has received a  bone marrow transplant, differential includes graft versus host  disease as well as ischemic/venoocclusive colitis.  Inflammatory or  infectious colitis would also be in the differential.  No bowel  obstruction.  There is a small amount of pelvic free fluid.  No  pneumatosis or portal venous gas.    The appendix is seen with no evidence of acute appendicitis.    Minimal patchy opacity in the lower lobes, raising the possibility of  changes of nofbo-esuyio-lwjt disease, pneumonia or sickle cell crisis.  Consider early or mild interstitial fibrosis.    Cholecystectomy.  No biliary ductal dilatation.  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    Consider cystitis which can also represent a manifestation of  rvjnp-hilfbg-yavk disease as well as infection.  Clinical correlation  is needed.    Osseous changes consistent with known sickle cell disease.  Mild  cardiomegaly consistent with known sickle cell disease.    NOTIFICATION:  The critical results of the study were discussed with,  and acknowledged by Dr. Bety Carr  by telephone on 10/23/2021 at  1322 hours.            Signed by Allyson Padgett MD     No echocardiogram results found for the past 12 months       Assessment/Plan   Xu Maya is a 43 y.o. male with pmh of sickle cell disease s/p transplant, who presents to [facility] with pain in bilat legs and lower back pain typical of his baseline pain.         ACC Course  - VSS  - Hemolysis labs near baseline, no indication of acute vaso-occlusive crisis or indication for blood transfusion   - dilaudid 12mg PO x3 doses given for sickle cell related pain  - Zofran 8 mg once for opioid induced nausea/vomiting  - Benadryl 25 mg once for opioid induced pruritus     Disposition  - Patient discharged home with no  further needs following ACC Course  - Return to clinic/ED instructions given            Bre Carson, APRN-CNP

## 2024-04-01 NOTE — PROGRESS NOTES
13:10- patient discharged from [facility]. Patient tolerated pain medication doses. Patient's ride waiting up front.

## 2024-04-01 NOTE — TELEPHONE ENCOUNTER
Xu P Crayton called the refill line for Dilaudid 4 mg. Requesting refills be sent to [facility] pharmacy; message sent to Sickle Cell team to submit.

## 2024-04-03 ENCOUNTER — TELEPHONE (OUTPATIENT)
Dept: PALLIATIVE MEDICINE | Facility: HOSPITAL | Age: 44
End: 2024-04-03
Payer: COMMERCIAL

## 2024-04-03 ENCOUNTER — CLINICAL SUPPORT (OUTPATIENT)
Dept: EMERGENCY MEDICINE | Facility: HOSPITAL | Age: 44
End: 2024-04-03
Payer: COMMERCIAL

## 2024-04-03 ENCOUNTER — OFFICE VISIT (OUTPATIENT)
Dept: PALLIATIVE MEDICINE | Facility: HOSPITAL | Age: 44
End: 2024-04-03
Payer: COMMERCIAL

## 2024-04-03 VITALS
BODY MASS INDEX: 22.48 KG/M2 | RESPIRATION RATE: 16 BRPM | TEMPERATURE: 97 F | SYSTOLIC BLOOD PRESSURE: 110 MMHG | OXYGEN SATURATION: 98 % | DIASTOLIC BLOOD PRESSURE: 75 MMHG | WEIGHT: 143.5 LBS | HEART RATE: 69 BPM

## 2024-04-03 DIAGNOSIS — Z51.5 PALLIATIVE CARE ENCOUNTER: ICD-10-CM

## 2024-04-03 DIAGNOSIS — Z79.891 ENCOUNTER FOR MONITORING OPIOID MAINTENANCE THERAPY: Primary | ICD-10-CM

## 2024-04-03 DIAGNOSIS — Z51.81 ENCOUNTER FOR MONITORING OPIOID MAINTENANCE THERAPY: Primary | ICD-10-CM

## 2024-04-03 DIAGNOSIS — T40.2X5A CONSTIPATION DUE TO OPIOID THERAPY: ICD-10-CM

## 2024-04-03 DIAGNOSIS — R52 ACUTE PAIN: ICD-10-CM

## 2024-04-03 DIAGNOSIS — K59.03 CONSTIPATION DUE TO OPIOID THERAPY: ICD-10-CM

## 2024-04-03 LAB
AMPHETAMINES UR QL SCN: ABNORMAL
ATRIAL RATE: 73 BPM
BARBITURATES UR QL SCN: ABNORMAL
BENZODIAZ UR QL SCN: ABNORMAL
BZE UR QL SCN: ABNORMAL
CANNABINOIDS UR QL SCN: ABNORMAL
FENTANYL+NORFENTANYL UR QL SCN: ABNORMAL
METHADONE UR QL SCN: ABNORMAL
METHADONE UR QL SCN: ABNORMAL
OPIATES UR QL SCN: ABNORMAL
OXYCODONE+OXYMORPHONE UR QL SCN: ABNORMAL
P AXIS: 64 DEGREES
P OFFSET: 182 MS
P ONSET: 131 MS
PCP UR QL SCN: ABNORMAL
PR INTERVAL: 160 MS
Q ONSET: 211 MS
QRS COUNT: 12 BEATS
QRS DURATION: 88 MS
QT INTERVAL: 428 MS
QTC CALCULATION(BAZETT): 471 MS
QTC FREDERICIA: 457 MS
R AXIS: -55 DEGREES
T AXIS: -17 DEGREES
T OFFSET: 425 MS
VENTRICULAR RATE: 73 BPM

## 2024-04-03 PROCEDURE — 80358 DRUG SCREENING METHADONE: CPT | Performed by: INTERNAL MEDICINE

## 2024-04-03 PROCEDURE — 80365 DRUG SCREENING OXYCODONE: CPT | Performed by: INTERNAL MEDICINE

## 2024-04-03 PROCEDURE — 93010 ELECTROCARDIOGRAM REPORT: CPT | Performed by: INTERNAL MEDICINE

## 2024-04-03 PROCEDURE — 99214 OFFICE O/P EST MOD 30 MIN: CPT | Performed by: INTERNAL MEDICINE

## 2024-04-03 PROCEDURE — 80307 DRUG TEST PRSMV CHEM ANLYZR: CPT | Performed by: INTERNAL MEDICINE

## 2024-04-03 PROCEDURE — 93005 ELECTROCARDIOGRAM TRACING: CPT

## 2024-04-03 RX ORDER — SENNOSIDES 8.6 MG/1
2 TABLET ORAL 2 TIMES DAILY
Qty: 120 TABLET | Refills: 11 | Status: SHIPPED | OUTPATIENT
Start: 2024-04-03 | End: 2024-05-01 | Stop reason: SDUPTHER

## 2024-04-03 RX ORDER — METHADONE HYDROCHLORIDE 10 MG/1
10 TABLET ORAL EVERY 12 HOURS
Qty: 60 TABLET | Refills: 0 | Status: SHIPPED | OUTPATIENT
Start: 2024-04-05 | End: 2024-04-10 | Stop reason: SDUPTHER

## 2024-04-03 RX ORDER — HYDROMORPHONE HYDROCHLORIDE 8 MG/1
8 TABLET ORAL 3 TIMES DAILY PRN
Qty: 35 TABLET | Refills: 0 | Status: SHIPPED | OUTPATIENT
Start: 2024-04-05 | End: 2024-04-10 | Stop reason: SDUPTHER

## 2024-04-03 ASSESSMENT — PAIN SCALES - GENERAL
PAINLEVEL: 5
PAINLEVEL: 5 - MODERATE PAIN

## 2024-04-03 NOTE — PATIENT INSTRUCTIONS
As discussed today, our plan is:     #Chronic pain 2/2 Hgb SS disease   -you reported that your pain is well managed compared to previous regimen, however you continue to endorse pain. We have made some adjustments to your medications. We have increased your methadone dose to 10 mg twice daily, and dilaudid 8mg three times for the first week as needed. Second week, take dilaudid 8 mg twice daily as needed.    #Constipation   -you reported history of constipation, only having bowel movements twice per week. Please take Senna twice daily.     #Drowsiness   You reported increased daytime sleepiness, we suspect this is due to Flexeril, please stop taking this medication       Return to clinic in 3 weeks.  ------  No shows: It is understandable if you are unable to make it to a visit, but please cancel your appointment instead of not showing up. This helps to give other patients access to primary care and keeps wait times low.      Tito Jones MD, MPH  Internal Medicine   Palliative Clinic

## 2024-04-03 NOTE — PROGRESS NOTES
Supportive Oncology Established Patient Note    Patient ID: Xu Maya is a 43 y.o. male who presents for Follow-up.    HPI  Xu Maya is a 43 yr M, w/ PMHx of  Hemoglobin SS disease s/p Haplo SCT (2021), who follows closely with Dr. Luna for disease surveillance and management. Patient presents to palliative clinic for pain medication evaluation and adjustment.   Patient reports subpar pain management with current regimen. Reports running out of medications at times prior to the next refill. He rates his baseline chronic pain 8/10, and then 5/10 after taking his pain meds. Furthermore patient reports increased drowsiness, and has fallen a sleep several times at work. He reports taking flexeril during the day, in addition to  addition to taking Abilify and duloxetine. Patient reports visiting [facility] clinic due to poor pain management.   Patient denies any CP, SOB, N/V, abdominal pain, however reports constipation. Reports 2BM's per week, not taking any bowel regimen currently.  Symptoms  [place] Symptom Assessment Scores  Pain Score: 5 - Moderate pain Drowsiness Score: 7     Appetite Score: Best appetite   Nausea Score: Not nauseated Wellbeing Score: Best feeling of wellbeing   Depression Score: Not depressed Dyspnea Score: No shortness of breath   Anxiety Score: Not anxious         Allergies  Hydrocodone-acetaminophen and Naproxen     Objective:     Last Recorded Vitals  Blood pressure 110/75, pulse 69, temperature 36.1 °C (97 °F), temperature source Temporal, resp. rate 16, weight 65.1 kg (143 lb 8 oz), SpO2 98 %.    Physical Exam:  Constitutional:       General: Patient is not in acute distress.  HENT:      Head: Normocephalic.      Mouth: Mucous membranes are moist.   Eyes:      Conjunctiva/sclera: Conjunctivae clear, sclerae white. No discharge.     Pupils: Pupils are equal, round, and reactive to light.   Neck:      Vascular: No carotid bruit.   Cardiovascular:      Rate and Rhythm: Normal rate and  regular rhythm.      Heart sounds: No murmur heard.  Pulmonary:      Effort: No respiratory distress.      Breath sounds: Clear to auscultation  Abdominal:      General: There is no distension.      Tenderness: There is no abdominal tenderness. There is no guarding.   Musculoskeletal:         General: No deformity. Dactylitis B/L UE digits.  Skin:     Coloration: Skin is not jaundiced.   Neurological:      General: No focal deficit present.      Mental Status: He is oriented to person, place, and time.   Psychiatric:         Behavior: Behavior normal. Behavior is cooperative.          Relevant Results  Lab Results   Component Value Date    WBC 9.7 04/01/2024    HGB 15.8 04/01/2024    HCT 44.4 04/01/2024    MCV 89 04/01/2024     04/01/2024      Lab Results   Component Value Date    GLUCOSE 89 04/01/2024    CALCIUM 9.2 04/01/2024     04/01/2024    K 3.9 04/01/2024    CO2 26 04/01/2024     04/01/2024    BUN 9 04/01/2024    CREATININE 0.86 04/01/2024      Lab Results   Component Value Date    ALT 11 04/01/2024    AST 14 04/01/2024    ALKPHOS 102 04/01/2024    BILITOT 0.7 04/01/2024        Relevant Imaging   No results found for this or any previous visit from the past 1000 days.     No image results found.       Medications:   Current Outpatient Medications   Medication Instructions    amitriptyline (ELAVIL) 25 mg, oral    ARIPiprazole (ABILIFY) 5 mg, oral, Daily    cholecalciferol (VITAMIN D-3) 2,000 Units, oral    cyclobenzaprine (FLEXERIL) 10 mg, oral, 3 times daily    diphenhydrAMINE (BENADRYL) 25 mg, oral, Every 6 hours PRN    DULoxetine (CYMBALTA) 60 mg, oral, 2 times daily    ergocalciferol (Vitamin D-2) 1.25 MG (02325 UT) capsule 1 capsule, oral, Weekly    gabapentin (NEURONTIN) 300 mg, oral, 2 times daily    HYDROmorphone (DILAUDID) 4 mg, oral, Every 4 hours PRN    ketoconazole (NIZOral) 2 % cream Topical, 2 times daily    methadone (DOLOPHINE) 5 mg, oral, Every 12 hours    mirtazapine  (REMERON) 7.5 mg, oral, Nightly    naloxone (NARCAN) 4 mg, nasal, As needed, 1 spray to nostril for overdose, may repeat every 2-3 minutes until medical assistance arrives<BR>    omeprazole (PRILOSEC) 40 mg, oral, Daily before breakfast, TAKE ONE CAPSULE BY MOUTH EVERY DAY IN THE MORNING BEFORE EATING    Rezurock 200 mg tablet Take one (1) tablet by mouth twice daily    tacrolimus (Protopic) 0.1 % ointment APPLY 1-2 TIMES PER WEEK        Assessment and Plan  Impression:  Xu Maya is a 43 yr M, w/ PMHx of  Hemoglobin SS disease s/p Haplo SCT (2021), who follows closely with Dr. Luna for disease surveillance and management. Patient presents to palliative clinic for pain medication evaluation and adjustment.     Problems:  # Hemoglobin SS disease  #Chronic Pain   - Haplo SCT from his brother (6/12 HLA match)   - T=0, 2/4/21  -Pain well managed compared to prior regimen. He reports chronic pain 8/10 baseline and 5/10   Plan:   -Methadone 10 mg BID   -Dilaudid 8 mg TID PRN  first week, then second week BID PRN    #Constipation   -Reports 2 BM's per week. No melena or hematochezia. Currently not taking any bowel regimen  Plan:   -Senna BID       Social Considerations  NA    Code Status: Assume Full     Changes this visit and follow-up for next visit: 3 weeks      Patient seen and staffed with Dr. Lorrie Jones MD  Internal Medicine PGY-1

## 2024-04-05 ENCOUNTER — SPECIALTY PHARMACY (OUTPATIENT)
Dept: PHARMACY | Facility: CLINIC | Age: 44
End: 2024-04-05

## 2024-04-05 PROCEDURE — RXMED WILLOW AMBULATORY MEDICATION CHARGE

## 2024-04-08 ENCOUNTER — TELEPHONE (OUTPATIENT)
Dept: HEMATOLOGY/ONCOLOGY | Facility: HOSPITAL | Age: 44
End: 2024-04-08
Payer: COMMERCIAL

## 2024-04-08 ENCOUNTER — APPOINTMENT (OUTPATIENT)
Dept: CARDIOLOGY | Facility: HOSPITAL | Age: 44
End: 2024-04-08
Payer: COMMERCIAL

## 2024-04-08 ENCOUNTER — HOSPITAL ENCOUNTER (EMERGENCY)
Facility: HOSPITAL | Age: 44
Discharge: HOME | End: 2024-04-08
Attending: STUDENT IN AN ORGANIZED HEALTH CARE EDUCATION/TRAINING PROGRAM
Payer: COMMERCIAL

## 2024-04-08 VITALS
OXYGEN SATURATION: 96 % | SYSTOLIC BLOOD PRESSURE: 103 MMHG | HEIGHT: 67 IN | HEART RATE: 81 BPM | TEMPERATURE: 97.3 F | WEIGHT: 142 LBS | BODY MASS INDEX: 22.29 KG/M2 | RESPIRATION RATE: 18 BRPM | DIASTOLIC BLOOD PRESSURE: 83 MMHG

## 2024-04-08 DIAGNOSIS — D57.00 SICKLE CELL PAIN CRISIS (MULTI): Primary | ICD-10-CM

## 2024-04-08 LAB
6MAM UR CFM-MCNC: <25 NG/ML
ALBUMIN SERPL BCP-MCNC: 4.3 G/DL (ref 3.4–5)
ALP SERPL-CCNC: 115 U/L (ref 33–120)
ALT SERPL W P-5'-P-CCNC: 12 U/L (ref 10–52)
ANION GAP SERPL CALC-SCNC: 9 MMOL/L (ref 10–20)
AST SERPL W P-5'-P-CCNC: 15 U/L (ref 9–39)
BASOPHILS # BLD AUTO: 0.02 X10*3/UL (ref 0–0.1)
BASOPHILS NFR BLD AUTO: 0.2 %
BILIRUB SERPL-MCNC: 0.5 MG/DL (ref 0–1.2)
BUN SERPL-MCNC: 11 MG/DL (ref 6–23)
CALCIUM SERPL-MCNC: 9.2 MG/DL (ref 8.6–10.3)
CHLORIDE SERPL-SCNC: 105 MMOL/L (ref 98–107)
CO2 SERPL-SCNC: 29 MMOL/L (ref 21–32)
CODEINE UR CFM-MCNC: <50 NG/ML
CREAT SERPL-MCNC: 1 MG/DL (ref 0.5–1.3)
EDDP UR CFM-MCNC: 440 NG/ML
EGFRCR SERPLBLD CKD-EPI 2021: >90 ML/MIN/1.73M*2
EOSINOPHIL # BLD AUTO: 0.18 X10*3/UL (ref 0–0.7)
EOSINOPHIL NFR BLD AUTO: 1.9 %
ERYTHROCYTE [DISTWIDTH] IN BLOOD BY AUTOMATED COUNT: 12.9 % (ref 11.5–14.5)
GLUCOSE SERPL-MCNC: 83 MG/DL (ref 74–99)
HCT VFR BLD AUTO: 46.4 % (ref 41–52)
HGB BLD-MCNC: 16.4 G/DL (ref 13.5–17.5)
HGB RETIC QN: 35 PG (ref 28–38)
HYDROCODONE CTO UR CFM-MCNC: <25 NG/ML
HYDROMORPHONE UR CFM-MCNC: >2500 NG/ML
IMM GRANULOCYTES # BLD AUTO: 0.02 X10*3/UL (ref 0–0.7)
IMM GRANULOCYTES NFR BLD AUTO: 0.2 % (ref 0–0.9)
IMMATURE RETIC FRACTION: 4 %
LDH SERPL L TO P-CCNC: 200 U/L (ref 84–246)
LYMPHOCYTES # BLD AUTO: 4.57 X10*3/UL (ref 1.2–4.8)
LYMPHOCYTES NFR BLD AUTO: 48.9 %
MCH RBC QN AUTO: 31.6 PG (ref 26–34)
MCHC RBC AUTO-ENTMCNC: 35.3 G/DL (ref 32–36)
MCV RBC AUTO: 89 FL (ref 80–100)
METHADONE UR CFM-MCNC: 414 NG/ML
MONOCYTES # BLD AUTO: 0.65 X10*3/UL (ref 0.1–1)
MONOCYTES NFR BLD AUTO: 7 %
MORPHINE UR CFM-MCNC: <50 NG/ML
NEUTROPHILS # BLD AUTO: 3.91 X10*3/UL (ref 1.2–7.7)
NEUTROPHILS NFR BLD AUTO: 41.8 %
NORHYDROCODONE UR CFM-MCNC: <25 NG/ML
NOROXYCODONE UR CFM-MCNC: <25 NG/ML
NRBC BLD-RTO: 0 /100 WBCS (ref 0–0)
OXYCODONE UR CFM-MCNC: <25 NG/ML
OXYMORPHONE UR CFM-MCNC: <25 NG/ML
PLATELET # BLD AUTO: 270 X10*3/UL (ref 150–450)
POTASSIUM SERPL-SCNC: 4.3 MMOL/L (ref 3.5–5.3)
PROT SERPL-MCNC: 7.3 G/DL (ref 6.4–8.2)
RBC # BLD AUTO: 5.19 X10*6/UL (ref 4.5–5.9)
RETICS #: 0.05 X10*6/UL (ref 0.02–0.12)
RETICS/RBC NFR AUTO: 1 % (ref 0.5–2)
SODIUM SERPL-SCNC: 139 MMOL/L (ref 136–145)
WBC # BLD AUTO: 9.4 X10*3/UL (ref 4.4–11.3)

## 2024-04-08 PROCEDURE — 85025 COMPLETE CBC W/AUTO DIFF WBC: CPT | Performed by: PHYSICIAN ASSISTANT

## 2024-04-08 PROCEDURE — 2500000004 HC RX 250 GENERAL PHARMACY W/ HCPCS (ALT 636 FOR OP/ED): Performed by: STUDENT IN AN ORGANIZED HEALTH CARE EDUCATION/TRAINING PROGRAM

## 2024-04-08 PROCEDURE — 96375 TX/PRO/DX INJ NEW DRUG ADDON: CPT

## 2024-04-08 PROCEDURE — 85045 AUTOMATED RETICULOCYTE COUNT: CPT | Performed by: PHYSICIAN ASSISTANT

## 2024-04-08 PROCEDURE — 80053 COMPREHEN METABOLIC PANEL: CPT | Performed by: PHYSICIAN ASSISTANT

## 2024-04-08 PROCEDURE — 36415 COLL VENOUS BLD VENIPUNCTURE: CPT | Performed by: PHYSICIAN ASSISTANT

## 2024-04-08 PROCEDURE — 96374 THER/PROPH/DIAG INJ IV PUSH: CPT

## 2024-04-08 PROCEDURE — 93005 ELECTROCARDIOGRAM TRACING: CPT

## 2024-04-08 PROCEDURE — 96376 TX/PRO/DX INJ SAME DRUG ADON: CPT

## 2024-04-08 PROCEDURE — 99284 EMERGENCY DEPT VISIT MOD MDM: CPT | Mod: 25

## 2024-04-08 PROCEDURE — 83615 LACTATE (LD) (LDH) ENZYME: CPT | Performed by: PHYSICIAN ASSISTANT

## 2024-04-08 PROCEDURE — 2500000004 HC RX 250 GENERAL PHARMACY W/ HCPCS (ALT 636 FOR OP/ED): Performed by: PHYSICIAN ASSISTANT

## 2024-04-08 RX ORDER — DIPHENHYDRAMINE HYDROCHLORIDE 50 MG/ML
25 INJECTION INTRAMUSCULAR; INTRAVENOUS ONCE
Status: COMPLETED | OUTPATIENT
Start: 2024-04-08 | End: 2024-04-08

## 2024-04-08 RX ORDER — KETOROLAC TROMETHAMINE 30 MG/ML
15 INJECTION, SOLUTION INTRAMUSCULAR; INTRAVENOUS ONCE
Status: COMPLETED | OUTPATIENT
Start: 2024-04-08 | End: 2024-04-08

## 2024-04-08 RX ORDER — HYDROMORPHONE HYDROCHLORIDE 2 MG/ML
2 INJECTION, SOLUTION INTRAMUSCULAR; INTRAVENOUS; SUBCUTANEOUS
Status: COMPLETED | OUTPATIENT
Start: 2024-04-08 | End: 2024-04-08

## 2024-04-08 RX ADMIN — HYDROMORPHONE HYDROCHLORIDE 2 MG: 2 INJECTION INTRAMUSCULAR; INTRAVENOUS; SUBCUTANEOUS at 18:11

## 2024-04-08 RX ADMIN — HYDROMORPHONE HYDROCHLORIDE 2 MG: 2 INJECTION INTRAMUSCULAR; INTRAVENOUS; SUBCUTANEOUS at 17:21

## 2024-04-08 RX ADMIN — HYDROMORPHONE HYDROCHLORIDE 2 MG: 2 INJECTION INTRAMUSCULAR; INTRAVENOUS; SUBCUTANEOUS at 15:08

## 2024-04-08 RX ADMIN — DIPHENHYDRAMINE HYDROCHLORIDE 25 MG: 50 INJECTION, SOLUTION INTRAMUSCULAR; INTRAVENOUS at 15:09

## 2024-04-08 RX ADMIN — KETOROLAC TROMETHAMINE 15 MG: 30 INJECTION, SOLUTION INTRAMUSCULAR at 15:08

## 2024-04-08 ASSESSMENT — PAIN SCALES - GENERAL
PAINLEVEL_OUTOF10: 2
PAINLEVEL_OUTOF10: 6
PAINLEVEL_OUTOF10: 7
PAINLEVEL_OUTOF10: 6

## 2024-04-08 ASSESSMENT — PAIN - FUNCTIONAL ASSESSMENT
PAIN_FUNCTIONAL_ASSESSMENT: 0-10

## 2024-04-08 NOTE — DISCHARGE INSTRUCTIONS
Continue your home pain medications as previously prescribed.  Please turn ED for worsening chest pain, palpitation or shortness of breath or any other concerning symptoms.

## 2024-04-08 NOTE — ED TRIAGE NOTES
Pt presents to ED for a complaint of sickle cell pain. Pt states this episode started this morning at aprox 07:00.

## 2024-04-08 NOTE — TELEPHONE ENCOUNTER
Pt is requesting a refill of dilaudid.  Preferred pharmacy is [facility] on [address]

## 2024-04-08 NOTE — ED PROVIDER NOTES
EMERGENCY MEDICINE EVALUATION NOTE    History of Present Illness     Chief Complaint:   Chief Complaint   Patient presents with    Sickle Cell Pain Crisis       HPI: Xu Maya is a 43 y.o. male with past medical history of anxiety and sickle cell disease who presents with complaint of sickle cell pain crisis.  Patient states worsening pain over the past several days involving his bilateral lower extremities and lower back.  He states this is similar to prior sickle cell pain crisis.  He states he takes Dilaudid at home and has been taking this without any relief.  He denies any chest pain, fever, chills abdominal pain, nausea, vomiting.  No other complaints at this time.    Previous History     Past Medical History:   Diagnosis Date    Anxiety disorder, unspecified 04/17/2017    Anxiety    Depression, unspecified 04/17/2017    Depression    Family history of glaucoma 03/02/2017    Family history of glaucoma in father    Gastritis, unspecified, without bleeding 04/17/2017    Gastritis    Hematuria, unspecified 04/17/2017    Hematuria    Personal history of other diseases of the digestive system 12/02/2015    History of esophageal reflux    Priapism, unspecified 04/17/2017    Priapism    Sickle-cell disease without crisis (CMS/Formerly [facility]) 04/17/2017    Sickle cell anemia     Past Surgical History:   Procedure Laterality Date    GALLBLADDER SURGERY  04/12/2017    Gallbladder Surgery    IR CVC TUNNELED  4/24/2018    IR CVC TUNNELED 4/24/2018 [facility] [place] [facility]    IR CVC TUNNELED  6/5/2018    IR CVC TUNNELED 6/5/2018 [facility] [place] [facility]    IR CVC TUNNELED  3/1/2019    IR CVC TUNNELED 3/1/2019 [facility] [place] [facility]    IR CVC TUNNELED  6/4/2019    IR CVC TUNNELED 6/4/2019 [facility] CLINICAL LEGACY    IR CVC TUNNELED  4/5/2019    IR CVC TUNNELED 4/5/2019 [facility] [place] [facility]    IR CVC TUNNELED  7/17/2019    IR CVC TUNNELED 7/17/2019 [facility] [place] [facility]    IR CVC TUNNELED  8/14/2019    IR CVC TUNNELED 8/14/2019 [facility] [place] [facility]    IR CVC TUNNELED  12/18/2019     IR CVC TUNNELED 12/18/2019 [facility] CLINICAL LEGACY    IR CVC TUNNELED  10/9/2019    IR CVC TUNNELED 10/9/2019 [facility]    IR CVC TUNNELED  11/13/2019    IR CVC TUNNELED 11/13/2019 [facility] CLINICAL LEGACY    IR CVC TUNNELED  1/15/2020    IR CVC TUNNELED 1/15/2020 [facility] CLINICAL LEGACY    IR CVC TUNNELED  2/19/2020    IR CVC TUNNELED 2/19/2020 [facility] CLINICAL LEGACY    IR CVC TUNNELED  1/4/2021    IR CVC TUNNELED 1/4/2021 [facility]    IR CVC TUNNELED  1/25/2021    IR CVC TUNNELED 1/25/2021 [facility]CILL[facility]GAC[facility]    IR CVC TUNNELED  8/21/2018    IR CVC TUNNELED 8/21/2018 [facility]    IR CVC TUNNELED  9/26/2018    IR CVC TUNNELED 9/26/2018 [facility]    IR CVC TUNNELED  11/5/2018    IR CVC TUNNELED 11/5/2018 [facility]    IR CVC TUNNELED  12/19/2018    IR CVC TUNNELED 12/19/2018 [facility]    IR VENOGRAM HEPATIC  11/8/2017    IR VENOGRAM HEPATIC 11/8/2017 [facility]    MR HEAD ANGIO WO IV CONTRAST  2/17/2017    MR HEAD ANGIO WO IV CONTRAST 2/17/2017 [facility] CLINICAL LEGACY    MR NECK ANGIO WO IV CONTRAST  2/17/2017    MR NECK ANGIO WO IV CONTRAST 2/17/2017 [facility] CLINICAL LEGACY    US GUIDED NEEDLE LIVER BIOPSY  9/8/2020    US GUIDED NEEDLE LIVER BIOPSY 9/8/2020 [facility]     Social History     Tobacco Use    Smoking status: Every Day     Types: Cigarettes     Start date: 1/1/1998     Passive exposure: Current    Smokeless tobacco: Never   Substance Use Topics    Alcohol use: Not Currently     Comment: Occasional    Drug use: Not Currently     Types: Marijuana     Comment: Last use sometime in 2023.  No intention to re-start.     Family History   Problem Relation Name Age of Onset    Diabetes Father      Sickle cell anemia Other sibling     Cancer Other grandfather     Cancer Other uncle      Allergies   Allergen Reactions    Hydrocodone-Acetaminophen Hives and Unknown    Naproxen Unknown     Current Outpatient Medications   Medication Instructions    amitriptyline (ELAVIL) 25 mg, oral     ARIPiprazole (ABILIFY) 5 mg, oral, Daily    cholecalciferol (VITAMIN D-3) 2,000 Units, oral    cyclobenzaprine (FLEXERIL) 10 mg, oral, 3 times daily    diphenhydrAMINE (BENADRYL) 25 mg, oral, Every 6 hours PRN    DULoxetine (CYMBALTA) 60 mg, oral, 2 times daily    ergocalciferol (Vitamin D-2) 1.25 MG (49308 UT) capsule 1 capsule, oral, Once Weekly    gabapentin (NEURONTIN) 300 mg, oral, 2 times daily    HYDROmorphone (DILAUDID) 8 mg, oral, 3 times daily PRN    ketoconazole (NIZOral) 2 % cream Topical, 2 times daily    methadone (DOLOPHINE) 10 mg, oral, Every 12 hours    mirtazapine (REMERON) 7.5 mg, oral, Nightly    naloxone (NARCAN) 4 mg, nasal, As needed, 1 spray to nostril for overdose, may repeat every 2-3 minutes until medical assistance arrives<BR>    omeprazole (PRILOSEC) 40 mg, oral, Daily before breakfast, TAKE ONE CAPSULE BY MOUTH EVERY DAY IN THE MORNING BEFORE EATING    Rezurock 200 mg tablet Take one (1) tablet by mouth twice daily    sennosides (SENOKOT) 17.2 mg, oral, 2 times daily    tacrolimus (Protopic) 0.1 % ointment APPLY 1-2 TIMES PER WEEK       Physical Exam     Appearance: Alert, oriented , cooperative,  in acute distress. Well nourished & well hydrated.     Skin: Intact,  dry skin, no lesions, rash, petechiae or purpura.      Eyes: PERRLA, EOMs intact,  Conjunctiva pink with no redness or exudates. Cornea & anterior chamber are clear, Eyelids without lesions. No scleral icterus.      ENT: Hearing grossly intact. External auditory canals patent, tympanic membranes intact with visible landmarks. Nares patent, mucus membranes moist. Dentition without lesions. Pharynx clear, uvula midline.      Neck: Supple, without meningismus. Thyroid not palpable. Trachea at midline. No lymphadenopathy.     Pulmonary: Clear bilaterally with good chest wall excursion. No rales, rhonchi or wheezing. No accessory muscle use or stridor.     Cardiac: Normal S1, S2 without murmur, rub, gallop or extrasystole. No  JVD, Carotids without bruits.     Abdomen: Soft, nontender, active bowel sounds.  No palpable organomegaly.  No rebound or guarding.  No CVA tenderness.     Genitourinary: Exam deferred.     Musculoskeletal: Full range of motion. no pain, edema, or deformity. Pulses full and equal. No cyanosis or clubbing.      Neurological:  Cranial nerves II through XII are grossly intact, finger-nose touch is normal, normal sensation, no weakness, no focal findings identified.     Psychiatric: Appropriate mood and affect.      Results     Labs Reviewed   COMPREHENSIVE METABOLIC PANEL - Abnormal       Result Value    Glucose 83      Sodium 139      Potassium 4.3      Chloride 105      Bicarbonate 29      Anion Gap 9 (*)     Urea Nitrogen 11      Creatinine 1.00      eGFR >90      Calcium 9.2      Albumin 4.3      Alkaline Phosphatase 115      Total Protein 7.3      AST 15      Bilirubin, Total 0.5      ALT 12     LACTATE DEHYDROGENASE - Normal         RETICULOCYTES - Normal    Retic % 1.0      Retic Absolute 0.050      Reticulocyte Hemoglobin 35      Immature Retic fraction 4.0     CBC WITH AUTO DIFFERENTIAL    WBC 9.4      nRBC 0.0      RBC 5.19      Hemoglobin 16.4      Hematocrit 46.4      MCV 89      MCH 31.6      MCHC 35.3      RDW 12.9      Platelets 270      Neutrophils % 41.8      Immature Granulocytes %, Automated 0.2      Lymphocytes % 48.9      Monocytes % 7.0      Eosinophils % 1.9      Basophils % 0.2      Neutrophils Absolute 3.91      Immature Granulocytes Absolute, Automated 0.02      Lymphocytes Absolute 4.57      Monocytes Absolute 0.65      Eosinophils Absolute 0.18      Basophils Absolute 0.02       No orders to display         ED Course & Medical Decision Making     Medications   HYDROmorphone (Dilaudid) injection 2 mg (2 mg intravenous Given 4/8/24 1811)   ketorolac (Toradol) injection 15 mg (15 mg intravenous Given 4/8/24 1508)   diphenhydrAMINE (BENADryl) injection 25 mg (25 mg intravenous Given  "4/8/24 1509)     Diagnoses as of 04/09/24 0730   Sickle cell pain crisis (CMS/HCC)     Heart Rate:  [68-81]   Temperature:  [36.3 °C (97.3 °F)]   Respirations:  [18-20]   BP: (100-114)/(70-89)   Height:  [170.2 cm (5' 7\")]   Weight:  [64.4 kg (142 lb)]   Pulse Ox:  [94 %-98 %]      Xu Maya is a 43 y.o. male with past medical history of anxiety and sickle cell disease who presents with complaint of sickle cell pain crisis.  Patient hemodynamic stable and afebrile.  No evidence of acute chest syndrome or pneumonia on clinical examination.  Most likely related to sickle cell pain crisis with similar symptoms in the past.  EKG did show normal sinus rhythm with a rate of 77 bpm with baseline T wave inversions in inferior leads and no other new acute ischemic change with a left anterior fascicular block.  Initial lab work reassuring with no significant anemia or leukocytosis or renal dysfunction.  I did order 3 doses of IV Dilaudid as well as Toradol and Benadryl for initial treatment.  Patient only received 1 dose so far with relief of pain although will receive 2 additional doses and was signed out to oncoming attending pending reevaluation and most likely plan for discharge home if improved with symptoms.    Procedures   Procedures    Diagnosis     1. Sickle cell pain crisis (CMS/HCC)        Disposition     Signed out to oncoming attending pending final disposition.    ED Prescriptions    None            Mihir Oglesby DO  04/09/24 0731    "

## 2024-04-08 NOTE — TELEPHONE ENCOUNTER
I spoke with the pharmacy and they have a prescription for hydromorphone. They will have to order the medication and fill it tomorrow. Patient has been updated.

## 2024-04-08 NOTE — ED TRIAGE NOTES
TRIAGE NOTE   I saw the patient as the Clinician in Triage and performed a brief history and physical exam, established acuity, and ordered appropriate tests to develop basic plan of care. Patient will be seen by an [facility], resident and/or physician who will independently evaluate the patient. Please see subsequent provider notes for further details and disposition.     Brief HPI: In brief, Xu Maya is a 43 y.o. male that presents for sickle cell crisis.  Patient states that he ran out of his Dilaudid at home and he started to have a crisis.  His pain includes bilateral lower extremity aching throbbing pain and lower back pain.  This is typical for him.  No atypical features such as numbness or tingling, loss of bowel or bladder function, or fevers or chills.    Focused Physical exam:   Generally unremarkable    Plan/MDM:   Will initiate sickle cell order set and Dilaudid orders based on previous visit.      Ilda Chaudhry PA-C   Please see subsequent provider note for further details and disposition

## 2024-04-09 ENCOUNTER — PHARMACY VISIT (OUTPATIENT)
Dept: PHARMACY | Facility: CLINIC | Age: 44
End: 2024-04-09
Payer: MEDICAID

## 2024-04-09 ENCOUNTER — TELEPHONE (OUTPATIENT)
Dept: HEMATOLOGY/ONCOLOGY | Facility: HOSPITAL | Age: 44
End: 2024-04-09
Payer: COMMERCIAL

## 2024-04-10 DIAGNOSIS — R52 ACUTE PAIN: ICD-10-CM

## 2024-04-10 DIAGNOSIS — Z51.81 ENCOUNTER FOR MONITORING OPIOID MAINTENANCE THERAPY: ICD-10-CM

## 2024-04-10 DIAGNOSIS — Z79.891 ENCOUNTER FOR MONITORING OPIOID MAINTENANCE THERAPY: ICD-10-CM

## 2024-04-10 DIAGNOSIS — D57.1 SICKLE CELL DISEASE WITHOUT CRISIS (MULTI): Primary | ICD-10-CM

## 2024-04-10 LAB
ATRIAL RATE: 77 BPM
P AXIS: 74 DEGREES
P OFFSET: 188 MS
P ONSET: 126 MS
PR INTERVAL: 168 MS
Q ONSET: 210 MS
QRS COUNT: 12 BEATS
QRS DURATION: 96 MS
QT INTERVAL: 416 MS
QTC CALCULATION(BAZETT): 470 MS
QTC FREDERICIA: 452 MS
R AXIS: -49 DEGREES
T AXIS: 16 DEGREES
T OFFSET: 418 MS
VENTRICULAR RATE: 77 BPM

## 2024-04-10 RX ORDER — METHADONE HYDROCHLORIDE 10 MG/1
10 TABLET ORAL EVERY 12 HOURS
Qty: 60 TABLET | Refills: 0 | Status: SHIPPED | OUTPATIENT
Start: 2024-04-10 | End: 2024-04-24 | Stop reason: DRUGHIGH

## 2024-04-10 RX ORDER — HYDROMORPHONE HYDROCHLORIDE 8 MG/1
8 TABLET ORAL 3 TIMES DAILY PRN
Qty: 21 TABLET | Refills: 0 | Status: SHIPPED | OUTPATIENT
Start: 2024-04-10 | End: 2024-04-16 | Stop reason: SDUPTHER

## 2024-04-10 NOTE — TELEPHONE ENCOUNTER
Dr. Tolliver has resubmitted the prescription to a different pharmacy that has it in stock. Patient aware.

## 2024-04-12 NOTE — TELEPHONE ENCOUNTER
Per [facility] Coverage has been approved for this patient's hydromorphone from 4/11/24 until 7/9/24.     Methadone PA still pending.

## 2024-04-16 ENCOUNTER — TELEPHONE (OUTPATIENT)
Dept: ADMISSION | Facility: HOSPITAL | Age: 44
End: 2024-04-16
Payer: COMMERCIAL

## 2024-04-16 ENCOUNTER — TELEPHONE (OUTPATIENT)
Dept: HEMATOLOGY/ONCOLOGY | Facility: HOSPITAL | Age: 44
End: 2024-04-16

## 2024-04-16 ENCOUNTER — OFFICE VISIT (OUTPATIENT)
Dept: HEMATOLOGY/ONCOLOGY | Facility: HOSPITAL | Age: 44
End: 2024-04-16
Payer: COMMERCIAL

## 2024-04-16 VITALS
BODY MASS INDEX: 22.11 KG/M2 | RESPIRATION RATE: 22 BRPM | WEIGHT: 140.87 LBS | OXYGEN SATURATION: 99 % | SYSTOLIC BLOOD PRESSURE: 104 MMHG | TEMPERATURE: 97.5 F | DIASTOLIC BLOOD PRESSURE: 71 MMHG | HEART RATE: 77 BPM | HEIGHT: 67 IN

## 2024-04-16 DIAGNOSIS — R52 ACUTE PAIN: Primary | ICD-10-CM

## 2024-04-16 DIAGNOSIS — D57.1 SICKLE CELL DISEASE WITHOUT CRISIS (MULTI): ICD-10-CM

## 2024-04-16 DIAGNOSIS — D57.00 SICKLE CELL CRISIS (MULTI): Primary | ICD-10-CM

## 2024-04-16 DIAGNOSIS — D57.00 SICKLE CELL CRISIS (MULTI): ICD-10-CM

## 2024-04-16 DIAGNOSIS — Z79.891 ENCOUNTER FOR MONITORING OPIOID MAINTENANCE THERAPY: ICD-10-CM

## 2024-04-16 DIAGNOSIS — Z51.81 ENCOUNTER FOR MONITORING OPIOID MAINTENANCE THERAPY: ICD-10-CM

## 2024-04-16 DIAGNOSIS — R52 ACUTE PAIN: ICD-10-CM

## 2024-04-16 LAB
ALBUMIN SERPL BCP-MCNC: 4.2 G/DL (ref 3.4–5)
ALP SERPL-CCNC: 112 U/L (ref 33–120)
ALT SERPL W P-5'-P-CCNC: 10 U/L (ref 10–52)
ANION GAP SERPL CALC-SCNC: 12 MMOL/L (ref 10–20)
AST SERPL W P-5'-P-CCNC: 15 U/L (ref 9–39)
BASOPHILS # BLD AUTO: 0.04 X10*3/UL (ref 0–0.1)
BASOPHILS NFR BLD AUTO: 0.3 %
BILIRUB SERPL-MCNC: 0.8 MG/DL (ref 0–1.2)
BUN SERPL-MCNC: 8 MG/DL (ref 6–23)
CALCIUM SERPL-MCNC: 9.1 MG/DL (ref 8.6–10.3)
CHLORIDE SERPL-SCNC: 106 MMOL/L (ref 98–107)
CO2 SERPL-SCNC: 25 MMOL/L (ref 21–32)
CREAT SERPL-MCNC: 0.83 MG/DL (ref 0.5–1.3)
EGFRCR SERPLBLD CKD-EPI 2021: >90 ML/MIN/1.73M*2
EOSINOPHIL # BLD AUTO: 0.17 X10*3/UL (ref 0–0.7)
EOSINOPHIL NFR BLD AUTO: 1.2 %
ERYTHROCYTE [DISTWIDTH] IN BLOOD BY AUTOMATED COUNT: 12.7 % (ref 11.5–14.5)
GLUCOSE SERPL-MCNC: 94 MG/DL (ref 74–99)
HCT VFR BLD AUTO: 43.6 % (ref 41–52)
HGB BLD-MCNC: 15.3 G/DL (ref 13.5–17.5)
HGB RETIC QN: 33 PG (ref 28–38)
IMM GRANULOCYTES # BLD AUTO: 0.05 X10*3/UL (ref 0–0.7)
IMM GRANULOCYTES NFR BLD AUTO: 0.4 % (ref 0–0.9)
IMMATURE RETIC FRACTION: 7.2 %
LDH SERPL L TO P-CCNC: 187 U/L (ref 84–246)
LYMPHOCYTES # BLD AUTO: 3.37 X10*3/UL (ref 1.2–4.8)
LYMPHOCYTES NFR BLD AUTO: 24.3 %
MCH RBC QN AUTO: 30.9 PG (ref 26–34)
MCHC RBC AUTO-ENTMCNC: 35.1 G/DL (ref 32–36)
MCV RBC AUTO: 88 FL (ref 80–100)
MONOCYTES # BLD AUTO: 0.75 X10*3/UL (ref 0.1–1)
MONOCYTES NFR BLD AUTO: 5.4 %
NEUTROPHILS # BLD AUTO: 9.47 X10*3/UL (ref 1.2–7.7)
NEUTROPHILS NFR BLD AUTO: 68.4 %
NRBC BLD-RTO: 0 /100 WBCS (ref 0–0)
PLATELET # BLD AUTO: 242 X10*3/UL (ref 150–450)
POTASSIUM SERPL-SCNC: 4.3 MMOL/L (ref 3.5–5.3)
PROT SERPL-MCNC: 7 G/DL (ref 6.4–8.2)
RBC # BLD AUTO: 4.95 X10*6/UL (ref 4.5–5.9)
RETICS #: 0.05 X10*6/UL (ref 0.02–0.12)
RETICS/RBC NFR AUTO: 1 % (ref 0.5–2)
SODIUM SERPL-SCNC: 139 MMOL/L (ref 136–145)
WBC # BLD AUTO: 13.9 X10*3/UL (ref 4.4–11.3)

## 2024-04-16 PROCEDURE — 2500000005 HC RX 250 GENERAL PHARMACY W/O HCPCS: Performed by: NURSE PRACTITIONER

## 2024-04-16 PROCEDURE — 36415 COLL VENOUS BLD VENIPUNCTURE: CPT

## 2024-04-16 PROCEDURE — 2500000001 HC RX 250 WO HCPCS SELF ADMINISTERED DRUGS (ALT 637 FOR MEDICARE OP): Performed by: NURSE PRACTITIONER

## 2024-04-16 PROCEDURE — 99417 PROLNG OP E/M EACH 15 MIN: CPT | Mod: ZK | Performed by: NURSE PRACTITIONER

## 2024-04-16 PROCEDURE — 99417 PROLNG OP E/M EACH 15 MIN: CPT | Performed by: NURSE PRACTITIONER

## 2024-04-16 PROCEDURE — 99215 OFFICE O/P EST HI 40 MIN: CPT | Performed by: NURSE PRACTITIONER

## 2024-04-16 PROCEDURE — 85025 COMPLETE CBC W/AUTO DIFF WBC: CPT

## 2024-04-16 PROCEDURE — 85045 AUTOMATED RETICULOCYTE COUNT: CPT

## 2024-04-16 PROCEDURE — 84075 ASSAY ALKALINE PHOSPHATASE: CPT

## 2024-04-16 PROCEDURE — 99215 OFFICE O/P EST HI 40 MIN: CPT | Mod: ZK | Performed by: NURSE PRACTITIONER

## 2024-04-16 PROCEDURE — 83615 LACTATE (LD) (LDH) ENZYME: CPT

## 2024-04-16 RX ORDER — HYDROMORPHONE HYDROCHLORIDE 4 MG/1
12 TABLET ORAL
Status: COMPLETED | OUTPATIENT
Start: 2024-04-16 | End: 2024-04-16

## 2024-04-16 RX ORDER — DIPHENHYDRAMINE HCL 25 MG
25 CAPSULE ORAL ONCE
Status: COMPLETED | OUTPATIENT
Start: 2024-04-16 | End: 2024-04-16

## 2024-04-16 RX ORDER — NALOXONE HYDROCHLORIDE 0.4 MG/ML
0.4 INJECTION, SOLUTION INTRAMUSCULAR; INTRAVENOUS; SUBCUTANEOUS
Status: DISCONTINUED | OUTPATIENT
Start: 2024-04-16 | End: 2024-04-16 | Stop reason: HOSPADM

## 2024-04-16 RX ORDER — HYDROMORPHONE HYDROCHLORIDE 8 MG/1
8 TABLET ORAL 3 TIMES DAILY PRN
Qty: 21 TABLET | Refills: 0 | Status: SHIPPED | OUTPATIENT
Start: 2024-04-16 | End: 2024-04-17 | Stop reason: SDUPTHER

## 2024-04-16 RX ORDER — CYCLOBENZAPRINE HCL 10 MG
10 TABLET ORAL ONCE
Status: COMPLETED | OUTPATIENT
Start: 2024-04-16 | End: 2024-04-16

## 2024-04-16 RX ORDER — ONDANSETRON HYDROCHLORIDE 8 MG/1
8 TABLET, FILM COATED ORAL ONCE
Status: COMPLETED | OUTPATIENT
Start: 2024-04-16 | End: 2024-04-16

## 2024-04-16 RX ADMIN — HYDROMORPHONE HYDROCHLORIDE 12 MG: 4 TABLET ORAL at 12:18

## 2024-04-16 RX ADMIN — CYCLOBENZAPRINE 10 MG: 10 TABLET, FILM COATED ORAL at 10:56

## 2024-04-16 RX ADMIN — ONDANSETRON HYDROCHLORIDE 8 MG: 8 TABLET, FILM COATED ORAL at 10:56

## 2024-04-16 RX ADMIN — DIPHENHYDRAMINE HYDROCHLORIDE 25 MG: 25 CAPSULE ORAL at 10:56

## 2024-04-16 RX ADMIN — HYDROMORPHONE HYDROCHLORIDE 12 MG: 4 TABLET ORAL at 10:57

## 2024-04-16 RX ADMIN — HYDROMORPHONE HYDROCHLORIDE 12 MG: 4 TABLET ORAL at 13:21

## 2024-04-16 ASSESSMENT — PAIN SCALES - GENERAL
PAINLEVEL_OUTOF10: 7
PAINLEVEL_OUTOF10: 7
PAINLEVEL: 7
PAINLEVEL_OUTOF10: 7

## 2024-04-16 ASSESSMENT — PAIN DESCRIPTION - LOCATION
LOCATION: LEG

## 2024-04-16 ASSESSMENT — PAIN - FUNCTIONAL ASSESSMENT
PAIN_FUNCTIONAL_ASSESSMENT: 0-10

## 2024-04-16 ASSESSMENT — PAIN DESCRIPTION - ORIENTATION
ORIENTATION: LEFT;RIGHT

## 2024-04-16 NOTE — TELEPHONE ENCOUNTER
Pt requesting refill   Dilaudid 8mg. 1 tablet TID prn  Pharmacy: [facility] on [address] in [facility].   Pt has also reached out to [facility] today for visit.

## 2024-04-16 NOTE — PROGRESS NOTES
13:30 patient tolerated pain medication doses and had a ride home from [facility] appointment.

## 2024-04-16 NOTE — TELEPHONE ENCOUNTER
Per [facility],  PA has been obtained for the Methadone. Pharmacy and patient updated.

## 2024-04-16 NOTE — PROGRESS NOTES
Patient ID:  Xu Maya is a 43 y.o. male.  Subjective   Chief Complaint: Uncontrolled Pain    HPI  Xu is a 43 y.o. male with history of anxiety, depression, gastritis/GERD, priapism, and sickle cell disease s/p transplant, who presents to [facility] with BLE pain typical of his baseline pain. He reports it started yesterday he has take his home dilaudid 8 mg last at 0500 without enough relief. He also took his home methadone last night. Pt denies chest pain, cough, SOB, headaches, blurry vision, falls, fever or chills, n/v/d/abd pain, or urinary complaints.      ROS  Review of Systems - Oncology     Allergies  Allergies   Allergen Reactions    Hydrocodone-Acetaminophen Hives and Unknown    Naproxen Unknown        Medications  Current Outpatient Medications   Medication Instructions    amitriptyline (ELAVIL) 25 mg, oral    ARIPiprazole (ABILIFY) 5 mg, oral, Daily    cholecalciferol (VITAMIN D-3) 2,000 Units, oral    cyclobenzaprine (FLEXERIL) 10 mg, oral, 3 times daily    diphenhydrAMINE (BENADRYL) 25 mg, oral, Every 6 hours PRN    DULoxetine (CYMBALTA) 60 mg, oral, 2 times daily    ergocalciferol (Vitamin D-2) 1.25 MG (63515 UT) capsule 1 capsule, oral, Once Weekly    gabapentin (NEURONTIN) 300 mg, oral, 2 times daily    HYDROmorphone (DILAUDID) 8 mg, oral, 3 times daily PRN    ketoconazole (NIZOral) 2 % cream Topical, 2 times daily    methadone (DOLOPHINE) 10 mg, oral, Every 12 hours    mirtazapine (REMERON) 7.5 mg, oral, Nightly    naloxone (NARCAN) 4 mg, nasal, As needed, 1 spray to nostril for overdose, may repeat every 2-3 minutes until medical assistance arrives<BR>    omeprazole (PRILOSEC) 40 mg, oral, Daily before breakfast, TAKE ONE CAPSULE BY MOUTH EVERY DAY IN THE MORNING BEFORE EATING    Rezurock 200 mg tablet Take one (1) tablet by mouth twice daily    sennosides (SENOKOT) 17.2 mg, oral, 2 times daily    tacrolimus (Protopic) 0.1 % ointment APPLY 1-2 TIMES PER WEEK        Past Medical History:   Past  Medical History:  04/17/2017: Anxiety disorder, unspecified      Comment:  Anxiety  04/17/2017: Depression, unspecified      Comment:  Depression  03/02/2017: Family history of glaucoma      Comment:  Family history of glaucoma in father  04/17/2017: Gastritis, unspecified, without bleeding      Comment:  Gastritis  04/17/2017: Hematuria, unspecified      Comment:  Hematuria  12/02/2015: Personal history of other diseases of the digestive system      Comment:  History of esophageal reflux  04/17/2017: Priapism, unspecified      Comment:  Priapism  04/17/2017: Sickle-cell disease without crisis (Multi)      Comment:  Sickle cell anemia   Surgical History:    Past Surgical History:   Procedure Laterality Date    GALLBLADDER SURGERY  04/12/2017    Gallbladder Surgery    IR CVC TUNNELED  4/24/2018    IR CVC TUNNELED 4/24/2018 [facility]    IR CVC TUNNELED  6/5/2018    IR CVC TUNNELED 6/5/2018 [facility] [place] [facility]    IR CVC TUNNELED  3/1/2019    IR CVC TUNNELED 3/1/2019 [facility] [place] [facility]    IR CVC TUNNELED  6/4/2019    IR CVC TUNNELED 6/4/2019 [facility] CLINICAL LEGACY    IR CVC TUNNELED  4/5/2019    IR CVC TUNNELED 4/5/2019 [facility]    IR CVC TUNNELED  7/17/2019    IR CVC TUNNELED 7/17/2019 [facility]    IR CVC TUNNELED  8/14/2019    IR CVC TUNNELED 8/14/2019 [facility]    IR CVC TUNNELED  12/18/2019    IR CVC TUNNELED 12/18/2019 [facility] CLINICAL LEGACY    IR CVC TUNNELED  10/9/2019    IR CVC TUNNELED 10/9/2019 [facility]    IR CVC TUNNELED  11/13/2019    IR CVC TUNNELED 11/13/2019 [facility] CLINICAL LEGACY    IR CVC TUNNELED  1/15/2020    IR CVC TUNNELED 1/15/2020 [facility] CLINICAL LEGACY    IR CVC TUNNELED  2/19/2020    IR CVC TUNNELED 2/19/2020 [facility] CLINICAL LEGACY    IR CVC TUNNELED  1/4/2021    IR CVC TUNNELED 1/4/2021 [facility]    IR CVC TUNNELED  1/25/2021    IR CVC TUNNELED 1/25/2021 [facility]    IR CVC TUNNELED  8/21/2018    IR CVC TUNNELED 8/21/2018 [facility]    IR CVC TUNNELED  9/26/2018     IR CVC TUNNELED 9/26/2018 [facility] [place] [facility]    IR CVC TUNNELED  11/5/2018    IR CVC TUNNELED 11/5/2018 [facility]    IR CVC TUNNELED  12/19/2018    IR CVC TUNNELED 12/19/2018 [facility]    IR VENOGRAM HEPATIC  11/8/2017    IR VENOGRAM HEPATIC 11/8/2017 [facility]    MR HEAD ANGIO WO IV CONTRAST  2/17/2017    MR HEAD ANGIO WO IV CONTRAST 2/17/2017 [facility] CLINICAL LEGACY    MR NECK ANGIO WO IV CONTRAST  2/17/2017    MR NECK ANGIO WO IV CONTRAST 2/17/2017 [facility] CLINICAL LEGACY    US GUIDED NEEDLE LIVER BIOPSY  9/8/2020    US GUIDED NEEDLE LIVER BIOPSY 9/8/2020 [facility]      Family History:    Family History   Problem Relation Name Age of Onset    Diabetes Father      Sickle cell anemia Other sibling     Cancer Other grandfather     Cancer Other uncle      Family Oncology History:    Cancer-related family history includes Cancer in some other family members.  Social History:    Social History     Tobacco Use    Smoking status: Every Day     Types: Cigarettes     Start date: 1/1/1998     Passive exposure: Current    Smokeless tobacco: Never   Substance Use Topics    Alcohol use: Not Currently     Comment: Occasional    Drug use: Not Currently     Types: Marijuana     Comment: Last use sometime in 2023.  No intention to re-start.        Objective   Vitals: There were no vitals taken for this visit.  Weight: There were no vitals filed for this visit.    Physical Exam  Vitals reviewed.   Constitutional:       Appearance: Normal appearance.   HENT:      Nose: Nose normal.      Mouth/Throat:      Mouth: Mucous membranes are moist.      Pharynx: Oropharynx is clear.   Eyes:      Conjunctiva/sclera: Conjunctivae normal.      Pupils: Pupils are equal, round, and reactive to light.   Cardiovascular:      Rate and Rhythm: Normal rate and regular rhythm.      Pulses: Normal pulses.      Heart sounds: Normal heart sounds.   Pulmonary:      Effort: Pulmonary effort is normal.      Breath sounds: Normal breath sounds.    Abdominal:      General: Bowel sounds are normal. There is no distension.      Palpations: Abdomen is soft.      Tenderness: There is no abdominal tenderness.   Musculoskeletal:         General: Normal range of motion.      Cervical back: Normal range of motion and neck supple.   Skin:     General: Skin is warm and dry.   Neurological:      General: No focal deficit present.      Mental Status: He is alert and oriented to person, place, and time.         Diagnostic Results     Labs  Results from last 7 days   Lab Units 04/16/24  1051   WBC AUTO x10*3/uL 13.9*   HEMOGLOBIN g/dL 15.3   HEMATOCRIT % 43.6   PLATELETS AUTO x10*3/uL 242   NEUTROS ABS x10*3/uL 9.47*   LYMPHS ABS AUTO x10*3/uL 3.37   MONOS ABS AUTO x10*3/uL 0.75   EOS ABS AUTO x10*3/uL 0.17   NEUTROS PCT AUTO % 68.4   LYMPHS PCT AUTO % 24.3   MONOS PCT AUTO % 5.4   EOS PCT AUTO % 1.2      Results from last 7 days   Lab Units 04/16/24  1051   GLUCOSE mg/dL 94   SODIUM mmol/L 139   POTASSIUM mmol/L 4.3   CHLORIDE mmol/L 106   CO2 mmol/L 25   BUN mg/dL 8   CREATININE mg/dL 0.83   EGFR mL/min/1.73m*2 >90   CALCIUM mg/dL 9.1   ALBUMIN g/dL 4.2   PROTEIN TOTAL g/dL 7.0     Results from last 7 days   Lab Units 04/16/24  1051   BILIRUBIN TOTAL mg/dL 0.8   ALK PHOS U/L 112   ALT U/L 10   AST U/L 15                     Lab Results   Component Value Date    HGB 16.4 04/08/2024    HGB 15.8 04/01/2024    HGB 16.1 02/26/2024     04/08/2024     04/01/2024     02/26/2024     04/08/2024     04/01/2024     02/26/2024       Images  === 02/07/24 ===    XR CHEST 2 VIEWS    - Impression -  1.  No evidence of acute cardiopulmonary process. Emphysema.        MACRO:  None    Signed by: Jared Jimenez 2/7/2024 4:42 AM  Dictation workstation:   [id number]   === 10/23/21 ===    CT ABDOMEN PELVIS W IV CONTRAST    - Impression -  There is a pancolitis.  In a sickle cell patient who has received a  bone marrow transplant, differential includes  graft versus host  disease as well as ischemic/venoocclusive colitis.  Inflammatory or  infectious colitis would also be in the differential.  No bowel  obstruction.  There is a small amount of pelvic free fluid.  No  pneumatosis or portal venous gas.    The appendix is seen with no evidence of acute appendicitis.    Minimal patchy opacity in the lower lobes, raising the possibility of  changes of hqaal-btuqsd-wcsq disease, pneumonia or sickle cell crisis.  Consider early or mild interstitial fibrosis.    Cholecystectomy.  No biliary ductal dilatation.  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    Consider cystitis which can also represent a manifestation of  kvbmn-qkrnrq-kzer disease as well as infection.  Clinical correlation  is needed.    Osseous changes consistent with known sickle cell disease.  Mild  cardiomegaly consistent with known sickle cell disease.    NOTIFICATION:  The critical results of the study were discussed with,  and acknowledged by Dr. Bety Carr  by telephone on 10/23/2021 at  1322 hours.            Signed by Allyson Padgett MD     No echocardiogram results found for the past 12 months       Assessment/Plan   Xu Maya is a 43 y.o. male with history of anxiety, depression, gastritis/GERD, priapism, and sickle cell disease s/p transplant, who presents to [facility] with BLE pain typical of his baseline pain.         [facility] Course  - VSS  - Hemolysis labs near baseline, no indication of acute vaso-occlusive crisis or indication for blood transfusion   - Flexeril 10 mg, given for AVN  - dilaudid 12mg PO x3 doses given for sickle cell related pain   - Zofran 8 mg  once for opioid induced nausea/vomiting  - Benadryl 25 mg  once for opioid induced pruritus     Disposition  - Patient discharged home with no further needs following [facility] Course  - Return to clinic/ED instructions given            Bre Carson, PRABHJOT-CNP

## 2024-04-17 ENCOUNTER — TELEPHONE (OUTPATIENT)
Dept: PALLIATIVE MEDICINE | Facility: HOSPITAL | Age: 44
End: 2024-04-17
Payer: COMMERCIAL

## 2024-04-17 ENCOUNTER — TELEPHONE (OUTPATIENT)
Dept: PAIN MEDICINE | Facility: CLINIC | Age: 44
End: 2024-04-17
Payer: COMMERCIAL

## 2024-04-17 RX ORDER — HYDROMORPHONE HYDROCHLORIDE 8 MG/1
8 TABLET ORAL 3 TIMES DAILY PRN
Qty: 21 TABLET | Refills: 0 | Status: SHIPPED | OUTPATIENT
Start: 2024-04-17 | End: 2024-04-22 | Stop reason: SDUPTHER

## 2024-04-17 NOTE — TELEPHONE ENCOUNTER
Pt called to request that script for dilaudid be sent to [facility] on [address] in [facility] as requested yesterday.   Script was [facility] to [facility] on [address] who does not have dilaudid in stock.

## 2024-04-17 NOTE — TELEPHONE ENCOUNTER
This is a sickle cell patient. I see this has been handled. No need to route to sickle cell team.

## 2024-04-22 ENCOUNTER — TELEPHONE (OUTPATIENT)
Dept: HEMATOLOGY/ONCOLOGY | Facility: HOSPITAL | Age: 44
End: 2024-04-22
Payer: COMMERCIAL

## 2024-04-22 DIAGNOSIS — R52 ACUTE PAIN: ICD-10-CM

## 2024-04-22 DIAGNOSIS — Z79.891 ENCOUNTER FOR MONITORING OPIOID MAINTENANCE THERAPY: ICD-10-CM

## 2024-04-22 DIAGNOSIS — D57.1 SICKLE CELL DISEASE WITHOUT CRISIS (MULTI): ICD-10-CM

## 2024-04-22 DIAGNOSIS — Z51.81 ENCOUNTER FOR MONITORING OPIOID MAINTENANCE THERAPY: ICD-10-CM

## 2024-04-22 RX ORDER — HYDROMORPHONE HYDROCHLORIDE 8 MG/1
8 TABLET ORAL 3 TIMES DAILY PRN
Qty: 21 TABLET | Refills: 0 | Status: SHIPPED | OUTPATIENT
Start: 2024-04-22 | End: 2024-04-30 | Stop reason: SDUPTHER

## 2024-04-22 NOTE — TELEPHONE ENCOUNTER
Refill request received for Dilaudid 8mg.  Preferred pharmacy is [facility] at [address] [facility] in [place].  Message sent to Sickle Cell team.

## 2024-04-24 ENCOUNTER — OFFICE VISIT (OUTPATIENT)
Dept: HEMATOLOGY/ONCOLOGY | Facility: HOSPITAL | Age: 44
End: 2024-04-24
Payer: COMMERCIAL

## 2024-04-24 ENCOUNTER — DOCUMENTATION (OUTPATIENT)
Dept: PALLIATIVE MEDICINE | Facility: HOSPITAL | Age: 44
End: 2024-04-24

## 2024-04-24 ENCOUNTER — TELEPHONE (OUTPATIENT)
Dept: HEMATOLOGY/ONCOLOGY | Facility: HOSPITAL | Age: 44
End: 2024-04-24

## 2024-04-24 ENCOUNTER — TELEPHONE (OUTPATIENT)
Dept: PALLIATIVE MEDICINE | Facility: HOSPITAL | Age: 44
End: 2024-04-24

## 2024-04-24 ENCOUNTER — SPECIALTY PHARMACY (OUTPATIENT)
Dept: PHARMACY | Facility: CLINIC | Age: 44
End: 2024-04-24

## 2024-04-24 VITALS
DIASTOLIC BLOOD PRESSURE: 61 MMHG | RESPIRATION RATE: 18 BRPM | TEMPERATURE: 98.6 F | OXYGEN SATURATION: 99 % | WEIGHT: 142 LBS | BODY MASS INDEX: 22.26 KG/M2 | HEART RATE: 87 BPM | SYSTOLIC BLOOD PRESSURE: 106 MMHG

## 2024-04-24 DIAGNOSIS — R52 ACUTE PAIN: Primary | ICD-10-CM

## 2024-04-24 DIAGNOSIS — D57.00 SICKLE-CELL DISEASE WITH PAIN (MULTI): Primary | ICD-10-CM

## 2024-04-24 LAB
ALBUMIN SERPL BCP-MCNC: 4.1 G/DL (ref 3.4–5)
ALP SERPL-CCNC: 106 U/L (ref 33–120)
ALT SERPL W P-5'-P-CCNC: 11 U/L (ref 10–52)
ANION GAP SERPL CALC-SCNC: 12 MMOL/L (ref 10–20)
AST SERPL W P-5'-P-CCNC: 14 U/L (ref 9–39)
BASOPHILS # BLD AUTO: 0.02 X10*3/UL (ref 0–0.1)
BASOPHILS NFR BLD AUTO: 0.2 %
BILIRUB SERPL-MCNC: 0.6 MG/DL (ref 0–1.2)
BUN SERPL-MCNC: 8 MG/DL (ref 6–23)
CALCIUM SERPL-MCNC: 9.1 MG/DL (ref 8.6–10.3)
CHLORIDE SERPL-SCNC: 107 MMOL/L (ref 98–107)
CO2 SERPL-SCNC: 27 MMOL/L (ref 21–32)
CREAT SERPL-MCNC: 0.92 MG/DL (ref 0.5–1.3)
EGFRCR SERPLBLD CKD-EPI 2021: >90 ML/MIN/1.73M*2
EOSINOPHIL # BLD AUTO: 0.17 X10*3/UL (ref 0–0.7)
EOSINOPHIL NFR BLD AUTO: 2 %
ERYTHROCYTE [DISTWIDTH] IN BLOOD BY AUTOMATED COUNT: 12.6 % (ref 11.5–14.5)
GLUCOSE SERPL-MCNC: 120 MG/DL (ref 74–99)
HCT VFR BLD AUTO: 43.1 % (ref 41–52)
HGB BLD-MCNC: 15.5 G/DL (ref 13.5–17.5)
HGB RETIC QN: 34 PG (ref 28–38)
IMM GRANULOCYTES # BLD AUTO: 0.01 X10*3/UL (ref 0–0.7)
IMM GRANULOCYTES NFR BLD AUTO: 0.1 % (ref 0–0.9)
IMMATURE RETIC FRACTION: 4 %
LDH SERPL L TO P-CCNC: 155 U/L (ref 84–246)
LYMPHOCYTES # BLD AUTO: 2.43 X10*3/UL (ref 1.2–4.8)
LYMPHOCYTES NFR BLD AUTO: 28 %
MCH RBC QN AUTO: 31 PG (ref 26–34)
MCHC RBC AUTO-ENTMCNC: 36 G/DL (ref 32–36)
MCV RBC AUTO: 86 FL (ref 80–100)
MONOCYTES # BLD AUTO: 0.61 X10*3/UL (ref 0.1–1)
MONOCYTES NFR BLD AUTO: 7 %
NEUTROPHILS # BLD AUTO: 5.44 X10*3/UL (ref 1.2–7.7)
NEUTROPHILS NFR BLD AUTO: 62.7 %
NRBC BLD-RTO: 0 /100 WBCS (ref 0–0)
PLATELET # BLD AUTO: 236 X10*3/UL (ref 150–450)
POTASSIUM SERPL-SCNC: 3.5 MMOL/L (ref 3.5–5.3)
PROT SERPL-MCNC: 6.8 G/DL (ref 6.4–8.2)
RBC # BLD AUTO: 5 X10*6/UL (ref 4.5–5.9)
RETICS #: 0.05 X10*6/UL (ref 0.02–0.12)
RETICS/RBC NFR AUTO: 0.9 % (ref 0.5–2)
SODIUM SERPL-SCNC: 142 MMOL/L (ref 136–145)
WBC # BLD AUTO: 8.7 X10*3/UL (ref 4.4–11.3)

## 2024-04-24 PROCEDURE — 36415 COLL VENOUS BLD VENIPUNCTURE: CPT

## 2024-04-24 PROCEDURE — 2500000005 HC RX 250 GENERAL PHARMACY W/O HCPCS: Performed by: NURSE PRACTITIONER

## 2024-04-24 PROCEDURE — 99417 PROLNG OP E/M EACH 15 MIN: CPT | Performed by: NURSE PRACTITIONER

## 2024-04-24 PROCEDURE — 84460 ALANINE AMINO (ALT) (SGPT): CPT

## 2024-04-24 PROCEDURE — 99215 OFFICE O/P EST HI 40 MIN: CPT | Mod: 25 | Performed by: NURSE PRACTITIONER

## 2024-04-24 PROCEDURE — 85045 AUTOMATED RETICULOCYTE COUNT: CPT

## 2024-04-24 PROCEDURE — 85025 COMPLETE CBC W/AUTO DIFF WBC: CPT

## 2024-04-24 PROCEDURE — 2500000001 HC RX 250 WO HCPCS SELF ADMINISTERED DRUGS (ALT 637 FOR MEDICARE OP): Performed by: NURSE PRACTITIONER

## 2024-04-24 PROCEDURE — 99215 OFFICE O/P EST HI 40 MIN: CPT | Performed by: NURSE PRACTITIONER

## 2024-04-24 PROCEDURE — 83615 LACTATE (LD) (LDH) ENZYME: CPT

## 2024-04-24 RX ORDER — HYDROMORPHONE HYDROCHLORIDE 4 MG/1
12 TABLET ORAL
Status: COMPLETED | OUTPATIENT
Start: 2024-04-24 | End: 2024-04-24

## 2024-04-24 RX ORDER — ONDANSETRON HYDROCHLORIDE 8 MG/1
8 TABLET, FILM COATED ORAL ONCE
Status: COMPLETED | OUTPATIENT
Start: 2024-04-24 | End: 2024-04-24

## 2024-04-24 RX ORDER — CYCLOBENZAPRINE HCL 10 MG
10 TABLET ORAL ONCE
Status: COMPLETED | OUTPATIENT
Start: 2024-04-24 | End: 2024-04-24

## 2024-04-24 RX ORDER — DIPHENHYDRAMINE HCL 25 MG
25 CAPSULE ORAL ONCE
Status: COMPLETED | OUTPATIENT
Start: 2024-04-24 | End: 2024-04-24

## 2024-04-24 RX ORDER — METHADONE HYDROCHLORIDE 5 MG/1
5 TABLET ORAL EVERY 12 HOURS
Qty: 14 TABLET | Refills: 0 | Status: SHIPPED | OUTPATIENT
Start: 2024-04-24 | End: 2024-05-01 | Stop reason: SDUPTHER

## 2024-04-24 RX ORDER — NALOXONE HYDROCHLORIDE 0.4 MG/ML
0.4 INJECTION, SOLUTION INTRAMUSCULAR; INTRAVENOUS; SUBCUTANEOUS
Status: DISCONTINUED | OUTPATIENT
Start: 2024-04-24 | End: 2024-04-24 | Stop reason: HOSPADM

## 2024-04-24 RX ORDER — ACETAMINOPHEN 325 MG/1
650 TABLET ORAL ONCE
Status: COMPLETED | OUTPATIENT
Start: 2024-04-24 | End: 2024-04-24

## 2024-04-24 RX ADMIN — CYCLOBENZAPRINE 10 MG: 10 TABLET, FILM COATED ORAL at 11:09

## 2024-04-24 RX ADMIN — ONDANSETRON HYDROCHLORIDE 8 MG: 8 TABLET, FILM COATED ORAL at 11:09

## 2024-04-24 RX ADMIN — HYDROMORPHONE HYDROCHLORIDE 12 MG: 4 TABLET ORAL at 13:13

## 2024-04-24 RX ADMIN — HYDROMORPHONE HYDROCHLORIDE 12 MG: 4 TABLET ORAL at 12:12

## 2024-04-24 RX ADMIN — DIPHENHYDRAMINE HYDROCHLORIDE 25 MG: 25 CAPSULE ORAL at 11:09

## 2024-04-24 RX ADMIN — ACETAMINOPHEN 650 MG: 325 TABLET ORAL at 11:09

## 2024-04-24 RX ADMIN — HYDROMORPHONE HYDROCHLORIDE 12 MG: 4 TABLET ORAL at 11:12

## 2024-04-24 ASSESSMENT — PAIN SCALES - GENERAL
PAINLEVEL_OUTOF10: 7
PAINLEVEL: 0-NO PAIN

## 2024-04-24 ASSESSMENT — PAIN - FUNCTIONAL ASSESSMENT: PAIN_FUNCTIONAL_ASSESSMENT: 0-10

## 2024-04-24 NOTE — TELEPHONE ENCOUNTER
Dr. Tolliver also called patient's spouse, Leidy, to update her after discussing with patient. Patient was agreeable to this.

## 2024-04-24 NOTE — PROGRESS NOTES
Patient ID:  Xu Maya is a 43 y.o. male.  Subjective   Chief Complaint: Uncontrolled Pain    HPI  Xu is a 43 y.o. male with history of anxiety, depression, gastritis/GERD, priapism, and sickle cell disease s/p transplant, who presents to [facility] with BLE pain typical of his baseline pain.   His pain started yesterday and he took his last dilaudid pill. He said he has a lock on his pharmacy and his pharmacy doesn't stock 8mg tablets and don't know when they will get ordered. Pt denies chest pain, cough, SOB, headaches, blurry vision, falls, fever or chills, n/v/d/abd pain, or urinary complaints.      He said he has been intermittently taking his methadone. He took a dose last night but not this morning because it makes him too drowsy during the day. He said most mornings he cannot take it and drive his kids to school. He said the 5mg made him feel tired but not drowsy. He thinks it has been helping his pain just a little and he can wait longer to take his dilaudid. He says life is otherwise going pretty well. He is still working at the home  and his shitzu is having puppies this weekend.       ROS  Review of Systems - Oncology     Allergies  Allergies   Allergen Reactions    Hydrocodone-Acetaminophen Hives and Unknown    Naproxen Unknown        Medications  Current Outpatient Medications   Medication Instructions    amitriptyline (ELAVIL) 25 mg, oral    ARIPiprazole (ABILIFY) 5 mg, oral, Daily    cholecalciferol (VITAMIN D-3) 2,000 Units, oral    cyclobenzaprine (FLEXERIL) 10 mg, oral, 3 times daily    diphenhydrAMINE (BENADRYL) 25 mg, oral, Every 6 hours PRN    DULoxetine (CYMBALTA) 60 mg, oral, 2 times daily    ergocalciferol (Vitamin D-2) 1.25 MG (84532 UT) capsule 1 capsule, oral, Once Weekly    gabapentin (NEURONTIN) 300 mg, oral, 2 times daily    HYDROmorphone (DILAUDID) 8 mg, oral, 3 times daily PRN    ketoconazole (NIZOral) 2 % cream Topical, 2 times daily    methadone (DOLOPHINE) 10 mg, oral,  Every 12 hours    mirtazapine (REMERON) 7.5 mg, oral, Nightly    naloxone (NARCAN) 4 mg, nasal, As needed, 1 spray to nostril for overdose, may repeat every 2-3 minutes until medical assistance arrives<BR>    omeprazole (PRILOSEC) 40 mg, oral, Daily before breakfast, TAKE ONE CAPSULE BY MOUTH EVERY DAY IN THE MORNING BEFORE EATING    Rezurock 200 mg tablet Take one (1) tablet by mouth twice daily    sennosides (SENOKOT) 17.2 mg, oral, 2 times daily    tacrolimus (Protopic) 0.1 % ointment APPLY 1-2 TIMES PER WEEK        Past Medical History:   Past Medical History:  04/17/2017: Anxiety disorder, unspecified      Comment:  Anxiety  04/17/2017: Depression, unspecified      Comment:  Depression  03/02/2017: Family history of glaucoma      Comment:  Family history of glaucoma in father  04/17/2017: Gastritis, unspecified, without bleeding      Comment:  Gastritis  04/17/2017: Hematuria, unspecified      Comment:  Hematuria  12/02/2015: Personal history of other diseases of the digestive system      Comment:  History of esophageal reflux  04/17/2017: Priapism, unspecified      Comment:  Priapism  04/17/2017: Sickle-cell disease without crisis (Multi)      Comment:  Sickle cell anemia   Surgical History:    Past Surgical History:   Procedure Laterality Date    GALLBLADDER SURGERY  04/12/2017    Gallbladder Surgery    IR CVC TUNNELED  4/24/2018    IR CVC TUNNELED 4/24/2018 [facility] [place]    IR CVC TUNNELED  6/5/2018    IR CVC TUNNELED 6/5/2018 [facility] [place]    IR CVC TUNNELED  3/1/2019    IR CVC TUNNELED 3/1/2019 [facility] [place]    IR CVC TUNNELED  6/4/2019    IR CVC TUNNELED 6/4/2019 [facility] CLINICAL LEGACY    IR CVC TUNNELED  4/5/2019    IR CVC TUNNELED 4/5/2019 [facility] [place]    IR CVC TUNNELED  7/17/2019    IR CVC TUNNELED 7/17/2019 [facility] [place] [facility] [place]    IR CVC TUNNELED  8/14/2019    IR CVC TUNNELED 8/14/2019 [facility] [place] [facility] [place]    IR CVC TUNNELED  12/18/2019    IR CVC TUNNELED 12/18/2019 [facility] CLINICAL LEGACY    IR CVC TUNNELED   10/9/2019    IR CVC TUNNELED 10/9/2019 [facility]IB LEGACY    IR CVC TUNNELED  11/13/2019    IR CVC TUNNELED 11/13/2019 [facility] CLINICAL LEGACY    IR CVC TUNNELED  1/15/2020    IR CVC TUNNELED 1/15/2020 [facility] CLINICAL LEGACY    IR CVC TUNNELED  2/19/2020    IR CVC TUNNELED 2/19/2020 [facility] CL[facility]ICAL LEGACY    IR CVC TUNNELED  1/4/2021    IR CVC TUNNELED 1/4/2021 [facility]    IR CVC TUNNELED  1/25/2021    IR CVC TUNNELED 1/25/2021 [facility] ANCILLARY LEGAC[facility]    IR CVC TUNNELED  8/21/2018    IR CVC TUNNELED 8/21/2018 [facility]    IR CVC TUNNELED  9/26/2018    IR CVC TUNNELED 9/26/2018 [facility]    IR CVC TUNNELED  11/5/2018    IR CVC TUNNELED 11/5/2018 [facility]    IR CVC TUNNELED  12/19/2018    IR CVC TUNNELED 12/19/2018 [facility]    IR VENOGRAM HEPATIC  11/8/2017    IR VENOGRAM HEPATIC 11/8/2017 [facility]IB [facility]    MR HEAD ANGIO WO IV CONTRAST  2/17/2017    MR HEAD ANGIO WO IV CONTRAST 2/17/2017 [facility] CLINICAL LEGACY    MR NECK ANGIO WO IV CONTRAST  2/17/2017    MR NECK ANGIO WO IV CONTRAST 2/17/2017 [facility] CLINICAL LEGACY    US GUIDED NEEDLE LIVER BIOPSY  9/8/2020    US GUIDED NEEDLE LIVER BIOPSY 9/8/2020 [facility]      Family History:    Family History   Problem Relation Name Age of Onset    Diabetes Father      Sickle cell anemia Other sibling     Cancer Other grandfather     Cancer Other uncle      Family Oncology History:    Cancer-related family history includes Cancer in some other family members.  Social History:    Social History     Tobacco Use    Smoking status: Every Day     Types: Cigarettes     Start date: 1/1/1998     Passive exposure: Current    Smokeless tobacco: Never   Substance Use Topics    Alcohol use: Not Currently     Comment: Occasional    Drug use: Not Currently     Types: Marijuana     Comment: Last use sometime in 2023.  No intention to re-start.        Objective   Vitals: There were no vitals taken for this visit.  Weight: There were no vitals filed for this visit.    Physical  Exam  Vitals reviewed.   Constitutional:       Appearance: Normal appearance.   HENT:      Nose: Nose normal.      Mouth/Throat:      Mouth: Mucous membranes are moist.      Pharynx: Oropharynx is clear.   Eyes:      Conjunctiva/sclera: Conjunctivae normal.      Pupils: Pupils are equal, round, and reactive to light.   Cardiovascular:      Rate and Rhythm: Normal rate and regular rhythm.      Pulses: Normal pulses.      Heart sounds: Normal heart sounds.   Pulmonary:      Effort: Pulmonary effort is normal.      Breath sounds: Normal breath sounds.   Abdominal:      General: Bowel sounds are normal. There is no distension.      Palpations: Abdomen is soft.      Tenderness: There is no abdominal tenderness.   Musculoskeletal:         General: Normal range of motion.      Cervical back: Normal range of motion and neck supple.   Skin:     General: Skin is warm and dry.   Neurological:      General: No focal deficit present.      Mental Status: He is alert and oriented to person, place, and time.         Diagnostic Results     Labs                                     Lab Results   Component Value Date    HGB 15.3 04/16/2024    HGB 16.4 04/08/2024    HGB 15.8 04/01/2024     04/16/2024     04/08/2024     04/01/2024     04/16/2024     04/08/2024     04/01/2024       Images  === 02/07/24 ===    XR CHEST 2 VIEWS    - Impression -  1.  No evidence of acute cardiopulmonary process. Emphysema.        MACRO:  None    Signed by: Jared Jimenez 2/7/2024 4:42 AM  Dictation workstation:   [id number]   === 10/23/21 ===    CT ABDOMEN PELVIS W IV CONTRAST    - Impression -  There is a pancolitis.  In a sickle cell patient who has received a  bone marrow transplant, differential includes graft versus host  disease as well as ischemic/venoocclusive colitis.  Inflammatory or  infectious colitis would also be in the differential.  No bowel  obstruction.  There is a small amount of pelvic free fluid.   No  pneumatosis or portal venous gas.    The appendix is seen with no evidence of acute appendicitis.    Minimal patchy opacity in the lower lobes, raising the possibility of  changes of icogi-poovhm-ztdy disease, pneumonia or sickle cell crisis.  Consider early or mild interstitial fibrosis.    Cholecystectomy.  No biliary ductal dilatation.  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    Consider cystitis which can also represent a manifestation of  ctyub-bunkwd-sire disease as well as infection.  Clinical correlation  is needed.    Osseous changes consistent with known sickle cell disease.  Mild  cardiomegaly consistent with known sickle cell disease.    NOTIFICATION:  The critical results of the study were discussed with,  and acknowledged by Dr. Bety Carr  by telephone on 10/23/2021 at  1322 hours.            Signed by Allyson Padgett MD     No echocardiogram results found for the past 12 months       Assessment/Plan   Xu Maya is a 43 y.o. male with history of anxiety, depression, gastritis/GERD, priapism, and sickle cell disease s/p transplant, who presents to [facility] with BLE pain typical of his baseline pain.     [facility] Course  - VSS  - Hemolysis labs near baseline, no indication of acute vaso-occlusive crisis or indication for blood transfusion   - Flexeril 10 mg, given for AVN  - dilaudid 12mg PO x3 doses given for sickle cell related pain   - Zofran 8 mg once for opioid induced nausea/vomiting  - Benadryl 25 mg once for opioid induced pruritus   - Dr. Lawrence and Dr. Ricks notified of pt's side effects with methadone, pt will wait for further instruction and has fu with Dr. Lawrence scheduled    Disposition  - Patient discharged home with no further needs following [facility] Course  - Return to clinic/ED instructions given        PRABHJOT Ames-CNP

## 2024-04-24 NOTE — TELEPHONE ENCOUNTER
Called patient with Dr. Tolliver to discuss medication regimen after him and Dr. Andrew discussed patient's care with sickle cell team and [facility] provider.    Dr. Tolliver submitted script for Methadone 5mg BID as patient had reported increased sleepiness on 10mg BID dosing so he had stopped taking medication twice/day and was taking 10mg daily. Patient agreeable to try 5mg BID dosing again.

## 2024-04-24 NOTE — TELEPHONE ENCOUNTER
Patient was scheduled to be seen in Supportive Oncology/Palliative Care clinic today by Dr. Andrew. Patient did not show up for appointment. Phone call to patient to reschedule missed appointment. Patient says he thought appointment was Monday - reviewed upcoming appointments for next week with patient. Patient accepted FUV with Dr. Andrew on 5/15 at 10:30. No further needs.

## 2024-04-24 NOTE — PROGRESS NOTES
Type of SA Concern:   Communication concern, no show.     Patient went to [facility] due to uncontrolled pain. He was seen by provider there and reported having adverse effects from methadone we prescribed. He reports drowsiness from methadone 10mg BID and was taking it PRN. We had discussed how to take his meds in detail at his last visit, namely that he needs to communicate with us if he has any adverse effects. He had run out of his hydromorphone 8mg BID PRN because he as taking it as a scheduled medication and had been unable to have it refilled due to a prior authorization. He was treated in the [facility] and then discharged home and did not come upstairs for his scheduled appointment.     We spoke with his sickle cell specialist, Dr. Ricks, regarding his lack of communication and it was related to us that SCD patients are known to have degrees of mild cognitive impairment that can interfere with their ability to follow instructions.   Furthermore, his chronic pain post-transplant is rare but not unheard of. Etiology of this type of residual chronic pain after transplant or gene therapy is unclear.     On review of his chart, he has one distant cannabinoid positive UDS and more importantly several positive results for Oxycodone. This was never prescribed (though he did receive it at least once in the ED in 10/2023) but had come back positive on multiple UDS. He may have received it at some ED elsewhere. We are unsure where he procured the oxycodone. Otherwise, he appears to have benefit from analgesics and his partner is a great help in supporting him and we do not believe she would allow for behavior that would complicate his treatment plan; this oxycodone usage may reflect an unsatisfactory treatment plan for his pain.     MRI 2021: Had chronic ischemic changes. Sequelae of small vessel disease.     STUDY:  MRI BRAIN W/WO CONTRAST;  6/1/2021 4:45 pm    IMPRESSION:  1. No acute intracranial abnormality.  2. Unchanged  minimal white matter FLAIR signal changes, which are  nonspecific and may be seen in setting of chronic migraines or  chronic sequela of small-vessel ischemic disease.          Recommendations:   - We will resume his earlier dose of methadone 5mg BID.    - Neuropsychiatry testing for cognitive indicated based on pattern of non-adherence, MRI findings.    - We will continue with hydromorphone 8mg BID PRN for his breakthrough pain.    - We will be calling him weekly to promote adherence.    - Rescheduled for 5/15 with supp onc      Curt Tolliver MD  Discussed with Dr. Donald Andrew and Dr. Mihir Hook.

## 2024-04-24 NOTE — PROGRESS NOTES
PT here for sickle cell pain in b/l lower extremities.  Received 3 doses of po dilaudid with good relief.  Discharged home in stable condition.

## 2024-04-30 ENCOUNTER — TELEPHONE (OUTPATIENT)
Dept: ADMISSION | Facility: HOSPITAL | Age: 44
End: 2024-04-30
Payer: COMMERCIAL

## 2024-04-30 DIAGNOSIS — Z79.891 ENCOUNTER FOR MONITORING OPIOID MAINTENANCE THERAPY: ICD-10-CM

## 2024-04-30 DIAGNOSIS — Z51.81 ENCOUNTER FOR MONITORING OPIOID MAINTENANCE THERAPY: ICD-10-CM

## 2024-04-30 DIAGNOSIS — D57.1 SICKLE CELL DISEASE WITHOUT CRISIS (MULTI): ICD-10-CM

## 2024-04-30 DIAGNOSIS — R52 ACUTE PAIN: ICD-10-CM

## 2024-04-30 RX ORDER — HYDROMORPHONE HYDROCHLORIDE 8 MG/1
8 TABLET ORAL 3 TIMES DAILY PRN
Qty: 21 TABLET | Refills: 0 | Status: SHIPPED | OUTPATIENT
Start: 2024-04-30 | End: 2024-05-01 | Stop reason: SDUPTHER

## 2024-04-30 NOTE — TELEPHONE ENCOUNTER
Xu P Crayton called the refill line for Dilaudid. Requesting refills be sent to [facility] pharmacy; message sent to Sickle Cell team to submit.

## 2024-05-01 ENCOUNTER — TELEPHONE (OUTPATIENT)
Dept: PALLIATIVE MEDICINE | Facility: HOSPITAL | Age: 44
End: 2024-05-01

## 2024-05-01 ENCOUNTER — OFFICE VISIT (OUTPATIENT)
Dept: PALLIATIVE MEDICINE | Facility: HOSPITAL | Age: 44
End: 2024-05-01
Payer: COMMERCIAL

## 2024-05-01 ENCOUNTER — OFFICE VISIT (OUTPATIENT)
Dept: HEMATOLOGY/ONCOLOGY | Facility: HOSPITAL | Age: 44
End: 2024-05-01
Payer: COMMERCIAL

## 2024-05-01 VITALS
BODY MASS INDEX: 21.87 KG/M2 | HEART RATE: 66 BPM | WEIGHT: 139.5 LBS | DIASTOLIC BLOOD PRESSURE: 74 MMHG | RESPIRATION RATE: 16 BRPM | SYSTOLIC BLOOD PRESSURE: 106 MMHG | OXYGEN SATURATION: 100 % | TEMPERATURE: 97 F

## 2024-05-01 DIAGNOSIS — R52 ACUTE PAIN: ICD-10-CM

## 2024-05-01 DIAGNOSIS — T40.2X5A CONSTIPATION DUE TO OPIOID THERAPY: ICD-10-CM

## 2024-05-01 DIAGNOSIS — Z51.5 PALLIATIVE CARE ENCOUNTER: Primary | ICD-10-CM

## 2024-05-01 DIAGNOSIS — K59.03 CONSTIPATION DUE TO OPIOID THERAPY: ICD-10-CM

## 2024-05-01 DIAGNOSIS — D57.00 SICKLE CELL DISEASE WITH CRISIS (MULTI): ICD-10-CM

## 2024-05-01 DIAGNOSIS — G89.4 CHRONIC PAIN SYNDROME: ICD-10-CM

## 2024-05-01 DIAGNOSIS — Z51.81 ENCOUNTER FOR MONITORING OPIOID MAINTENANCE THERAPY: ICD-10-CM

## 2024-05-01 DIAGNOSIS — Z79.891 ENCOUNTER FOR MONITORING OPIOID MAINTENANCE THERAPY: ICD-10-CM

## 2024-05-01 DIAGNOSIS — D57.1 SICKLE CELL DISEASE WITHOUT CRISIS (MULTI): ICD-10-CM

## 2024-05-01 DIAGNOSIS — D57.00 SICKLE-CELL DISEASE WITH PAIN (MULTI): ICD-10-CM

## 2024-05-01 PROCEDURE — 99214 OFFICE O/P EST MOD 30 MIN: CPT | Performed by: PEDIATRICS

## 2024-05-01 PROCEDURE — 99215 OFFICE O/P EST HI 40 MIN: CPT | Performed by: INTERNAL MEDICINE

## 2024-05-01 RX ORDER — SENNOSIDES 8.6 MG/1
3 TABLET ORAL 2 TIMES DAILY
Qty: 180 TABLET | Refills: 3 | Status: SHIPPED | OUTPATIENT
Start: 2024-05-01 | End: 2024-05-02 | Stop reason: SDUPTHER

## 2024-05-01 RX ORDER — HYDROMORPHONE HYDROCHLORIDE 4 MG/1
4 TABLET ORAL 4 TIMES DAILY PRN
Qty: 56 TABLET | Refills: 0 | Status: SHIPPED | OUTPATIENT
Start: 2024-05-01 | End: 2024-05-02 | Stop reason: SDUPTHER

## 2024-05-01 RX ORDER — METHADONE HYDROCHLORIDE 5 MG/1
5 TABLET ORAL EVERY 12 HOURS
Qty: 14 TABLET | Refills: 0 | Status: SHIPPED | OUTPATIENT
Start: 2024-05-01 | End: 2024-05-02 | Stop reason: SDUPTHER

## 2024-05-01 ASSESSMENT — PAIN SCALES - GENERAL: PAINLEVEL: 6

## 2024-05-01 NOTE — PROGRESS NOTES
Supportive Oncology Established Patient Note    Patient ID: Xu Maya is a 43 y.o. male who presents for No chief complaint on file..    HPI     Symptoms  Felt sedated on methadone 10 mg bid. Current doses of methadone 5 mg bid is helpful but still >6/10 pain level. Takes dilaudid 8 several times daily but sedating.   Also taking joleen 300 bid.   Constipation- takes 2 senna bid and has BM every other day.   Sleeps well.   Enjoys his kids and work.   Fair appetite.   Hopes to have better pain control without sedation.     Allergies  Hydrocodone-acetaminophen and Naproxen     Objective:    Last Recorded Vitals  There were no vitals taken for this visit.         Physical Exam:  Constitutional:       General: Patient is not in acute distress.  HENT:      Head: Normocephalic.      Mouth: Mucous membranes are moist.   Eyes:      Conjunctiva/sclera: [place] sclera (chronic)  Neck:      Vascular: No carotid bruit.   Cardiovascular:      Rate and Rhythm: Normal rate and regular rhythm.      Heart sounds: No murmur heard but appears to have fixed splitting best heard at apex and RSB.  Pulmonary:      Effort: No respiratory distress.      Breath sounds: Clear to auscultation  Musculoskeletal:         General: No deformity. No leg edema.   Skin:     Coloration: Skin is not jaundiced.   Neurological:      General: No focal deficit present.      Mental Status: He is oriented to person, place, and time.   Psychiatric:         Behavior: Behavior normal. Behavior is cooperative.      Relevant Results  Lab Results   Component Value Date    WBC 8.7 04/24/2024    HGB 15.5 04/24/2024    HCT 43.1 04/24/2024    MCV 86 04/24/2024     04/24/2024      Lab Results   Component Value Date    GLUCOSE 120 (H) 04/24/2024    CALCIUM 9.1 04/24/2024     04/24/2024    K 3.5 04/24/2024    CO2 27 04/24/2024     04/24/2024    BUN 8 04/24/2024    CREATININE 0.92 04/24/2024      Lab Results   Component Value Date    ALT 11 04/24/2024     AST 14 04/24/2024    ALKPHOS 106 04/24/2024    BILITOT 0.6 04/24/2024        Relevant Imaging   No results found for this or any previous visit from the past 1000 days.     No image results found.       Medications:   Current Outpatient Medications   Medication Instructions    amitriptyline (ELAVIL) 25 mg, oral    ARIPiprazole (ABILIFY) 5 mg, oral, Daily    cholecalciferol (VITAMIN D-3) 2,000 Units, oral    cyclobenzaprine (FLEXERIL) 10 mg, oral, 3 times daily    diphenhydrAMINE (BENADRYL) 25 mg, oral, Every 6 hours PRN    DULoxetine (CYMBALTA) 60 mg, oral, 2 times daily    ergocalciferol (Vitamin D-2) 1.25 MG (09179 UT) capsule 1 capsule, oral, Once Weekly    gabapentin (NEURONTIN) 300 mg, oral, 2 times daily    HYDROmorphone (DILAUDID) 8 mg, oral, 3 times daily PRN    ketoconazole (NIZOral) 2 % cream Topical, 2 times daily    methadone (DOLOPHINE) 5 mg, oral, Every 12 hours    mirtazapine (REMERON) 7.5 mg, oral, Nightly    naloxone (NARCAN) 4 mg, nasal, As needed, 1 spray to nostril for overdose, may repeat every 2-3 minutes until medical assistance arrives<BR>    omeprazole (PRILOSEC) 40 mg, oral, Daily before breakfast, TAKE ONE CAPSULE BY MOUTH EVERY DAY IN THE MORNING BEFORE EATING    Rezurock 200 mg tablet Take one (1) tablet by mouth twice daily    sennosides (SENOKOT) 17.2 mg, oral, 2 times daily    tacrolimus (Protopic) 0.1 % ointment APPLY 1-2 TIMES PER WEEK        Assessment and Plan  Impression:  Xu Maya is a 43 yr M, w/ PMHx of  Hemoglobin SS disease s/p Haplo SCT (2021), who follows closely with Dr. Luna for disease surveillance and management. Patient presents to palliative clinic for pain medication evaluation and adjustment.      Problems:  # Hemoglobin SS disease  #Chronic Pain   - Haplo SCT from his brother (6/12 HLA match)   - T=0, 2/4/21  -Plan:   -Methadone 5 mg tab-- 7.5 mg BID - monitor for sedation  -Dilaudid 4 mg 4 times daily PRN    -Scheduled for scrambler.   -Continue  duloxetine    #Constipation   -Reports BM's every other day.     Plan:   -increase Senna to 3 tabs BID     Cognitive decline  -due to microvascular changes from SS disease  -has evidence of disease per imagine in 2020/21.   -will monitor him closely- our team will call twice weekly  -will engage his wife and update her on plan of care.        Social Considerations  NA    Code Status: Assume Full     Discussed case with his Sickle cell team.     Follow up in 2 weeks. Will inform his wife of the adjustments.     Next visit, hope to taper down joleen given he will start scrambler therapy.

## 2024-05-01 NOTE — TELEPHONE ENCOUNTER
Phone call to patient's spouse, Leidy, with update of visit today per Dr. Andrew's request. VM left with Methadone and Dilaudid doses and FUV in 2 week. I will call patient on Friday also to check in. Call back number provided for questions.

## 2024-05-02 ENCOUNTER — TELEPHONE (OUTPATIENT)
Dept: HEMATOLOGY/ONCOLOGY | Facility: HOSPITAL | Age: 44
End: 2024-05-02

## 2024-05-02 ENCOUNTER — DOCUMENTATION (OUTPATIENT)
Dept: PALLIATIVE MEDICINE | Facility: HOSPITAL | Age: 44
End: 2024-05-02

## 2024-05-02 ENCOUNTER — TELEPHONE (OUTPATIENT)
Dept: PALLIATIVE MEDICINE | Facility: CLINIC | Age: 44
End: 2024-05-02

## 2024-05-02 ENCOUNTER — TELEPHONE (OUTPATIENT)
Dept: ADMISSION | Facility: HOSPITAL | Age: 44
End: 2024-05-02

## 2024-05-02 ENCOUNTER — TELEMEDICINE (OUTPATIENT)
Dept: BEHAVIORAL HEALTH | Facility: HOSPITAL | Age: 44
End: 2024-05-02
Payer: COMMERCIAL

## 2024-05-02 DIAGNOSIS — F33.1 MODERATE EPISODE OF RECURRENT MAJOR DEPRESSIVE DISORDER (MULTI): ICD-10-CM

## 2024-05-02 DIAGNOSIS — D57.00 SICKLE-CELL DISEASE WITH PAIN (MULTI): ICD-10-CM

## 2024-05-02 DIAGNOSIS — Z51.81 ENCOUNTER FOR MONITORING OPIOID MAINTENANCE THERAPY: ICD-10-CM

## 2024-05-02 DIAGNOSIS — K59.03 CONSTIPATION DUE TO OPIOID THERAPY: ICD-10-CM

## 2024-05-02 DIAGNOSIS — D57.1 SICKLE CELL DISEASE WITHOUT CRISIS (MULTI): ICD-10-CM

## 2024-05-02 DIAGNOSIS — R52 ACUTE PAIN: ICD-10-CM

## 2024-05-02 DIAGNOSIS — Z51.5 PALLIATIVE CARE ENCOUNTER: ICD-10-CM

## 2024-05-02 DIAGNOSIS — Z79.891 ENCOUNTER FOR MONITORING OPIOID MAINTENANCE THERAPY: ICD-10-CM

## 2024-05-02 DIAGNOSIS — F41.9 ANXIETY: ICD-10-CM

## 2024-05-02 DIAGNOSIS — T40.2X5A CONSTIPATION DUE TO OPIOID THERAPY: ICD-10-CM

## 2024-05-02 PROCEDURE — 99214 OFFICE O/P EST MOD 30 MIN: CPT | Performed by: PSYCHIATRY & NEUROLOGY

## 2024-05-02 PROCEDURE — 90833 PSYTX W PT W E/M 30 MIN: CPT | Performed by: PSYCHIATRY & NEUROLOGY

## 2024-05-02 RX ORDER — SENNOSIDES 8.6 MG/1
3 TABLET ORAL 2 TIMES DAILY
Qty: 180 TABLET | Refills: 3 | Status: SHIPPED | OUTPATIENT
Start: 2024-05-02 | End: 2025-05-02

## 2024-05-02 RX ORDER — ARIPIPRAZOLE 5 MG/1
5 TABLET ORAL DAILY
Qty: 90 TABLET | Refills: 1 | Status: SHIPPED | OUTPATIENT
Start: 2024-05-02

## 2024-05-02 RX ORDER — HYDROMORPHONE HYDROCHLORIDE 4 MG/1
4 TABLET ORAL 4 TIMES DAILY PRN
Qty: 56 TABLET | Refills: 0 | Status: SHIPPED | OUTPATIENT
Start: 2024-05-02 | End: 2024-05-15 | Stop reason: SDUPTHER

## 2024-05-02 RX ORDER — METHADONE HYDROCHLORIDE 5 MG/1
5 TABLET ORAL EVERY 12 HOURS
Qty: 14 TABLET | Refills: 0 | Status: SHIPPED | OUTPATIENT
Start: 2024-05-02 | End: 2024-05-06 | Stop reason: SDUPTHER

## 2024-05-02 RX ORDER — DULOXETIN HYDROCHLORIDE 60 MG/1
60 CAPSULE, DELAYED RELEASE ORAL 2 TIMES DAILY
Qty: 180 CAPSULE | Refills: 1 | Status: SHIPPED | OUTPATIENT
Start: 2024-05-02

## 2024-05-02 NOTE — TELEPHONE ENCOUNTER
Call returned to [facility] to confirm that Dr Andrew is lowering the methadone from 10mg to 5mg due to sedation.

## 2024-05-02 NOTE — TELEPHONE ENCOUNTER
Patient called and left message requesting medications to be sent to [facility] in [place].  Medications were sent yesterday to incorrect pharmacy location.  Hydromorphone 4mg, methadone 5mg and sennosides 8.6mg.   Message sent to team to resend Rxs.

## 2024-05-02 NOTE — PROGRESS NOTES
Outpatient Psychiatry FUV      Subjective   Xu Maya, a 43 y.o. male, for virtual FUV. At home today    Converted to phone due to audio issues on virtual platform      Assessment/Plan   Patient Discussion:  continue duloxetine 60mg 2x day  CAN USE mirtazapine 7.5 mg at bedtime as needed  CONTINUE aripiprazole 5mg in the AM  monitor for excess serotonin    RETURN to clinic 7/11at 9:30AM for virtual FUV    call with questions or concerns. [phone]     Assessment:   43 y.o. BM with SCC disease with depression, sickle cell disease now s/p BMT. Previously on suboxone for management of pain/high utilization but now off since transplant, still having pain now transitioning to methadone     Since last appt, pt reports doing ok, mood better recently. 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 Adoption on hold. Follow up 1 months sooner if needed    Diagnosis:   MDD, recurrent. P remission  WILFRID  Chronic pain disorder    Treatment Plan/Recommendations:   1. Safety Assessment: no current SI, no h/o SI/SA. has guns at home but unloaded and locked with kids at home. PF include , no previous attempts, kids, [employer]. RF include depression, pain, father with completed suicide. Pt has moderate baseline risk based on static factors but is not an imminent risk and safety plan addressed    2. MDD, WILFRID  CONTINUE duloxetine 60mg PO BID, r/b/ae discussed including higher dose of SNRI, si/sx excess serotonin/SS  CONTINUE mirtazapine 7.5mg PO QHS PRN insomnia  CONTINUE aripiprazole 5mg PO daily     continue supportive therapy during appt    3. opioid misuse in the context of chronic pain 2/2 sickle cell disease with acute exacerbations currently no concerns  continues to struggle with chronic pain, working with Dr. Andrew to transition to methadone    nicotine use disorder --previous success with chantix but stopped and now smoking 3-4/day. monitor for now    4. Medical: SCC, back pain, GERD with h/o PUD: recent notes and labs  reviewed. stable.   s/p BMT 1/2021  notes and labs reviewed,   coordinate care as needed with sickle cell team, BMT as needed  Ongoing pain, managed by sickle cell/pall care    5. Social: lives with kids and wife, moved to [place] after transplant, [place]. stepfather passed away from leukemia, sister passed from sickle cell. Better at home, work. Wants to start home     Reason for Visit:   FUV depression and anxiety    Subjective:  Last visit 2 months ago  Since then doing ok  Rates depression lower in the past few weeks, currently 2/10    Looking to start home     Putting adoption on hold. Reports did a 2nd UDS and did not show oxy    No feelings of not wanting to go on, no SI/HI    Better at home, better relationship with son  Helping sister, 13yo nephew is staying with them    No a/e to medication    Current Medications:    Current Outpatient Medications:     amitriptyline (Elavil) 25 mg tablet, Take 1 tablet (25 mg) by mouth., Disp: , Rfl:     ARIPiprazole (Abilify) 5 mg tablet, Take 1 tablet (5 mg) by mouth once daily., Disp: 90 tablet, Rfl: 1    cholecalciferol (Vitamin D-3) 50 MCG (2000 UT) tablet, Take 1 tablet (2,000 Units) by mouth., Disp: , Rfl:     cyclobenzaprine (Flexeril) 10 mg tablet, Take 1 tablet (10 mg) by mouth 3 times a day for 10 days., Disp: 30 tablet, Rfl: 0    diphenhydrAMINE (BENADryl) 25 mg capsule, Take 1 capsule (25 mg) by mouth every 6 hours if needed., Disp: , Rfl:     DULoxetine (Cymbalta) 60 mg DR capsule, Take 1 capsule (60 mg) by mouth 2 times a day., Disp: 60 capsule, Rfl: 2    ergocalciferol (Vitamin D-2) 1.25 MG (23755 UT) capsule, Take 1 capsule (1,250 mcg) by mouth 1 (one) time per week., Disp: , Rfl:     gabapentin (Neurontin) 300 mg capsule, Take 1 capsule (300 mg) by mouth 2 times a day., Disp: 60 capsule, Rfl: 0    HYDROmorphone (Dilaudid) 4 mg tablet, Take 1 tablet (4 mg) by mouth 4 times a day as needed for severe pain (7 - 10) for up to  14 days., Disp: 56 tablet, Rfl: 0    ketoconazole (NIZOral) 2 % cream, Apply topically 2 times a day., Disp: 30 g, Rfl: 11    methadone (Dolophine) 5 mg tablet, Take 1 tablet (5 mg) by mouth every 12 hours for 7 days., Disp: 14 tablet, Rfl: 0    mirtazapine (Remeron) 7.5 mg tablet, Take 1 tablet (7.5 mg) by mouth once daily at bedtime., Disp: 30 tablet, Rfl: 2    naloxone (Narcan) 4 mg/0.1 mL nasal spray, Administer 1 spray (4 mg) into affected nostril(s) if needed for opioid reversal or respiratory depression. 1 spray to nostril for overdose, may repeat every 2-3 minutes until medical assistance arrives, Disp: , Rfl:     omeprazole (PriLOSEC) 40 mg DR capsule, Take 1 capsule (40 mg) by mouth once daily in the morning. Take before meals. TAKE ONE CAPSULE BY MOUTH EVERY DAY IN THE MORNING BEFORE EATING, Disp: 30 capsule, Rfl: 2    Rezurock 200 mg tablet, Take one (1) tablet by mouth twice daily, Disp: 60 tablet, Rfl: 5    sennosides (Senokot) 8.6 mg tablet, Take 3 tablets (25.8 mg) by mouth 2 times a day., Disp: 180 tablet, Rfl: 3    tacrolimus (Protopic) 0.1 % ointment, APPLY 1-2 TIMES PER WEEK (Patient not taking: Reported on 5/1/2024), Disp: 30 g, Rfl: 1    Current Facility-Administered Medications:     heparin lock flush (porcine) 10 unit/mL injection 100 Units, 100 Units, intra-catheter, Once, CHRIS Garcia    heparin lock flush (porcine) injection 500 Units, 5 mL, intravenous, PRN, CHRIS Garcia  Medical History:  Past Medical History:   Diagnosis Date    Anxiety disorder, unspecified 04/17/2017    Anxiety    Depression, unspecified 04/17/2017    Depression    Family history of glaucoma 03/02/2017    Family history of glaucoma in father    Gastritis, unspecified, without bleeding 04/17/2017    Gastritis    Hematuria, unspecified 04/17/2017    Hematuria    Personal history of other diseases of the digestive system 12/02/2015    History of esophageal reflux    Priapism, unspecified 04/17/2017     Priapism    Sickle-cell disease without crisis (Multi) 04/17/2017    Sickle cell anemia       Surgical History:  Past Surgical History:   Procedure Laterality Date    GALLBLADDER SURGERY  04/12/2017    Gallbladder Surgery    IR CVC TUNNELED  4/24/2018    IR CVC TUNNELED 4/24/2018 [facility]    IR CVC TUNNELED  6/5/2018    IR CVC TUNNELED 6/5/2018 [facility]    IR CVC TUNNELED  3/1/2019    IR CVC TUNNELED 3/1/2019 [facility]    IR CVC TUNNELED  6/4/2019    IR CVC TUNNELED 6/4/2019 [facility]    IR CVC TUNNELED  4/5/2019    IR CVC TUNNELED 4/5/2019 [facility]    IR CVC TUNNELED  7/17/2019    IR CVC TUNNELED 7/17/2019 [facility]    IR CVC TUNNELED  8/14/2019    IR CVC TUNNELED 8/14/2019 [facility]    IR CVC TUNNELED  12/18/2019    IR CVC TUNNELED 12/18/2019 [facility]    IR CVC TUNNELED  10/9/2019    IR CVC TUNNELED 10/9/2019 [facility]    IR CVC TUNNELED  11/13/2019    IR CVC TUNNELED 11/13/2019 [facility]    IR CVC TUNNELED  1/15/2020    IR CVC TUNNELED 1/15/2020 [facility]    IR CVC TUNNELED  2/19/2020    IR CVC TUNNELED 2/19/2020 [facility]    IR CVC TUNNELED  1/4/2021    IR CVC TUNNELED 1/4/2021 [facility]    IR CVC TUNNELED  1/25/2021    IR CVC TUNNELED 1/25/2021 [facility]    IR CVC TUNNELED  8/21/2018    IR CVC TUNNELED 8/21/2018 [facility]    IR CVC TUNNELED  9/26/2018    IR CVC TUNNELED 9/26/2018 [facility]    IR CVC TUNNELED  11/5/2018    IR CVC TUNNELED 11/5/2018 [facility]    IR CVC TUNNELED  12/19/2018    IR CVC TUNNELED 12/19/2018 [facility]    IR VENOGRAM HEPATIC  11/8/2017    IR VENOGRAM HEPATIC 11/8/2017 [facility]    MR HEAD ANGIO WO IV CONTRAST  2/17/2017    MR HEAD ANGIO WO IV CONTRAST 2/17/2017 [facility]    MR NECK ANGIO WO IV CONTRAST  2/17/2017    MR NECK ANGIO WO IV CONTRAST 2/17/2017 [facility]    US GUIDED NEEDLE LIVER BIOPSY  9/8/2020    US GUIDED NEEDLE LIVER  "BIOPSY 9/8/2020 [facility]       Family History:  Family History   Problem Relation Name Age of Onset    Diabetes Father      Sickle cell anemia Other sibling     Cancer Other grandfather     Cancer Other uncle        Social History:  Social History     Socioeconomic History    Marital status:      Spouse name: Not on file    Number of children: Not on file    Years of education: Not on file    Highest education level: Not on file   Occupational History    Not on file   Tobacco Use    Smoking status: Every Day     Types: Cigarettes     Start date: 1/1/1998     Passive exposure: Current    Smokeless tobacco: Never   Substance and Sexual Activity    Alcohol use: Not Currently     Comment: Occasional    Drug use: Not Currently     Types: Marijuana     Comment: Last use sometime in 2023.  No intention to re-start.    Sexual activity: Not on file   Other Topics Concern    Not on file   Social History Narrative    Not on file     Social Determinants of Health     Financial Resource Strain: Not on file   Food Insecurity: Not on file   Transportation Needs: Not on file   Physical Activity: Not on file   Stress: Not on file   Social Connections: Not on file   Intimate Partner Violence: Not on file   Housing Stability: Not on file              Medical Review Of Systems:  +pain worse with weather  +fatigue    Psychiatric Review Of Systems:  Depression ok       Objective   Mental Status Exam:   Appearance: appropriate g/h.   Attitude: cooperative and engaged.   Behavior: sitting in chair, good eye contact.   Motor Activity: no tremor, normal tone.   Speech: regular in rate, volume, low tone, no dysarthria, no aphasia.   Mood: \"ok\"  Affect: congruent, appropriate range.   Thought Process: linear, goal directed.   Thought Content: no delusions, no SI/HI.   Thought Perception: no AVH.   Cognition: alert, oriented to person, place, time. attn intact.   Insight: fair.   Judgment: fair/intact.       Vitals:  There were no " vitals filed for this visit.  Encounter Date: 04/08/24   ECG 12 lead   Result Value    Ventricular Rate 77    Atrial Rate 77    OH Interval 168    QRS Duration 96    QT Interval 416    QTC Calculation(Bazett) 470    P Axis 74    R Axis -49    T Axis 16    QRS Count 12    Q Onset 210    P Onset 126    P Offset 188    T Offset 418    QTC Fredericia 452    Narrative    Normal sinus rhythm  Left anterior fascicular block  Abnormal ECG  When compared with ECG of 03-APR-2024 14:37,  T wave inversion less evident in Inferior leads  T wave inversion no longer evident in Lateral leads  See ED provider note for full interpretation and clinical correlation  Confirmed by Annamaria Pruitt ([id number]) on 4/10/2024 10:13:52 AM     Lab Results   Component Value Date    WBC 8.7 04/24/2024    HGB 15.5 04/24/2024    HCT 43.1 04/24/2024    MCV 86 04/24/2024     04/24/2024     Lab Results   Component Value Date    GLUCOSE 120 (H) 04/24/2024    CALCIUM 9.1 04/24/2024     04/24/2024    K 3.5 04/24/2024    CO2 27 04/24/2024     04/24/2024    BUN 8 04/24/2024    CREATININE 0.92 04/24/2024     Psychotherapy       Time: 18 minutes  Type: supportive, insight oriented  Target: mood, anxiety  Strategies: problem solving, insight oriented  Goal: decreased sx burden  Follow up: next visit 1 month  Response: mayuri Contreras MD

## 2024-05-02 NOTE — PROGRESS NOTES
Per [facility] , [place] is needed for Methadone as script exceeds 5 opioid claims in 90 days. Request sent to [facility]. Awaiting response.

## 2024-05-03 ENCOUNTER — OFFICE VISIT (OUTPATIENT)
Dept: HEMATOLOGY/ONCOLOGY | Facility: HOSPITAL | Age: 44
End: 2024-05-03
Payer: COMMERCIAL

## 2024-05-03 ENCOUNTER — TELEPHONE (OUTPATIENT)
Dept: PALLIATIVE MEDICINE | Facility: CLINIC | Age: 44
End: 2024-05-03

## 2024-05-03 VITALS
OXYGEN SATURATION: 98 % | HEART RATE: 68 BPM | SYSTOLIC BLOOD PRESSURE: 113 MMHG | DIASTOLIC BLOOD PRESSURE: 74 MMHG | RESPIRATION RATE: 17 BRPM | BODY MASS INDEX: 22.02 KG/M2 | WEIGHT: 140.43 LBS | TEMPERATURE: 97 F

## 2024-05-03 DIAGNOSIS — Z94.84 STEM CELLS TRANSPLANT STATUS (MULTI): ICD-10-CM

## 2024-05-03 DIAGNOSIS — E55.9 VITAMIN D DEFICIENCY: Primary | ICD-10-CM

## 2024-05-03 DIAGNOSIS — D89.811 CHRONIC GVHD (MULTI): ICD-10-CM

## 2024-05-03 DIAGNOSIS — M85.80 OSTEOPENIA, UNSPECIFIED LOCATION: ICD-10-CM

## 2024-05-03 DIAGNOSIS — D57.1 SICKLE CELL DISEASE WITHOUT CRISIS (MULTI): ICD-10-CM

## 2024-05-03 DIAGNOSIS — E55.9 VITAMIN D DEFICIENCY, UNSPECIFIED: ICD-10-CM

## 2024-05-03 PROCEDURE — 99215 OFFICE O/P EST HI 40 MIN: CPT | Performed by: STUDENT IN AN ORGANIZED HEALTH CARE EDUCATION/TRAINING PROGRAM

## 2024-05-03 RX ORDER — CHOLECALCIFEROL (VITAMIN D3) 25 MCG
1000 TABLET ORAL DAILY
Qty: 30 TABLET | Refills: 11 | Status: SHIPPED | OUTPATIENT
Start: 2024-05-03 | End: 2025-05-03

## 2024-05-03 RX ORDER — CHOLECALCIFEROL (VITAMIN D3) 25 MCG
1000 TABLET ORAL DAILY
Qty: 30 TABLET | Refills: 11 | Status: SHIPPED | OUTPATIENT
Start: 2024-05-03 | End: 2024-05-03

## 2024-05-03 ASSESSMENT — ENCOUNTER SYMPTOMS
NECK PAIN: 0
NUMBNESS: 0
NEUROLOGICAL NEGATIVE: 1
PSYCHIATRIC NEGATIVE: 1
APPETITE CHANGE: 0
MYALGIAS: 1
ENDOCRINE NEGATIVE: 1
COUGH: 1
WOUND: 0
HEMATOLOGIC/LYMPHATIC NEGATIVE: 1
WHEEZING: 0
HEMATURIA: 0
DIARRHEA: 0
DEPRESSION: 0
CONSTIPATION: 1
NERVOUS/ANXIOUS: 0
EYE PROBLEMS: 0
HEADACHES: 0
LIGHT-HEADEDNESS: 0
NAUSEA: 0
GASTROINTESTINAL NEGATIVE: 1
COUGH: 0
FEVER: 0
FATIGUE: 1
DIAPHORESIS: 0
SORE THROAT: 0
BACK PAIN: 1
VOMITING: 0
FATIGUE: 1
ABDOMINAL PAIN: 0
MUSCULOSKELETAL NEGATIVE: 1
HEMATOLOGIC/LYMPHATIC NEGATIVE: 1
FLANK PAIN: 0
SCLERAL ICTERUS: 0
EYES NEGATIVE: 1
CARDIOVASCULAR NEGATIVE: 1
LEG SWELLING: 0
UNEXPECTED WEIGHT CHANGE: 0
CHILLS: 0
PALPITATIONS: 0
HEMOPTYSIS: 0
CHEST TIGHTNESS: 0

## 2024-05-03 ASSESSMENT — PAIN SCALES - GENERAL: PAINLEVEL: 0-NO PAIN
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
## 2024-05-03 NOTE — TELEPHONE ENCOUNTER
Phone call to patient to check in since Dr. Andrew increased Methadone from 5mg BID to 7.5mg BID at visit this week. Recall patient previously was taking 10mg BID but very sleepy on it so had stopped taking completely - pt. Then instructed to start 5mg BID on 4/24/24 and then increase to 7.5mg BID on 5/1/24. Reminder for our dept to call patient on Monday per Dr. Andrew's request.

## 2024-05-03 NOTE — PROGRESS NOTES
SICKLE CELL OUTPATIENT NOTE  Patient ID: Xu Maya   Visit Type: Follow up visit     ASSESSMENT AND PLAN  Xu Maya is 43 y.o. 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 Conditioning regimen was with flu/Cy/TBI, and posttransplant prophylaxis included siro/MMF/PTCy. He is in continued remission post BMT and without any complications including GvHD. Xu continues to have chronic pain mainly in his bilateral lower extremities which he indicates feels like his sickle cell pain. Pain is currently being managed by palliative care service. No changes in his pain medications from my end and he will continue to follow up with palliative care team. He is currently on   Methadone 5 mg BID with plan to increase to TID   Oral Tylenol and or ibuprofen as needed for mild to moderate pain   Oral hydromorphone 8 mg every 3-4 hours as needed   Continue gabapentin at 300 mg in am and 600 mg pm   Discussed non pharmacologic methods of pain control   Reviewed OARRS  Scheduled for scrambler therapy     2.    History of MDD, WILFRID and insomnia   Continue Abilify, Cymbalta CR 60 mg BID  Mirtazapine 7.5 mg at bedtime.      3.      Chronic constipation secondary to chronic opioid use   Senna and or miralax as needed     4.      Chronic migraine headaches     Oral amitriptyline 25 mg daily      5.       Follow up office visit will be in 6 months      Chief Complaint: Follow up for Hemoglobin SS SCD post HSCT and inadequately controlled chronic pain post transplant       Interval History: Xu Maya continues to have chronic pain which he describes as feeling more like his sickle cell pain. He continues to require hydromorphone and is also on methadone. Pain medication is currently being managed by palliative care. He rates pain at 7-8/10. He continues to have multiple ED or ACC visits for pain control. Besides pain,  he denies any symptoms of acute sickle cell crises. He has not had priapism or focal neurologic deficits. He denies fevers, cough, runny nose or congestion. He has not had any nausea or vomiting. Migraine headache is well controlled. His mood and affect are normal and denies feeling depressed or anxious    Review of System:   Review of Systems   Constitutional:  Positive for fatigue. Negative for appetite change, chills, diaphoresis, fever and unexpected weight change.   HENT:   Negative for mouth sores, nosebleeds and sore throat.    Eyes:  Negative for eye problems and icterus.   Respiratory:  Negative for chest tightness, cough, hemoptysis, shortness of breath and wheezing.    Cardiovascular:  Negative for chest pain, leg swelling and palpitations.   Gastrointestinal:  Positive for constipation. Negative for abdominal pain, diarrhea, nausea and vomiting.   Genitourinary:  Negative for hematuria.    Musculoskeletal:  Positive for back pain and myalgias. Negative for arthralgias, flank pain and neck pain.   Skin:  Negative for itching, rash and wound.   Neurological:  Negative for headaches, light-headedness and numbness.   Hematological: Negative.    Psychiatric/Behavioral:  Negative for depression. The patient is not nervous/anxious.       Allergies:   Allergies   Allergen Reactions    Hydrocodone-Acetaminophen Hives and Unknown    Naproxen Unknown       Current Medications:   Outpatient Encounter Medications as of 1/31/2024   Medication Sig    amitriptyline (Elavil) 25 mg tablet Take 1 tablet (25 mg) by mouth.    cholecalciferol (Vitamin D-3) 50 MCG (2000 UT) tablet Take 1 tablet (2,000 Units) by mouth.    cyclobenzaprine (Flexeril) 10 mg tablet Take 1 tablet (10 mg) by mouth 3 times a day for 10 days.    diphenhydrAMINE (BENADryl) 25 mg capsule Take 1 capsule (25 mg) by mouth every 6 hours if needed.    ergocalciferol (Vitamin D-2) 1.25 MG (43944 UT) capsule Take 1 capsule (1,250 mcg) by mouth 1 (one) time per  week.    gabapentin (Neurontin) 300 mg capsule Take 1 capsule (300 mg) by mouth 2 times a day.    HYDROmorphone (Dilaudid) 4 mg tablet Take 1 tablet (4 mg) by mouth every 4 hours if needed for severe pain (7 - 10) for up to 7 days. Must attend next appt for any additional refills    ketoconazole (NIZOral) 2 % cream Apply topically 2 times a day.    mirtazapine (Remeron) 7.5 mg tablet Take 1 tablet (7.5 mg) by mouth once daily at bedtime.    naloxone (Narcan) 4 mg/0.1 mL nasal spray Administer 1 spray (4 mg) into affected nostril(s) if needed for opioid reversal or respiratory depression. 1 spray to nostril for overdose, may repeat every 2-3 minutes until medical assistance arrives    omeprazole (PriLOSEC) 40 mg DR capsule Take 1 capsule (40 mg) by mouth once daily in the morning. Take before meals. TAKE ONE CAPSULE BY MOUTH EVERY DAY IN THE MORNING BEFORE EATING    tacrolimus (Protopic) 0.1 % ointment APPLY 1-2 TIMES PER WEEK     Past Medical History:    has a past medical history of Anxiety disorder, unspecified (04/17/2017), Depression, unspecified (04/17/2017), Family history of glaucoma (03/02/2017), Gastritis, unspecified, without bleeding (04/17/2017), Hematuria, unspecified (04/17/2017), Personal history of other diseases of the digestive system (12/02/2015), Priapism, unspecified (04/17/2017), and Sickle-cell disease without crisis (Multi) (04/17/2017).    Past Surgical History:    has a past surgical history that includes Gallbladder surgery (04/12/2017); IR CVC tunneled (4/24/2018); IR CVC tunneled (6/5/2018); IR CVC tunneled (3/1/2019); IR CVC tunneled (6/4/2019); IR CVC tunneled (4/5/2019); IR CVC tunneled (7/17/2019); IR CVC tunneled (8/14/2019); IR CVC tunneled (12/18/2019); IR CVC tunneled (10/9/2019); IR CVC tunneled (11/13/2019); IR CVC tunneled (1/15/2020); IR CVC tunneled (2/19/2020); US guided needle liver biopsy (9/8/2020); IR CVC tunneled (1/4/2021); IR CVC tunneled (1/25/2021); MR angio head  wo IV contrast (2/17/2017); MR angio neck wo IV contrast (2/17/2017); IR venogram hepatic (11/8/2017); IR CVC tunneled (8/21/2018); IR CVC tunneled (9/26/2018); IR CVC tunneled (11/5/2018); and IR CVC tunneled (12/19/2018).    Family History:   Family History   Problem Relation Name Age of Onset    Diabetes Father      Sickle cell anemia Other sibling     Cancer Other grandfather     Cancer Other uncle        Social History:   Xu Maya  reports that he has been smoking cigarettes. He started smoking about 26 years ago. He has been exposed to tobacco smoke. He has never used smokeless tobacco.  reports that he does not currently use drugs after having used the following drugs: Marijuana.    EXAMINATION FINDINGS   /74 (BP Location: Left arm, Patient Position: Sitting)   Pulse 66   Temp 36.1 °C (97 °F) (Temporal)   Resp 16   Wt 63.3 kg (139 lb 8 oz)   SpO2 100%   BMI 21.87 kg/m²   1.73 meters squared    Physical Exam  Vitals and nursing note reviewed.   Constitutional:       General: He is not in acute distress.  Cardiovascular:      Rate and Rhythm: Normal rate and regular rhythm.      Pulses: Normal pulses.      Heart sounds: Normal heart sounds. No murmur heard.  Pulmonary:      Effort: No respiratory distress.      Breath sounds: No wheezing.   Abdominal:      General: Abdomen is flat. Bowel sounds are normal. There is no distension.      Palpations: Abdomen is soft.      Tenderness: There is no abdominal tenderness.   Musculoskeletal:         General: No swelling or deformity.      Cervical back: Normal range of motion and neck supple.      Right lower leg: No edema.      Left lower leg: No edema.   Skin:     Capillary Refill: Capillary refill takes less than 2 seconds.   Neurological:      General: No focal deficit present.      Mental Status: He is alert and oriented to person, place, and time. Mental status is at baseline.      Cranial Nerves: No cranial nerve deficit.      Motor: No  weakness.      Gait: Gait normal.   Psychiatric:         Mood and Affect: Mood normal.         Behavior: Behavior normal.      LABS     Reviewed all relevant labs      Mihir Ricks MD

## 2024-05-06 ENCOUNTER — TELEPHONE (OUTPATIENT)
Dept: PALLIATIVE MEDICINE | Facility: HOSPITAL | Age: 44
End: 2024-05-06
Payer: COMMERCIAL

## 2024-05-06 DIAGNOSIS — Z51.5 PALLIATIVE CARE ENCOUNTER: ICD-10-CM

## 2024-05-06 DIAGNOSIS — D57.00 SICKLE-CELL DISEASE WITH PAIN (MULTI): ICD-10-CM

## 2024-05-06 PROCEDURE — RXMED WILLOW AMBULATORY MEDICATION CHARGE

## 2024-05-06 RX ORDER — METHADONE HYDROCHLORIDE 5 MG/1
7.5 TABLET ORAL EVERY 12 HOURS
Qty: 42 TABLET | Refills: 0 | Status: SHIPPED | OUTPATIENT
Start: 2024-05-06 | End: 2024-05-15

## 2024-05-06 RX ORDER — METHADONE HYDROCHLORIDE 5 MG/1
7.5 TABLET ORAL EVERY 12 HOURS
Qty: 90 TABLET | Refills: 0 | OUTPATIENT
Start: 2024-05-06 | End: 2024-06-05

## 2024-05-06 ASSESSMENT — ENCOUNTER SYMPTOMS
SHORTNESS OF BREATH: 0
ARTHRALGIAS: 0

## 2024-05-06 NOTE — TELEPHONE ENCOUNTER
----- Message from Zuly Herrera RN sent at 5/1/2024 11:10 AM EDT -----  Regarding: call pt  Pt. Was taking Methadone 5mg BID then increased to 10 BID then stopped taking on own due to sedation.    Pt. Started back on 5 BID on 4/24 and then increased to 7.5 BID on 5/1.     Rab asked that we call on 5/3 then again on 5/6 to check on pt (Zuly already doing 5/3 call) as patient won't call to tell us.

## 2024-05-06 NOTE — TELEPHONE ENCOUNTER
Patient called to check on him and see how he was doing on methadone 7.5 mg BID.  Patient doing very well since restarting medication.  No drowsiness or other symptoms.  Dr Donald Andrew notified and aware.  Patient sent in a refill for methadone to [facility] pharmacy.  Patient will call office with any further questions or concerns.

## 2024-05-07 ENCOUNTER — PHARMACY VISIT (OUTPATIENT)
Dept: PHARMACY | Facility: CLINIC | Age: 44
End: 2024-05-07
Payer: MEDICAID

## 2024-05-13 ENCOUNTER — HOSPITAL ENCOUNTER (EMERGENCY)
Facility: HOSPITAL | Age: 44
Discharge: HOME | End: 2024-05-14
Attending: INTERNAL MEDICINE
Payer: COMMERCIAL

## 2024-05-13 ENCOUNTER — TELEPHONE (OUTPATIENT)
Dept: HEMATOLOGY/ONCOLOGY | Facility: HOSPITAL | Age: 44
End: 2024-05-13
Payer: COMMERCIAL

## 2024-05-13 ENCOUNTER — APPOINTMENT (OUTPATIENT)
Dept: RADIOLOGY | Facility: HOSPITAL | Age: 44
End: 2024-05-13
Payer: COMMERCIAL

## 2024-05-13 DIAGNOSIS — S46.812A STRAIN OF LEFT TRAPEZIUS MUSCLE, INITIAL ENCOUNTER: Primary | ICD-10-CM

## 2024-05-13 DIAGNOSIS — D57.00 SICKLE CELL PAIN CRISIS (MULTI): ICD-10-CM

## 2024-05-13 PROCEDURE — 99284 EMERGENCY DEPT VISIT MOD MDM: CPT | Mod: 25 | Performed by: INTERNAL MEDICINE

## 2024-05-13 PROCEDURE — 85025 COMPLETE CBC W/AUTO DIFF WBC: CPT | Performed by: NURSE PRACTITIONER

## 2024-05-13 PROCEDURE — 96375 TX/PRO/DX INJ NEW DRUG ADDON: CPT | Performed by: INTERNAL MEDICINE

## 2024-05-13 PROCEDURE — 2500000004 HC RX 250 GENERAL PHARMACY W/ HCPCS (ALT 636 FOR OP/ED): Performed by: INTERNAL MEDICINE

## 2024-05-13 PROCEDURE — 83690 ASSAY OF LIPASE: CPT | Performed by: NURSE PRACTITIONER

## 2024-05-13 PROCEDURE — 71046 X-RAY EXAM CHEST 2 VIEWS: CPT

## 2024-05-13 PROCEDURE — 83615 LACTATE (LD) (LDH) ENZYME: CPT | Performed by: NURSE PRACTITIONER

## 2024-05-13 PROCEDURE — 36415 COLL VENOUS BLD VENIPUNCTURE: CPT | Performed by: NURSE PRACTITIONER

## 2024-05-13 PROCEDURE — 83735 ASSAY OF MAGNESIUM: CPT | Performed by: NURSE PRACTITIONER

## 2024-05-13 PROCEDURE — 96374 THER/PROPH/DIAG INJ IV PUSH: CPT | Performed by: INTERNAL MEDICINE

## 2024-05-13 PROCEDURE — 85045 AUTOMATED RETICULOCYTE COUNT: CPT | Performed by: NURSE PRACTITIONER

## 2024-05-13 PROCEDURE — 80053 COMPREHEN METABOLIC PANEL: CPT | Performed by: NURSE PRACTITIONER

## 2024-05-13 RX ORDER — HYDROMORPHONE HYDROCHLORIDE 2 MG/ML
2 INJECTION, SOLUTION INTRAMUSCULAR; INTRAVENOUS; SUBCUTANEOUS
Status: COMPLETED | OUTPATIENT
Start: 2024-05-13 | End: 2024-05-14

## 2024-05-13 RX ORDER — KETOROLAC TROMETHAMINE 30 MG/ML
30 INJECTION, SOLUTION INTRAMUSCULAR; INTRAVENOUS ONCE
Status: COMPLETED | OUTPATIENT
Start: 2024-05-13 | End: 2024-05-13

## 2024-05-13 RX ORDER — HYDROMORPHONE HYDROCHLORIDE 1 MG/ML
1 INJECTION, SOLUTION INTRAMUSCULAR; INTRAVENOUS; SUBCUTANEOUS EVERY 30 MIN PRN
Status: DISCONTINUED | OUTPATIENT
Start: 2024-05-13 | End: 2024-05-13

## 2024-05-13 RX ORDER — ORPHENADRINE CITRATE 30 MG/ML
60 INJECTION INTRAMUSCULAR; INTRAVENOUS ONCE
Status: COMPLETED | OUTPATIENT
Start: 2024-05-13 | End: 2024-05-13

## 2024-05-13 RX ADMIN — ORPHENADRINE CITRATE 60 MG: 60 INJECTION INTRAMUSCULAR; INTRAVENOUS at 23:55

## 2024-05-13 RX ADMIN — KETOROLAC TROMETHAMINE 30 MG: 30 INJECTION, SOLUTION INTRAMUSCULAR at 23:55

## 2024-05-13 RX ADMIN — HYDROMORPHONE HYDROCHLORIDE 2 MG: 2 INJECTION, SOLUTION INTRAMUSCULAR; INTRAVENOUS; SUBCUTANEOUS at 23:53

## 2024-05-13 ASSESSMENT — PAIN DESCRIPTION - ORIENTATION
ORIENTATION: RIGHT;LEFT
ORIENTATION: RIGHT;LEFT

## 2024-05-13 ASSESSMENT — PAIN DESCRIPTION - PROGRESSION: CLINICAL_PROGRESSION: NOT CHANGED

## 2024-05-13 ASSESSMENT — PAIN DESCRIPTION - LOCATION
LOCATION: LEG
LOCATION: LEG

## 2024-05-13 ASSESSMENT — PAIN DESCRIPTION - DESCRIPTORS: DESCRIPTORS: ACHING

## 2024-05-13 ASSESSMENT — PAIN - FUNCTIONAL ASSESSMENT
PAIN_FUNCTIONAL_ASSESSMENT: 0-10
PAIN_FUNCTIONAL_ASSESSMENT: 0-10

## 2024-05-13 ASSESSMENT — PAIN SCALES - GENERAL
PAINLEVEL_OUTOF10: 9
PAINLEVEL_OUTOF10: 8

## 2024-05-13 NOTE — TELEPHONE ENCOUNTER
Returned patients call requesting to be seen in ACC for bilat leg pain that started over the weekend. He has tried managing with his home methadone and dilaudid - last took at 0700 this am but has not had enough relief. Pt denies chest pain, cough, SOB, headaches, blurry vision, falls, fever or chills, n/v/d/abd pain, or urinary complaints.  Offered our first available appt at 1200 but patient declined and decided he would just try to manage at home. Encouraged to call back if he changed his mind.

## 2024-05-14 ENCOUNTER — TELEPHONE (OUTPATIENT)
Dept: ADMISSION | Facility: HOSPITAL | Age: 44
End: 2024-05-14
Payer: COMMERCIAL

## 2024-05-14 ENCOUNTER — APPOINTMENT (OUTPATIENT)
Dept: CARDIOLOGY | Facility: HOSPITAL | Age: 44
End: 2024-05-14
Payer: COMMERCIAL

## 2024-05-14 VITALS
HEART RATE: 68 BPM | OXYGEN SATURATION: 96 % | RESPIRATION RATE: 17 BRPM | HEIGHT: 67 IN | WEIGHT: 146 LBS | TEMPERATURE: 98.6 F | DIASTOLIC BLOOD PRESSURE: 86 MMHG | BODY MASS INDEX: 22.91 KG/M2 | SYSTOLIC BLOOD PRESSURE: 113 MMHG

## 2024-05-14 LAB
ALBUMIN SERPL BCP-MCNC: 4.2 G/DL (ref 3.4–5)
ALP SERPL-CCNC: 110 U/L (ref 33–120)
ALT SERPL W P-5'-P-CCNC: 14 U/L (ref 10–52)
ANION GAP SERPL CALC-SCNC: 11 MMOL/L (ref 10–20)
AST SERPL W P-5'-P-CCNC: 15 U/L (ref 9–39)
BASOPHILS # BLD AUTO: 0.02 X10*3/UL (ref 0–0.1)
BASOPHILS NFR BLD AUTO: 0.2 %
BILIRUB SERPL-MCNC: 0.5 MG/DL (ref 0–1.2)
BUN SERPL-MCNC: 11 MG/DL (ref 6–23)
CALCIUM SERPL-MCNC: 8.9 MG/DL (ref 8.6–10.3)
CARDIAC TROPONIN I PNL SERPL HS: 5 NG/L (ref 0–20)
CHLORIDE SERPL-SCNC: 108 MMOL/L (ref 98–107)
CO2 SERPL-SCNC: 25 MMOL/L (ref 21–32)
CREAT SERPL-MCNC: 1.15 MG/DL (ref 0.5–1.3)
EGFRCR SERPLBLD CKD-EPI 2021: 81 ML/MIN/1.73M*2
EOSINOPHIL # BLD AUTO: 0.21 X10*3/UL (ref 0–0.7)
EOSINOPHIL NFR BLD AUTO: 2 %
ERYTHROCYTE [DISTWIDTH] IN BLOOD BY AUTOMATED COUNT: 13 % (ref 11.5–14.5)
GLUCOSE SERPL-MCNC: 91 MG/DL (ref 74–99)
HCT VFR BLD AUTO: 39.6 % (ref 41–52)
HGB BLD-MCNC: 14.2 G/DL (ref 13.5–17.5)
HGB RETIC QN: 36 PG (ref 28–38)
IMM GRANULOCYTES # BLD AUTO: 0.03 X10*3/UL (ref 0–0.7)
IMM GRANULOCYTES NFR BLD AUTO: 0.3 % (ref 0–0.9)
IMMATURE RETIC FRACTION: 11.5 %
LDH SERPL L TO P-CCNC: 157 U/L (ref 84–246)
LIPASE SERPL-CCNC: 7 U/L (ref 9–82)
LYMPHOCYTES # BLD AUTO: 4.99 X10*3/UL (ref 1.2–4.8)
LYMPHOCYTES NFR BLD AUTO: 46.9 %
MAGNESIUM SERPL-MCNC: 1.8 MG/DL (ref 1.6–2.4)
MCH RBC QN AUTO: 31.1 PG (ref 26–34)
MCHC RBC AUTO-ENTMCNC: 35.9 G/DL (ref 32–36)
MCV RBC AUTO: 87 FL (ref 80–100)
MONOCYTES # BLD AUTO: 0.8 X10*3/UL (ref 0.1–1)
MONOCYTES NFR BLD AUTO: 7.5 %
NEUTROPHILS # BLD AUTO: 4.59 X10*3/UL (ref 1.2–7.7)
NEUTROPHILS NFR BLD AUTO: 43.1 %
NRBC BLD-RTO: 0 /100 WBCS (ref 0–0)
PLATELET # BLD AUTO: 251 X10*3/UL (ref 150–450)
POTASSIUM SERPL-SCNC: 3.4 MMOL/L (ref 3.5–5.3)
PROT SERPL-MCNC: 6.8 G/DL (ref 6.4–8.2)
RBC # BLD AUTO: 4.57 X10*6/UL (ref 4.5–5.9)
RETICS #: 0.08 X10*6/UL (ref 0.02–0.12)
RETICS/RBC NFR AUTO: 1.7 % (ref 0.5–2)
SODIUM SERPL-SCNC: 141 MMOL/L (ref 136–145)
WBC # BLD AUTO: 10.6 X10*3/UL (ref 4.4–11.3)

## 2024-05-14 PROCEDURE — 2500000004 HC RX 250 GENERAL PHARMACY W/ HCPCS (ALT 636 FOR OP/ED): Performed by: INTERNAL MEDICINE

## 2024-05-14 PROCEDURE — 36415 COLL VENOUS BLD VENIPUNCTURE: CPT | Performed by: INTERNAL MEDICINE

## 2024-05-14 PROCEDURE — 71046 X-RAY EXAM CHEST 2 VIEWS: CPT | Performed by: STUDENT IN AN ORGANIZED HEALTH CARE EDUCATION/TRAINING PROGRAM

## 2024-05-14 PROCEDURE — 2500000001 HC RX 250 WO HCPCS SELF ADMINISTERED DRUGS (ALT 637 FOR MEDICARE OP): Performed by: INTERNAL MEDICINE

## 2024-05-14 PROCEDURE — 2500000004 HC RX 250 GENERAL PHARMACY W/ HCPCS (ALT 636 FOR OP/ED): Performed by: NURSE PRACTITIONER

## 2024-05-14 PROCEDURE — 96376 TX/PRO/DX INJ SAME DRUG ADON: CPT | Performed by: INTERNAL MEDICINE

## 2024-05-14 PROCEDURE — 2500000005 HC RX 250 GENERAL PHARMACY W/O HCPCS: Performed by: INTERNAL MEDICINE

## 2024-05-14 PROCEDURE — 84484 ASSAY OF TROPONIN QUANT: CPT | Performed by: INTERNAL MEDICINE

## 2024-05-14 PROCEDURE — 93005 ELECTROCARDIOGRAM TRACING: CPT

## 2024-05-14 RX ORDER — POTASSIUM CHLORIDE 1.5 G/1.58G
20 POWDER, FOR SOLUTION ORAL ONCE
Status: COMPLETED | OUTPATIENT
Start: 2024-05-14 | End: 2024-05-14

## 2024-05-14 RX ORDER — LIDOCAINE 560 MG/1
1 PATCH PERCUTANEOUS; TOPICAL; TRANSDERMAL ONCE
Status: DISCONTINUED | OUTPATIENT
Start: 2024-05-14 | End: 2024-05-14 | Stop reason: HOSPADM

## 2024-05-14 RX ORDER — LIDOCAINE 50 MG/G
1 PATCH TOPICAL DAILY
Qty: 7 PATCH | Refills: 0 | Status: SHIPPED | OUTPATIENT
Start: 2024-05-14 | End: 2024-05-21

## 2024-05-14 RX ORDER — CYCLOBENZAPRINE HCL 10 MG
10 TABLET ORAL 3 TIMES DAILY PRN
Qty: 9 TABLET | Refills: 0 | Status: SHIPPED | OUTPATIENT
Start: 2024-05-14 | End: 2024-05-17

## 2024-05-14 RX ADMIN — HYDROMORPHONE HYDROCHLORIDE 2 MG: 2 INJECTION, SOLUTION INTRAMUSCULAR; INTRAVENOUS; SUBCUTANEOUS at 01:02

## 2024-05-14 RX ADMIN — LIDOCAINE 4% 1 PATCH: 40 PATCH TOPICAL at 03:36

## 2024-05-14 RX ADMIN — POTASSIUM CHLORIDE 20 MEQ: 1.5 POWDER, FOR SOLUTION ORAL at 01:10

## 2024-05-14 RX ADMIN — HYDROMORPHONE HYDROCHLORIDE 2 MG: 2 INJECTION, SOLUTION INTRAMUSCULAR; INTRAVENOUS; SUBCUTANEOUS at 02:04

## 2024-05-14 RX ADMIN — SODIUM CHLORIDE 1000 ML: 9 INJECTION, SOLUTION INTRAVENOUS at 00:05

## 2024-05-14 ASSESSMENT — PAIN DESCRIPTION - PAIN TYPE: TYPE: CHRONIC PAIN

## 2024-05-14 ASSESSMENT — PAIN DESCRIPTION - ORIENTATION
ORIENTATION: LEFT;RIGHT
ORIENTATION: RIGHT;LEFT

## 2024-05-14 ASSESSMENT — PAIN - FUNCTIONAL ASSESSMENT
PAIN_FUNCTIONAL_ASSESSMENT: 0-10

## 2024-05-14 ASSESSMENT — PAIN DESCRIPTION - DESCRIPTORS: DESCRIPTORS: ACHING

## 2024-05-14 ASSESSMENT — PAIN SCALES - GENERAL
PAINLEVEL_OUTOF10: 6
PAINLEVEL_OUTOF10: 7
PAINLEVEL_OUTOF10: 7
PAINLEVEL_OUTOF10: 5 - MODERATE PAIN

## 2024-05-14 ASSESSMENT — PAIN DESCRIPTION - LOCATION
LOCATION: LEG
LOCATION: BACK
LOCATION: LEG

## 2024-05-14 NOTE — ED TRIAGE NOTES
TRIAGE NOTE   I saw the patient as the Clinician in Triage and performed a brief history and physical exam, established acuity, and ordered appropriate tests to develop basic plan of care. Patient will be seen by an [facility], resident and/or physician who will independently evaluate the patient. Please see subsequent provider notes for further details and disposition.     Brief HPI: In brief, Xu Maya is a 43 y.o. male that presents for sickle cell crisis.  His main pain is lower extremity legs and he called the infusion center at 8:30 AM this morning and they were willing to get him in at 12 but patient had work and could not come at 12:00.  He tried to manage the pain himself.  He is on a regimen at home that includes methadone and Dilaudid.  He usually receives 2 mg of Dilaudid when he is in our emergency department.  He is experiencing posterior back pain which is new for patient.  He is denying chest pain.  He is denying dyspnea.  He experiences pain in his back during inspiration.  He has no history of a pulmonary embolism.  He has no history of DVT..     Focused Physical exam:   Clear bilateral lung sounds.  Normal S1-S2 and rate.  Patient is in obvious pain.  Skin is normal in temperature and color.  He has full range of motion of his neck but he is experiencing pain during palpation.  This could be muscle pain they typically also received in his legs.  Plan/MDM:   Sickle cell crisis labs ordered.  EKG ordered.  Patient received 1 mg Dilaudid every 30 minutes order.  He I ordered a liter of fluid.  He may benefit if they put him on oxygen once he gets into a room.  Please see subsequent provider note for further details and disposition

## 2024-05-14 NOTE — TELEPHONE ENCOUNTER
Patient is scheduled to see Dr Andrew tomorrow in clinic at 1030a which is also when the hydromorphone is due to refill. I left him a VM with this information.

## 2024-05-14 NOTE — ED PROVIDER NOTES
HPI     CC: Sickle Cell Pain Crisis and Back Pain     HPI: Xu Maya is a 43 y.o. male smoker with a history of sickle cell disease (Hb SS) s/p BMT, depression/anxiety, insomnia, chronic migraines and constipation, chronic pain on methadone, GERD, who presents with sickle cell pain crisis and neck pain.  Patient states that he has had pain in his bilateral legs for the past 3 days consistent with his typical sickle cell crises.  This morning he developed a spasming in the left side of his neck upon awakening.  It radiates into the chest and back.  It hurts to move his neck.  It feels like a muscle spasm.  He denies fevers, chills, cough, shortness of breath, or other cardiopulmonary system complaints.    ROS: 10-point review of systems was performed and is otherwise negative except as noted in HPI.    Limitations to history: N/A    Independent Historians: N/A    External Records Reviewed: Outpatient notes in EMR    Past Medical History: Noncontributory except per HPI     Past Surgical History: Noncontributory except per HPI     Family History: Reviewed and noncontributory     Social History: Smokes tobacco. Denies ETOH. Denies illicit drugs.    Social Determinants Affecting Care: N/A    Allergies   Allergen Reactions    Hydrocodone-Acetaminophen Hives and Unknown    Naproxen Unknown       Home Meds:   Current Outpatient Medications   Medication Instructions    amitriptyline (ELAVIL) 25 mg, oral    ARIPiprazole (ABILIFY) 5 mg, oral, Daily    cholecalciferol (VITAMIN D-3) 1,000 Units, oral, Daily    cyclobenzaprine (FLEXERIL) 10 mg, oral, 3 times daily PRN    diphenhydrAMINE (BENADRYL) 25 mg, oral, Every 6 hours PRN    DULoxetine (CYMBALTA) 60 mg, oral, 2 times daily    ergocalciferol (Vitamin D-2) 1.25 MG (96386 UT) capsule 1 capsule, oral, Once Weekly    gabapentin (NEURONTIN) 300 mg, oral, 2 times daily    HYDROmorphone (DILAUDID) 4 mg, oral, 4 times daily PRN    ketoconazole (NIZOral) 2 % cream Topical, 2  "times daily    lidocaine (Lidoderm) 5 % patch 1 patch, transdermal, Daily, Remove & discard patch within 12 hours or as directed by MD.    methadone (DOLOPHINE) 7.5 mg, oral, Every 12 hours    mirtazapine (REMERON) 7.5 mg, oral, Nightly    naloxone (NARCAN) 4 mg, nasal, As needed, 1 spray to nostril for overdose, may repeat every 2-3 minutes until medical assistance arrives<BR>    omeprazole (PRILOSEC) 40 mg, oral, Daily before breakfast, TAKE ONE CAPSULE BY MOUTH EVERY DAY IN THE MORNING BEFORE EATING    Rezurock 200 mg tablet Take one (1) tablet by mouth twice daily    sennosides (SENOKOT) 25.8 mg, oral, 2 times daily    tacrolimus (Protopic) 0.1 % ointment APPLY 1-2 TIMES PER WEEK        Physical Exam     ED Triage Vitals   Temperature Heart Rate Respirations BP   05/13/24 2112 05/13/24 2113 05/13/24 2113 05/13/24 2113   37 °C (98.6 °F) 73 16 112/86      Pulse Ox Temp Source Heart Rate Source Patient Position   05/13/24 2113 05/13/24 2112 05/13/24 2112 05/13/24 2112   98 % Temporal Monitor Sitting      BP Location FiO2 (%)     05/13/24 2112 --     Right arm          Heart Rate:  [73]   Temperature:  [37 °C (98.6 °F)]   Respirations:  [16]   BP: (112)/(86)   Height:  [170.2 cm (5' 7\")]   Weight:  [66.2 kg (146 lb)]   Pulse Ox:  [98 %]      Physical Exam  Vitals and nursing note reviewed.     CONSTITUTIONAL: Well appearing, well nourished, in no acute distress.   HENMT: Head atraumatic. Airway patent. Nasal mucosa clear. Mouth with normal mucosa, clear oropharynx. Uvula midline. Neck supple.    EYES: Clear bilaterally, pupils equally round and reactive to light.   CARDIOVASCULAR: Normal rate, regular rhythm.  Heart sounds S1, S2.  No murmurs, rubs or gallops. Normal pulses. Capillary refill < 2 sec.   RESPIRATORY: No increased work of breathing. Breath sounds clear and equal bilaterally.  GASTROINTESTINAL: Abdomen soft, non-distended, non-tender. No rebound, no guarding. Normal bowel sounds. No palpable " masses.  GENITOURINARY:  No CVA tenderness.  MUSCULOSKELETAL: TTP along left trapezius.  Spine appears normal, range of motion is not limited, no muscle or joint tenderness. No edema.   NEUROLOGICAL: Alert and oriented, no asymmetry, moving all extremities equally.  SKIN: Warm, dry and intact. No rash or notable lesions.  PSYCHIATRIC: Normal mood and affect.  HEME/LYMPH: No adenopathy or splenomegaly.    Diagnostic Results      ECG: ECGs read and interpreted by me. See ED Course, below, for interpretation.    Labs Reviewed   CBC WITH AUTO DIFFERENTIAL - Abnormal       Result Value    WBC 10.6      nRBC 0.0      RBC 4.57      Hemoglobin 14.2      Hematocrit 39.6 (*)     MCV 87      MCH 31.1      MCHC 35.9      RDW 13.0      Platelets 251      Neutrophils % 43.1      Immature Granulocytes %, Automated 0.3      Lymphocytes % 46.9      Monocytes % 7.5      Eosinophils % 2.0      Basophils % 0.2      Neutrophils Absolute 4.59      Immature Granulocytes Absolute, Automated 0.03      Lymphocytes Absolute 4.99 (*)     Monocytes Absolute 0.80      Eosinophils Absolute 0.21      Basophils Absolute 0.02     COMPREHENSIVE METABOLIC PANEL - Abnormal    Glucose 91      Sodium 141      Potassium 3.4 (*)     Chloride 108 (*)     Bicarbonate 25      Anion Gap 11      Urea Nitrogen 11      Creatinine 1.15      eGFR 81      Calcium 8.9      Albumin 4.2      Alkaline Phosphatase 110      Total Protein 6.8      AST 15      Bilirubin, Total 0.5      ALT 14     LIPASE - Abnormal    Lipase 7 (*)     Narrative:     Venipuncture immediately after or during the administration of Metamizole may lead to falsely low results. Testing should be performed immediately prior to Metamizole dosing.   MAGNESIUM - Normal    Magnesium 1.80     LACTATE DEHYDROGENASE - Normal         RETICULOCYTES - Normal    Retic % 1.7      Retic Absolute 0.077      Reticulocyte Hemoglobin 36      Immature Retic fraction 11.5     TROPONIN I, HIGH SENSITIVITY -  Normal    Troponin I, High Sensitivity 5      Narrative:     Less than 99th percentile of normal range cutoff-  Female and children under 18 years old <14 ng/L; Male <21 ng/L: Negative  Repeat testing should be performed if clinically indicated.     Female and children under 18 years old 14-50 ng/L; Male 21-50 ng/L:  Consistent with possible cardiac damage and possible increased clinical   risk. Serial measurements may help to assess extent of myocardial damage.     >50 ng/L: Consistent with cardiac damage, increased clinical risk and  myocardial infarction. Serial measurements may help assess extent of   myocardial damage.      NOTE: Children less than 1 year old may have higher baseline troponin   levels and results should be interpreted in conjunction with the overall   clinical context.     NOTE: Troponin I testing is performed using a different   testing methodology at [facility] than at other   [facility] hospitals. Direct result comparisons should only   be made within the same method.         XR chest 2 views   Final Result   Left ventricular enlargement without evidence of acute disease in the   chest.             MACRO:   None.        Signed by: Colin Jordan 5/14/2024 12:56 AM   Dictation workstation:   [id number]                    No data recorded                Procedure  Procedures    ED Course & MDM   Assessment/Plan:   Xu Maya is a 43 y.o. male smoker with a history of sickle cell disease (Hb SS) s/p BMT, depression/anxiety, insomnia, chronic migraines and constipation, chronic pain on methadone, GERD, who presents with sickle cell pain crisis and neck pain.  Neck pain reproducible on palpation of the trapezius consistent with muscle spasm/muscle strain.  Leg pain is consistent with prior episodes of sickle cell crises.  His neck pain is radiating into the chest and back.  Will obtain a chest x-ray, ECG, troponin.  Will also obtain sickle cell labs.  Treatment was initiated  with Toradol, Norflex, Dilaudid. See below for details of ED course and ultimate disposition.    Medications   lidocaine 4 % patch 1 patch (1 patch transdermal Medication Applied 5/14/24 0336)   sodium chloride 0.9 % bolus 1,000 mL (0 mL intravenous Stopped 5/14/24 0035)   HYDROmorphone (Dilaudid) injection 2 mg (2 mg intravenous Given 5/14/24 0204)   ketorolac (Toradol) injection 30 mg (30 mg intravenous Given 5/13/24 2355)   orphenadrine (Norflex) injection 60 mg (60 mg intravenous Given 5/13/24 2355)   potassium chloride (Klor-Con) packet 20 mEq (20 mEq oral Given 5/14/24 0110)        ED Course as of 05/14/24 0346   Tue May 14, 2024   0059 Chest x-ray shows no acute infiltrate, LV enlargement. [CG]   0100 Labs are notable for CBC without leukocytosis, normal H&H, normal platelets, CMP with mild hypokalemia (repleted), otherwise unremarkable, normal lipase, normal magnesium, normal LDH, reticulocyte 1.7%. [CG]   0253 ECG read interpreted by me.  Sinus bradycardia, rate 55.  Leftward axis.  Incomplete RBBB.   isolated T wave inversions  II, III, aVF, V4 through V6 unchanged from prior.  [CG]   0320 Troponin is normal [CG]   0344 Patient reassessed.  He feels slightly better but is still in pain.  He was offered admission for pain control but he has declined.  Will give prescription for Flexeril and lidocaine patches.  Patient was discharged home with anticipatory guidance and strict return precautions. [CG]      ED Course User Index  [CG] Justine Fowler MD         Diagnoses as of 05/14/24 0346   Strain of left trapezius muscle, initial encounter   Sickle cell pain crisis (Multi)       Disposition:   DISCHARGE.  The patient was discharged with both verbal and written anticipatory guidance and strict return precautions. Discharge diagnosis, instructions and plan were discussed and understood. I emphasized the importance of following up with their primary care provider within 24-48 hours and with any referrals in  the timeframe recommended. At the time of discharge the patient was comfortable and was in no apparent distress. Patient is aware of diagnostic uncertainty and was notified though testing is negative here, there is a very small chance that pathology may be missed.  The patient understands these risks and the patient/family understood to call 911 or return immediately to the emergency department if the symptoms worsen or if they have any additional concerns.      FOLLOW UP  Primary care provider in 1-2 days.      ED Prescriptions       Medication Sig Dispense Start Date End Date Auth. Provider    cyclobenzaprine (Flexeril) 10 mg tablet Take 1 tablet (10 mg) by mouth 3 times a day as needed for muscle spasms for up to 3 days. 9 tablet 5/14/2024 5/17/2024 Justine Fowler MD    lidocaine (Lidoderm) 5 % patch Place 1 patch over 12 hours on the skin once daily for 7 days. Remove & discard patch within 12 hours or as directed by MD. 7 patch 5/14/2024 5/21/2024 MD Justine Nunez MD  EM/IM/Peds    This note was dictated by speech recognition. Minor errors in transcription may be present.     Justine Fowler MD  05/14/24 0346

## 2024-05-14 NOTE — TELEPHONE ENCOUNTER
Xu P Crayton called the refill line for Dilaudid. Would like refills to be sent to [facility] pharmacy on file. Message sent to Palliative Care team to send in.

## 2024-05-14 NOTE — DISCHARGE INSTRUCTIONS
You were seen today for sickle cell pain and muscle spasm in your neck.  Your evaluation was not concerning for an emergency at this time.  We gave you a prescription for Lidoderm patches and Flexeril, a muscle relaxant.  You can take acetaminophen (Tylenol) 650 mg every 6 hours and ibuprofen (Motrin) 600 mg every 6 hours, alternating, for pain control.  Use your Dilaudid for breakthrough.  Please see the attached information sheet for information about your condition, how to care for your condition at home, and reasons to return to the emergency department. Take any prescriptions written today as prescribed. You should call your primary care provider within 24 hours to tell them about today's visit, including any new medications or medication changes, as he or she may want to see you in the office for further evaluation. If you do not have a primary care provider, call  [phone] for an appointment. We offer in-person office visits as well as virtual options. Please do not hesitate to call  [phone] or return to the emergency department with any new or unresolved concerns or symptoms. Thank you for choosing [facility] for your care.

## 2024-05-15 ENCOUNTER — APPOINTMENT (OUTPATIENT)
Dept: PALLIATIVE MEDICINE | Facility: HOSPITAL | Age: 44
End: 2024-05-15
Payer: COMMERCIAL

## 2024-05-15 ENCOUNTER — OFFICE VISIT (OUTPATIENT)
Dept: PALLIATIVE MEDICINE | Facility: HOSPITAL | Age: 44
End: 2024-05-15
Payer: COMMERCIAL

## 2024-05-15 ENCOUNTER — TELEPHONE (OUTPATIENT)
Dept: PALLIATIVE MEDICINE | Facility: HOSPITAL | Age: 44
End: 2024-05-15
Payer: COMMERCIAL

## 2024-05-15 VITALS
RESPIRATION RATE: 20 BRPM | OXYGEN SATURATION: 99 % | WEIGHT: 144.84 LBS | DIASTOLIC BLOOD PRESSURE: 81 MMHG | TEMPERATURE: 97.5 F | SYSTOLIC BLOOD PRESSURE: 120 MMHG | BODY MASS INDEX: 22.69 KG/M2 | HEART RATE: 66 BPM

## 2024-05-15 DIAGNOSIS — Z51.5 PALLIATIVE CARE ENCOUNTER: ICD-10-CM

## 2024-05-15 DIAGNOSIS — Z79.891 ENCOUNTER FOR MONITORING OPIOID MAINTENANCE THERAPY: ICD-10-CM

## 2024-05-15 DIAGNOSIS — D57.1 SICKLE CELL DISEASE WITHOUT CRISIS (MULTI): ICD-10-CM

## 2024-05-15 DIAGNOSIS — R52 ACUTE PAIN: ICD-10-CM

## 2024-05-15 DIAGNOSIS — Z51.81 ENCOUNTER FOR MONITORING OPIOID MAINTENANCE THERAPY: ICD-10-CM

## 2024-05-15 DIAGNOSIS — D57.00 SICKLE-CELL DISEASE WITH PAIN (MULTI): ICD-10-CM

## 2024-05-15 PROCEDURE — 99214 OFFICE O/P EST MOD 30 MIN: CPT | Performed by: INTERNAL MEDICINE

## 2024-05-15 PROCEDURE — 99214 OFFICE O/P EST MOD 30 MIN: CPT | Mod: GC | Performed by: INTERNAL MEDICINE

## 2024-05-15 RX ORDER — HYDROMORPHONE HYDROCHLORIDE 4 MG/1
4 TABLET ORAL 4 TIMES DAILY PRN
Qty: 56 TABLET | Refills: 0 | Status: SHIPPED | OUTPATIENT
Start: 2024-05-15 | End: 2024-05-15 | Stop reason: SDUPTHER

## 2024-05-15 RX ORDER — METHADONE HYDROCHLORIDE 5 MG/1
TABLET ORAL
Qty: 49 TABLET | Refills: 0 | Status: SHIPPED | OUTPATIENT
Start: 2024-05-15 | End: 2024-05-15 | Stop reason: SDUPTHER

## 2024-05-15 RX ORDER — METHADONE HYDROCHLORIDE 5 MG/1
TABLET ORAL
Qty: 49 TABLET | Refills: 0 | Status: SHIPPED | OUTPATIENT
Start: 2024-05-15 | End: 2024-05-29

## 2024-05-15 RX ORDER — HYDROMORPHONE HYDROCHLORIDE 4 MG/1
4 TABLET ORAL 4 TIMES DAILY PRN
Qty: 56 TABLET | Refills: 0 | Status: SHIPPED | OUTPATIENT
Start: 2024-05-15 | End: 2024-05-16 | Stop reason: SDUPTHER

## 2024-05-15 ASSESSMENT — PAIN SCALES - GENERAL: PAINLEVEL: 7

## 2024-05-15 NOTE — TELEPHONE ENCOUNTER
Phone call to pharmacy to check if PA needed for Methadone dose increase. Pharmacy states PA needed for both Methadone and Dilaudid. STAT PA sent to [facility]. Patient is aware of this and also aware he can use discount card to fill if he would like. Patient agreeable to call with any questions/concerns.

## 2024-05-15 NOTE — PROGRESS NOTES
Supportive Oncology Established Patient Note    Patient ID: Xu Maya is a 43 y.o. male who presents for Follow-up.    HPI     Symptoms  Resumed lower dose of methadone 7.5mg BID at last visit after oversedation from 10mg . Still taking dilaudid q4H when awake, works for a few hours. Does wake up with pain sometimes. Most of the pain is lower back but primarily in the legs. 4 is lowest that his pain gets.   Constipation is not a problem. Has BM every other day. Reports taking senna as prescribed.   Appetite is fair. He reports that he has reduced intake when pain is out of control. He reports adequate hydration.   Fatigue has improved. New onset lumbar back pain and neck spasm.     Muscle spasm: Mr Maya reports having bowled the night prior his pain starting. He reports it as diffuse in his neck and upper back on the left side. It is affecting his sleep and ability to work. He has also been doing some light renovations for a  they are preparing in their home.       Allergies  Hydrocodone-acetaminophen and Naproxen     Objective:    Last Recorded Vitals  Blood pressure 120/81, pulse 66, temperature 36.4 °C (97.5 °F), temperature source Core, resp. rate 20, weight 65.7 kg (144 lb 13.5 oz), SpO2 99%.         Physical Exam:  Constitutional:       General: Patient is not in acute distress.  HENT:      Head: Normocephalic.      Mouth: Mucous membranes are moist.   Eyes:      Conjunctiva/sclera: Austin sclera (chronic)  Neck:      Vascular: No carotid bruit.   Cardiovascular:      Rate and Rhythm: Normal rate and regular rhythm.      Heart sounds: No murmur heard but appears to have fixed splitting best heard at apex and RSB.  Pulmonary:      Effort: No respiratory distress.      Breath sounds: Clear to auscultation  Musculoskeletal:    Diffuse muscle rigidity and tenderness over lt neck, medial and lateral to scapula, and pasaspinal muscles     General: No deformity. No leg edema.   Skin:     Coloration:  Skin is not jaundiced.   Neurological:      General: No focal deficit present.      Mental Status: He is oriented to person, place, and time.   Psychiatric:         Behavior: Behavior normal. Behavior is cooperative.      Relevant Results  Lab Results   Component Value Date    WBC 10.6 05/13/2024    HGB 14.2 05/13/2024    HCT 39.6 (L) 05/13/2024    MCV 87 05/13/2024     05/13/2024      Lab Results   Component Value Date    GLUCOSE 91 05/13/2024    CALCIUM 8.9 05/13/2024     05/13/2024    K 3.4 (L) 05/13/2024    CO2 25 05/13/2024     (H) 05/13/2024    BUN 11 05/13/2024    CREATININE 1.15 05/13/2024      Lab Results   Component Value Date    ALT 14 05/13/2024    AST 15 05/13/2024    ALKPHOS 110 05/13/2024    BILITOT 0.5 05/13/2024        Relevant Imaging   No results found for this or any previous visit from the past 1000 days.     No image results found.       Medications:   Current Outpatient Medications   Medication Instructions    amitriptyline (ELAVIL) 25 mg, oral    ARIPiprazole (ABILIFY) 5 mg, oral, Daily    cholecalciferol (VITAMIN D-3) 1,000 Units, oral, Daily    cyclobenzaprine (FLEXERIL) 10 mg, oral, 3 times daily PRN    diphenhydrAMINE (BENADRYL) 25 mg, oral, Every 6 hours PRN    DULoxetine (CYMBALTA) 60 mg, oral, 2 times daily    ergocalciferol (Vitamin D-2) 1.25 MG (32892 UT) capsule 1 capsule, oral, Once Weekly    gabapentin (NEURONTIN) 300 mg, oral, 2 times daily    HYDROmorphone (DILAUDID) 4 mg, oral, 4 times daily PRN    ketoconazole (NIZOral) 2 % cream Topical, 2 times daily    lidocaine (Lidoderm) 5 % patch 1 patch, transdermal, Daily, Remove & discard patch within 12 hours or as directed by MD.    methadone (DOLOPHINE) 7.5 mg, oral, Every 12 hours    mirtazapine (REMERON) 7.5 mg, oral, Nightly    naloxone (NARCAN) 4 mg, nasal, As needed, 1 spray to nostril for overdose, may repeat every 2-3 minutes until medical assistance arrives<BR>    omeprazole (PRILOSEC) 40 mg, oral, Daily  before breakfast, TAKE ONE CAPSULE BY MOUTH EVERY DAY IN THE MORNING BEFORE EATING    Rezurock 200 mg tablet Take one (1) tablet by mouth twice daily    sennosides (SENOKOT) 25.8 mg, oral, 2 times daily    tacrolimus (Protopic) 0.1 % ointment APPLY 1-2 TIMES PER WEEK        Assessment and Plan  Impression:  Xu Maya is a 43 yr M, w/ PMHx of  Hemoglobin SS disease s/p Haplo SCT (2021), who follows closely with Dr. Luna for disease surveillance and management. Patient presents to palliative clinic for pain medication evaluation and adjustment.      Problems:    #Cervical and thoracic muscle myalgia, acute  - Diffuse and likely related to muscle sprain w/ resultant spasm. Likely brought on by change in activity.   - Cyclobenzaprine 10mg TID PRN can be continued   - Instructed on exercises w/ tennis ball, physical therapy handout for neck stretches.   - Will consider PMR referral in doesn't resolve.      # Hemoglobin SS disease  #Chronic Pain   - Haplo SCT from his brother (6/12 HLA match)   - T=0, 2/4/21  -Plan:   Increase Methadone 5 mg tab-- 7.5 mg QAM and 10mg at nighttime - monitor for sedation  -Dilaudid 4 mg 4 times daily PRN    -Scheduled for scrambler.  6/7/24  -Continue duloxetine 60mg nightly    #Constipation, controlled.  -Reports BM's every other day.   -C/w  Senna to 3 tabs BID     #SSD related cognitive complications  -due to microvascular changes from SS disease  -has evidence of disease per imaging in 2020/21.   -will monitor him closely- our team will call twice weekly  -will engage his wife and update her on plan of care.        Social Considerations  NA    Code Status: Assume Full     Discussed case with his Sickle cell team.   Follow up in 2 weeks. Will inform his wife of the adjustments.     I saw and evaluated the patient. I personally obtained the key and critical portions of the history and physical exam or was physically present for key and critical portions performed by the  resident/fellow. I reviewed the resident/fellow's documentation and discussed the patient with the resident/fellow. I agree with the resident/fellow's medical decision making as documented in the note.    Given his forgetfulness, will ensure wife knows about changes.    Tabitha Andrew MD

## 2024-05-16 ENCOUNTER — TELEPHONE (OUTPATIENT)
Dept: PALLIATIVE MEDICINE | Facility: HOSPITAL | Age: 44
End: 2024-05-16
Payer: COMMERCIAL

## 2024-05-16 DIAGNOSIS — D57.1 SICKLE CELL DISEASE WITHOUT CRISIS (MULTI): ICD-10-CM

## 2024-05-16 DIAGNOSIS — Z51.81 ENCOUNTER FOR MONITORING OPIOID MAINTENANCE THERAPY: ICD-10-CM

## 2024-05-16 DIAGNOSIS — Z79.891 ENCOUNTER FOR MONITORING OPIOID MAINTENANCE THERAPY: ICD-10-CM

## 2024-05-16 DIAGNOSIS — R52 ACUTE PAIN: ICD-10-CM

## 2024-05-16 LAB
ATRIAL RATE: 55 BPM
P AXIS: 54 DEGREES
P OFFSET: 173 MS
P ONSET: 113 MS
PR INTERVAL: 194 MS
Q ONSET: 210 MS
QRS COUNT: 9 BEATS
QRS DURATION: 102 MS
QT INTERVAL: 442 MS
QTC CALCULATION(BAZETT): 422 MS
QTC FREDERICIA: 429 MS
R AXIS: -43 DEGREES
T AXIS: -18 DEGREES
T OFFSET: 431 MS
VENTRICULAR RATE: 55 BPM

## 2024-05-16 NOTE — TELEPHONE ENCOUNTER
Spoke to [facility] and informed prior authorization has been received for the Methadone 5 mg tabs. Approval 5/15/24-11/10/24. Pharmacist stated the medication was picked up and patient paid cash.

## 2024-05-17 ENCOUNTER — PHARMACY VISIT (OUTPATIENT)
Dept: PHARMACY | Facility: CLINIC | Age: 44
End: 2024-05-17
Payer: MEDICAID

## 2024-05-17 ENCOUNTER — TELEPHONE (OUTPATIENT)
Dept: HEMATOLOGY/ONCOLOGY | Facility: HOSPITAL | Age: 44
End: 2024-05-17
Payer: COMMERCIAL

## 2024-05-17 ENCOUNTER — OFFICE VISIT (OUTPATIENT)
Dept: HEMATOLOGY/ONCOLOGY | Facility: HOSPITAL | Age: 44
End: 2024-05-17
Payer: COMMERCIAL

## 2024-05-17 ENCOUNTER — TELEPHONE (OUTPATIENT)
Dept: HEMATOLOGY/ONCOLOGY | Facility: HOSPITAL | Age: 44
End: 2024-05-17

## 2024-05-17 VITALS
SYSTOLIC BLOOD PRESSURE: 108 MMHG | HEART RATE: 94 BPM | BODY MASS INDEX: 22.82 KG/M2 | WEIGHT: 145.72 LBS | DIASTOLIC BLOOD PRESSURE: 74 MMHG | OXYGEN SATURATION: 98 % | RESPIRATION RATE: 16 BRPM | TEMPERATURE: 99.1 F

## 2024-05-17 DIAGNOSIS — R52 ACUTE PAIN: Primary | ICD-10-CM

## 2024-05-17 PROCEDURE — 2500000005 HC RX 250 GENERAL PHARMACY W/O HCPCS: Performed by: NURSE PRACTITIONER

## 2024-05-17 PROCEDURE — RXMED WILLOW AMBULATORY MEDICATION CHARGE

## 2024-05-17 PROCEDURE — 2500000001 HC RX 250 WO HCPCS SELF ADMINISTERED DRUGS (ALT 637 FOR MEDICARE OP): Performed by: NURSE PRACTITIONER

## 2024-05-17 PROCEDURE — 99417 PROLNG OP E/M EACH 15 MIN: CPT | Performed by: NURSE PRACTITIONER

## 2024-05-17 PROCEDURE — 99215 OFFICE O/P EST HI 40 MIN: CPT | Performed by: NURSE PRACTITIONER

## 2024-05-17 PROCEDURE — 99215 OFFICE O/P EST HI 40 MIN: CPT | Mod: 25 | Performed by: NURSE PRACTITIONER

## 2024-05-17 RX ORDER — CYCLOBENZAPRINE HCL 10 MG
10 TABLET ORAL ONCE
Status: COMPLETED | OUTPATIENT
Start: 2024-05-17 | End: 2024-05-17

## 2024-05-17 RX ORDER — NALOXONE HYDROCHLORIDE 0.4 MG/ML
0.4 INJECTION, SOLUTION INTRAMUSCULAR; INTRAVENOUS; SUBCUTANEOUS
Status: DISCONTINUED | OUTPATIENT
Start: 2024-05-17 | End: 2024-05-17 | Stop reason: HOSPADM

## 2024-05-17 RX ORDER — IBUPROFEN 600 MG/1
600 TABLET ORAL ONCE
Status: COMPLETED | OUTPATIENT
Start: 2024-05-17 | End: 2024-05-17

## 2024-05-17 RX ORDER — DIPHENHYDRAMINE HCL 25 MG
25 CAPSULE ORAL ONCE
Status: COMPLETED | OUTPATIENT
Start: 2024-05-17 | End: 2024-05-17

## 2024-05-17 RX ORDER — HYDROMORPHONE HYDROCHLORIDE 4 MG/1
4 TABLET ORAL 4 TIMES DAILY PRN
Qty: 56 TABLET | Refills: 0 | Status: SHIPPED | OUTPATIENT
Start: 2024-05-17 | End: 2024-05-30 | Stop reason: SDUPTHER

## 2024-05-17 RX ORDER — HYDROMORPHONE HYDROCHLORIDE 4 MG/1
12 TABLET ORAL
Status: DISCONTINUED | OUTPATIENT
Start: 2024-05-17 | End: 2024-05-17 | Stop reason: HOSPADM

## 2024-05-17 RX ORDER — ONDANSETRON 4 MG/1
4 TABLET, FILM COATED ORAL ONCE
Status: COMPLETED | OUTPATIENT
Start: 2024-05-17 | End: 2024-05-17

## 2024-05-17 RX ADMIN — ONDANSETRON HYDROCHLORIDE 4 MG: 4 TABLET, FILM COATED ORAL at 12:04

## 2024-05-17 RX ADMIN — IBUPROFEN 600 MG: 600 TABLET ORAL at 12:04

## 2024-05-17 RX ADMIN — HYDROMORPHONE HYDROCHLORIDE 12 MG: 4 TABLET ORAL at 13:07

## 2024-05-17 RX ADMIN — DIPHENHYDRAMINE HYDROCHLORIDE 25 MG: 25 CAPSULE ORAL at 12:04

## 2024-05-17 RX ADMIN — CYCLOBENZAPRINE 10 MG: 10 TABLET, FILM COATED ORAL at 12:03

## 2024-05-17 RX ADMIN — HYDROMORPHONE HYDROCHLORIDE 12 MG: 4 TABLET ORAL at 12:04

## 2024-05-17 ASSESSMENT — PAIN SCALES - GENERAL
PAINLEVEL: 8
PAINLEVEL_OUTOF10: 6
PAINLEVEL_OUTOF10: 8

## 2024-05-17 ASSESSMENT — PAIN DESCRIPTION - ORIENTATION
ORIENTATION: RIGHT;LEFT
ORIENTATION: RIGHT;LEFT

## 2024-05-17 ASSESSMENT — PAIN DESCRIPTION - LOCATION
LOCATION: LEG
LOCATION: LEG

## 2024-05-17 ASSESSMENT — PAIN - FUNCTIONAL ASSESSMENT
PAIN_FUNCTIONAL_ASSESSMENT: 0-10
PAIN_FUNCTIONAL_ASSESSMENT: 0-10

## 2024-05-17 NOTE — PROGRESS NOTES
NURSING NOTE     Xu Maya is a 43 y.o. year old male here today,05/17/24,  in the [facility] infusion center for 8/10 bilateral leg pain that started Sunday, 5/12/2024. He reported that his home pain medications brought pain down to a 5 but once medications wore off pain is back at an 8. He received two doses of PO Dilaudid but declined the third does due to needing to leave for work.   He denies headache, lightheadedness, dizziness, chest pain, SOB, nausea, vomiting, constipation, diarrhea and numbness and tingling in the extremities.   Administrations This Visit       cyclobenzaprine (Flexeril) tablet 10 mg       Admin Date  05/17/2024 Action  Given Dose  10 mg Route  oral Documented By  Olivia Obrien RN              diphenhydrAMINE (BENADryl) capsule 25 mg       Admin Date  05/17/2024 Action  Given Dose  25 mg Route  oral Documented By  Olivia Obrien RN              HYDROmorphone (Dilaudid) tablet 12 mg       Admin Date  05/17/2024 Action  Given Dose  12 mg Route  oral Documented By  Olivia Obrien RN               Admin Date  05/17/2024 Action  Given Dose  12 mg Route  oral Documented By  Olivia Obrien RN              ibuprofen tablet 600 mg       Admin Date  05/17/2024 Action  Given Dose  600 mg Route  oral Documented By  Olivia Obrien RN              ondansetron (Zofran) tablet 4 mg       Admin Date  05/17/2024 Action  Given Dose  4 mg Route  oral Documented By  Olivia Obrien RN                    Pt tolerated medications well and was discharged in stable condition. Pt had no further questions or concerns at this time.

## 2024-05-17 NOTE — PROGRESS NOTES
Patient ID:  Xu Maya is a 43 y.o. male.  Subjective   Chief Complaint: Uncontrolled Pain    HPI  Xu is a 43 y.o. male with history of anxiety, depression, gastritis/GERD, priapism, and sickle cell disease s/p transplant, who presents to [facility] with BLE pain typical of his baseline pain.   His pain started at the beginning of the week. He was seen in the ED for his typical pain and also reports he was having muscle spasms. He has been taking his methadone but does not have any dilaudid. He reports his team is aware and trying to transfer his prescription to [facility]. Pt denies chest pain, cough, SOB, headaches, blurry vision, falls, fever or chills, n/v/d/abd pain, or urinary complaints.          ROS  Review of Systems - Oncology     Allergies  Allergies   Allergen Reactions   • Hydrocodone-Acetaminophen Hives and Unknown   • Naproxen Unknown        Medications  Current Outpatient Medications   Medication Instructions   • amitriptyline (ELAVIL) 25 mg, oral   • ARIPiprazole (ABILIFY) 5 mg, oral, Daily   • cholecalciferol (VITAMIN D-3) 1,000 Units, oral, Daily   • cyclobenzaprine (FLEXERIL) 10 mg, oral, 3 times daily PRN   • diphenhydrAMINE (BENADRYL) 25 mg, oral, Every 6 hours PRN   • DULoxetine (CYMBALTA) 60 mg, oral, 2 times daily   • ergocalciferol (Vitamin D-2) 1.25 MG (28542 UT) capsule 1 capsule, oral, Once Weekly   • gabapentin (NEURONTIN) 300 mg, oral, 2 times daily   • HYDROmorphone (DILAUDID) 4 mg, oral, 4 times daily PRN   • ketoconazole (NIZOral) 2 % cream Topical, 2 times daily   • lidocaine (Lidoderm) 5 % patch 1 patch, transdermal, Daily, Remove & discard patch within 12 hours or as directed by MD.   • methadone (Dolophine) 5 mg tablet Take 1.5 tablets (7.5 mg) by mouth once daily with breakfast AND 2 tablets (10 mg) once daily at bedtime. Do all this for 14 days.   • mirtazapine (REMERON) 7.5 mg, oral, Nightly   • naloxone (NARCAN) 4 mg, nasal, As needed, 1 spray to nostril for overdose, may  repeat every 2-3 minutes until medical assistance arrives<BR>   • omeprazole (PRILOSEC) 40 mg, oral, Daily before breakfast, TAKE ONE CAPSULE BY MOUTH EVERY DAY IN THE MORNING BEFORE EATING   • Rezurock 200 mg tablet Take one (1) tablet by mouth twice daily   • sennosides (SENOKOT) 25.8 mg, oral, 2 times daily   • tacrolimus (Protopic) 0.1 % ointment APPLY 1-2 TIMES PER WEEK        Past Medical History:   Past Medical History:  04/17/2017: Anxiety disorder, unspecified      Comment:  Anxiety  04/17/2017: Depression, unspecified      Comment:  Depression  03/02/2017: Family history of glaucoma      Comment:  Family history of glaucoma in father  04/17/2017: Gastritis, unspecified, without bleeding      Comment:  Gastritis  04/17/2017: Hematuria, unspecified      Comment:  Hematuria  12/02/2015: Personal history of other diseases of the digestive system      Comment:  History of esophageal reflux  04/17/2017: Priapism, unspecified      Comment:  Priapism  04/17/2017: Sickle-cell disease without crisis (Multi)      Comment:  Sickle cell anemia   Surgical History:    Past Surgical History:   Procedure Laterality Date   • GALLBLADDER SURGERY  04/12/2017    Gallbladder Surgery   • IR CVC TUNNELED  4/24/2018    IR CVC TUNNELED 4/24/2018 [facility]   • IR CVC TUNNELED  6/5/2018    IR CVC TUNNELED 6/5/2018 [facility]   • IR CVC TUNNELED  3/1/2019    IR CVC TUNNELED 3/1/2019 [facility]   • IR CVC TUNNELED  6/4/2019    IR CVC TUNNELED 6/4/2019 [facility]   • IR CVC TUNNELED  4/5/2019    IR CVC TUNNELED 4/5/2019 [facility]   • IR CVC TUNNELED  7/17/2019    IR CVC TUNNELED 7/17/2019 [facility]   • IR CVC TUNNELED  8/14/2019    IR CVC TUNNELED 8/14/2019 [facility]   • IR CVC TUNNELED  12/18/2019    IR CVC TUNNELED 12/18/2019 [facility]   • IR CVC TUNNELED  10/9/2019    IR CVC TUNNELED 10/9/2019 [facility]   • IR CVC TUNNELED  11/13/2019    IR CVC TUNNELED 11/13/2019 [facility] CL[facility]  [facility]GAC[facility]   • IR CVC TUNNELED  1/15/2020    IR CVC TUNNELED 1/15/2020 [facility] CLINICAL LEGAC[facility]   • IR CVC TUNNELED  2/19/2020    IR CVC TUNNELED 2/19/2020 [facility]   • IR CVC TUNNELED  1/4/2021    IR CVC TUNNELED 1/4/2021 [facility]   • IR CVC TUNNELED  1/25/2021    IR CVC TUNNELED 1/25/2021 [facility]   • IR CVC TUNNELED  8/21/2018    IR CVC TUNNELED 8/21/2018 [facility]   • IR CVC TUNNELED  9/26/2018    IR CVC TUNNELED 9/26/2018 [facility]   • IR CVC TUNNELED  11/5/2018    IR CVC TUNNELED 11/5/2018 [facility]   • IR CVC TUNNELED  12/19/2018    IR CVC TUNNELED 12/19/2018 [facility]   • IR VENOGRAM HEPATIC  11/8/2017    IR VENOGRAM HEPATIC 11/8/2017 [facility]   • MR HEAD ANGIO WO IV CONTRAST  2/17/2017    MR HEAD ANGIO WO IV CONTRAST 2/17/2017 [facility] CLINICAL LEGACY   • MR NECK ANGIO WO IV CONTRAST  2/17/2017    MR NECK ANGIO WO IV CONTRAST 2/17/2017 [facility] CL[facility]GAC[facility]   • US GUIDED NEEDLE LIVER BIOPSY  9/8/2020    US GUIDED NEEDLE LIVER BIOPSY 9/8/2020 [facility]      Family History:    Family History   Problem Relation Name Age of Onset   • Diabetes Father     • Sickle cell anemia Other sibling    • Cancer Other grandfather    • Cancer Other uncle      Family Oncology History:    Cancer-related family history includes Cancer in some other family members.  Social History:    Social History     Tobacco Use   • Smoking status: Every Day     Types: Cigarettes     Start date: 1/1/1998     Passive exposure: Current   • Smokeless tobacco: Never   Substance Use Topics   • Alcohol use: Not Currently     Comment: Occasional   • Drug use: Not Currently     Types: Marijuana     Comment: Last use sometime in 2023.  No intention to re-start.        Objective   Vitals: There were no vitals taken for this visit.  Weight: There were no vitals filed for this visit.    Physical Exam  Vitals reviewed.   Constitutional:       Appearance: Normal appearance.   HENT:      Nose: Nose normal.       Mouth/Throat:      Mouth: Mucous membranes are moist.      Pharynx: Oropharynx is clear.   Eyes:      Conjunctiva/sclera: Conjunctivae normal.      Pupils: Pupils are equal, round, and reactive to light.   Cardiovascular:      Rate and Rhythm: Normal rate and regular rhythm.      Pulses: Normal pulses.      Heart sounds: Normal heart sounds.   Pulmonary:      Effort: Pulmonary effort is normal.      Breath sounds: Normal breath sounds.   Abdominal:      General: Bowel sounds are normal.      Palpations: Abdomen is soft.   Musculoskeletal:         General: Normal range of motion.      Cervical back: Normal range of motion and neck supple.   Skin:     General: Skin is warm and dry.   Neurological:      General: No focal deficit present.      Mental Status: He is alert and oriented to person, place, and time.       Diagnostic Results     Labs  Results from last 7 days   Lab Units 05/13/24  2234   WBC AUTO x10*3/uL 10.6   HEMOGLOBIN g/dL 14.2   HEMATOCRIT % 39.6*   PLATELETS AUTO x10*3/uL 251   NEUTROS ABS x10*3/uL 4.59   LYMPHS ABS AUTO x10*3/uL 4.99*   MONOS ABS AUTO x10*3/uL 0.80   EOS ABS AUTO x10*3/uL 0.21   NEUTROS PCT AUTO % 43.1   LYMPHS PCT AUTO % 46.9   MONOS PCT AUTO % 7.5   EOS PCT AUTO % 2.0        Results from last 7 days   Lab Units 05/13/24  2234   GLUCOSE mg/dL 91   SODIUM mmol/L 141   POTASSIUM mmol/L 3.4*   CHLORIDE mmol/L 108*   CO2 mmol/L 25   BUN mg/dL 11   CREATININE mg/dL 1.15   EGFR mL/min/1.73m*2 81   CALCIUM mg/dL 8.9   MAGNESIUM mg/dL 1.80   ALBUMIN g/dL 4.2   PROTEIN TOTAL g/dL 6.8       Results from last 7 days   Lab Units 05/13/24  2234   BILIRUBIN TOTAL mg/dL 0.5   ALK PHOS U/L 110   ALT U/L 14   AST U/L 15           Results from last 7 days   Lab Units 05/13/24  2234   RETIC CT PCT % 1.7   RETIC CT ABS x10*6/uL 0.077   IMMATURE RETIC FRACTION % 11.5   RETIC HGB pg 36     Results from last 7 days   Lab Units 05/13/24  2234   LD U/L 157         Lab Results   Component Value Date    HGB  14.2 05/13/2024    HGB 15.5 04/24/2024    HGB 15.3 04/16/2024     05/13/2024     04/24/2024     04/16/2024     05/13/2024     04/24/2024     04/16/2024       Images  === 02/07/24 ===    XR CHEST 2 VIEWS    - Impression -  1.  No evidence of acute cardiopulmonary process. Emphysema.        MACRO:  None    Signed by: Jared Jimenez 2/7/2024 4:42 AM  Dictation workstation:   [id number]   === 10/23/21 ===    CT ABDOMEN PELVIS W IV CONTRAST    - Impression -  There is a pancolitis.  In a sickle cell patient who has received a  bone marrow transplant, differential includes graft versus host  disease as well as ischemic/venoocclusive colitis.  Inflammatory or  infectious colitis would also be in the differential.  No bowel  obstruction.  There is a small amount of pelvic free fluid.  No  pneumatosis or portal venous gas.    The appendix is seen with no evidence of acute appendicitis.    Minimal patchy opacity in the lower lobes, raising the possibility of  changes of gemve-qmygtf-ssii disease, pneumonia or sickle cell crisis.  Consider early or mild interstitial fibrosis.    Cholecystectomy.  No biliary ductal dilatation.  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    Consider cystitis which can also represent a manifestation of  dnwos-vowbgn-avhs disease as well as infection.  Clinical correlation  is needed.    Osseous changes consistent with known sickle cell disease.  Mild  cardiomegaly consistent with known sickle cell disease.    NOTIFICATION:  The critical results of the study were discussed with,  and acknowledged by Dr. Bety Carr  by telephone on 10/23/2021 at  1322 hours.            Signed by Allyson Padgett MD     No echocardiogram results found for the past 12 months       Assessment/Plan   Xu Maya is a 43 y.o. male with history of anxiety, depression, gastritis/GERD, priapism, and sickle cell disease s/p transplant, who presents  to [facility] with BLE pain typical of his baseline pain.     [facility] Course  - VSS  - Hemolysis labs near baseline, no indication of acute vaso-occlusive crisis or indication for blood transfusion   - Flexeril 10 mg, given for AVN  - dilaudid 12mg PO x2 doses given for sickle cell related pain, discussed with Desiree Hughes NP need for updated carepath, she would like to wait until finalized plan for pain management is made  - Zofran 8 mg once for opioid induced nausea/vomiting  - Benadryl 25 mg once for opioid induced pruritus     Disposition  - Patient discharged home with no further needs following [facility] Course  - Return to clinic/ED instructions given        PRABHJOT Ames-CNP

## 2024-05-29 ENCOUNTER — OFFICE VISIT (OUTPATIENT)
Dept: PALLIATIVE MEDICINE | Facility: HOSPITAL | Age: 44
End: 2024-05-29
Payer: COMMERCIAL

## 2024-05-29 VITALS
BODY MASS INDEX: 22.17 KG/M2 | RESPIRATION RATE: 18 BRPM | OXYGEN SATURATION: 99 % | HEART RATE: 74 BPM | WEIGHT: 141.54 LBS | SYSTOLIC BLOOD PRESSURE: 115 MMHG | DIASTOLIC BLOOD PRESSURE: 82 MMHG | TEMPERATURE: 96.8 F

## 2024-05-29 DIAGNOSIS — T40.2X5A CONSTIPATION DUE TO OPIOID THERAPY: ICD-10-CM

## 2024-05-29 DIAGNOSIS — K59.03 CONSTIPATION DUE TO OPIOID THERAPY: ICD-10-CM

## 2024-05-29 DIAGNOSIS — I69.319 COGNITIVE DEFICIT DUE TO OLD SUBCORTICAL INFARCTS: ICD-10-CM

## 2024-05-29 DIAGNOSIS — M79.18 MYALGIA, MULTIPLE SITES: ICD-10-CM

## 2024-05-29 DIAGNOSIS — Z51.5 PALLIATIVE CARE ENCOUNTER: Primary | ICD-10-CM

## 2024-05-29 PROCEDURE — 99214 OFFICE O/P EST MOD 30 MIN: CPT | Mod: GC | Performed by: INTERNAL MEDICINE

## 2024-05-29 PROCEDURE — 99214 OFFICE O/P EST MOD 30 MIN: CPT | Performed by: INTERNAL MEDICINE

## 2024-05-29 ASSESSMENT — PAIN SCALES - GENERAL
PAINLEVEL: 5 - MODERATE PAIN
PAINLEVEL: 8

## 2024-05-29 NOTE — PROGRESS NOTES
Supportive Oncology Established Patient Note    Patient ID: Xu Maya is a 43 y.o. male who presents for Follow-up.    HPI     Symptoms    Continuing lower dose of methadone 7.5mg BID at last visit after oversedation from 10mg . Still taking dilaudid q4H when awake, works for a few hours. Does wake up with pain sometimes. Most of the pain is lower back but primarily in the legs. 4 is lowest that his pain gets.   Constipation is not a problem. Has BM every other day. Reports taking senna as prescribed.   Appetite is fair. He reports that he has reduced intake when pain is out of control. He reports adequate hydration.   Fatigue has improved. Persistent lower lumbar back pain and bilateral thigh pain without sciatica.      Muscle spasm: Mr Maya reports having bowled the night prior his pain starting. He reports it as diffuse in his neck and upper back on the left side. It is affecting his sleep and ability to work. He has also been completing light renovations for a  they are preparing in their home that is set to start accepting children in early June    [place] Symptom Assessment Scores  Pain Score: 5 - Moderate pain (methadone 7.5mg AM, 10 mg at PM. dilaudid 4mg q4hrs. No significant improvement of pain. Upper legs/lower back are where pain is mostly limited to. Uses tennis balls for ROM activities every other day. Still able to perform ADLs) Drowsiness Score: Not drowsy (duloxetine 60 mg with good sleep and minimal day time drowsiness. Able to work throughout the day)   Tiredness Score: 2 Appetite Score: 4 (Intermittent appetite)   Nausea Score: Not nauseated Wellbeing Score: 4 (less pain would improve quality of life)   Depression Score: 2 (always has depression, not better or worse. Tries to focus on the day and tasks) Dyspnea Score: No shortness of breath   Anxiety Score: Not anxious Other Problem Score:  (constipation mild; BM's every other day, takes senna)     EKG: QTc  447ms    Allergies  Hydrocodone-acetaminophen and Naproxen     Objective:  Last Recorded Vitals  Blood pressure 115/82, pulse 74, temperature 36 °C (96.8 °F), temperature source Core, resp. rate 18, weight 64.2 kg (141 lb 8.6 oz), SpO2 99%.     Physical Exam:  Constitutional:       General: Patient is not in acute distress.  HENT:      Head: Normocephalic.      Mouth: Mucous membranes are moist.   Eyes:      Conjunctiva/sclera: Conjunctivae clear, sclerae white. No discharge.     Pupils: Pupils are equal, round, and reactive to light.   Neck:      Vascular: No carotid bruit.   Cardiovascular:      Rate and Rhythm: Normal rate and regular rhythm.      Heart sounds: No murmur heard.  Pulmonary:      Effort: No respiratory distress.      Breath sounds: Clear to auscultation  Abdominal:      General: There is no distension.      Tenderness: There is no abdominal tenderness. There is no guarding.   Musculoskeletal:         General: No deformity.   Skin:     Coloration: Skin is not jaundiced.   Neurological:      General: No focal deficit present.      Mental Status: He is oriented to person, place, and time.   Psychiatric:         Behavior: Behavior normal. Behavior is cooperative.          Relevant Results  Lab Results   Component Value Date    WBC 10.6 05/13/2024    HGB 14.2 05/13/2024    HCT 39.6 (L) 05/13/2024    MCV 87 05/13/2024     05/13/2024      Lab Results   Component Value Date    GLUCOSE 91 05/13/2024    CALCIUM 8.9 05/13/2024     05/13/2024    K 3.4 (L) 05/13/2024    CO2 25 05/13/2024     (H) 05/13/2024    BUN 11 05/13/2024    CREATININE 1.15 05/13/2024      Lab Results   Component Value Date    ALT 14 05/13/2024    AST 15 05/13/2024    ALKPHOS 110 05/13/2024    BILITOT 0.5 05/13/2024        Relevant Imaging   No results found for this or any previous visit from the past 1000 days.     No image results found.       Medications:   Current Outpatient Medications   Medication Instructions     amitriptyline (ELAVIL) 25 mg, oral    ARIPiprazole (ABILIFY) 5 mg, oral, Daily    cholecalciferol (VITAMIN D-3) 1,000 Units, oral, Daily    cyclobenzaprine (FLEXERIL) 10 mg, oral, 3 times daily PRN    diphenhydrAMINE (BENADRYL) 25 mg, oral, Every 6 hours PRN    DULoxetine (CYMBALTA) 60 mg, oral, 2 times daily    ergocalciferol (Vitamin D-2) 1.25 MG (92271 UT) capsule 1 capsule, oral, Once Weekly    gabapentin (NEURONTIN) 300 mg, oral, 2 times daily    HYDROmorphone (DILAUDID) 4 mg, oral, 4 times daily PRN    ketoconazole (NIZOral) 2 % cream Topical, 2 times daily    methadone (Dolophine) 5 mg tablet Take 1.5 tablets (7.5 mg) by mouth once daily with breakfast AND 2 tablets (10 mg) once daily at bedtime. Do all this for 14 days.    mirtazapine (REMERON) 7.5 mg, oral, Nightly    naloxone (NARCAN) 4 mg, nasal, As needed, 1 spray to nostril for overdose, may repeat every 2-3 minutes until medical assistance arrives<BR>    omeprazole (PRILOSEC) 40 mg, oral, Daily before breakfast, TAKE ONE CAPSULE BY MOUTH EVERY DAY IN THE MORNING BEFORE EATING    Rezurock 200 mg tablet Take one (1) tablet by mouth twice daily    sennosides (SENOKOT) 25.8 mg, oral, 2 times daily    tacrolimus (Protopic) 0.1 % ointment APPLY 1-2 TIMES PER WEEK        Assessment and Plan  Impression:  Xu Maya is a 43 yr M, w/ PMHx of  Hemoglobin SS disease s/p Haplo SCT (2021), who follows closely with Dr. Luna for disease surveillance and management. Patient presents to palliative clinic for pain medication evaluation and adjustment.      Problems:     #Cervical and thoracic muscle myalgia, acute  - Diffuse and likely related to muscle sprain w/ resultant spasm. Likely brought on by change in activity.   - Cyclobenzaprine 10mg TID PRN can be continued   - Instructed on exercises w/ tennis ball, physical therapy handout for neck stretches.   - Will consider PMR referral in doesn't resolve.     # Hemoglobin SS disease  #Chronic Pain   - Haplo SCT  from his brother (6/12 HLA match)   - T=0, 2/4/21  -Plan:   Increase Methadone 5 mg tab-- 7.5 mg QAM and 10mg at nighttime - monitor for sedation  -Dilaudid 4 mg 4 times daily PRN    -Scheduled for scrambler.  6/7/24  -Continue duloxetine 60mg nightly     #Constipation, controlled.  -Reports BM's every other day.   -C/w  Senna to 3 tabs BID      #SSD related cognitive complications  -due to microvascular changes from SS disease  -has evidence of disease per imaging in 2020/21.   -will monitor him closely- our team will call twice weekly  -will engage his wife and update her on plan of care.         Social Considerations  NA    Code Status: Assume Full      Discussed case with his Sickle cell team.   Follow up in 2 weeks. Reports that he will inform his wife of the adjustments.      I saw and evaluated the patient. I personally obtained the key and critical portions of the history and physical exam or was physically present for key and critical portions performed by the resident/fellow. I reviewed the resident/fellow's documentation and discussed the patient with the resident/fellow. I agree with the resident/fellow's medical decision making as documented in the note.     Given his forgetfulness, will ensure wife knows about changes.       Changes this visit and follow-up for next visit:     Follow up in 2 weeks    Niki Wilburn MD

## 2024-05-29 NOTE — PATIENT INSTRUCTIONS
Preston Caraballo Francesco,    Thank you for allowing us to participate in your care at the [facility]. Per our discussion today our plan going forward:     - continue current dose of methadone  - please take your dilaudid 4 mg for breakthrough pain  - please consider taking your miralax for soft BM's in addition to your senna as needed  - please continue with the exercises shown to you with the tennis ball and perform few times a day as tolerated  - please attend your appointment for your scrambler on 6/7/2024  - please bring all your medications to future appointments    We look forward to seeing you in 2 weeks.

## 2024-05-30 ENCOUNTER — TELEPHONE (OUTPATIENT)
Dept: HEMATOLOGY/ONCOLOGY | Facility: HOSPITAL | Age: 44
End: 2024-05-30

## 2024-05-30 ENCOUNTER — OFFICE VISIT (OUTPATIENT)
Dept: HEMATOLOGY/ONCOLOGY | Facility: HOSPITAL | Age: 44
End: 2024-05-30
Payer: COMMERCIAL

## 2024-05-30 ENCOUNTER — TELEPHONE (OUTPATIENT)
Dept: PALLIATIVE MEDICINE | Facility: HOSPITAL | Age: 44
End: 2024-05-30

## 2024-05-30 VITALS
TEMPERATURE: 98.1 F | WEIGHT: 140.87 LBS | SYSTOLIC BLOOD PRESSURE: 122 MMHG | DIASTOLIC BLOOD PRESSURE: 78 MMHG | RESPIRATION RATE: 16 BRPM | HEIGHT: 67 IN | BODY MASS INDEX: 22.11 KG/M2 | HEART RATE: 70 BPM | OXYGEN SATURATION: 100 %

## 2024-05-30 DIAGNOSIS — R52 ACUTE PAIN: ICD-10-CM

## 2024-05-30 DIAGNOSIS — R52 ACUTE PAIN: Primary | ICD-10-CM

## 2024-05-30 DIAGNOSIS — Z79.891 ENCOUNTER FOR MONITORING OPIOID MAINTENANCE THERAPY: ICD-10-CM

## 2024-05-30 DIAGNOSIS — D57.1 SICKLE CELL DISEASE WITHOUT CRISIS (MULTI): ICD-10-CM

## 2024-05-30 DIAGNOSIS — E55.9 VITAMIN D DEFICIENCY: ICD-10-CM

## 2024-05-30 DIAGNOSIS — Z51.81 ENCOUNTER FOR MONITORING OPIOID MAINTENANCE THERAPY: ICD-10-CM

## 2024-05-30 LAB
25(OH)D3 SERPL-MCNC: 28 NG/ML (ref 30–100)
ALBUMIN SERPL BCP-MCNC: 4.3 G/DL (ref 3.4–5)
ALP SERPL-CCNC: 106 U/L (ref 33–120)
ALT SERPL W P-5'-P-CCNC: 10 U/L (ref 10–52)
ANION GAP SERPL CALC-SCNC: 12 MMOL/L (ref 10–20)
AST SERPL W P-5'-P-CCNC: 12 U/L (ref 9–39)
BASOPHILS # BLD AUTO: 0.03 X10*3/UL (ref 0–0.1)
BASOPHILS NFR BLD AUTO: 0.3 %
BILIRUB SERPL-MCNC: 0.8 MG/DL (ref 0–1.2)
BUN SERPL-MCNC: 7 MG/DL (ref 6–23)
CALCIUM SERPL-MCNC: 9.1 MG/DL (ref 8.6–10.3)
CHLORIDE SERPL-SCNC: 108 MMOL/L (ref 98–107)
CO2 SERPL-SCNC: 26 MMOL/L (ref 21–32)
CREAT SERPL-MCNC: 0.83 MG/DL (ref 0.5–1.3)
EGFRCR SERPLBLD CKD-EPI 2021: >90 ML/MIN/1.73M*2
EOSINOPHIL # BLD AUTO: 0.24 X10*3/UL (ref 0–0.7)
EOSINOPHIL NFR BLD AUTO: 2.6 %
ERYTHROCYTE [DISTWIDTH] IN BLOOD BY AUTOMATED COUNT: 13.7 % (ref 11.5–14.5)
GLUCOSE SERPL-MCNC: 107 MG/DL (ref 74–99)
HCT VFR BLD AUTO: 41 % (ref 41–52)
HGB BLD-MCNC: 14.7 G/DL (ref 13.5–17.5)
HGB RETIC QN: 34 PG (ref 28–38)
IMM GRANULOCYTES # BLD AUTO: 0.03 X10*3/UL (ref 0–0.7)
IMM GRANULOCYTES NFR BLD AUTO: 0.3 % (ref 0–0.9)
IMMATURE RETIC FRACTION: 10.7 %
LDH SERPL L TO P-CCNC: 158 U/L (ref 84–246)
LYMPHOCYTES # BLD AUTO: 3.3 X10*3/UL (ref 1.2–4.8)
LYMPHOCYTES NFR BLD AUTO: 35.8 %
MCH RBC QN AUTO: 30.9 PG (ref 26–34)
MCHC RBC AUTO-ENTMCNC: 35.9 G/DL (ref 32–36)
MCV RBC AUTO: 86 FL (ref 80–100)
MONOCYTES # BLD AUTO: 0.73 X10*3/UL (ref 0.1–1)
MONOCYTES NFR BLD AUTO: 7.9 %
NEUTROPHILS # BLD AUTO: 4.89 X10*3/UL (ref 1.2–7.7)
NEUTROPHILS NFR BLD AUTO: 53.1 %
NRBC BLD-RTO: 0 /100 WBCS (ref 0–0)
PLATELET # BLD AUTO: 265 X10*3/UL (ref 150–450)
POTASSIUM SERPL-SCNC: 3.5 MMOL/L (ref 3.5–5.3)
PROT SERPL-MCNC: 6.9 G/DL (ref 6.4–8.2)
RBC # BLD AUTO: 4.75 X10*6/UL (ref 4.5–5.9)
RETICS #: 0.08 X10*6/UL (ref 0.02–0.12)
RETICS/RBC NFR AUTO: 1.6 % (ref 0.5–2)
SODIUM SERPL-SCNC: 142 MMOL/L (ref 136–145)
WBC # BLD AUTO: 9.2 X10*3/UL (ref 4.4–11.3)

## 2024-05-30 PROCEDURE — 2500000001 HC RX 250 WO HCPCS SELF ADMINISTERED DRUGS (ALT 637 FOR MEDICARE OP): Performed by: NURSE PRACTITIONER

## 2024-05-30 PROCEDURE — 80053 COMPREHEN METABOLIC PANEL: CPT

## 2024-05-30 PROCEDURE — 82306 VITAMIN D 25 HYDROXY: CPT

## 2024-05-30 PROCEDURE — 99215 OFFICE O/P EST HI 40 MIN: CPT | Performed by: NURSE PRACTITIONER

## 2024-05-30 PROCEDURE — 85025 COMPLETE CBC W/AUTO DIFF WBC: CPT

## 2024-05-30 PROCEDURE — 99417 PROLNG OP E/M EACH 15 MIN: CPT | Performed by: NURSE PRACTITIONER

## 2024-05-30 PROCEDURE — 85045 AUTOMATED RETICULOCYTE COUNT: CPT

## 2024-05-30 PROCEDURE — 4004F PT TOBACCO SCREEN RCVD TLK: CPT | Performed by: NURSE PRACTITIONER

## 2024-05-30 PROCEDURE — 83615 LACTATE (LD) (LDH) ENZYME: CPT

## 2024-05-30 RX ORDER — ONDANSETRON 4 MG/1
4 TABLET, FILM COATED ORAL ONCE
Status: DISCONTINUED | OUTPATIENT
Start: 2024-05-30 | End: 2024-05-30 | Stop reason: HOSPADM

## 2024-05-30 RX ORDER — CYCLOBENZAPRINE HCL 10 MG
5 TABLET ORAL ONCE
Status: COMPLETED | OUTPATIENT
Start: 2024-05-30 | End: 2024-05-30

## 2024-05-30 RX ORDER — HYDROMORPHONE HYDROCHLORIDE 4 MG/1
12 TABLET ORAL
Status: COMPLETED | OUTPATIENT
Start: 2024-05-30 | End: 2024-05-30

## 2024-05-30 RX ORDER — DIPHENHYDRAMINE HCL 25 MG
25 CAPSULE ORAL ONCE
Status: DISCONTINUED | OUTPATIENT
Start: 2024-05-30 | End: 2024-05-30 | Stop reason: HOSPADM

## 2024-05-30 RX ORDER — HYDROMORPHONE HYDROCHLORIDE 4 MG/1
4 TABLET ORAL 4 TIMES DAILY PRN
Qty: 56 TABLET | Refills: 0 | Status: SHIPPED | OUTPATIENT
Start: 2024-05-30 | End: 2024-05-31 | Stop reason: SDUPTHER

## 2024-05-30 RX ORDER — NALOXONE HYDROCHLORIDE 0.4 MG/ML
0.4 INJECTION, SOLUTION INTRAMUSCULAR; INTRAVENOUS; SUBCUTANEOUS
Status: DISCONTINUED | OUTPATIENT
Start: 2024-05-30 | End: 2024-05-30 | Stop reason: HOSPADM

## 2024-05-30 RX ORDER — IBUPROFEN 600 MG/1
600 TABLET ORAL ONCE
Status: COMPLETED | OUTPATIENT
Start: 2024-05-30 | End: 2024-05-30

## 2024-05-30 RX ADMIN — CYCLOBENZAPRINE 5 MG: 10 TABLET, FILM COATED ORAL at 10:28

## 2024-05-30 RX ADMIN — IBUPROFEN 600 MG: 600 TABLET ORAL at 10:31

## 2024-05-30 RX ADMIN — HYDROMORPHONE HYDROCHLORIDE 12 MG: 4 TABLET ORAL at 11:38

## 2024-05-30 RX ADMIN — HYDROMORPHONE HYDROCHLORIDE 12 MG: 4 TABLET ORAL at 12:41

## 2024-05-30 RX ADMIN — HYDROMORPHONE HYDROCHLORIDE 12 MG: 4 TABLET ORAL at 10:28

## 2024-05-30 ASSESSMENT — PAIN SCALES - GENERAL
PAINLEVEL_OUTOF10: 7
PAINLEVEL: 8
PAINLEVEL_OUTOF10: 5 - MODERATE PAIN

## 2024-05-30 ASSESSMENT — PAIN DESCRIPTION - LOCATION: LOCATION: LEG

## 2024-05-30 ASSESSMENT — PAIN DESCRIPTION - ORIENTATION: ORIENTATION: RIGHT;LEFT

## 2024-05-30 ASSESSMENT — PAIN - FUNCTIONAL ASSESSMENT: PAIN_FUNCTIONAL_ASSESSMENT: 0-10

## 2024-05-30 NOTE — PROGRESS NOTES
Patient ID:  Xu Maya is a 43 y.o. male.  Subjective   Chief Complaint: Uncontrolled Pain    HPI  Xu is a 43 y.o. male with history of anxiety, depression, gastritis/GERD, priapism, and sickle cell disease s/p transplant, who presents to [facility] with BLE pain typical of his baseline pain.   He said his pain has been worsening this past week. He took his last dilaudid last night. He called for a refill today. He said he is waiting to get the scrambler which he is nervous for but hopeful. He is taking the methadone BID and thinks it helps but he is still having to rely on his dilaudid. Pt denies chest pain, cough, SOB, headaches, blurry vision, falls, fever or chills, n/v/d/abd pain, or urinary complaints.         ROS  Review of Systems - Oncology     Allergies  Allergies   Allergen Reactions    Hydrocodone-Acetaminophen Hives and Unknown    Naproxen Unknown        Medications  Current Outpatient Medications   Medication Instructions    amitriptyline (ELAVIL) 25 mg, oral    ARIPiprazole (ABILIFY) 5 mg, oral, Daily    cholecalciferol (VITAMIN D-3) 1,000 Units, oral, Daily    cyclobenzaprine (FLEXERIL) 10 mg, oral, 3 times daily PRN    diphenhydrAMINE (BENADRYL) 25 mg, oral, Every 6 hours PRN    DULoxetine (CYMBALTA) 60 mg, oral, 2 times daily    ergocalciferol (Vitamin D-2) 1.25 MG (96886 UT) capsule 1 capsule, oral, Once Weekly    gabapentin (NEURONTIN) 300 mg, oral, 2 times daily    HYDROmorphone (DILAUDID) 4 mg, oral, 4 times daily PRN    ketoconazole (NIZOral) 2 % cream Topical, 2 times daily    methadone (Dolophine) 5 mg tablet Take 1.5 tablets (7.5 mg) by mouth once daily with breakfast AND 2 tablets (10 mg) once daily at bedtime. Do all this for 14 days.    mirtazapine (REMERON) 7.5 mg, oral, Nightly    naloxone (NARCAN) 4 mg, nasal, As needed, 1 spray to nostril for overdose, may repeat every 2-3 minutes until medical assistance arrives<BR>    omeprazole (PRILOSEC) 40 mg, oral, Daily before breakfast,  TAKE ONE CAPSULE BY MOUTH EVERY DAY IN THE MORNING BEFORE EATING    Rezurock 200 mg tablet Take one (1) tablet by mouth twice daily    sennosides (SENOKOT) 25.8 mg, oral, 2 times daily    tacrolimus (Protopic) 0.1 % ointment APPLY 1-2 TIMES PER WEEK        Past Medical History:   Past Medical History:  04/17/2017: Anxiety disorder, unspecified      Comment:  Anxiety  04/17/2017: Depression, unspecified      Comment:  Depression  03/02/2017: Family history of glaucoma      Comment:  Family history of glaucoma in father  04/17/2017: Gastritis, unspecified, without bleeding      Comment:  Gastritis  04/17/2017: Hematuria, unspecified      Comment:  Hematuria  12/02/2015: Personal history of other diseases of the digestive system      Comment:  History of esophageal reflux  04/17/2017: Priapism, unspecified      Comment:  Priapism  04/17/2017: Sickle-cell disease without crisis (Multi)      Comment:  Sickle cell anemia   Surgical History:    Past Surgical History:   Procedure Laterality Date    GALLBLADDER SURGERY  04/12/2017    Gallbladder Surgery    IR CVC TUNNELED  4/24/2018    IR CVC TUNNELED 4/24/2018 [facility]    IR CVC TUNNELED  6/5/2018    IR CVC TUNNELED 6/5/2018 [facility]    IR CVC TUNNELED  3/1/2019    IR CVC TUNNELED 3/1/2019 [facility]    IR CVC TUNNELED  6/4/2019    IR CVC TUNNELED 6/4/2019 [facility]    IR CVC TUNNELED  4/5/2019    IR CVC TUNNELED 4/5/2019 [facility]    IR CVC TUNNELED  7/17/2019    IR CVC TUNNELED 7/17/2019 [facility]    IR CVC TUNNELED  8/14/2019    IR CVC TUNNELED 8/14/2019 [facility]    IR CVC TUNNELED  12/18/2019    IR CVC TUNNELED 12/18/2019 [facility]    IR CVC TUNNELED  10/9/2019    IR CVC TUNNELED 10/9/2019 [facility]    IR CVC TUNNELED  11/13/2019    IR CVC TUNNELED 11/13/2019 [facility]    IR CVC TUNNELED  1/15/2020    IR CVC TUNNELED 1/15/2020 [facility]    IR CVC TUNNELED  2/19/2020    IR CVC TUNNELED  "2/19/2020 [facility]    IR CVC TUNNELED  1/4/2021    IR CVC TUNNELED 1/4/2021 [facility]    IR CVC TUNNELED  1/25/2021    IR CVC TUNNELED 1/25/2021 [facility]    IR CVC TUNNELED  8/21/2018    IR CVC TUNNELED 8/21/2018 [facility]    IR CVC TUNNELED  9/26/2018    IR CVC TUNNELED 9/26/2018 [facility]    IR CVC TUNNELED  11/5/2018    IR CVC TUNNELED 11/5/2018 [facility]    IR CVC TUNNELED  12/19/2018    IR CVC TUNNELED 12/19/2018 [facility]    IR VENOGRAM HEPATIC  11/8/2017    IR VENOGRAM HEPATIC 11/8/2017 [facility]    MR HEAD ANGIO WO IV CONTRAST  2/17/2017    MR HEAD ANGIO WO IV CONTRAST 2/17/2017 [facility]    MR NECK ANGIO WO IV CONTRAST  2/17/2017    MR NECK ANGIO WO IV CONTRAST 2/17/2017 [facility]    US GUIDED NEEDLE LIVER BIOPSY  9/8/2020    US GUIDED NEEDLE LIVER BIOPSY 9/8/2020 [facility]      Family History:    Family History   Problem Relation Name Age of Onset    Diabetes Father      Sickle cell anemia Other sibling     Cancer Other grandfather     Cancer Other uncle      Family Oncology History:    Cancer-related family history includes Cancer in some other family members.  Social History:    Social History     Tobacco Use    Smoking status: Every Day     Types: Cigarettes     Start date: 1/1/1998     Passive exposure: Current    Smokeless tobacco: Never   Substance Use Topics    Alcohol use: Not Currently     Comment: Occasional    Drug use: Not Currently     Types: Marijuana     Comment: Last use sometime in 2023.  No intention to re-start.        Objective   Vitals: /78 (BP Location: Right arm, Patient Position: Sitting, BP Cuff Size: Adult)   Pulse 70   Temp 36.7 °C (98.1 °F) (Temporal)   Resp 16   Ht 1.701 m (5' 6.97\")   Wt 63.9 kg (140 lb 14 oz)   SpO2 100%   BMI 22.08 kg/m²   Weight:   Vitals:    05/30/24 1009   Weight: 63.9 kg (140 lb 14 oz)       Physical Exam  Vitals reviewed.   Constitutional:       Appearance: " Normal appearance.   HENT:      Nose: Nose normal.      Mouth/Throat:      Mouth: Mucous membranes are moist.      Pharynx: Oropharynx is clear.   Eyes:      Conjunctiva/sclera: Conjunctivae normal.      Pupils: Pupils are equal, round, and reactive to light.   Cardiovascular:      Rate and Rhythm: Normal rate and regular rhythm.      Pulses: Normal pulses.      Heart sounds: Normal heart sounds.   Pulmonary:      Effort: Pulmonary effort is normal.      Breath sounds: Normal breath sounds.   Abdominal:      General: Bowel sounds are normal.      Palpations: Abdomen is soft.   Musculoskeletal:         General: Normal range of motion.      Cervical back: Normal range of motion and neck supple.   Skin:     General: Skin is warm and dry.   Neurological:      General: No focal deficit present.      Mental Status: He is alert and oriented to person, place, and time.         Diagnostic Results     Labs  Results from last 7 days   Lab Units 05/30/24  1015   WBC AUTO x10*3/uL 9.2   HEMOGLOBIN g/dL 14.7   HEMATOCRIT % 41.0   PLATELETS AUTO x10*3/uL 265   NEUTROS ABS x10*3/uL 4.89   LYMPHS ABS AUTO x10*3/uL 3.30   MONOS ABS AUTO x10*3/uL 0.73   EOS ABS AUTO x10*3/uL 0.24   NEUTROS PCT AUTO % 53.1   LYMPHS PCT AUTO % 35.8   MONOS PCT AUTO % 7.9   EOS PCT AUTO % 2.6        Results from last 7 days   Lab Units 05/30/24  1015   GLUCOSE mg/dL 107*   SODIUM mmol/L 142   POTASSIUM mmol/L 3.5   CHLORIDE mmol/L 108*   CO2 mmol/L 26   BUN mg/dL 7   CREATININE mg/dL 0.83   EGFR mL/min/1.73m*2 >90   CALCIUM mg/dL 9.1   ALBUMIN g/dL 4.3   PROTEIN TOTAL g/dL 6.9       Results from last 7 days   Lab Units 05/30/24  1015   BILIRUBIN TOTAL mg/dL 0.8   ALK PHOS U/L 106   ALT U/L 10   AST U/L 12           Results from last 7 days   Lab Units 05/30/24  1015   RETIC CT PCT % 1.6   RETIC CT ABS x10*6/uL 0.077   IMMATURE RETIC FRACTION % 10.7   RETIC HGB pg 34     Results from last 7 days   Lab Units 05/30/24  1015   LD U/L 158         Lab Results    Component Value Date    HGB 14.7 05/30/2024    HGB 14.2 05/13/2024    HGB 15.5 04/24/2024     05/30/2024     05/13/2024     04/24/2024     05/30/2024     05/13/2024     04/24/2024       Images  === 02/07/24 ===    XR CHEST 2 VIEWS    - Impression -  1.  No evidence of acute cardiopulmonary process. Emphysema.        MACRO:  None    Signed by: Jared Jimenez 2/7/2024 4:42 AM  Dictation workstation:   [id number]   === 10/23/21 ===    CT ABDOMEN PELVIS W IV CONTRAST    - Impression -  There is a pancolitis.  In a sickle cell patient who has received a  bone marrow transplant, differential includes graft versus host  disease as well as ischemic/venoocclusive colitis.  Inflammatory or  infectious colitis would also be in the differential.  No bowel  obstruction.  There is a small amount of pelvic free fluid.  No  pneumatosis or portal venous gas.    The appendix is seen with no evidence of acute appendicitis.    Minimal patchy opacity in the lower lobes, raising the possibility of  changes of xvuvw-wimlqv-iiau disease, pneumonia or sickle cell crisis.  Consider early or mild interstitial fibrosis.    Cholecystectomy.  No biliary ductal dilatation.  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    Consider cystitis which can also represent a manifestation of  gkcxg-jynosv-xmom disease as well as infection.  Clinical correlation  is needed.    Osseous changes consistent with known sickle cell disease.  Mild  cardiomegaly consistent with known sickle cell disease.    NOTIFICATION:  The critical results of the study were discussed with,  and acknowledged by Dr. Bety Carr  by telephone on 10/23/2021 at  1322 hours.            Signed by Allyson Padgett MD     No echocardiogram results found for the past 12 months       Assessment/Plan   Xu Maya is a 43 y.o. male with history of anxiety, depression, gastritis/GERD, priapism, and sickle cell disease  s/p transplant, who presents to [facility] with BLE pain typical of his baseline pain.     ACC Course  - VSS  - Hemolysis labs near baseline, no indication of acute vaso-occlusive crisis or indication for blood transfusion   - Flexeril 5 mg and ibuprofen given for MSK pain  - dilaudid 12mg PO x3 doses given for sickle cell related pain, discussed with Dr. Ricks and Dr. Andrew need for updated carepath  - Zofran 8 mg once for opioid induced nausea/vomiting  - Benadryl 25 mg once for opioid induced pruritus     Disposition  - Patient discharged home with no further needs following ACC Course  - Return to clinic/ED instructions given        PRABHJOT Ames-CNP

## 2024-05-31 ENCOUNTER — TELEPHONE (OUTPATIENT)
Dept: HEMATOLOGY/ONCOLOGY | Facility: HOSPITAL | Age: 44
End: 2024-05-31

## 2024-05-31 ENCOUNTER — PHARMACY VISIT (OUTPATIENT)
Dept: PHARMACY | Facility: CLINIC | Age: 44
End: 2024-05-31
Payer: MEDICAID

## 2024-05-31 ENCOUNTER — TELEPHONE (OUTPATIENT)
Dept: PALLIATIVE MEDICINE | Facility: HOSPITAL | Age: 44
End: 2024-05-31
Payer: COMMERCIAL

## 2024-05-31 DIAGNOSIS — R52 ACUTE PAIN: ICD-10-CM

## 2024-05-31 DIAGNOSIS — D57.1 SICKLE CELL DISEASE WITHOUT CRISIS (MULTI): ICD-10-CM

## 2024-05-31 DIAGNOSIS — Z79.891 ENCOUNTER FOR MONITORING OPIOID MAINTENANCE THERAPY: ICD-10-CM

## 2024-05-31 DIAGNOSIS — Z51.81 ENCOUNTER FOR MONITORING OPIOID MAINTENANCE THERAPY: ICD-10-CM

## 2024-05-31 PROCEDURE — RXMED WILLOW AMBULATORY MEDICATION CHARGE

## 2024-05-31 RX ORDER — HYDROMORPHONE HYDROCHLORIDE 4 MG/1
4 TABLET ORAL 4 TIMES DAILY PRN
Qty: 56 TABLET | Refills: 0 | Status: SHIPPED | OUTPATIENT
Start: 2024-05-31 | End: 2024-05-31 | Stop reason: SDUPTHER

## 2024-06-04 ENCOUNTER — SPECIALTY PHARMACY (OUTPATIENT)
Dept: PHARMACY | Facility: CLINIC | Age: 44
End: 2024-06-04

## 2024-06-04 PROCEDURE — RXMED WILLOW AMBULATORY MEDICATION CHARGE

## 2024-06-07 ENCOUNTER — TELEMEDICINE (OUTPATIENT)
Dept: PAIN MEDICINE | Facility: CLINIC | Age: 44
End: 2024-06-07
Payer: COMMERCIAL

## 2024-06-07 DIAGNOSIS — D57.1 SICKLE CELL DISEASE WITHOUT CRISIS (MULTI): Primary | ICD-10-CM

## 2024-06-07 DIAGNOSIS — G89.29 OTHER CHRONIC PAIN: ICD-10-CM

## 2024-06-07 PROCEDURE — 4004F PT TOBACCO SCREEN RCVD TLK: CPT | Performed by: ANESTHESIOLOGY

## 2024-06-07 PROCEDURE — 99214 OFFICE O/P EST MOD 30 MIN: CPT | Performed by: ANESTHESIOLOGY

## 2024-06-07 ASSESSMENT — PAIN SCALES - GENERAL
PAINLEVEL: 7
PAINLEVEL_OUTOF10: 7

## 2024-06-07 ASSESSMENT — ENCOUNTER SYMPTOMS
BACK PAIN: 1
ACTIVITY CHANGE: 1

## 2024-06-07 ASSESSMENT — PAIN - FUNCTIONAL ASSESSMENT: PAIN_FUNCTIONAL_ASSESSMENT: 0-10

## 2024-06-07 ASSESSMENT — PAIN DESCRIPTION - DESCRIPTORS: DESCRIPTORS: ACHING;SHARP;SHOOTING

## 2024-06-07 NOTE — PROGRESS NOTES
The patient is a 43-year-old male with chronic pain.  The pain is the results of his sickle cell disease.  The constant chronic pain is in the low back and both legs.  This is the pain that the patient would like to have addressed.  The patient and I discussed scrambler therapy to address his neuropathic pain in his legs.  We discussed spinal cord stimulation to address his back and potentially his leg pain.  The patient would like to pursue both therapies.  We reviewed the MRI of his hips from last year.  We reviewed the lumbar MRI from 4 years ago.    Review of Systems   Constitutional:  Positive for activity change.   Musculoskeletal:  Positive for back pain and gait problem.   All other systems reviewed and are negative.    GENERAL: alert and appropriate, in no distress, well-hydrated, well-nourished and interactive  SKIN: no rash noted  RESPIRATORY: breathing non-labored and no grunting/flaring/retractions  CHEST: equal chest rise with normal respiratory effort  ABDOMEN: soft and non-tender  BACK: back normal in appearance, spine with reduced ROM  EXTREMITIES: strength intact  NEUROLOGIC: gait antalgic, SLR negative, sensation grossly intact.    Assessment and Plan    -Chronicity--chronic neuropathic pain    -Diagnostics--no new imaging ordered    -Pharmacologic--no change    -Psychologic--no need for psychologic intervention from my standpoint    -Physical--we discussed the importance of physical therapy and exercise.  We discussed avoidance and modification techniques.    -Intervention--the patient is a candidate for the scrambler therapy bilaterally at the L5 and S1 levels.  The patient was informed to hold his gabapentin 1 week prior to the onset of the scrambler therapy.  We will also pursue a psychological evaluation in anticipation of a spinal cord stimulator trial with a [employer] device.    I spent time educating the patient on the condition including the treatment and the prognosis.  I invited the  patient to call at anytime with any questions.

## 2024-06-08 ENCOUNTER — PHARMACY VISIT (OUTPATIENT)
Dept: PHARMACY | Facility: CLINIC | Age: 44
End: 2024-06-08
Payer: MEDICAID

## 2024-06-12 ENCOUNTER — TELEPHONE (OUTPATIENT)
Dept: ADMISSION | Facility: HOSPITAL | Age: 44
End: 2024-06-12
Payer: COMMERCIAL

## 2024-06-12 DIAGNOSIS — Z51.81 ENCOUNTER FOR MONITORING OPIOID MAINTENANCE THERAPY: ICD-10-CM

## 2024-06-12 DIAGNOSIS — D57.1 SICKLE CELL DISEASE WITHOUT CRISIS (MULTI): ICD-10-CM

## 2024-06-12 DIAGNOSIS — R52 ACUTE PAIN: ICD-10-CM

## 2024-06-12 DIAGNOSIS — Z79.891 ENCOUNTER FOR MONITORING OPIOID MAINTENANCE THERAPY: ICD-10-CM

## 2024-06-12 NOTE — TELEPHONE ENCOUNTER
Xu P Crayton called the refill line for Dilaudid. Would like refills to be sent to [facility] pharmacy on file. Message sent to Palliative Care team to send in.

## 2024-06-12 NOTE — TELEPHONE ENCOUNTER
Patient last seen by Dr. Andrew/Dr. Tolliver on 5/15 with plan to continue dilaudid 4mg 4 times daily PRN. Follow up visit is scheduled for 6/26. OARRS reviewed and no aberrancy noted. Patient last filled hydromorphone 4mg #56/14 days on 5/31. Prescription pended to provider to approve.

## 2024-06-13 ENCOUNTER — TELEPHONE (OUTPATIENT)
Dept: HEMATOLOGY/ONCOLOGY | Facility: HOSPITAL | Age: 44
End: 2024-06-13
Payer: COMMERCIAL

## 2024-06-13 DIAGNOSIS — R52 ACUTE PAIN: ICD-10-CM

## 2024-06-13 DIAGNOSIS — Z79.891 ENCOUNTER FOR MONITORING OPIOID MAINTENANCE THERAPY: ICD-10-CM

## 2024-06-13 DIAGNOSIS — Z51.81 ENCOUNTER FOR MONITORING OPIOID MAINTENANCE THERAPY: ICD-10-CM

## 2024-06-13 DIAGNOSIS — D57.1 SICKLE CELL DISEASE WITHOUT CRISIS (MULTI): ICD-10-CM

## 2024-06-13 PROCEDURE — RXMED WILLOW AMBULATORY MEDICATION CHARGE

## 2024-06-13 RX ORDER — HYDROMORPHONE HYDROCHLORIDE 4 MG/1
4 TABLET ORAL 4 TIMES DAILY PRN
Qty: 56 TABLET | Refills: 0 | Status: SHIPPED | OUTPATIENT
Start: 2024-06-13 | End: 2024-06-28

## 2024-06-13 NOTE — TELEPHONE ENCOUNTER
Phone call to patient to inform him that Dr. Andrew just send script to [facility] within the last 30 minutes (patient called in yesterday for refill). Patient will fill at pharmacy. No further needs.

## 2024-06-14 ENCOUNTER — PHARMACY VISIT (OUTPATIENT)
Dept: PHARMACY | Facility: CLINIC | Age: 44
End: 2024-06-14
Payer: MEDICAID

## 2024-06-16 RX ORDER — HYDROMORPHONE HYDROCHLORIDE 4 MG/1
4 TABLET ORAL 4 TIMES DAILY PRN
Qty: 56 TABLET | Refills: 0 | OUTPATIENT
Start: 2024-06-16 | End: 2024-06-30

## 2024-06-18 RX ORDER — POTASSIUM CHLORIDE 750 MG/1
1 TABLET, EXTENDED RELEASE ORAL
COMMUNITY
Start: 2023-07-21

## 2024-06-18 RX ORDER — OMEPRAZOLE 20 MG/1
1 CAPSULE, DELAYED RELEASE ORAL
COMMUNITY
Start: 2023-10-22

## 2024-06-18 RX ORDER — PENICILLIN V POTASSIUM 250 MG/1
1 TABLET, FILM COATED ORAL
COMMUNITY
Start: 2023-10-10

## 2024-06-21 ENCOUNTER — TELEPHONE (OUTPATIENT)
Dept: ADMISSION | Facility: HOSPITAL | Age: 44
End: 2024-06-21
Payer: COMMERCIAL

## 2024-06-21 DIAGNOSIS — D57.00 SICKLE-CELL DISEASE WITH PAIN (MULTI): ICD-10-CM

## 2024-06-21 DIAGNOSIS — Z51.5 PALLIATIVE CARE ENCOUNTER: ICD-10-CM

## 2024-06-26 ENCOUNTER — OFFICE VISIT (OUTPATIENT)
Dept: PALLIATIVE MEDICINE | Facility: HOSPITAL | Age: 44
End: 2024-06-26
Payer: COMMERCIAL

## 2024-06-26 ENCOUNTER — PHARMACY VISIT (OUTPATIENT)
Dept: PHARMACY | Facility: CLINIC | Age: 44
End: 2024-06-26
Payer: MEDICAID

## 2024-06-26 VITALS
SYSTOLIC BLOOD PRESSURE: 98 MMHG | TEMPERATURE: 97.3 F | OXYGEN SATURATION: 97 % | RESPIRATION RATE: 14 BRPM | WEIGHT: 142.6 LBS | BODY MASS INDEX: 22.35 KG/M2 | DIASTOLIC BLOOD PRESSURE: 65 MMHG | HEART RATE: 59 BPM

## 2024-06-26 DIAGNOSIS — D57.00 SICKLE-CELL DISEASE WITH PAIN (MULTI): ICD-10-CM

## 2024-06-26 DIAGNOSIS — R52 ACUTE PAIN: ICD-10-CM

## 2024-06-26 DIAGNOSIS — Z51.5 PALLIATIVE CARE ENCOUNTER: ICD-10-CM

## 2024-06-26 DIAGNOSIS — Z79.891 ENCOUNTER FOR MONITORING OPIOID MAINTENANCE THERAPY: ICD-10-CM

## 2024-06-26 DIAGNOSIS — F41.9 ANXIETY: ICD-10-CM

## 2024-06-26 DIAGNOSIS — Z51.81 ENCOUNTER FOR MONITORING OPIOID MAINTENANCE THERAPY: ICD-10-CM

## 2024-06-26 DIAGNOSIS — F33.1 MODERATE EPISODE OF RECURRENT MAJOR DEPRESSIVE DISORDER (MULTI): ICD-10-CM

## 2024-06-26 DIAGNOSIS — D57.1 SICKLE CELL DISEASE WITHOUT CRISIS (MULTI): ICD-10-CM

## 2024-06-26 PROCEDURE — 99213 OFFICE O/P EST LOW 20 MIN: CPT | Performed by: INTERNAL MEDICINE

## 2024-06-26 PROCEDURE — RXMED WILLOW AMBULATORY MEDICATION CHARGE

## 2024-06-26 RX ORDER — HYDROMORPHONE HYDROCHLORIDE 4 MG/1
4 TABLET ORAL 4 TIMES DAILY PRN
Qty: 120 TABLET | Refills: 0 | Status: SHIPPED | OUTPATIENT
Start: 2024-06-26 | End: 2024-07-26

## 2024-06-26 RX ORDER — METHADONE HYDROCHLORIDE 5 MG/1
TABLET ORAL
Qty: 105 TABLET | Refills: 0 | Status: SHIPPED | OUTPATIENT
Start: 2024-06-26 | End: 2024-07-26

## 2024-06-26 RX ORDER — DULOXETIN HYDROCHLORIDE 60 MG/1
60 CAPSULE, DELAYED RELEASE ORAL 2 TIMES DAILY
Qty: 180 CAPSULE | Refills: 1 | Status: SHIPPED | OUTPATIENT
Start: 2024-06-26

## 2024-06-26 ASSESSMENT — PAIN SCALES - GENERAL: PAINLEVEL: 0-NO PAIN

## 2024-06-26 NOTE — PROGRESS NOTES
Supportive Oncology Established Patient Note    Patient ID: Xu Maya is a 43 y.o. male who presents for Follow-up.    HPI     Symptoms  Pain is okay and has not had sedation with new regimen- methadone 7.5 mg in a.m, 10 mg at bedtime; still requires hydromorphone 4 mg 4 times daily. Regular bowels - takes 2 tabs of senna at bedtime.   Good appetite.   Has called for refills but delays.   Gave him my card and to reach out directly to me.     Allergies  Hydrocodone-acetaminophen and Naproxen     Objective:    Last Recorded Vitals  Blood pressure 98/65, pulse 59, temperature 36.3 °C (97.3 °F), temperature source Temporal, resp. rate 14, weight 64.7 kg (142 lb 9.6 oz), SpO2 97%.       Physical Exam:  Constitutional:       General: Patient is not in acute distress.  HENT:      Head: Normocephalic.      Mouth: Mucous membranes are moist.   Eyes:      Conjunctiva/sclera: [place] sclera. No discharge.     Pupils: Pupils are equal, round, and reactive to light.   Neck:   Cardiovascular:      Rate and Rhythm: Normal rate and regular rhythm.      Heart sounds: No murmur heard.  Pulmonary:      Effort: No respiratory distress.      Breath sounds: Clear to auscultation  Abdominal:      General: There is no distension.      Tenderness: There is no abdominal tenderness. There is no guarding.   Musculoskeletal:         General: No deformity.   Skin:     Coloration: Skin is not jaundiced.   Neurological:      General: No focal deficit present.      Mental Status: He is oriented to person, place, and time.   Psychiatric:         Behavior: Behavior normal. Behavior is cooperative.          Relevant Results  Lab Results   Component Value Date    WBC 9.2 05/30/2024    HGB 14.7 05/30/2024    HCT 41.0 05/30/2024    MCV 86 05/30/2024     05/30/2024      Lab Results   Component Value Date    GLUCOSE 107 (H) 05/30/2024    CALCIUM 9.1 05/30/2024     05/30/2024    K 3.5 05/30/2024    CO2 26 05/30/2024     (H) 05/30/2024     BUN 7 05/30/2024    CREATININE 0.83 05/30/2024      Lab Results   Component Value Date    ALT 10 05/30/2024    AST 12 05/30/2024    ALKPHOS 106 05/30/2024    BILITOT 0.8 05/30/2024        Relevant Imaging   No results found for this or any previous visit from the past 1000 days.     No image results found.       Medications:   Current Outpatient Medications   Medication Instructions    amitriptyline (ELAVIL) 25 mg, oral    ARIPiprazole (ABILIFY) 5 mg, oral, Daily    cholecalciferol (VITAMIN D-3) 1,000 Units, oral, Daily    cyclobenzaprine (FLEXERIL) 10 mg, oral, 3 times daily PRN    diphenhydrAMINE (BENADRYL) 25 mg, oral, Every 6 hours PRN    DULoxetine (CYMBALTA) 60 mg, oral, 2 times daily    ergocalciferol (Vitamin D-2) 1.25 MG (48629 UT) capsule 1 capsule, oral, Once Weekly    gabapentin (NEURONTIN) 300 mg, oral, 2 times daily    HYDROmorphone (DILAUDID) 4 mg, oral, 4 times daily PRN    ketoconazole (NIZOral) 2 % cream Topical, 2 times daily    methadone (Dolophine) 5 mg tablet Take 1.5 tablets (7.5 mg) by mouth once daily with breakfast AND 2 tablets (10 mg) once daily at bedtime. Do all this for 14 days.    mirtazapine (REMERON) 7.5 mg, oral, Nightly    naloxone (NARCAN) 4 mg, nasal, As needed, 1 spray to nostril for overdose, may repeat every 2-3 minutes until medical assistance arrives<BR>    omeprazole (PriLOSEC) 20 mg DR capsule 1 capsule, oral, Daily (0630)    omeprazole (PRILOSEC) 40 mg, oral, Daily before breakfast, TAKE ONE CAPSULE BY MOUTH EVERY DAY IN THE MORNING BEFORE EATING    penicillin V (Veetid) 250 mg tablet 1 tablet, oral, Every 12 hours scheduled (0630,1830)    potassium chloride CR 10 mEq ER tablet 1 tablet, oral, Daily (0630)    Rezurock 200 mg tablet Take one (1) tablet by mouth twice daily    sennosides (SENOKOT) 25.8 mg, oral, 2 times daily    tacrolimus (Protopic) 0.1 % ointment APPLY 1-2 TIMES PER WEEK        Assessment and Plan  Xu Francesco is a 43 yr M, w/ PMHx of  Hemoglobin SS  disease s/p Haplo SCT (2021), who follows closely with Dr. Luna for disease surveillance and management. Patient presents to palliative clinic for pain medication evaluation and adjustment.      Problems:     #Cervical and thoracic muscle myalgia, acute  - Diffuse and likely related to muscle sprain w/ resultant spasm. Likely brought on by change in activity.   - Cyclobenzaprine 10mg TID PRN can be continued   - Finds exercises w/ tennis ball helpful; continue neck stretches.      # Hemoglobin SS disease  #Chronic Pain   - Haplo SCT from his brother (6/12 HLA match)   - T=0, 2/4/21  -Plan:   Continue Methadone 5 mg tab-- 7.5 mg QAM and 10mg at nighttime.  -Dilaudid 4 mg 4 times daily PRN    -Scheduled for scrambler in September.   -Continue duloxetine 60mg nightly     #Constipation, controlled.  -Reports BM's every other day.   -C/w  Senna to 3 tabs BID      #SSD related cognitive complications  -due to microvascular changes from SS disease  -has evidence of disease per imaging in 2020/21.   -will monitor him closely- our team will call twice weekly  -will engage his wife and update her on plan of care.         Social Considerations  NA    Code Status: Assume Full      Follow up in 4 weeks.    Tabitha Andrew MD

## 2024-07-03 ENCOUNTER — OFFICE VISIT (OUTPATIENT)
Dept: ORTHOPEDIC SURGERY | Facility: HOSPITAL | Age: 44
End: 2024-07-03
Payer: COMMERCIAL

## 2024-07-03 DIAGNOSIS — M25.811 IMPINGEMENT OF RIGHT SHOULDER: ICD-10-CM

## 2024-07-03 PROCEDURE — 2500000004 HC RX 250 GENERAL PHARMACY W/ HCPCS (ALT 636 FOR OP/ED): Performed by: ORTHOPAEDIC SURGERY

## 2024-07-03 PROCEDURE — 99212 OFFICE O/P EST SF 10 MIN: CPT | Performed by: ORTHOPAEDIC SURGERY

## 2024-07-03 PROCEDURE — 2500000005 HC RX 250 GENERAL PHARMACY W/O HCPCS: Performed by: ORTHOPAEDIC SURGERY

## 2024-07-03 PROCEDURE — 20610 DRAIN/INJ JOINT/BURSA W/O US: CPT | Mod: RT | Performed by: ORTHOPAEDIC SURGERY

## 2024-07-03 RX ORDER — TRIAMCINOLONE ACETONIDE 40 MG/ML
40 INJECTION, SUSPENSION INTRA-ARTICULAR; INTRAMUSCULAR
Status: COMPLETED | OUTPATIENT
Start: 2024-07-03 | End: 2024-07-03

## 2024-07-03 RX ORDER — LIDOCAINE HYDROCHLORIDE 10 MG/ML
4 INJECTION INFILTRATION; PERINEURAL
Status: COMPLETED | OUTPATIENT
Start: 2024-07-03 | End: 2024-07-03

## 2024-07-03 NOTE — PROGRESS NOTES
Reevaluation of right shoulder he has right shoulder pain recurrent previous injections have been very helpful for him he would like to have another one done.    On exam today the patient has full range of motion of the shoulder but a painful arc of motion above 90 degrees of elevation particular with internal rotation no apprehension.  Motor power abduction internal/external rotation 5/5 radial pulses palpable no peripheral edema.  Integument intact over the right upper extremity.    Right shoulder pain we discussed options for treatment both short and long-term.  The patient did request an injection.  Consent, injection was performed today and tolerated well.  I have also recommended formal physical therapy.    prescription was provided.      This was dictated using voice recognition software and not corrected for grammatical or spelling errors.    L Inj/Asp: R subacromial bursa on 7/3/2024 1:23 PM  Indications: pain  Details: 22 G needle, posterior approach  Medications: 40 mg triamcinolone acetonide 40 mg/mL; 4 mL lidocaine 10 mg/mL (1 %)  Outcome: tolerated well, no immediate complications  Procedure, treatment alternatives, risks and benefits explained, specific risks discussed. Consent was given by the patient. Immediately prior to procedure a time out was called to verify the correct patient, procedure, equipment, support staff and site/side marked as required.

## 2024-07-05 ENCOUNTER — SPECIALTY PHARMACY (OUTPATIENT)
Dept: PHARMACY | Facility: CLINIC | Age: 44
End: 2024-07-05

## 2024-07-10 PROBLEM — H40.003 PREGLAUCOMA, UNSPECIFIED, BILATERAL: Status: ACTIVE | Noted: 2024-07-10

## 2024-07-10 PROBLEM — K29.70 GASTRITIS: Status: ACTIVE | Noted: 2017-04-17

## 2024-07-10 PROBLEM — K21.9 GASTROESOPHAGEAL REFLUX DISEASE: Status: ACTIVE | Noted: 2017-04-17

## 2024-07-11 ENCOUNTER — TELEMEDICINE (OUTPATIENT)
Dept: BEHAVIORAL HEALTH | Facility: HOSPITAL | Age: 44
End: 2024-07-11
Payer: COMMERCIAL

## 2024-07-11 DIAGNOSIS — F33.1 MODERATE EPISODE OF RECURRENT MAJOR DEPRESSIVE DISORDER (MULTI): ICD-10-CM

## 2024-07-11 DIAGNOSIS — F41.9 ANXIETY: ICD-10-CM

## 2024-07-11 DIAGNOSIS — F51.5 NIGHTMARES: ICD-10-CM

## 2024-07-11 DIAGNOSIS — F33.0 MILD EPISODE OF RECURRENT MAJOR DEPRESSIVE DISORDER (CMS-HCC): ICD-10-CM

## 2024-07-11 PROCEDURE — 4004F PT TOBACCO SCREEN RCVD TLK: CPT | Performed by: PSYCHIATRY & NEUROLOGY

## 2024-07-11 PROCEDURE — 99214 OFFICE O/P EST MOD 30 MIN: CPT | Performed by: PSYCHIATRY & NEUROLOGY

## 2024-07-11 PROCEDURE — 90833 PSYTX W PT W E/M 30 MIN: CPT | Performed by: PSYCHIATRY & NEUROLOGY

## 2024-07-11 RX ORDER — ARIPIPRAZOLE 5 MG/1
5 TABLET ORAL DAILY
Qty: 90 TABLET | Refills: 1 | Status: SHIPPED | OUTPATIENT
Start: 2024-07-11

## 2024-07-11 RX ORDER — PRAZOSIN HYDROCHLORIDE 1 MG/1
1 CAPSULE ORAL NIGHTLY
Qty: 30 CAPSULE | Refills: 2 | Status: SHIPPED | OUTPATIENT
Start: 2024-07-11 | End: 2024-10-09

## 2024-07-11 NOTE — PROGRESS NOTES
Outpatient Psychiatry FUV      Subjective   Xu Maya, a 43 y.o. male, for virtual FUV. At work today    Converted to phone due to audio issues on virtual platform      Assessment/Plan   Patient Discussion:  continue duloxetine 60mg 2x day  CAN USE mirtazapine 7.5 mg at bedtime as needed  CONTINUE aripiprazole 5mg in the AM  START prazosin 1mg at bedtime for dreams  CHECK BP before to see if normal range  DO NOT take with mirtazapine, monitor for dizziness     RETURN to clinic 9/12 at 10AM for virtual FUV     call with questions or concerns. [phone]      Assessment:   43 y.o.  M with SCC disease with depression, sickle cell disease now s/p BMT. Previously on suboxone for management of pain/high utilization but now off since transplant, still having pain now transitioning to methadone      FUV 7/11/2024  pt reports doing ok,  more pain, distressing drems about sister. Agreed to prazosin, will check BP at home first. Discussed grief therapy, defer for now, will re-assess based on med response. Follow up 1 months sooner if needed    Diagnosis:   MDD, recurrent. P remission  WILFRID  Chronic pain disorder    Treatment Plan/Recommendations:   1. Safety Assessment: no current SI, no h/o SI/SA. has guns at home but unloaded and locked with kids at home. PF include , no previous attempts, kids, [employer]. RF include depression, pain, father with completed suicide. Pt has moderate baseline risk based on static factors but is not an imminent risk and safety plan addressed    2. MDD, WILFRID  CONTINUE duloxetine 60mg PO BID, r/b/ae discussed including higher dose of SNRI, si/sx excess serotonin/SS  CONTINUE mirtazapine 7.5mg PO QHS PRN insomnia  CONTINUE aripiprazole 5mg PO daily   START prazosin 1mg at bedtime, check BP and monitor for     continue supportive therapy during appt    3. opioid misuse in the context of chronic pain 2/2 sickle cell disease with acute exacerbations currently no concerns  continues to  struggle with chronic pain, more sedation with methadone, has scrambler therapy in September    nicotine use disorder --previous success with chantix but stopped and now smoking 3-4/day. monitor for now    4. Medical: SCC, back pain, GERD with h/o PUD: recent notes and labs reviewed. stable.   s/p BMT 1/2021  notes and labs reviewed,   coordinate care as needed with sickle cell team, BMT as needed  Ongoing pain, managed by [facility], having scrambler therapy in sept 5. Social: lives with kids and wife, moved to [place] after transplant, [place]. stepfather passed away from leukemia, sister passed from sickle cell. Better at home, work. Wants to start home . Hoping to have up by end of summer    Reason for Visit:   FUV depression and anxiety    Subjective:  Last visit 2 months ago  Since then having more pain  Falling asleep at work with methadone  Going to do scrambler therapy in September    Had run out of duloxetine but Dr. Andrew refilled  Mood was more down but better now. Out about 3 weeks. Still taking abilify    Waking up in the middle of night, having dreams about sister. Occurs most nights. Then difficulty going back to sleep  Agreeable to trial of prazosin  Discussed last BP was low but general trend is normal. Monitor for dizziness    Discussed grief support, will defer and reconsider based on response to prazosin    Things at home are good.     No SI/HI or thoughts of not wanting to go on  No a/e to medication    Current Medications:    Current Outpatient Medications:     omeprazole (PriLOSEC) 20 mg DR capsule, Take 1 capsule (20 mg) by mouth early in the morning.., Disp: , Rfl:     penicillin V (Veetid) 250 mg tablet, Take 1 tablet (250 mg) by mouth every 12 hours., Disp: , Rfl:     potassium chloride CR 10 mEq ER tablet, Take 1 tablet (10 mEq) by mouth early in the morning.., Disp: , Rfl:     amitriptyline (Elavil) 25 mg tablet, Take 1 tablet (25 mg) by mouth., Disp: , Rfl:      ARIPiprazole (Abilify) 5 mg tablet, Take 1 tablet (5 mg) by mouth once daily., Disp: 90 tablet, Rfl: 1    cholecalciferol (Vitamin D-3) 25 MCG (1000 UT) tablet, Take 1 tablet (1,000 Units) by mouth once daily., Disp: 30 tablet, Rfl: 11    cyclobenzaprine (Flexeril) 10 mg tablet, Take 1 tablet (10 mg) by mouth 3 times a day as needed for muscle spasms for up to 3 days., Disp: 9 tablet, Rfl: 0    diphenhydrAMINE (BENADryl) 25 mg capsule, Take 1 capsule (25 mg) by mouth every 6 hours if needed., Disp: , Rfl:     DULoxetine (Cymbalta) 60 mg DR capsule, Take 1 capsule (60 mg) by mouth 2 times a day., Disp: 180 capsule, Rfl: 1    ergocalciferol (Vitamin D-2) 1.25 MG (94427 UT) capsule, Take 1 capsule (1,250 mcg) by mouth 1 (one) time per week., Disp: , Rfl:     gabapentin (Neurontin) 300 mg capsule, Take 1 capsule (300 mg) by mouth 2 times a day., Disp: 60 capsule, Rfl: 0    HYDROmorphone (Dilaudid) 4 mg tablet, Take 1 tablet (4 mg) by mouth 4 times a day as needed for severe pain (7 - 10)., Disp: 120 tablet, Rfl: 0    ketoconazole (NIZOral) 2 % cream, Apply topically 2 times a day., Disp: 30 g, Rfl: 11    methadone (Dolophine) 5 mg tablet, Take 1.5 tablets (7.5 mg) by mouth once daily with breakfast AND 2 tablets (10 mg) once daily at bedtime., Disp: 105 tablet, Rfl: 0    mirtazapine (Remeron) 7.5 mg tablet, Take 1 tablet (7.5 mg) by mouth once daily at bedtime., Disp: 30 tablet, Rfl: 2    naloxone (Narcan) 4 mg/0.1 mL nasal spray, Administer 1 spray (4 mg) into affected nostril(s) if needed for opioid reversal or respiratory depression. 1 spray to nostril for overdose, may repeat every 2-3 minutes until medical assistance arrives, Disp: , Rfl:     omeprazole (PriLOSEC) 40 mg DR capsule, Take 1 capsule (40 mg) by mouth once daily in the morning. Take before meals. TAKE ONE CAPSULE BY MOUTH EVERY DAY IN THE MORNING BEFORE EATING, Disp: 30 capsule, Rfl: 2    Rezurock 200 mg tablet, Take one (1) tablet by mouth twice  daily, Disp: 60 tablet, Rfl: 5    sennosides (Senokot) 8.6 mg tablet, Take 3 tablets (25.8 mg) by mouth 2 times a day., Disp: 180 tablet, Rfl: 3    tacrolimus (Protopic) 0.1 % ointment, APPLY 1-2 TIMES PER WEEK, Disp: 30 g, Rfl: 1    Current Facility-Administered Medications:     heparin lock flush (porcine) 10 unit/mL injection 100 Units, 100 Units, intra-catheter, Once, CHRIS Garcia    heparin lock flush (porcine) injection 500 Units, 5 mL, intravenous, PRN, CHRIS Garcia  Medical History:  Past Medical History:   Diagnosis Date    Anxiety disorder, unspecified 04/17/2017    Anxiety    Depression, unspecified 04/17/2017    Depression    Family history of glaucoma 03/02/2017    Family history of glaucoma in father    Gastritis, unspecified, without bleeding 04/17/2017    Gastritis    Hematuria, unspecified 04/17/2017    Hematuria    Personal history of other diseases of the digestive system 12/02/2015    History of esophageal reflux    Priapism, unspecified 04/17/2017    Priapism    Sickle-cell disease without crisis (Multi) 04/17/2017    Sickle cell anemia       Surgical History:  Past Surgical History:   Procedure Laterality Date    GALLBLADDER SURGERY  04/12/2017    Gallbladder Surgery    IR CVC TUNNELED  4/24/2018    IR CVC TUNNELED 4/24/2018 [facility] [place] [facility]    IR CVC TUNNELED  6/5/2018    IR CVC TUNNELED 6/5/2018 [facility] [place] [facility]    IR CVC TUNNELED  3/1/2019    IR CVC TUNNELED 3/1/2019 [facility] [place] [facility]GAC[facility]    IR CVC TUNNELED  6/4/2019    IR CVC TUNNELED 6/4/2019 [facility] CLINICAL LEGAC[facility]    IR CVC TUNNELED  4/5/2019    IR CVC TUNNELED 4/5/2019 [facility]GAC[facility]    IR CVC TUNNELED  7/17/2019    IR CVC TUNNELED 7/17/2019 [facility]    IR CVC TUNNELED  8/14/2019    IR CVC TUNNELED 8/14/2019 [facility]GAC[facility]    IR CVC TUNNELED  12/18/2019    IR CVC TUNNELED 12/18/2019 [facility] CLINICAL LEGACY    IR CVC TUNNELED  10/9/2019    IR CVC TUNNELED 10/9/2019 [facility]IB LEGACY    IR CVC TUNNELED  11/13/2019    IR CVC TUNNELED  11/13/2019 [facility] CLINICAL LEGAC[facility]    IR CVC TUNNELED  1/15/2020    IR CVC TUNNELED 1/15/2020 [facility] CLINICAL LEGAC[facility]    IR CVC TUNNELED  2/19/2020    IR CVC TUNNELED 2/19/2020 [facility] CL[facility]ICAL [facility]GAC[facility]    IR CVC TUNNELED  1/4/2021    IR CVC TUNNELED 1/4/2021 [facility]    IR CVC TUNNELED  1/25/2021    IR CVC TUNNELED 1/25/2021 [facility] ANCILLARY LEGAC[facility]    IR CVC TUNNELED  8/21/2018    IR CVC TUNNELED 8/21/2018 [facility]    IR CVC TUNNELED  9/26/2018    IR CVC TUNNELED 9/26/2018 [facility]    IR CVC TUNNELED  11/5/2018    IR CVC TUNNELED 11/5/2018 [facility]    IR CVC TUNNELED  12/19/2018    IR CVC TUNNELED 12/19/2018 [facility]    IR VENOGRAM HEPATIC  11/8/2017    IR VENOGRAM HEPATIC 11/8/2017 [facility]    MR HEAD ANGIO WO IV CONTRAST  2/17/2017    MR HEAD ANGIO WO IV CONTRAST 2/17/2017 [facility] CLINICAL LEGACY    MR NECK ANGIO WO IV CONTRAST  2/17/2017    MR NECK ANGIO WO IV CONTRAST 2/17/2017 [facility] CLINICAL LEGAC[facility]    US GUIDED NEEDLE LIVER BIOPSY  9/8/2020    US GUIDED NEEDLE LIVER BIOPSY 9/8/2020 [facility]       Family History:  Family History   Problem Relation Name Age of Onset    Diabetes Father      Sickle cell anemia Other sibling     Cancer Other grandfather     Cancer Other uncle        Social History:  Social History     Socioeconomic History    Marital status:      Spouse name: Not on file    Number of children: Not on file    Years of education: Not on file    Highest education level: Not on file   Occupational History    Not on file   Tobacco Use    Smoking status: Every Day     Types: Cigarettes     Start date: 1/1/1998     Passive exposure: Current    Smokeless tobacco: Never   Substance and Sexual Activity    Alcohol use: Not Currently     Comment: Occasional    Drug use: Not Currently     Types: Marijuana     Comment: Last use sometime in 2023.  No intention to re-start.    Sexual activity: Not on file   Other Topics Concern    Not on file   Social History Narrative    Not on file  "    Social Determinants of Health     Financial Resource Strain: Not on file   Food Insecurity: Not on file   Transportation Needs: Not on file   Physical Activity: Not on file   Stress: Not on file   Social Connections: Not on file   Intimate Partner Violence: Not on file   Housing Stability: Not on file              Medical Review Of Systems:  +pain worse   +sedation with methadone    Psychiatric Review Of Systems:  Depression ok  +distressing dreams about sister       Objective   Mental Status Exam:  Appearance: appropriate g/h.   Attitude: cooperative and engaged.   Behavior: sitting in chair, good eye contact.   Motor Activity: no tremor, spontaneous movement  Speech: regular in rate, volume, low tone, no dysarthria, no aphasia.   Mood: \"ok\"  Affect: congruent, appropriate range.   Thought Process: linear, goal directed.   Thought Content: no delusions, no SI/HI.   Thought Perception: no AVH.   Cognition: alert, oriented to person, place, time. attn intact.   Insight: fair.   Judgment: fair/intact.     Vitals:  There were no vitals filed for this visit.  Encounter Date: 05/13/24   ECG 12 lead   Result Value    Ventricular Rate 55    Atrial Rate 55    SD Interval 194    QRS Duration 102    QT Interval 442    QTC Calculation(Bazett) 422    P Axis 54    R Axis -43    T Axis -18    QRS Count 9    Q Onset 210    P Onset 113    P Offset 173    T Offset 431    QTC Fredericia 429    Narrative    Sinus bradycardia  Possible Left atrial enlargement  Left axis deviation  Incomplete right bundle branch block  T wave abnormality, consider lateral ischemia  Abnormal ECG  When compared with ECG of 08-APR-2024 14:10,  Incomplete right bundle branch block is now Present  See ED provider note for full interpretation and clinical correlation  Confirmed by Annamaria Pruitt ([id number]) on 5/16/2024 9:57:45 PM     Lab Results   Component Value Date    WBC 9.2 05/30/2024    HGB 14.7 05/30/2024    HCT 41.0 05/30/2024    MCV 86 " 05/30/2024     05/30/2024     Lab Results   Component Value Date    GLUCOSE 107 (H) 05/30/2024    CALCIUM 9.1 05/30/2024     05/30/2024    K 3.5 05/30/2024    CO2 26 05/30/2024     (H) 05/30/2024    BUN 7 05/30/2024    CREATININE 0.83 05/30/2024     Psychotherapy       Time: 17 minutes  Type: supportive, insight oriented  Target: mood, anxiety  Strategies: problem solving, insight oriented  Goal: decreased sx burden  Follow up: next visit 1 month  Response: good    Time via phone: 21 minutes    Yola Contreras MD

## 2024-07-20 ENCOUNTER — HOSPITAL ENCOUNTER (EMERGENCY)
Facility: HOSPITAL | Age: 44
Discharge: HOME | End: 2024-07-20
Attending: EMERGENCY MEDICINE
Payer: COMMERCIAL

## 2024-07-20 VITALS
DIASTOLIC BLOOD PRESSURE: 64 MMHG | BODY MASS INDEX: 22.5 KG/M2 | RESPIRATION RATE: 16 BRPM | OXYGEN SATURATION: 97 % | HEART RATE: 67 BPM | HEIGHT: 66 IN | SYSTOLIC BLOOD PRESSURE: 100 MMHG | WEIGHT: 140 LBS | TEMPERATURE: 97.5 F

## 2024-07-20 DIAGNOSIS — D57.00 SICKLE CELL CRISIS (MULTI): Primary | ICD-10-CM

## 2024-07-20 LAB
ALBUMIN SERPL BCP-MCNC: 4 G/DL (ref 3.4–5)
ALP SERPL-CCNC: 100 U/L (ref 33–120)
ALT SERPL W P-5'-P-CCNC: 8 U/L (ref 10–52)
ANION GAP SERPL CALC-SCNC: 8 MMOL/L (ref 10–20)
AST SERPL W P-5'-P-CCNC: 13 U/L (ref 9–39)
BASOPHILS # BLD AUTO: 0.02 X10*3/UL (ref 0–0.1)
BASOPHILS NFR BLD AUTO: 0.2 %
BILIRUB SERPL-MCNC: 0.9 MG/DL (ref 0–1.2)
BUN SERPL-MCNC: 4 MG/DL (ref 6–23)
CALCIUM SERPL-MCNC: 8.8 MG/DL (ref 8.6–10.6)
CHLORIDE SERPL-SCNC: 106 MMOL/L (ref 98–107)
CO2 SERPL-SCNC: 29 MMOL/L (ref 21–32)
CREAT SERPL-MCNC: 0.92 MG/DL (ref 0.5–1.3)
EGFRCR SERPLBLD CKD-EPI 2021: >90 ML/MIN/1.73M*2
EOSINOPHIL # BLD AUTO: 0.06 X10*3/UL (ref 0–0.7)
EOSINOPHIL NFR BLD AUTO: 0.6 %
ERYTHROCYTE [DISTWIDTH] IN BLOOD BY AUTOMATED COUNT: 13.2 % (ref 11.5–14.5)
GLUCOSE SERPL-MCNC: 141 MG/DL (ref 74–99)
HCT VFR BLD AUTO: 39 % (ref 41–52)
HGB BLD-MCNC: 14.2 G/DL (ref 13.5–17.5)
HGB RETIC QN: 36 PG (ref 28–38)
IMM GRANULOCYTES # BLD AUTO: 0.02 X10*3/UL (ref 0–0.7)
IMM GRANULOCYTES NFR BLD AUTO: 0.2 % (ref 0–0.9)
IMMATURE RETIC FRACTION: 7.2 %
LDH SERPL L TO P-CCNC: 142 U/L (ref 84–246)
LYMPHOCYTES # BLD AUTO: 2.59 X10*3/UL (ref 1.2–4.8)
LYMPHOCYTES NFR BLD AUTO: 28 %
MCH RBC QN AUTO: 31.4 PG (ref 26–34)
MCHC RBC AUTO-ENTMCNC: 36.4 G/DL (ref 32–36)
MCV RBC AUTO: 86 FL (ref 80–100)
MONOCYTES # BLD AUTO: 0.5 X10*3/UL (ref 0.1–1)
MONOCYTES NFR BLD AUTO: 5.4 %
NEUTROPHILS # BLD AUTO: 6.05 X10*3/UL (ref 1.2–7.7)
NEUTROPHILS NFR BLD AUTO: 65.6 %
NRBC BLD-RTO: 0 /100 WBCS (ref 0–0)
PLATELET # BLD AUTO: 261 X10*3/UL (ref 150–450)
POTASSIUM SERPL-SCNC: 3.3 MMOL/L (ref 3.5–5.3)
PROT SERPL-MCNC: 6.5 G/DL (ref 6.4–8.2)
RBC # BLD AUTO: 4.52 X10*6/UL (ref 4.5–5.9)
RETICS #: 0.07 X10*6/UL (ref 0.02–0.12)
RETICS/RBC NFR AUTO: 1.5 % (ref 0.5–2)
SODIUM SERPL-SCNC: 140 MMOL/L (ref 136–145)
WBC # BLD AUTO: 9.2 X10*3/UL (ref 4.4–11.3)

## 2024-07-20 PROCEDURE — 96374 THER/PROPH/DIAG INJ IV PUSH: CPT

## 2024-07-20 PROCEDURE — 96376 TX/PRO/DX INJ SAME DRUG ADON: CPT

## 2024-07-20 PROCEDURE — 83615 LACTATE (LD) (LDH) ENZYME: CPT

## 2024-07-20 PROCEDURE — 2500000005 HC RX 250 GENERAL PHARMACY W/O HCPCS: Mod: SE

## 2024-07-20 PROCEDURE — 96372 THER/PROPH/DIAG INJ SC/IM: CPT

## 2024-07-20 PROCEDURE — 99284 EMERGENCY DEPT VISIT MOD MDM: CPT | Mod: 25

## 2024-07-20 PROCEDURE — 2500000004 HC RX 250 GENERAL PHARMACY W/ HCPCS (ALT 636 FOR OP/ED): Mod: SE

## 2024-07-20 PROCEDURE — 84075 ASSAY ALKALINE PHOSPHATASE: CPT

## 2024-07-20 PROCEDURE — 85025 COMPLETE CBC W/AUTO DIFF WBC: CPT

## 2024-07-20 PROCEDURE — 2500000001 HC RX 250 WO HCPCS SELF ADMINISTERED DRUGS (ALT 637 FOR MEDICARE OP): Mod: SE

## 2024-07-20 PROCEDURE — 85045 AUTOMATED RETICULOCYTE COUNT: CPT

## 2024-07-20 PROCEDURE — 99285 EMERGENCY DEPT VISIT HI MDM: CPT | Performed by: EMERGENCY MEDICINE

## 2024-07-20 PROCEDURE — 36415 COLL VENOUS BLD VENIPUNCTURE: CPT

## 2024-07-20 RX ORDER — KETOROLAC TROMETHAMINE 30 MG/ML
30 INJECTION, SOLUTION INTRAMUSCULAR; INTRAVENOUS ONCE
Status: COMPLETED | OUTPATIENT
Start: 2024-07-20 | End: 2024-07-20

## 2024-07-20 RX ORDER — CYCLOBENZAPRINE HCL 10 MG
10 TABLET ORAL ONCE
Status: COMPLETED | OUTPATIENT
Start: 2024-07-20 | End: 2024-07-20

## 2024-07-20 RX ORDER — LIDOCAINE 560 MG/1
1 PATCH PERCUTANEOUS; TOPICAL; TRANSDERMAL DAILY
Status: DISCONTINUED | OUTPATIENT
Start: 2024-07-20 | End: 2024-07-20 | Stop reason: HOSPADM

## 2024-07-20 RX ORDER — HYDROMORPHONE HYDROCHLORIDE 1 MG/ML
1 INJECTION, SOLUTION INTRAMUSCULAR; INTRAVENOUS; SUBCUTANEOUS
Status: COMPLETED | OUTPATIENT
Start: 2024-07-20 | End: 2024-07-20

## 2024-07-20 RX ADMIN — LIDOCAINE 1 PATCH: 4 PATCH TOPICAL at 14:46

## 2024-07-20 RX ADMIN — HYDROMORPHONE HYDROCHLORIDE 1 MG: 1 INJECTION, SOLUTION INTRAMUSCULAR; INTRAVENOUS; SUBCUTANEOUS at 16:07

## 2024-07-20 RX ADMIN — KETOROLAC TROMETHAMINE 30 MG: 30 INJECTION, SOLUTION INTRAMUSCULAR at 14:46

## 2024-07-20 RX ADMIN — HYDROMORPHONE HYDROCHLORIDE 1 MG: 1 INJECTION, SOLUTION INTRAMUSCULAR; INTRAVENOUS; SUBCUTANEOUS at 14:58

## 2024-07-20 RX ADMIN — CYCLOBENZAPRINE 10 MG: 10 TABLET, FILM COATED ORAL at 14:46

## 2024-07-20 RX ADMIN — HYDROMORPHONE HYDROCHLORIDE 1 MG: 1 INJECTION, SOLUTION INTRAMUSCULAR; INTRAVENOUS; SUBCUTANEOUS at 14:46

## 2024-07-20 NOTE — ED PROVIDER NOTES
HPI   Chief Complaint   Patient presents with   • Sickle Cell Pain Crisis       HPI  Patient is a 43-year-old past medical history of smoker, sickle cell disease, chronic migraines presenting with possible sickle cell crisis.  Patient said the pain started about 3 hours ago.  The pain is centered around his back and left leg.  Patient said he is experiencing his normal crisis.  Patient denies any fever, nausea, chest pain, and abdominal pain.      Patient History   Past Medical History:   Diagnosis Date   • Anxiety disorder, unspecified 04/17/2017    Anxiety   • Depression, unspecified 04/17/2017    Depression   • Family history of glaucoma 03/02/2017    Family history of glaucoma in father   • Gastritis, unspecified, without bleeding 04/17/2017    Gastritis   • Hematuria, unspecified 04/17/2017    Hematuria   • Personal history of other diseases of the digestive system 12/02/2015    History of esophageal reflux   • Priapism, unspecified 04/17/2017    Priapism   • Sickle-cell disease without crisis (Multi) 04/17/2017    Sickle cell anemia     Past Surgical History:   Procedure Laterality Date   • GALLBLADDER SURGERY  04/12/2017    Gallbladder Surgery   • IR CVC TUNNELED  4/24/2018    IR CVC TUNNELED 4/24/2018 [facility]   • IR CVC TUNNELED  6/5/2018    IR CVC TUNNELED 6/5/2018 [facility]   • IR CVC TUNNELED  3/1/2019    IR CVC TUNNELED 3/1/2019 [facility]   • IR CVC TUNNELED  6/4/2019    IR CVC TUNNELED 6/4/2019 [facility] CLINICAL LEGAC[facility]   • IR CVC TUNNELED  4/5/2019    IR CVC TUNNELED 4/5/2019 [facility]   • IR CVC TUNNELED  7/17/2019    IR CVC TUNNELED 7/17/2019 [facility]   • IR CVC TUNNELED  8/14/2019    IR CVC TUNNELED 8/14/2019 [facility]   • IR CVC TUNNELED  12/18/2019    IR CVC TUNNELED 12/18/2019 [facility]   • IR CVC TUNNELED  10/9/2019    IR CVC TUNNELED 10/9/2019 [facility]   • IR CVC TUNNELED  11/13/2019    IR CVC TUNNELED 11/13/2019 [facility] CL[facility]ICAL [facility]GAC[facility]   • IR CVC  TUNNELED  1/15/2020    IR CVC TUNNELED 1/15/2020 [facility] CL[facility]ICAL LEGAC[facility]   • IR CVC TUNNELED  2/19/2020    IR CVC TUNNELED 2/19/2020 [facility] CL[facility]   • IR CVC TUNNELED  1/4/2021    IR CVC TUNNELED 1/4/2021 [facility]   • IR CVC TUNNELED  1/25/2021    IR CVC TUNNELED 1/25/2021 [facility]   • IR CVC TUNNELED  8/21/2018    IR CVC TUNNELED 8/21/2018 [facility]   • IR CVC TUNNELED  9/26/2018    IR CVC TUNNELED 9/26/2018 [facility]   • IR CVC TUNNELED  11/5/2018    IR CVC TUNNELED 11/5/2018 [facility]   • IR CVC TUNNELED  12/19/2018    IR CVC TUNNELED 12/19/2018 [facility]   • IR VENOGRAM HEPATIC  11/8/2017    IR VENOGRAM HEPATIC 11/8/2017 [facility]   • MR HEAD ANGIO WO IV CONTRAST  2/17/2017    MR HEAD ANGIO WO IV CONTRAST 2/17/2017 [facility] CLINICAL LEGACY   • MR NECK ANGIO WO IV CONTRAST  2/17/2017    MR NECK ANGIO WO IV CONTRAST 2/17/2017 [facility] CL[facility]ICAL LEGAC[facility]   • US GUIDED NEEDLE LIVER BIOPSY  9/8/2020    US GUIDED NEEDLE LIVER BIOPSY 9/8/2020 [facility]     Family History   Problem Relation Name Age of Onset   • Diabetes Father     • Sickle cell anemia Other sibling    • Cancer Other grandfather    • Cancer Other uncle      Social History     Tobacco Use   • Smoking status: Every Day     Types: Cigarettes     Start date: 1/1/1998     Passive exposure: Current   • Smokeless tobacco: Never   Substance Use Topics   • Alcohol use: Not Currently     Comment: Occasional   • Drug use: Not Currently     Types: Marijuana     Comment: Last use sometime in 2023.  No intention to re-start.       Physical Exam   ED Triage Vitals [07/20/24 1247]   Temperature Heart Rate Respirations BP   36.4 °C (97.5 °F) 85 18 108/75      Pulse Ox Temp Source Heart Rate Source Patient Position   97 % Tympanic Monitor Lying      BP Location FiO2 (%)     Right arm --       Physical Exam  Constitutional:       Appearance: Normal appearance.   Cardiovascular:      Rate and Rhythm: Normal rate and regular rhythm.       Pulses: Normal pulses.      Heart sounds: Normal heart sounds.   Pulmonary:      Effort: Pulmonary effort is normal.      Breath sounds: Normal breath sounds.   Abdominal:      General: Abdomen is flat. Bowel sounds are normal.      Palpations: Abdomen is soft.   Musculoskeletal:         General: Normal range of motion.      Cervical back: Normal range of motion and neck supple.   Skin:     Capillary Refill: Capillary refill takes 2 to 3 seconds.   Neurological:      General: No focal deficit present.      Mental Status: He is alert and oriented to person, place, and time. Mental status is at baseline.           ED Course & MDM   Diagnoses as of 07/23/24 7825   Sickle cell crisis (Multi)         Medical Decision Making  Patient is a 43-year-old past medical history of smoker, sickle cell disease, chronic migraines presenting with possible sickle cell crisis.  At bedside patient was hemodynamically stable and in mild distress.  Patient physical exam was benign.  Patient CBC and CMP were comparable to his previous labs during crisis.  Patient LDH and reticulocyte responded appropriately to patient crisis.  Patient received 3 x 1 mg Dilaudid every 1 hour.  Patient also received 30 mg of Toradol.  After reevaluation, patient pain has subsided and patient was discharged.    Procedure  Procedures     Jc Caldwell MD  Resident  07/23/24 3346

## 2024-07-20 NOTE — ED TRIAGE NOTES
Pt complains of bilateral leg pain related to his sickle cell disease. Pt states this feels like his normal pain when he is in a crisis

## 2024-07-21 ENCOUNTER — HOSPITAL ENCOUNTER (EMERGENCY)
Facility: HOSPITAL | Age: 44
Discharge: HOME | End: 2024-07-21
Attending: EMERGENCY MEDICINE
Payer: COMMERCIAL

## 2024-07-21 VITALS
TEMPERATURE: 97.5 F | HEIGHT: 67 IN | RESPIRATION RATE: 16 BRPM | WEIGHT: 140 LBS | HEART RATE: 62 BPM | SYSTOLIC BLOOD PRESSURE: 105 MMHG | DIASTOLIC BLOOD PRESSURE: 75 MMHG | OXYGEN SATURATION: 99 % | BODY MASS INDEX: 21.97 KG/M2

## 2024-07-21 DIAGNOSIS — D57.00 SICKLE CELL PAIN CRISIS (MULTI): Primary | ICD-10-CM

## 2024-07-21 PROCEDURE — 2500000002 HC RX 250 W HCPCS SELF ADMINISTERED DRUGS (ALT 637 FOR MEDICARE OP, ALT 636 FOR OP/ED): Performed by: EMERGENCY MEDICINE

## 2024-07-21 PROCEDURE — 96376 TX/PRO/DX INJ SAME DRUG ADON: CPT

## 2024-07-21 PROCEDURE — 99284 EMERGENCY DEPT VISIT MOD MDM: CPT

## 2024-07-21 PROCEDURE — 2500000004 HC RX 250 GENERAL PHARMACY W/ HCPCS (ALT 636 FOR OP/ED): Performed by: EMERGENCY MEDICINE

## 2024-07-21 PROCEDURE — 96374 THER/PROPH/DIAG INJ IV PUSH: CPT

## 2024-07-21 PROCEDURE — 2500000001 HC RX 250 WO HCPCS SELF ADMINISTERED DRUGS (ALT 637 FOR MEDICARE OP): Performed by: EMERGENCY MEDICINE

## 2024-07-21 RX ORDER — HYDROMORPHONE HYDROCHLORIDE 2 MG/1
4 TABLET ORAL ONCE
Status: COMPLETED | OUTPATIENT
Start: 2024-07-21 | End: 2024-07-21

## 2024-07-21 RX ORDER — HYDROMORPHONE HYDROCHLORIDE 2 MG/ML
2 INJECTION, SOLUTION INTRAMUSCULAR; INTRAVENOUS; SUBCUTANEOUS
Status: COMPLETED | OUTPATIENT
Start: 2024-07-21 | End: 2024-07-21

## 2024-07-21 RX ORDER — ONDANSETRON HYDROCHLORIDE 2 MG/ML
4 INJECTION, SOLUTION INTRAVENOUS ONCE AS NEEDED
Status: DISCONTINUED | OUTPATIENT
Start: 2024-07-21 | End: 2024-07-21 | Stop reason: HOSPADM

## 2024-07-21 RX ORDER — POTASSIUM CHLORIDE 20 MEQ/1
40 TABLET, EXTENDED RELEASE ORAL ONCE
Status: COMPLETED | OUTPATIENT
Start: 2024-07-21 | End: 2024-07-21

## 2024-07-21 ASSESSMENT — PAIN SCALES - GENERAL
PAINLEVEL_OUTOF10: 2
PAINLEVEL_OUTOF10: 8
PAINLEVEL_OUTOF10: 10 - WORST POSSIBLE PAIN
PAINLEVEL_OUTOF10: 5 - MODERATE PAIN
PAINLEVEL_OUTOF10: 4
PAINLEVEL_OUTOF10: 10 - WORST POSSIBLE PAIN

## 2024-07-21 ASSESSMENT — PAIN - FUNCTIONAL ASSESSMENT
PAIN_FUNCTIONAL_ASSESSMENT: 0-10

## 2024-07-21 ASSESSMENT — PAIN DESCRIPTION - DESCRIPTORS: DESCRIPTORS: SHARP;SHOOTING

## 2024-07-21 ASSESSMENT — PAIN DESCRIPTION - PAIN TYPE: TYPE: ACUTE PAIN

## 2024-07-21 ASSESSMENT — PAIN DESCRIPTION - LOCATION
LOCATION: HIP
LOCATION: LEG

## 2024-07-21 ASSESSMENT — PAIN DESCRIPTION - ORIENTATION: ORIENTATION: MID;LEFT;RIGHT

## 2024-07-21 ASSESSMENT — PAIN DESCRIPTION - ONSET: ONSET: ONGOING

## 2024-07-21 ASSESSMENT — PAIN DESCRIPTION - FREQUENCY: FREQUENCY: CONSTANT/CONTINUOUS

## 2024-07-21 NOTE — ED TRIAGE NOTES
Patient stated he is feeling pain in his legs and lower back. Patient stated he had a history of sickle cell and this feels like his regular crisis. 10/10 pain

## 2024-07-21 NOTE — ED PROVIDER NOTES
Emergency Department Provider Note             History of Present Illness   CC: Sickle Cell Pain Crisis    History provided by: Patient  Limitations to History: None    HPI:  Xu Maya is a 43 y.o. male with history including sickle cell disease presenting to the emergency department for significant pain in his lower extremities and mild pain in low back. States this feels similar to prior pain crises. He was seen in the ED yesterday and his pain felt somewhat improved to the point that he was comfortable going home. However, overnight it returned and worsened. He denies any new component to the pain. Denies fever, chills, nausea, vomiting, weaknes, paresthesia, any additional symptoms.     ---  Past Medical History:   Diagnosis Date    Anxiety disorder, unspecified 04/17/2017    Anxiety    Depression, unspecified 04/17/2017    Depression    Family history of glaucoma 03/02/2017    Family history of glaucoma in father    Gastritis, unspecified, without bleeding 04/17/2017    Gastritis    Hematuria, unspecified 04/17/2017    Hematuria    Personal history of other diseases of the digestive system 12/02/2015    History of esophageal reflux    Priapism, unspecified 04/17/2017    Priapism    Sickle-cell disease without crisis (Multi) 04/17/2017    Sickle cell anemia     Past Surgical History:   Procedure Laterality Date    GALLBLADDER SURGERY  04/12/2017    Gallbladder Surgery    IR CVC TUNNELED  4/24/2018    IR CVC TUNNELED 4/24/2018 [facility]    IR CVC TUNNELED  6/5/2018    IR CVC TUNNELED 6/5/2018 [facility]    IR CVC TUNNELED  3/1/2019    IR CVC TUNNELED 3/1/2019 [facility]    IR CVC TUNNELED  6/4/2019    IR CVC TUNNELED 6/4/2019 [facility] CLINICAL LEGACY    IR CVC TUNNELED  4/5/2019    IR CVC TUNNELED 4/5/2019 [facility]    IR CVC TUNNELED  7/17/2019    IR CVC TUNNELED 7/17/2019 [facility]    IR CVC TUNNELED  8/14/2019    IR CVC TUNNELED 8/14/2019 [facility]    IR CVC TUNNELED  12/18/2019     IR CVC TUNNELED 12/18/2019 [facility] CLINICAL LEGACY    IR CVC TUNNELED  10/9/2019    IR CVC TUNNELED 10/9/2019 [facility]    IR CVC TUNNELED  11/13/2019    IR CVC TUNNELED 11/13/2019 [facility] CLINICAL LEGACY    IR CVC TUNNELED  1/15/2020    IR CVC TUNNELED 1/15/2020 [facility] CLINICAL LEGACY    IR CVC TUNNELED  2/19/2020    IR CVC TUNNELED 2/19/2020 [facility] CLINICAL LEGACY    IR CVC TUNNELED  1/4/2021    IR CVC TUNNELED 1/4/2021 [facility]    IR CVC TUNNELED  1/25/2021    IR CVC TUNNELED 1/25/2021 [facility]    IR CVC TUNNELED  8/21/2018    IR CVC TUNNELED 8/21/2018 [facility]    IR CVC TUNNELED  9/26/2018    IR CVC TUNNELED 9/26/2018 [facility]    IR CVC TUNNELED  11/5/2018    IR CVC TUNNELED 11/5/2018 [facility]    IR CVC TUNNELED  12/19/2018    IR CVC TUNNELED 12/19/2018 [facility]    IR VENOGRAM HEPATIC  11/8/2017    IR VENOGRAM HEPATIC 11/8/2017 [facility]    MR HEAD ANGIO WO IV CONTRAST  2/17/2017    MR HEAD ANGIO WO IV CONTRAST 2/17/2017 [facility] CLINICAL LEGACY    MR NECK ANGIO WO IV CONTRAST  2/17/2017    MR NECK ANGIO WO IV CONTRAST 2/17/2017 [facility] CLINICAL LEGACY    US GUIDED NEEDLE LIVER BIOPSY  9/8/2020    US GUIDED NEEDLE LIVER BIOPSY 9/8/2020 [facility]       Allergies   Allergen Reactions    Hydrocodone-Acetaminophen Hives and Unknown    Naproxen Unknown       Physical Exam   Triage vitals:  T 36.4 °C (97.5 °F)  HR 81  /81  RR 20  O2 100 % None (Room air)    General: awake, well-appearing, no distress  Head: normocephalic, atraumatic  Eyes: pupils equal, extraocular movements grossly intact, no conjunctival injection or scleral icterus  ENT: nares patent, moist mucous membranes  Neck: supple, trachea midline, no masses  CV: regular rate and rhythm, well-perfused  Resp: breathing is non-labored, speaking in full sentences. Lungs are clear to auscultation bilaterally  GI: soft, non-distended, non-tender, no rebound or guarding  Extremities: no edema, no gross  deformity, ROM grossly intact, 2+ DP pulses  Back: no spinal tenderness   Neuro: alert, oriented, speech is fluent, face is symmetric, moving all extremities   Psych: Appropriate mood and affect    ED Course & Medical Decision Making   Medical Decision Makin y.o. male with history including sickle cell disease presenting to the emergency department for pain in his lower extremities and low back, consistent with prior sickle cell pain crises. He had labs yesterday when he presented for similar pain and these were reassuring. As such, will hold off on repeating them today. However he did have mild hypokalemia and didn't receive repletion then so this was given today. He received analgesics per his care plan. Discussed disposition and considered admission given return visit but after 3 doses he reported feeling well enough to go home. He was offered a dose of his home medication prior to discharge.     External Records Reviewed: prior heme/onc clinic notes and ED notes     Results: N/a    Chronic Medical Conditions Significantly Affecting Care: As documented above in [facility]    Patient was discussed with the following consultants/services: None     Care Considerations: As documented above in [facility]    ED Course:  Diagnoses as of 24 0857   Sickle cell pain crisis (Multi)     Disposition   discharge    Procedures   Procedures    MD Lilia Pires MD  24 [phone]       Lilia Nova MD  24 [phone]

## 2024-07-22 ENCOUNTER — OFFICE VISIT (OUTPATIENT)
Dept: HEMATOLOGY/ONCOLOGY | Facility: HOSPITAL | Age: 44
End: 2024-07-22
Payer: COMMERCIAL

## 2024-07-22 ENCOUNTER — TELEPHONE (OUTPATIENT)
Dept: PALLIATIVE MEDICINE | Facility: HOSPITAL | Age: 44
End: 2024-07-22

## 2024-07-22 ENCOUNTER — TELEPHONE (OUTPATIENT)
Dept: HEMATOLOGY/ONCOLOGY | Facility: HOSPITAL | Age: 44
End: 2024-07-22

## 2024-07-22 VITALS
TEMPERATURE: 97.7 F | SYSTOLIC BLOOD PRESSURE: 112 MMHG | BODY MASS INDEX: 21.02 KG/M2 | DIASTOLIC BLOOD PRESSURE: 69 MMHG | WEIGHT: 134.2 LBS | RESPIRATION RATE: 16 BRPM | HEART RATE: 89 BPM | OXYGEN SATURATION: 100 %

## 2024-07-22 DIAGNOSIS — D57.00 SICKLE CELL CRISIS (MULTI): Primary | ICD-10-CM

## 2024-07-22 PROCEDURE — 2500000001 HC RX 250 WO HCPCS SELF ADMINISTERED DRUGS (ALT 637 FOR MEDICARE OP)

## 2024-07-22 PROCEDURE — 99215 OFFICE O/P EST HI 40 MIN: CPT

## 2024-07-22 RX ORDER — ACETAMINOPHEN 325 MG/1
975 TABLET ORAL ONCE
Status: COMPLETED | OUTPATIENT
Start: 2024-07-22 | End: 2024-07-22

## 2024-07-22 RX ORDER — NALOXONE HYDROCHLORIDE 0.4 MG/ML
0.4 INJECTION, SOLUTION INTRAMUSCULAR; INTRAVENOUS; SUBCUTANEOUS
Status: DISCONTINUED | OUTPATIENT
Start: 2024-07-22 | End: 2024-07-22 | Stop reason: HOSPADM

## 2024-07-22 RX ORDER — CYCLOBENZAPRINE HCL 10 MG
5 TABLET ORAL ONCE
Status: COMPLETED | OUTPATIENT
Start: 2024-07-22 | End: 2024-07-22

## 2024-07-22 RX ORDER — HYDROMORPHONE HYDROCHLORIDE 4 MG/1
10 TABLET ORAL ONCE
Status: COMPLETED | OUTPATIENT
Start: 2024-07-22 | End: 2024-07-22

## 2024-07-22 RX ORDER — HYDROMORPHONE HYDROCHLORIDE 4 MG/1
12 TABLET ORAL
Status: DISCONTINUED | OUTPATIENT
Start: 2024-07-22 | End: 2024-07-22 | Stop reason: HOSPADM

## 2024-07-22 ASSESSMENT — PAIN SCALES - GENERAL
PAINLEVEL_OUTOF10: 8
PAINLEVEL: 8
PAINLEVEL_OUTOF10: 6
PAINLEVEL_OUTOF10: 7

## 2024-07-22 ASSESSMENT — PAIN DESCRIPTION - LOCATION
LOCATION: BACK

## 2024-07-22 ASSESSMENT — PAIN SCALES - PAIN ASSESSMENT IN ADVANCED DEMENTIA (PAINAD): TOTALSCORE: MEDICATION (SEE MAR)

## 2024-07-22 NOTE — PROGRESS NOTES
Patient ID:  Xu Maya is a 43 y.o. male.  Subjective   Chief Complaint: Uncontrolled Pain    HPI  Xu is a 43 y.o. male with history of anxiety, depression, gastritis/GERD, priapism, and sickle cell disease s/p transplant, who presents to [facility] with uncontrolled pain in B/L LE     His pain started a few days ago. He presented to the ER the last two days but is still having uncontrolled pain since then. He self discontinued his methadone ~3 weeks ago because it was not controlling his pain and was making him feel more sedated and unable to focus at work. He has been taking 4mg dilaudid PO at home for pain control. Denies any chest pain, cough, SOB, headaches, blurry vision, falls, fever or chills, n/v/d/abd pain, or urinary complaints.       ROS  Review of Systems   Musculoskeletal:         B/l LE leg pain   All other systems reviewed and are negative.       Allergies  Allergies   Allergen Reactions    Hydrocodone-Acetaminophen Hives and Unknown    Naproxen Unknown        Medications  Current Outpatient Medications   Medication Instructions    amitriptyline (ELAVIL) 25 mg, oral    ARIPiprazole (ABILIFY) 5 mg, oral, Daily    cholecalciferol (VITAMIN D-3) 1,000 Units, oral, Daily    cyclobenzaprine (FLEXERIL) 10 mg, oral, 3 times daily PRN    diphenhydrAMINE (BENADRYL) 25 mg, oral, Every 6 hours PRN    DULoxetine (CYMBALTA) 60 mg, oral, 2 times daily    ergocalciferol (Vitamin D-2) 1.25 MG (15910 UT) capsule 1 capsule, oral, Once Weekly    gabapentin (NEURONTIN) 300 mg, oral, 2 times daily    HYDROmorphone (DILAUDID) 4 mg, oral, 4 times daily PRN    ketoconazole (NIZOral) 2 % cream Topical, 2 times daily    methadone (Dolophine) 5 mg tablet Take 1.5 tablets (7.5 mg) by mouth once daily with breakfast AND 2 tablets (10 mg) once daily at bedtime.    mirtazapine (REMERON) 7.5 mg, oral, Nightly    naloxone (NARCAN) 4 mg, nasal, As needed, 1 spray to nostril for overdose, may repeat every 2-3 minutes until medical  assistance arrives<BR>    omeprazole (PriLOSEC) 20 mg DR capsule 1 capsule, oral, Daily (0630)    omeprazole (PRILOSEC) 40 mg, oral, Daily before breakfast, TAKE ONE CAPSULE BY MOUTH EVERY DAY IN THE MORNING BEFORE EATING    penicillin V (Veetid) 250 mg tablet 1 tablet, oral, Every 12 hours scheduled (0630,1830)    potassium chloride CR 10 mEq ER tablet 1 tablet, oral, Daily (0630)    prazosin (MINIPRESS) 1 mg, oral, Nightly    Rezurock 200 mg tablet Take one (1) tablet by mouth twice daily    sennosides (SENOKOT) 25.8 mg, oral, 2 times daily    tacrolimus (Protopic) 0.1 % ointment APPLY 1-2 TIMES PER WEEK        Past Medical History:   Past Medical History:  04/17/2017: Anxiety disorder, unspecified      Comment:  Anxiety  04/17/2017: Depression, unspecified      Comment:  Depression  03/02/2017: Family history of glaucoma      Comment:  Family history of glaucoma in father  04/17/2017: Gastritis, unspecified, without bleeding      Comment:  Gastritis  04/17/2017: Hematuria, unspecified      Comment:  Hematuria  12/02/2015: Personal history of other diseases of the digestive system      Comment:  History of esophageal reflux  04/17/2017: Priapism, unspecified      Comment:  Priapism  04/17/2017: Sickle-cell disease without crisis (Multi)      Comment:  Sickle cell anemia   Surgical History:    Past Surgical History:   Procedure Laterality Date    GALLBLADDER SURGERY  04/12/2017    Gallbladder Surgery    IR CVC TUNNELED  4/24/2018    IR CVC TUNNELED 4/24/2018 [facility] LEGAC[facility]    IR CVC TUNNELED  6/5/2018    IR CVC TUNNELED 6/5/2018 [facility]GAC[facility]    IR CVC TUNNELED  3/1/2019    IR CVC TUNNELED 3/1/2019 [facility]GAC[facility]    IR CVC TUNNELED  6/4/2019    IR CVC TUNNELED 6/4/2019 [facility] CLINICAL LEGACY    IR CVC TUNNELED  4/5/2019    IR CVC TUNNELED 4/5/2019 [facility]GAC[facility]    IR CVC TUNNELED  7/17/2019    IR CVC TUNNELED 7/17/2019 [facility]GAC[facility]    IR CVC TUNNELED  8/14/2019    IR CVC TUNNELED 8/14/2019 [facility] LEGAC[facility]    IR  CVC TUNNELED  12/18/2019    IR CVC TUNNELED 12/18/2019 [facility] CLINICAL LEGACY    IR CVC TUNNELED  10/9/2019    IR CVC TUNNELED 10/9/2019 [facility]    IR CVC TUNNELED  11/13/2019    IR CVC TUNNELED 11/13/2019 [facility] CLINICAL LEGACY    IR CVC TUNNELED  1/15/2020    IR CVC TUNNELED 1/15/2020 [facility] CLINICAL LEGACY    IR CVC TUNNELED  2/19/2020    IR CVC TUNNELED 2/19/2020 [facility] CL[facility]ICAL LEGACY    IR CVC TUNNELED  1/4/2021    IR CVC TUNNELED 1/4/2021 [facility]    IR CVC TUNNELED  1/25/2021    IR CVC TUNNELED 1/25/2021 [facility]    IR CVC TUNNELED  8/21/2018    IR CVC TUNNELED 8/21/2018 [facility]    IR CVC TUNNELED  9/26/2018    IR CVC TUNNELED 9/26/2018 [facility]    IR CVC TUNNELED  11/5/2018    IR CVC TUNNELED 11/5/2018 [facility]    IR CVC TUNNELED  12/19/2018    IR CVC TUNNELED 12/19/2018 [facility]    IR VENOGRAM HEPATIC  11/8/2017    IR VENOGRAM HEPATIC 11/8/2017 [facility]    MR HEAD ANGIO WO IV CONTRAST  2/17/2017    MR HEAD ANGIO WO IV CONTRAST 2/17/2017 [facility] CLINICAL LEGACY    MR NECK ANGIO WO IV CONTRAST  2/17/2017    MR NECK ANGIO WO IV CONTRAST 2/17/2017 [facility] CL[facility]ICAL LEGACY    US GUIDED NEEDLE LIVER BIOPSY  9/8/2020    US GUIDED NEEDLE LIVER BIOPSY 9/8/2020 [facility]      Family History:    Family History   Problem Relation Name Age of Onset    Diabetes Father      Sickle cell anemia Other sibling     Cancer Other grandfather     Cancer Other uncle      Family Oncology History:    Cancer-related family history includes Cancer in some other family members.  Social History:    Social History     Tobacco Use    Smoking status: Every Day     Types: Cigarettes     Start date: 1/1/1998     Passive exposure: Current    Smokeless tobacco: Never   Substance Use Topics    Alcohol use: Not Currently     Comment: Occasional    Drug use: Not Currently     Types: Marijuana     Comment: Last use sometime in 2023.  No intention to re-start.        Objective   Vitals: /69 (BP  Location: Right arm, Patient Position: Sitting)   Pulse 89   Temp 36.5 °C (97.7 °F) (Temporal)   Resp 16   Wt 60.9 kg (134 lb 3.2 oz)   SpO2 100%   BMI 21.02 kg/m²   Weight:   Vitals:    07/22/24 1003   Weight: 60.9 kg (134 lb 3.2 oz)       Physical Exam  Vitals reviewed.   HENT:      Head: Normocephalic.   Eyes:      Pupils: Pupils are equal, round, and reactive to light.   Cardiovascular:      Rate and Rhythm: Normal rate and regular rhythm.   Pulmonary:      Effort: Pulmonary effort is normal.      Breath sounds: Normal breath sounds.   Abdominal:      General: Abdomen is flat. Bowel sounds are normal.   Skin:     General: Skin is warm.      Capillary Refill: Capillary refill takes less than 2 seconds.   Neurological:      General: No focal deficit present.      Mental Status: He is alert and oriented to person, place, and time.         Diagnostic Results     Labs  Results from last 7 days   Lab Units 07/20/24  1434   WBC AUTO x10*3/uL 9.2   HEMOGLOBIN g/dL 14.2   HEMATOCRIT % 39.0*   PLATELETS AUTO x10*3/uL 261   NEUTROS ABS x10*3/uL 6.05   LYMPHS ABS AUTO x10*3/uL 2.59   MONOS ABS AUTO x10*3/uL 0.50   EOS ABS AUTO x10*3/uL 0.06   NEUTROS PCT AUTO % 65.6   LYMPHS PCT AUTO % 28.0   MONOS PCT AUTO % 5.4   EOS PCT AUTO % 0.6      Results from last 7 days   Lab Units 07/20/24  1434   GLUCOSE mg/dL 141*   SODIUM mmol/L 140   POTASSIUM mmol/L 3.3*   CHLORIDE mmol/L 106   CO2 mmol/L 29   BUN mg/dL 4*   CREATININE mg/dL 0.92   EGFR mL/min/1.73m*2 >90   CALCIUM mg/dL 8.8   ALBUMIN g/dL 4.0   PROTEIN TOTAL g/dL 6.5     Results from last 7 days   Lab Units 07/20/24  1434   BILIRUBIN TOTAL mg/dL 0.9   ALK PHOS U/L 100   ALT U/L 8*   AST U/L 13         Results from last 7 days   Lab Units 07/20/24  1434   RETIC CT PCT % 1.5   RETIC CT ABS x10*6/uL 0.070   IMMATURE RETIC FRACTION % 7.2   RETIC HGB pg 36     Results from last 7 days   Lab Units 07/20/24  1434   LD U/L 142         Lab Results   Component Value Date    HGB  14.2 07/20/2024    HGB 14.7 05/30/2024    HGB 14.2 05/13/2024     07/20/2024     05/30/2024     05/13/2024     07/20/2024     05/30/2024     05/13/2024       Images  === 05/13/24 ===    XR CHEST 2 VIEWS    - Impression -  Left ventricular enlargement without evidence of acute disease in the  chest.      MACRO:  None.    Signed by: Colin Jordan 5/14/2024 12:56 AM  Dictation workstation:   [id number]   === 10/23/21 ===    CT ABDOMEN PELVIS W IV CONTRAST    - Impression -  There is a pancolitis.  In a sickle cell patient who has received a  bone marrow transplant, differential includes graft versus host  disease as well as ischemic/venoocclusive colitis.  Inflammatory or  infectious colitis would also be in the differential.  No bowel  obstruction.  There is a small amount of pelvic free fluid.  No  pneumatosis or portal venous gas.    The appendix is seen with no evidence of acute appendicitis.    Minimal patchy opacity in the lower lobes, raising the possibility of  changes of mqzcd-gdfzew-diwd disease, pneumonia or sickle cell crisis.  Consider early or mild interstitial fibrosis.    Cholecystectomy.  No biliary ductal dilatation.  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    Consider cystitis which can also represent a manifestation of  abojd-prymsl-uhjt disease as well as infection.  Clinical correlation  is needed.    Osseous changes consistent with known sickle cell disease.  Mild  cardiomegaly consistent with known sickle cell disease.    NOTIFICATION:  The critical results of the study were discussed with,  and acknowledged by Dr. Bety Carr  by telephone on 10/23/2021 at  1322 hours.            Signed by Allyson Padgett MD     No echocardiogram results found for the past 12 months       Assessment/Plan   Xu is a 43 y.o. male with history of anxiety, depression, gastritis/GERD, priapism, and sickle cell disease s/p transplant,  who presents to [facility] with uncontrolled pain in B/L LE        [facility] Course  - VSS  -  Hemolysis labs reviewed from 7/20- no indication to get repeat labs   - dilaudid 12mg PO Q1 hr x3 doses (ordered however by error patient was given 16mg PO x1 dose)   - then 10mg PO, followed by 12mg PO given for sickle cell related pain  - Flexeril 5mg, given for AVN  - 975mg tylenol ONCE given for sickle cell related pain    Disposition  - Patient discharged home with no further needs following [facility] Course; discussed with wife who drove patient home regarding pain medication and ACC course  - pt has scheduled FUV with palliative care Dr. Andrew 7/24  - Return to clinic/ED instructions given    PRABHJOT Alex-CNP

## 2024-07-22 NOTE — PROGRESS NOTES
Patient in [facility] for sickle cell pain. FLORENTINO Thelma Sofia made aware ordered 12mg of diuladid PO. Nurse gave 16mg of diuladid PO. FLORENTINO made aware. No further orders at this time. Next dose was adjusted per provider Will monitor patient.

## 2024-07-22 NOTE — LETTER
July 22, 2024     Patient: Xu Maya   YOB: 1980   Date of Visit: 7/22/2024       To Whom It May Concern:    Xu Maya was seen in my clinic on 7/22/2024 at 10:00 am. Please excuse Xu for his absence from work on this day to make the appointment.    If you have any questions or concerns, please don't hesitate to call.         Sincerely,         [facility]        CC: No Recipients

## 2024-07-23 ENCOUNTER — TELEPHONE (OUTPATIENT)
Dept: ADMISSION | Facility: HOSPITAL | Age: 44
End: 2024-07-23
Payer: COMMERCIAL

## 2024-07-23 DIAGNOSIS — D57.1 SICKLE CELL DISEASE WITHOUT CRISIS (MULTI): ICD-10-CM

## 2024-07-23 DIAGNOSIS — Z79.891 ENCOUNTER FOR MONITORING OPIOID MAINTENANCE THERAPY: ICD-10-CM

## 2024-07-23 DIAGNOSIS — R52 ACUTE PAIN: ICD-10-CM

## 2024-07-23 DIAGNOSIS — Z51.81 ENCOUNTER FOR MONITORING OPIOID MAINTENANCE THERAPY: ICD-10-CM

## 2024-07-23 PROCEDURE — RXMED WILLOW AMBULATORY MEDICATION CHARGE

## 2024-07-23 RX ORDER — HYDROMORPHONE HYDROCHLORIDE 4 MG/1
4 TABLET ORAL 4 TIMES DAILY PRN
Qty: 120 TABLET | Refills: 0 | Status: SHIPPED | OUTPATIENT
Start: 2024-07-23 | End: 2024-07-24 | Stop reason: SDUPTHER

## 2024-07-23 NOTE — TELEPHONE ENCOUNTER
OARRS reviewed and no concerns noted. Per last visit with Dr. Andrew on 6/26/24 patient to continue Dilaudid 4 mg every 6 hours as needed for pain. Last fille don 6/26/24 #120/ 30 days. F/U visit scheduled for 7/24/24. Refills sent to provider for review/approval .

## 2024-07-23 NOTE — TELEPHONE ENCOUNTER
Xu P Crayton called the refill line for Dilaudid 4mg. Would like refills to be sent to [facility] pharmacy on file. Message sent to Palliative Care team to send in.

## 2024-07-24 ENCOUNTER — PHARMACY VISIT (OUTPATIENT)
Dept: PHARMACY | Facility: CLINIC | Age: 44
End: 2024-07-24
Payer: MEDICAID

## 2024-07-24 ENCOUNTER — OFFICE VISIT (OUTPATIENT)
Dept: PALLIATIVE MEDICINE | Facility: HOSPITAL | Age: 44
End: 2024-07-24
Payer: COMMERCIAL

## 2024-07-24 VITALS
RESPIRATION RATE: 15 BRPM | DIASTOLIC BLOOD PRESSURE: 73 MMHG | HEART RATE: 75 BPM | BODY MASS INDEX: 22.1 KG/M2 | TEMPERATURE: 97.5 F | SYSTOLIC BLOOD PRESSURE: 112 MMHG | WEIGHT: 141.09 LBS | OXYGEN SATURATION: 100 %

## 2024-07-24 DIAGNOSIS — Z79.891 ENCOUNTER FOR MONITORING OPIOID MAINTENANCE THERAPY: ICD-10-CM

## 2024-07-24 DIAGNOSIS — Z51.5 PALLIATIVE CARE ENCOUNTER: ICD-10-CM

## 2024-07-24 DIAGNOSIS — D57.1 SICKLE CELL DISEASE WITHOUT CRISIS (MULTI): ICD-10-CM

## 2024-07-24 DIAGNOSIS — D57.00 SICKLE-CELL DISEASE WITH PAIN (MULTI): Primary | ICD-10-CM

## 2024-07-24 DIAGNOSIS — R52 ACUTE PAIN: ICD-10-CM

## 2024-07-24 DIAGNOSIS — Z51.81 ENCOUNTER FOR MONITORING OPIOID MAINTENANCE THERAPY: ICD-10-CM

## 2024-07-24 PROCEDURE — 99214 OFFICE O/P EST MOD 30 MIN: CPT | Mod: GC | Performed by: INTERNAL MEDICINE

## 2024-07-24 PROCEDURE — 99214 OFFICE O/P EST MOD 30 MIN: CPT | Performed by: INTERNAL MEDICINE

## 2024-07-24 PROCEDURE — RXMED WILLOW AMBULATORY MEDICATION CHARGE

## 2024-07-24 RX ORDER — BUPRENORPHINE HYDROCHLORIDE AND NALOXONE HYDROCHLORIDE DIHYDRATE 2; .5 MG/1; MG/1
1 TABLET SUBLINGUAL DAILY
Qty: 30 TABLET | Refills: 0 | Status: SHIPPED | OUTPATIENT
Start: 2024-07-24 | End: 2024-08-23

## 2024-07-24 RX ORDER — HYDROMORPHONE HYDROCHLORIDE 4 MG/1
4 TABLET ORAL 4 TIMES DAILY PRN
Qty: 120 TABLET | Refills: 0 | Status: SHIPPED | OUTPATIENT
Start: 2024-07-24 | End: 2024-08-23

## 2024-07-24 ASSESSMENT — PAIN SCALES - GENERAL: PAINLEVEL: 0-NO PAIN

## 2024-07-24 NOTE — TELEPHONE ENCOUNTER
Phone call to [facility] to see if copay on buprenorphine. $0 copay and medication is ready at pharmacy. Patient notified.

## 2024-07-24 NOTE — PROGRESS NOTES
.Supportive Oncology Established Patient Note    Patient ID: Xu Maya is a 43 y.o. male who presents for Follow-up.    HPI  Patient states he stopped taking methadone as he was getting sedated and he is a  at a  and will not put the kids in danger with a medication that will cause sedation. He continues to be in pain in the lower extremities and he went to acute care on Monday due to severe pain.     Allergies  Hydrocodone-acetaminophen and Naproxen     Objective:  [place] Symptom Assessment Scores    Drowsiness Score: Not drowsy   Tiredness Score: Not tired Appetite Score: 5   Nausea Score: Not nauseated Wellbeing Score: 5   Depression Score: 5 Dyspnea Score: No shortness of breath   Anxiety Score: 5 Other Problem Score: Best possible response       Last Recorded Vitals  Blood pressure 112/73, pulse 75, temperature 36.4 °C (97.5 °F), temperature source Temporal, resp. rate 15, weight 64 kg (141 lb 1.5 oz), SpO2 100%.         Physical Exam:  Constitutional:       General: Patient is not in acute distress.  HENT:      Head: Normocephalic.      Mouth: Mucous membranes are moist.   Eyes:      Conjunctiva/sclera: Lafferty sclera (chronic)  Neck:      Vascular: No carotid bruit.   Cardiovascular:      Rate and Rhythm: Normal rate and regular rhythm.      Heart sounds: No murmur heard but appears to have fixed splitting best heard at apex and RSB.  Pulmonary:      Effort: No respiratory distress.      Breath sounds: Clear to auscultation  Musculoskeletal:         General: No deformity. No leg edema.   Skin:     Coloration: Skin is not jaundiced.   Neurological:      General: No focal deficit present.      Mental Status: He is oriented to person, place, and time.   Psychiatric:         Behavior: Behavior normal. Behavior is cooperative.          Relevant Results  Lab Results   Component Value Date    WBC 9.2 07/20/2024    HGB 14.2 07/20/2024    HCT 39.0 (L) 07/20/2024    MCV 86 07/20/2024      07/20/2024      Lab Results   Component Value Date    GLUCOSE 141 (H) 07/20/2024    CALCIUM 8.8 07/20/2024     07/20/2024    K 3.3 (L) 07/20/2024    CO2 29 07/20/2024     07/20/2024    BUN 4 (L) 07/20/2024    CREATININE 0.92 07/20/2024      Lab Results   Component Value Date    ALT 8 (L) 07/20/2024    AST 13 07/20/2024    ALKPHOS 100 07/20/2024    BILITOT 0.9 07/20/2024        Relevant Imaging   No results found for this or any previous visit from the past 1000 days.     No image results found.       Medications:   Current Outpatient Medications   Medication Instructions    amitriptyline (ELAVIL) 25 mg, oral    ARIPiprazole (ABILIFY) 5 mg, oral, Daily    cholecalciferol (VITAMIN D-3) 1,000 Units, oral, Daily    cyclobenzaprine (FLEXERIL) 10 mg, oral, 3 times daily PRN    diphenhydrAMINE (BENADRYL) 25 mg, oral, Every 6 hours PRN    DULoxetine (CYMBALTA) 60 mg, oral, 2 times daily    ergocalciferol (Vitamin D-2) 1.25 MG (78419 UT) capsule 1 capsule, oral, Once Weekly    gabapentin (NEURONTIN) 300 mg, oral, 2 times daily    HYDROmorphone (DILAUDID) 4 mg, oral, 4 times daily PRN    ketoconazole (NIZOral) 2 % cream Topical, 2 times daily    methadone (Dolophine) 5 mg tablet Take 1.5 tablets (7.5 mg) by mouth once daily with breakfast AND 2 tablets (10 mg) once daily at bedtime.    mirtazapine (REMERON) 7.5 mg, oral, Nightly    naloxone (NARCAN) 4 mg, nasal, As needed, 1 spray to nostril for overdose, may repeat every 2-3 minutes until medical assistance arrives<BR>    omeprazole (PriLOSEC) 20 mg DR capsule 1 capsule, oral, Daily (0630)    omeprazole (PRILOSEC) 40 mg, oral, Daily before breakfast, TAKE ONE CAPSULE BY MOUTH EVERY DAY IN THE MORNING BEFORE EATING    penicillin V (Veetid) 250 mg tablet 1 tablet, oral, Every 12 hours scheduled (0630,1830)    potassium chloride CR 10 mEq ER tablet 1 tablet, oral, Daily (0630)    prazosin (MINIPRESS) 1 mg, oral, Nightly    Rezurock 200 mg tablet Take one (1) tablet  by mouth twice daily    sennosides (SENOKOT) 25.8 mg, oral, 2 times daily    tacrolimus (Protopic) 0.1 % ointment APPLY 1-2 TIMES PER WEEK        Assessment and Plan  Impression:    Xu Maya is a 43 yr M, w/ PMHx of  Hemoglobin SS disease s/p Haplo SCT (2021), who follows closely with Dr. Luna for disease surveillance and management. Patient presents to palliative clinic for pain medication evaluation and adjustment. Patient stopped taking methadone as it was sedating him at work. He continues to be in pain an dilaudid is only taking the edge off.      Problems:  # Hemoglobin SS disease  #Chronic Pain   - Haplo SCT from his brother (6/12 HLA match)   - T=0, 2/4/21  -Plan:   -Buprenorphine 2mg once a day.   -Continue Dilaudid 4 mg 4 times daily PRN    -Continue duloxetine 60 mg BID     #Constipation   -Constipation has resolved since prior visit    #anxiety  Heart breathing techniques discussed     Cognitive decline  -due to microvascular changes from SS disease  -has evidence of disease per imagine in 2020/21.   -will monitor him closely- our team will call twice weekly  -will engage his wife and update her on plan of care.                 Social Considerations  NONE    Code Status: Assume Full     Changes this visit and follow-up for next visit:   Will start buprenorphine 2 mg once a day.   Follow up 6 weeks        Amor Ruiz MD  Fellow Anesthesiology critical care PGY-5

## 2024-07-30 ENCOUNTER — TELEPHONE (OUTPATIENT)
Dept: PALLIATIVE MEDICINE | Facility: CLINIC | Age: 44
End: 2024-07-30
Payer: COMMERCIAL

## 2024-07-30 NOTE — TELEPHONE ENCOUNTER
Patient called to check on pain level and see how he was doing since starting Suboxone last week.  No answer. Voicemail message left with patient to call office back with any questions or concerns.

## 2024-08-07 ENCOUNTER — PHARMACY VISIT (OUTPATIENT)
Dept: PHARMACY | Facility: CLINIC | Age: 44
End: 2024-08-07
Payer: MEDICAID

## 2024-08-07 PROCEDURE — RXMED WILLOW AMBULATORY MEDICATION CHARGE

## 2024-08-08 ENCOUNTER — TELEPHONE (OUTPATIENT)
Dept: PAIN MEDICINE | Facility: CLINIC | Age: 44
End: 2024-08-08
Payer: COMMERCIAL

## 2024-08-08 NOTE — TELEPHONE ENCOUNTER
Called this patient and reached his voice mail.  Left him a message to call the office and ask for Hanh.  Thony will be out of office 9/23/24-9/27/24, I have providers to take over his scrambler therapy in [place] each day except Tuesday and Wednesday.  I need to see if he would be willing to come to [facility] those 2 days only to have his scrambler therapy at 1 pm.  I will have providers at that office those days to get him hooked up.  Our phone number was left [phone] on his voice mail to return our call.

## 2024-08-17 ENCOUNTER — APPOINTMENT (OUTPATIENT)
Dept: CARDIOLOGY | Facility: HOSPITAL | Age: 44
End: 2024-08-17
Payer: COMMERCIAL

## 2024-08-17 ENCOUNTER — HOSPITAL ENCOUNTER (EMERGENCY)
Facility: HOSPITAL | Age: 44
Discharge: HOME | End: 2024-08-17
Attending: EMERGENCY MEDICINE
Payer: COMMERCIAL

## 2024-08-17 ENCOUNTER — APPOINTMENT (OUTPATIENT)
Dept: RADIOLOGY | Facility: HOSPITAL | Age: 44
End: 2024-08-17
Payer: COMMERCIAL

## 2024-08-17 VITALS
RESPIRATION RATE: 18 BRPM | WEIGHT: 140 LBS | OXYGEN SATURATION: 97 % | DIASTOLIC BLOOD PRESSURE: 91 MMHG | TEMPERATURE: 96.6 F | BODY MASS INDEX: 21.97 KG/M2 | HEART RATE: 60 BPM | SYSTOLIC BLOOD PRESSURE: 115 MMHG | HEIGHT: 67 IN

## 2024-08-17 DIAGNOSIS — D57.00 SICKLE CELL PAIN CRISIS (MULTI): Primary | ICD-10-CM

## 2024-08-17 LAB
ALBUMIN SERPL BCP-MCNC: 4 G/DL (ref 3.4–5)
ALP SERPL-CCNC: 92 U/L (ref 33–120)
ALT SERPL W P-5'-P-CCNC: 10 U/L (ref 10–52)
ANION GAP SERPL CALC-SCNC: 11 MMOL/L (ref 10–20)
AST SERPL W P-5'-P-CCNC: 17 U/L (ref 9–39)
BASOPHILS # BLD AUTO: 0.03 X10*3/UL (ref 0–0.1)
BASOPHILS NFR BLD AUTO: 0.3 %
BILIRUB SERPL-MCNC: 0.4 MG/DL (ref 0–1.2)
BUN SERPL-MCNC: 6 MG/DL (ref 6–23)
CALCIUM SERPL-MCNC: 8.5 MG/DL (ref 8.6–10.3)
CHLORIDE SERPL-SCNC: 108 MMOL/L (ref 98–107)
CO2 SERPL-SCNC: 25 MMOL/L (ref 21–32)
CREAT SERPL-MCNC: 0.86 MG/DL (ref 0.5–1.3)
EGFRCR SERPLBLD CKD-EPI 2021: >90 ML/MIN/1.73M*2
EOSINOPHIL # BLD AUTO: 0.08 X10*3/UL (ref 0–0.7)
EOSINOPHIL NFR BLD AUTO: 0.8 %
ERYTHROCYTE [DISTWIDTH] IN BLOOD BY AUTOMATED COUNT: 14.7 % (ref 11.5–14.5)
GLUCOSE SERPL-MCNC: 121 MG/DL (ref 74–99)
HCT VFR BLD AUTO: 41 % (ref 41–52)
HGB BLD-MCNC: 14.5 G/DL (ref 13.5–17.5)
HGB RETIC QN: 34 PG (ref 28–38)
IMM GRANULOCYTES # BLD AUTO: 0.03 X10*3/UL (ref 0–0.7)
IMM GRANULOCYTES NFR BLD AUTO: 0.3 % (ref 0–0.9)
IMMATURE RETIC FRACTION: 3.1 %
LDH SERPL L TO P-CCNC: 225 U/L (ref 84–246)
LYMPHOCYTES # BLD AUTO: 3.26 X10*3/UL (ref 1.2–4.8)
LYMPHOCYTES NFR BLD AUTO: 30.6 %
MCH RBC QN AUTO: 32.2 PG (ref 26–34)
MCHC RBC AUTO-ENTMCNC: 35.4 G/DL (ref 32–36)
MCV RBC AUTO: 91 FL (ref 80–100)
MONOCYTES # BLD AUTO: 1.07 X10*3/UL (ref 0.1–1)
MONOCYTES NFR BLD AUTO: 10 %
NEUTROPHILS # BLD AUTO: 6.19 X10*3/UL (ref 1.2–7.7)
NEUTROPHILS NFR BLD AUTO: 58 %
NRBC BLD-RTO: 0 /100 WBCS (ref 0–0)
PLATELET # BLD AUTO: 271 X10*3/UL (ref 150–450)
POTASSIUM SERPL-SCNC: 4 MMOL/L (ref 3.5–5.3)
PROT SERPL-MCNC: 6.3 G/DL (ref 6.4–8.2)
RBC # BLD AUTO: 4.5 X10*6/UL (ref 4.5–5.9)
RETICS #: 0.05 X10*6/UL (ref 0.02–0.12)
RETICS/RBC NFR AUTO: 1 % (ref 0.5–2)
SODIUM SERPL-SCNC: 140 MMOL/L (ref 136–145)
WBC # BLD AUTO: 10.7 X10*3/UL (ref 4.4–11.3)

## 2024-08-17 PROCEDURE — 2500000001 HC RX 250 WO HCPCS SELF ADMINISTERED DRUGS (ALT 637 FOR MEDICARE OP)

## 2024-08-17 PROCEDURE — 96374 THER/PROPH/DIAG INJ IV PUSH: CPT

## 2024-08-17 PROCEDURE — 36415 COLL VENOUS BLD VENIPUNCTURE: CPT

## 2024-08-17 PROCEDURE — 93005 ELECTROCARDIOGRAM TRACING: CPT

## 2024-08-17 PROCEDURE — 85025 COMPLETE CBC W/AUTO DIFF WBC: CPT

## 2024-08-17 PROCEDURE — 71046 X-RAY EXAM CHEST 2 VIEWS: CPT

## 2024-08-17 PROCEDURE — 83615 LACTATE (LD) (LDH) ENZYME: CPT

## 2024-08-17 PROCEDURE — 2500000004 HC RX 250 GENERAL PHARMACY W/ HCPCS (ALT 636 FOR OP/ED)

## 2024-08-17 PROCEDURE — 80053 COMPREHEN METABOLIC PANEL: CPT

## 2024-08-17 PROCEDURE — 96376 TX/PRO/DX INJ SAME DRUG ADON: CPT

## 2024-08-17 PROCEDURE — 71046 X-RAY EXAM CHEST 2 VIEWS: CPT | Performed by: RADIOLOGY

## 2024-08-17 PROCEDURE — 99284 EMERGENCY DEPT VISIT MOD MDM: CPT | Mod: 25

## 2024-08-17 PROCEDURE — 85045 AUTOMATED RETICULOCYTE COUNT: CPT

## 2024-08-17 PROCEDURE — 96375 TX/PRO/DX INJ NEW DRUG ADDON: CPT

## 2024-08-17 RX ORDER — HYDROMORPHONE HYDROCHLORIDE 2 MG/1
2 TABLET ORAL ONCE
Status: DISCONTINUED | OUTPATIENT
Start: 2024-08-17 | End: 2024-08-17 | Stop reason: HOSPADM

## 2024-08-17 RX ORDER — HYDROMORPHONE HYDROCHLORIDE 2 MG/ML
2 INJECTION, SOLUTION INTRAMUSCULAR; INTRAVENOUS; SUBCUTANEOUS
Status: COMPLETED | OUTPATIENT
Start: 2024-08-17 | End: 2024-08-17

## 2024-08-17 RX ORDER — KETOROLAC TROMETHAMINE 30 MG/ML
30 INJECTION, SOLUTION INTRAMUSCULAR; INTRAVENOUS ONCE
Status: COMPLETED | OUTPATIENT
Start: 2024-08-17 | End: 2024-08-17

## 2024-08-17 RX ORDER — HYDROMORPHONE HYDROCHLORIDE 1 MG/ML
1 INJECTION, SOLUTION INTRAMUSCULAR; INTRAVENOUS; SUBCUTANEOUS
Status: DISCONTINUED | OUTPATIENT
Start: 2024-08-17 | End: 2024-08-17

## 2024-08-17 RX ORDER — HYDROMORPHONE HYDROCHLORIDE 2 MG/ML
2 INJECTION, SOLUTION INTRAMUSCULAR; INTRAVENOUS; SUBCUTANEOUS ONCE
Status: COMPLETED | OUTPATIENT
Start: 2024-08-17 | End: 2024-08-17

## 2024-08-17 RX ORDER — ONDANSETRON HYDROCHLORIDE 2 MG/ML
4 INJECTION, SOLUTION INTRAVENOUS ONCE
Status: COMPLETED | OUTPATIENT
Start: 2024-08-17 | End: 2024-08-17

## 2024-08-17 RX ADMIN — HYDROMORPHONE HYDROCHLORIDE 2 MG: 2 INJECTION, SOLUTION INTRAMUSCULAR; INTRAVENOUS; SUBCUTANEOUS at 11:51

## 2024-08-17 RX ADMIN — HYDROMORPHONE HYDROCHLORIDE 2 MG: 2 INJECTION, SOLUTION INTRAMUSCULAR; INTRAVENOUS; SUBCUTANEOUS at 09:34

## 2024-08-17 RX ADMIN — KETOROLAC TROMETHAMINE 30 MG: 30 INJECTION, SOLUTION INTRAMUSCULAR at 09:34

## 2024-08-17 RX ADMIN — ONDANSETRON 4 MG: 2 INJECTION INTRAMUSCULAR; INTRAVENOUS at 09:34

## 2024-08-17 RX ADMIN — HYDROMORPHONE HYDROCHLORIDE 2 MG: 2 INJECTION, SOLUTION INTRAMUSCULAR; INTRAVENOUS; SUBCUTANEOUS at 15:06

## 2024-08-17 ASSESSMENT — PAIN SCALES - GENERAL
PAINLEVEL_OUTOF10: 7
PAINLEVEL_OUTOF10: 6
PAINLEVEL_OUTOF10: 4
PAINLEVEL_OUTOF10: 8
PAINLEVEL_OUTOF10: 5 - MODERATE PAIN
PAINLEVEL_OUTOF10: 7

## 2024-08-17 ASSESSMENT — PAIN - FUNCTIONAL ASSESSMENT: PAIN_FUNCTIONAL_ASSESSMENT: 0-10

## 2024-08-17 NOTE — ED TRIAGE NOTES
Pt to ED for sickle cell pain crisis. Pt states his pain is in his legs. Pt is awake, alert, and ambulatory upon arrival with stable vital signs. Pt appears uncomfortable.

## 2024-08-17 NOTE — ED PROVIDER NOTES
Emergency Department Provider Note        History of Present Illness     History provided by: Patient  Limitations to History: None  External Records Reviewed with Brief Summary:  ED summary from 2024 summarizing visit for sickle cell pain crisis    HPI:  Xu Maya is a 43 y.o. male past medical history significant for sickle cell disease presenting to the emergency department for pain in his lower extremities and mid back.  Patient reports his symptoms today are consistent with previous sickle cell pain crises.  Reports his symptoms started roughly 8 hours ago, he attempted to take his at home medication without relief.  Denies any new component to the pain, no additional chest pain or shortness of breath.  Denies fever, chills, nausea, vomiting, weakness or paresthesias.    Physical Exam   Triage vitals:  T 35.9 °C (96.6 °F)  HR 65  /86  RR 16  O2 100 % None (Room air)    General: Awake, alert, in no acute distress  Eyes: Gaze conjugate.  No scleral icterus or injection  HENT: Normo-cephalic, atraumatic. No stridor  CV: Regular rate, regular rhythm. Radial pulses 2+ bilaterally  Resp: Breathing non-labored, speaking in full sentences.  Clear to auscultation bilaterally  GI: Soft, non-distended, non-tender. No rebound or guarding.  MSK/Extremities: No gross bony deformities. Moving all extremities  Skin: Warm. Appropriate color  Neuro: Alert. Oriented. Face symmetric. Speech is fluent.  Gross strength and sensation intact in b/l UE and LEs  Psych: Appropriate mood and affect    Medical Decision Making & ED Course   Medical Decision Makin y.o. male presenting to the emergency department with concern for sickle cell pain crisis.  Symptoms today are consistent with similar presentations which is reassuring.  Patient has not been in our department for several weeks so labs obtained, LDH, reticulocytes, CBC and CMP are within normal limits and appear improved when compared to previous baseline  lab results.  Chest x-ray is negative for evidence of acute chest syndrome, EKG is nonischemic.  Received analgesics per his care plan.  On reevaluation, patient states his pain is improved and is comfortable with plan for discharge home.  Do not feel that further workup indicated at this time. Discussed results, diagnosis/differential, and plan with patient. Patient advised to follow up with primary physician in 2-3 days. Discussed return precautions and encouraged patient to return to the Emergency Department for any concerning symptoms or worsening condition. Patient expresses understanding and is in agreement. All questions answered. Patient discharged in stable condition.  ----     Social Determinants of Health which Significantly Impact Care: None identified     EKG Independent Interpretation: EKG interpreted by myself. Please see ED Course for full interpretation.    Independent Result Review and Interpretation: Relevant laboratory and radiographic results were reviewed and independently interpreted by myself.  As necessary, they are commented on in the ED Course.    Chronic conditions affecting the patient's care: As documented above in [facility]    The patient was discussed with the following consultants/services: None    Care Considerations: As documented above in [facility]    ED Course:  Diagnoses as of 08/17/24 1614   Sickle cell pain crisis (Multi)     Disposition   As a result of the work-up, the patient was discharged home.  he was informed of his diagnosis and instructed to come back with any concerns or worsening of condition.  he and was agreeable to the plan as discussed above.  he was given the opportunity to ask questions.  All of the patient's questions were answered.    Procedures   Procedures    Patient seen and discussed with ED attending physician.    Zunilda Coates DO  Emergency Medicine       Zunilda Coates DO  Resident  08/17/24 1614

## 2024-08-18 ENCOUNTER — HOSPITAL ENCOUNTER (EMERGENCY)
Facility: HOSPITAL | Age: 44
Discharge: HOME | End: 2024-08-18
Attending: EMERGENCY MEDICINE
Payer: COMMERCIAL

## 2024-08-18 VITALS
BODY MASS INDEX: 21.93 KG/M2 | OXYGEN SATURATION: 98 % | DIASTOLIC BLOOD PRESSURE: 82 MMHG | RESPIRATION RATE: 18 BRPM | HEART RATE: 88 BPM | TEMPERATURE: 97.7 F | SYSTOLIC BLOOD PRESSURE: 118 MMHG | WEIGHT: 140 LBS

## 2024-08-18 DIAGNOSIS — R52 ACUTE PAIN: ICD-10-CM

## 2024-08-18 DIAGNOSIS — Z51.81 ENCOUNTER FOR MONITORING OPIOID MAINTENANCE THERAPY: ICD-10-CM

## 2024-08-18 DIAGNOSIS — D57.00 SICKLE-CELL DISEASE WITH PAIN (MULTI): ICD-10-CM

## 2024-08-18 DIAGNOSIS — Z79.891 ENCOUNTER FOR MONITORING OPIOID MAINTENANCE THERAPY: ICD-10-CM

## 2024-08-18 DIAGNOSIS — D57.00 SICKLE CELL PAIN CRISIS (MULTI): Primary | ICD-10-CM

## 2024-08-18 LAB
ALBUMIN SERPL BCP-MCNC: 4.6 G/DL (ref 3.4–5)
ALP SERPL-CCNC: 96 U/L (ref 33–120)
ALT SERPL W P-5'-P-CCNC: 9 U/L (ref 10–52)
ANION GAP SERPL CALC-SCNC: 12 MMOL/L (ref 10–20)
AST SERPL W P-5'-P-CCNC: 15 U/L (ref 9–39)
BASOPHILS # BLD AUTO: 0.02 X10*3/UL (ref 0–0.1)
BASOPHILS NFR BLD AUTO: 0.2 %
BILIRUB SERPL-MCNC: 0.6 MG/DL (ref 0–1.2)
BUN SERPL-MCNC: 5 MG/DL (ref 6–23)
CALCIUM SERPL-MCNC: 9.1 MG/DL (ref 8.6–10.6)
CHLORIDE SERPL-SCNC: 103 MMOL/L (ref 98–107)
CO2 SERPL-SCNC: 27 MMOL/L (ref 21–32)
CREAT SERPL-MCNC: 0.95 MG/DL (ref 0.5–1.3)
EGFRCR SERPLBLD CKD-EPI 2021: >90 ML/MIN/1.73M*2
EOSINOPHIL # BLD AUTO: 0.05 X10*3/UL (ref 0–0.7)
EOSINOPHIL NFR BLD AUTO: 0.4 %
ERYTHROCYTE [DISTWIDTH] IN BLOOD BY AUTOMATED COUNT: 14.1 % (ref 11.5–14.5)
GLUCOSE SERPL-MCNC: 85 MG/DL (ref 74–99)
HCT VFR BLD AUTO: 38.6 % (ref 41–52)
HGB BLD-MCNC: 13.9 G/DL (ref 13.5–17.5)
HGB RETIC QN: 34 PG (ref 28–38)
IMM GRANULOCYTES # BLD AUTO: 0.04 X10*3/UL (ref 0–0.7)
IMM GRANULOCYTES NFR BLD AUTO: 0.3 % (ref 0–0.9)
IMMATURE RETIC FRACTION: 5 %
LDH SERPL L TO P-CCNC: 174 U/L (ref 84–246)
LYMPHOCYTES # BLD AUTO: 2.35 X10*3/UL (ref 1.2–4.8)
LYMPHOCYTES NFR BLD AUTO: 19.8 %
MCH RBC QN AUTO: 31.5 PG (ref 26–34)
MCHC RBC AUTO-ENTMCNC: 36 G/DL (ref 32–36)
MCV RBC AUTO: 88 FL (ref 80–100)
MONOCYTES # BLD AUTO: 0.88 X10*3/UL (ref 0.1–1)
MONOCYTES NFR BLD AUTO: 7.4 %
NEUTROPHILS # BLD AUTO: 8.53 X10*3/UL (ref 1.2–7.7)
NEUTROPHILS NFR BLD AUTO: 71.9 %
NRBC BLD-RTO: 0 /100 WBCS (ref 0–0)
PLATELET # BLD AUTO: 259 X10*3/UL (ref 150–450)
POTASSIUM SERPL-SCNC: 3.6 MMOL/L (ref 3.5–5.3)
PROT SERPL-MCNC: 6.9 G/DL (ref 6.4–8.2)
RBC # BLD AUTO: 4.41 X10*6/UL (ref 4.5–5.9)
RETICS #: 0.04 X10*6/UL (ref 0.02–0.12)
RETICS/RBC NFR AUTO: 1 % (ref 0.5–2)
SARS-COV-2 RNA RESP QL NAA+PROBE: NOT DETECTED
SODIUM SERPL-SCNC: 138 MMOL/L (ref 136–145)
WBC # BLD AUTO: 11.9 X10*3/UL (ref 4.4–11.3)

## 2024-08-18 PROCEDURE — 36415 COLL VENOUS BLD VENIPUNCTURE: CPT

## 2024-08-18 PROCEDURE — 85025 COMPLETE CBC W/AUTO DIFF WBC: CPT

## 2024-08-18 PROCEDURE — 96376 TX/PRO/DX INJ SAME DRUG ADON: CPT

## 2024-08-18 PROCEDURE — 83615 LACTATE (LD) (LDH) ENZYME: CPT

## 2024-08-18 PROCEDURE — 87635 SARS-COV-2 COVID-19 AMP PRB: CPT

## 2024-08-18 PROCEDURE — 96374 THER/PROPH/DIAG INJ IV PUSH: CPT

## 2024-08-18 PROCEDURE — 99284 EMERGENCY DEPT VISIT MOD MDM: CPT | Mod: 25

## 2024-08-18 PROCEDURE — 99285 EMERGENCY DEPT VISIT HI MDM: CPT | Performed by: STUDENT IN AN ORGANIZED HEALTH CARE EDUCATION/TRAINING PROGRAM

## 2024-08-18 PROCEDURE — 84295 ASSAY OF SERUM SODIUM: CPT

## 2024-08-18 PROCEDURE — 2500000004 HC RX 250 GENERAL PHARMACY W/ HCPCS (ALT 636 FOR OP/ED): Mod: SE

## 2024-08-18 PROCEDURE — 85045 AUTOMATED RETICULOCYTE COUNT: CPT

## 2024-08-18 RX ORDER — HYDROMORPHONE HYDROCHLORIDE 4 MG/1
4 TABLET ORAL 4 TIMES DAILY PRN
Qty: 8 TABLET | Refills: 0 | Status: SHIPPED | OUTPATIENT
Start: 2024-08-18 | End: 2024-08-20 | Stop reason: SDUPTHER

## 2024-08-18 ASSESSMENT — PAIN - FUNCTIONAL ASSESSMENT: PAIN_FUNCTIONAL_ASSESSMENT: 0-10

## 2024-08-18 ASSESSMENT — LIFESTYLE VARIABLES
HAVE YOU EVER FELT YOU SHOULD CUT DOWN ON YOUR DRINKING: NO
TOTAL SCORE: 0
EVER HAD A DRINK FIRST THING IN THE MORNING TO STEADY YOUR NERVES TO GET RID OF A HANGOVER: NO
HAVE PEOPLE ANNOYED YOU BY CRITICIZING YOUR DRINKING: NO
EVER FELT BAD OR GUILTY ABOUT YOUR DRINKING: NO

## 2024-08-18 ASSESSMENT — PAIN DESCRIPTION - PROGRESSION: CLINICAL_PROGRESSION: NOT CHANGED

## 2024-08-18 ASSESSMENT — PAIN SCALES - GENERAL: PAINLEVEL_OUTOF10: 10 - WORST POSSIBLE PAIN

## 2024-08-18 NOTE — ED PROVIDER NOTES
HPI   Chief Complaint   Patient presents with    Sickle Cell Pain Crisis       Xu Maya is a 42 yo M with PMH SS disease presenting with acute pain crisis. Patient was seen at [facility] ED yesterday for similar symptoms and discharged. His pain is primarily in his legs and lower back. He says his pain started back around 2 a.m. and he has not slept since then. He usually takes 4mg dilaudid orally at home but took his last dose yesterday. He says this is his typical pain for him. He denies chest pain, palpitations, shortness of breath, dizziness, weakness, fever/chills, n/v. He reports mild headache along with cough and runny nose.            Patient History   Past Medical History:   Diagnosis Date    Anxiety disorder, unspecified 04/17/2017    Anxiety    Depression, unspecified 04/17/2017    Depression    Family history of glaucoma 03/02/2017    Family history of glaucoma in father    Gastritis, unspecified, without bleeding 04/17/2017    Gastritis    Hematuria, unspecified 04/17/2017    Hematuria    Personal history of other diseases of the digestive system 12/02/2015    History of esophageal reflux    Priapism, unspecified 04/17/2017    Priapism    Sickle-cell disease without crisis (Multi) 04/17/2017    Sickle cell anemia     Past Surgical History:   Procedure Laterality Date    GALLBLADDER SURGERY  04/12/2017    Gallbladder Surgery    IR CVC TUNNELED  4/24/2018    [place] [facility] [place]    IR CVC TUNNELED  6/5/2018    IR CVC TUNNEL[place] 6/5/2018 [facility] [place]    IR CVC TUNNELED  3/1/2019    IR CVC TUNNELED 3[place]1/2019 [facility] [place]    IR CVC TUNNELED  6/4/2019    IR CVC TUNNELED 6/4/2019 [facility] LEGAC[facility]    IR CVC TUNNELED  4/5/2019    IR CVC TUNNELED 4[place]5/2019 [facility] [place]    IR CVC TUNNELED  7/17/2019    IR CVC TUNNELED 7[place]17/2019 [facility] [place]    IR CVC TUNNELED  8/14/2019    IR CVC TUNNELED 8/14/2019 [facility] [place]    IR CVC TUNNELED  12/18/2019    IR CVC TUNNELED 12/18/2019 [facility] CLINICAL  LEGACY    IR CVC TUNNELED  10/9/2019    IR CVC TUNNELED 10/9/2019 [facility] AIB LEGACY    IR CVC TUNNELED  11/13/2019    IR CVC TUNNELED 11/13/2019 [facility] CLINICAL LEGAC[facility]    IR CVC TUNNELED  1/15/2020    IR CVC TUNNELED 1/15/2020 [facility] CLINICAL LEGACY    IR CVC TUNNELED  2/19/2020    IR CVC TUNNELED 2/19/2020 [facility] CLINICAL LEGACY    IR CVC TUNNELED  1/4/2021    IR CVC TUNNELED 1/4/2021 [facility] AIB LEGACY    IR CVC TUNNELED  1/25/2021    IR CVC TUNNELED 1/25/2021 [facility] ANCILLARY LEGACY    IR CVC TUNNELED  8/21/2018    IR CVC TUNNELED 8/21/2018 [facility]IB LEGACY    IR CVC TUNNELED  9/26/2018    IR CVC TUNNELED 9/26/2018 [facility]IB [facility]GAC[facility]    IR CVC TUNNELED  11/5/2018    IR CVC TUNNELED 11/5/2018 [facility]IB [facility]GAC[facility]    IR CVC TUNNELED  12/19/2018    IR CVC TUNNELED 12/19/2018 [facility]IB [facility]GAC[facility]    IR VENOGRAM HEPATIC  11/8/2017    IR VENOGRAM HEPATIC 11/8/2017 [facility]IB [facility]GAC[facility]    MR HEAD ANGIO WO IV CONTRAST  2/17/2017    MR HEAD ANGIO WO IV CONTRAST 2/17/2017 [facility] CLINICAL LEGACY    MR NECK ANGIO WO IV CONTRAST  2/17/2017    MR NECK ANGIO WO IV CONTRAST 2/17/2017 [facility] CLINICAL LEGACY    US GUIDED NEEDLE LIVER BIOPSY  9/8/2020    US GUIDED NEEDLE LIVER BIOPSY 9/8/2020 [facility]IB LEGAC[facility]     Family History   Problem Relation Name Age of Onset    Diabetes Father      Sickle cell anemia Other sibling     Cancer Other grandfather     Cancer Other uncle      Social History     Tobacco Use    Smoking status: Every Day     Types: Cigarettes     Start date: 1/1/1998     Passive exposure: Current    Smokeless tobacco: Never   Substance Use Topics    Alcohol use: Not Currently     Comment: Occasional    Drug use: Not Currently     Types: Marijuana     Comment: Last use sometime in 2023.  No intention to re-start.       Physical Exam   ED Triage Vitals [08/18/24 1127]   Temperature Heart Rate Respirations BP   36.5 °C (97.7 °F) 88 18 118/82      Pulse Ox Temp src Heart Rate Source Patient Position   98 % -- -- --      BP Location FiO2 (%)     -- --       Physical  Exam  Constitutional:       Appearance: Normal appearance. He is not ill-appearing.   HENT:      Head: Normocephalic and atraumatic.      Nose: Nose normal.      Mouth/Throat:      Mouth: Mucous membranes are moist.   Eyes:      Extraocular Movements: Extraocular movements intact.   Cardiovascular:      Rate and Rhythm: Normal rate and regular rhythm.      Heart sounds: Normal heart sounds.   Pulmonary:      Effort: Pulmonary effort is normal.      Breath sounds: Normal breath sounds.   Abdominal:      General: Abdomen is flat.      Palpations: Abdomen is soft.      Tenderness: There is no abdominal tenderness.   Musculoskeletal:      Right lower leg: No edema.      Left lower leg: No edema.   Skin:     General: Skin is warm and dry.   Neurological:      General: No focal deficit present.      Mental Status: He is alert and oriented to person, place, and time.         ED Course & MDM   ED Course as of 08/18/24 1742   Sun Aug 18, 2024   1785 Emergency Medicine Attending Attestation:     [unfilled]    The patient was seen by the resident/fellow.  I have personally performed a substantive portion of the encounter.  I have seen and examined the patient; agree with the workup, evaluation, MDM, management and diagnosis.  The care plan has been discussed with the resident; I have reviewed the resident's note and agree with the documented findings.      Patient with a past medical history of sickle cell anemia presents the emergency department with leg pain and back pain consistent with prior sickle cell pain crises.  Patient was actually seen in the Emergency Department yesterday, evaluated, had his pain controlled without signs of acute chest or aplastic crisis and subsequently discharged.  Patient presents today with recurrent pain in his legs and back consistent with sickle cell pain.  He denies any fever, denies any chest pain, denies any shortness of breath.  He states that he has ran out of his oral Dilaudid at home  and given the recurrence of symptoms he presented back to the ED.  He denies any new symptoms.  His exam is notable for slight tenderness palpation of his bilateral legs as well as his back without any overlying skin changes.  He has a symmetric chest rise bilaterally with clear breath sounds, his cardiac exam is notable for regular rate.  He does not have any lower extremity edema and has a soft benign abdomen.  We will evaluate and treat for sickle cell pain crises.  I have low suspicion for any acute chest syndrome given absence of fever, shortness of breath and chest pain.  I have low suspicion for a splenic crisis given labs drawn yesterday without any signs of anemia.  We will repeat labs today, treat his pain and disposition accordingly on reassessment.    I independently interpreted patient's EKG and agree with the above mentioned interpretation.      Vazquez Robb MD   [AM]      ED Course User Index  [AM] Vazquez Robb MD         Diagnoses as of 08/18/24 1742   Sickle cell pain crisis (Multi)                 No data recorded     [place] Coma Scale Score: 15 (08/18/24 1128 : Meka Nunez RN)                           Medical Decision Making  Patient is a 44 yo M with SS disease presenting with pain crisis. Seen at [facility] yesterday for the same symptoms and got 3 doses of 2mg IV dilaudid and was discharged but symptoms resumed at 2 a.m. His current leg and back pain is his typical pain. Low concern for ACS or other acute complications as patient does not have chest pain, shortness of breath, abdominal pain, fevers/chills, dizziness, weakness, abdominal pain, or any neuro symptoms. Patient had EKG and chest x ray yesterday at [facility] that were unremarkable and does not have new chest pain, SOB, or fever, so will defer repeating them for now. Patient does have cough, runny nose, and mild headache so will test for covid. Will try 2mg IV dilaudid q1h prn for 3 doses to attempt to get patient's pain  under control. Patient's covid test was negative. His repeat labs today were largely unremarkable. Hgb 13.9 from 14.5 yesterday. LDH and reticulocytes also non-elevated. Patient felt his pain was adequately controlled and he was discharged with a prescription for 8 tablets of 4mg Dilaudid to last him until he can get a refill on Tuesday. Patient was agreeable with the plan and discharged in stable condition.        Procedure  Procedures     Jason Pickens MD  Resident  08/19/24 2431

## 2024-08-18 NOTE — DISCHARGE INSTRUCTIONS
[place] Mr. Maya,    You were recently treated for sickle cell pain crisis. You were sent home with enough dilaudid to get you through until Tuesday when you can get a refill. Please continue to take your suboxone as prescribed.     It was a pleasure taking care of you,  Your [facility] care team

## 2024-08-18 NOTE — ED TRIAGE NOTES
Pt reports for SCC with pain in legs and back. Seen at [facility] yesterday for same complaint and discharged.

## 2024-08-19 NOTE — TELEPHONE ENCOUNTER
Spoke with patient. He would prefer to cancel the 9/24 and 9/25 treatment rather than travel to [place] [facility] [place]

## 2024-08-20 ENCOUNTER — OFFICE VISIT (OUTPATIENT)
Dept: HEMATOLOGY/ONCOLOGY | Facility: HOSPITAL | Age: 44
End: 2024-08-20
Payer: COMMERCIAL

## 2024-08-20 ENCOUNTER — TELEPHONE (OUTPATIENT)
Dept: HEMATOLOGY/ONCOLOGY | Facility: HOSPITAL | Age: 44
End: 2024-08-20

## 2024-08-20 ENCOUNTER — TELEPHONE (OUTPATIENT)
Dept: ADMISSION | Facility: HOSPITAL | Age: 44
End: 2024-08-20
Payer: COMMERCIAL

## 2024-08-20 VITALS
OXYGEN SATURATION: 99 % | RESPIRATION RATE: 22 BRPM | SYSTOLIC BLOOD PRESSURE: 111 MMHG | HEART RATE: 83 BPM | DIASTOLIC BLOOD PRESSURE: 85 MMHG | TEMPERATURE: 97 F | BODY MASS INDEX: 21.79 KG/M2 | WEIGHT: 139.1 LBS

## 2024-08-20 DIAGNOSIS — Z79.891 ENCOUNTER FOR MONITORING OPIOID MAINTENANCE THERAPY: ICD-10-CM

## 2024-08-20 DIAGNOSIS — R52 ACUTE PAIN: ICD-10-CM

## 2024-08-20 DIAGNOSIS — D57.00 SICKLE-CELL DISEASE WITH PAIN (MULTI): ICD-10-CM

## 2024-08-20 DIAGNOSIS — Z51.81 ENCOUNTER FOR MONITORING OPIOID MAINTENANCE THERAPY: ICD-10-CM

## 2024-08-20 DIAGNOSIS — D57.00 SICKLE CELL CRISIS (MULTI): Primary | ICD-10-CM

## 2024-08-20 PROCEDURE — 99213 OFFICE O/P EST LOW 20 MIN: CPT

## 2024-08-20 PROCEDURE — 2500000001 HC RX 250 WO HCPCS SELF ADMINISTERED DRUGS (ALT 637 FOR MEDICARE OP)

## 2024-08-20 RX ORDER — HYDROMORPHONE HYDROCHLORIDE 4 MG/1
12 TABLET ORAL ONCE
Status: COMPLETED | OUTPATIENT
Start: 2024-08-20 | End: 2024-08-20

## 2024-08-20 RX ORDER — HYDROMORPHONE HYDROCHLORIDE 4 MG/1
4 TABLET ORAL 4 TIMES DAILY PRN
Qty: 8 TABLET | Refills: 0 | Status: SHIPPED | OUTPATIENT
Start: 2024-08-22 | End: 2024-08-22 | Stop reason: SDUPTHER

## 2024-08-20 RX ORDER — NALOXONE HYDROCHLORIDE 0.4 MG/ML
0.4 INJECTION, SOLUTION INTRAMUSCULAR; INTRAVENOUS; SUBCUTANEOUS
Status: DISCONTINUED | OUTPATIENT
Start: 2024-08-20 | End: 2024-08-20 | Stop reason: HOSPADM

## 2024-08-20 RX ORDER — ACETAMINOPHEN 325 MG/1
975 TABLET ORAL ONCE
Status: COMPLETED | OUTPATIENT
Start: 2024-08-20 | End: 2024-08-20

## 2024-08-20 RX ORDER — HYDROMORPHONE HYDROCHLORIDE 4 MG/1
4 TABLET ORAL
Status: DISCONTINUED | OUTPATIENT
Start: 2024-08-20 | End: 2024-08-20

## 2024-08-20 RX ORDER — CYCLOBENZAPRINE HCL 10 MG
10 TABLET ORAL ONCE
Status: COMPLETED | OUTPATIENT
Start: 2024-08-20 | End: 2024-08-20

## 2024-08-20 ASSESSMENT — PAIN SCALES - GENERAL
PAINLEVEL_OUTOF10: 4
PAINLEVEL_OUTOF10: 10-WORST PAIN EVER
PAINLEVEL: 10-WORST PAIN EVER

## 2024-08-20 ASSESSMENT — ENCOUNTER SYMPTOMS
FATIGUE: 1
MYALGIAS: 1
ARTHRALGIAS: 1
BACK PAIN: 1

## 2024-08-20 NOTE — PROGRESS NOTES
[facility] Acute Care Nursing Note  08/20/24    Xu Maya is a 43 y.o. year old male patient presenting to [facility] for pain.       Line type: none  Line removed/maintained prior to discharge: n/a    Administrations This Visit       acetaminophen (Tylenol) tablet 975 mg       Admin Date  08/20/2024 Action  Given Dose  975 mg Route  oral Documented By  Gabi Chairez RN              cyclobenzaprine (Flexeril) tablet 10 mg       Admin Date  08/20/2024 Action  Given Dose  10 mg Route  oral Documented By  Gabi Chairez RN              HYDROmorphone (Dilaudid) tablet 12 mg       Admin Date  08/20/2024 Action  Given Dose  12 mg Route  oral Documented By  Gabi Chairez RN               Admin Date  08/20/2024 Action  Given Dose  12 mg Route  oral Documented By  Gabi Chairez RN              HYDROmorphone (Dilaudid) tablet 4 mg       Admin Date  08/20/2024 Action  Given Dose  4 mg Route  oral Documented By  Gabi Chairez RN                    Patient tolerated treatment well. Discharged home in stable condition.    Follow-up Plan: follow-up with provider    Gabi Chairez RN

## 2024-08-20 NOTE — TELEPHONE ENCOUNTER
Xu P Crayton called the refill line for Dilaudid 4mg. Would like refills to be sent to [facility] pharmacy on file. Message sent to Palliative Care team to send in.

## 2024-08-20 NOTE — PROGRESS NOTES
Patient ID:  Xu Maya is a 43 y.o. male.  Subjective   Chief Complaint: Uncontrolled Pain    HPI  Xu is a 43 y.o. male with history of anxiety, depression, gastritis/GERD, priapism, and sickle cell disease s/p transplant, who presents to [facility] with uncontrolled pain. Patient reports that he is out of his home medication. He recently switched from methadone to suboxone outpatient and feels his pain is not controlled. He ran out of PO dilaudid.    He was seen in the ED 8/17 and 8/18 for uncontrolled pain and out of home medication and presented to [facility] with ongoing uncontrolled pain d/t out of home dilaudid.     Dr. Andrew is currently out- discussed with his team and they will refill PO dilaudid.   Called [facility] and script is unable to be filled until 8/22 d/t insurance and OARRS. Patient made aware.      ROS  Review of Systems   Constitutional:  Positive for fatigue.   Musculoskeletal:  Positive for arthralgias, back pain and myalgias.   All other systems reviewed and are negative.       Allergies  Allergies   Allergen Reactions    Hydrocodone-Acetaminophen Hives and Unknown    Naproxen Hives        Medications  Current Outpatient Medications   Medication Instructions    amitriptyline (ELAVIL) 25 mg, oral    ARIPiprazole (ABILIFY) 5 mg, oral, Daily    buprenorphine-naloxone (Suboxone) 2-0.5 mg SL tablet 1 tablet, sublingual, Daily    cholecalciferol (VITAMIN D-3) 1,000 Units, oral, Daily    cyclobenzaprine (FLEXERIL) 10 mg, oral, 3 times daily PRN    diphenhydrAMINE (BENADRYL) 25 mg, oral, Every 6 hours PRN    DULoxetine (CYMBALTA) 60 mg, oral, 2 times daily    ergocalciferol (Vitamin D-2) 1.25 MG (52920 UT) capsule 1 capsule, oral, Once Weekly    gabapentin (NEURONTIN) 300 mg, oral, 2 times daily    [START ON 8/22/2024] HYDROmorphone (DILAUDID) 4 mg, oral, 4 times daily PRN    ketoconazole (NIZOral) 2 % cream Topical, 2 times daily    methadone (Dolophine) 5 mg tablet Take 1.5 tablets (7.5 mg) by mouth once  daily with breakfast AND 2 tablets (10 mg) once daily at bedtime.    mirtazapine (REMERON) 7.5 mg, oral, Nightly    naloxone (NARCAN) 4 mg, nasal, As needed, 1 spray to nostril for overdose, may repeat every 2-3 minutes until medical assistance arrives<BR>    omeprazole (PriLOSEC) 20 mg DR capsule 1 capsule, oral, Daily (0630)    omeprazole (PRILOSEC) 40 mg, oral, Daily before breakfast, TAKE ONE CAPSULE BY MOUTH EVERY DAY IN THE MORNING BEFORE EATING    penicillin V (Veetid) 250 mg tablet 1 tablet, oral, Every 12 hours scheduled (0630,1830)    potassium chloride CR 10 mEq ER tablet 1 tablet, oral, Daily (0630)    prazosin (MINIPRESS) 1 mg, oral, Nightly    Rezurock 200 mg tablet Take one (1) tablet by mouth twice daily    sennosides (SENOKOT) 25.8 mg, oral, 2 times daily    tacrolimus (Protopic) 0.1 % ointment APPLY 1-2 TIMES PER WEEK        Past Medical History:   Past Medical History:  04/17/2017: Anxiety disorder, unspecified      Comment:  Anxiety  04/17/2017: Depression, unspecified      Comment:  Depression  03/02/2017: Family history of glaucoma      Comment:  Family history of glaucoma in father  04/17/2017: Gastritis, unspecified, without bleeding      Comment:  Gastritis  04/17/2017: Hematuria, unspecified      Comment:  Hematuria  12/02/2015: Personal history of other diseases of the digestive system      Comment:  History of esophageal reflux  04/17/2017: Priapism, unspecified      Comment:  Priapism  04/17/2017: Sickle-cell disease without crisis (Multi)      Comment:  Sickle cell anemia   Surgical History:    Past Surgical History:   Procedure Laterality Date    GALLBLADDER SURGERY  04/12/2017    Gallbladder Surgery    IR CVC TUNNELED  4/24/2018    IR CVC TUNNELED 4/24/2018 [facility]    IR CVC TUNNELED  6/5/2018    IR CVC TUNNELED 6/5/2018 [facility]    IR CVC TUNNELED  3/1/2019    IR CVC TUNNELED 3/1/2019 [facility]    IR CVC TUNNELED  6/4/2019    IR CVC TUNNELED 6/4/2019 [facility]    IR CVC TUNNELED  4/5/2019    IR CVC TUNNELED 4/5/2019 [facility]    IR CVC TUNNELED  7/17/2019    IR CVC TUNNELED 7/17/2019 [facility]    IR CVC TUNNELED  8/14/2019    IR CVC TUNNELED 8/14/2019 [facility]    IR CVC TUNNELED  12/18/2019    IR CVC TUNNELED 12/18/2019 [facility]    IR CVC TUNNELED  10/9/2019    IR CVC TUNNELED 10/9/2019 [facility]    IR CVC TUNNELED  11/13/2019    IR CVC TUNNELED 11/13/2019 [facility]    IR CVC TUNNELED  1/15/2020    IR CVC TUNNELED 1/15/2020 [facility]    IR CVC TUNNELED  2/19/2020    IR CVC TUNNELED 2/19/2020 [facility]    IR CVC TUNNELED  1/4/2021    IR CVC TUNNELED 1/4/2021 [facility]    IR CVC TUNNELED  1/25/2021    IR CVC TUNNELED 1/25/2021 [facility]    IR CVC TUNNELED  8/21/2018    IR CVC TUNNELED 8/21/2018 [facility]    IR CVC TUNNELED  9/26/2018    IR CVC TUNNELED 9/26/2018 [facility]    IR CVC TUNNELED  11/5/2018    IR CVC TUNNELED 11/5/2018 [facility]    IR CVC TUNNELED  12/19/2018    IR CVC TUNNELED 12/19/2018 [facility]    IR VENOGRAM HEPATIC  11/8/2017    IR VENOGRAM HEPATIC 11/8/2017 [facility]    MR HEAD ANGIO WO IV CONTRAST  2/17/2017    MR HEAD ANGIO WO IV CONTRAST 2/17/2017 [facility]    MR NECK ANGIO WO IV CONTRAST  2/17/2017    MR NECK ANGIO WO IV CONTRAST 2/17/2017 [facility]    US GUIDED NEEDLE LIVER BIOPSY  9/8/2020    US GUIDED NEEDLE LIVER BIOPSY 9/8/2020 [facility]      Family History:    Family History   Problem Relation Name Age of Onset    Diabetes Father      Sickle cell anemia Other sibling     Cancer Other grandfather     Cancer Other uncle      Family Oncology History:    Cancer-related family history includes Cancer in some other family members.  Social History:    Social History     Tobacco Use    Smoking status: Every Day     Types: Cigarettes     Start date: 1/1/1998     Passive exposure: Current    Smokeless tobacco: Never    Substance Use Topics    Alcohol use: Not Currently     Comment: Occasional    Drug use: Not Currently     Types: Marijuana     Comment: Last use sometime in 2023.  No intention to re-start.        Objective   Vitals: /85 (BP Location: Right arm, Patient Position: Sitting)   Pulse 83   Temp 36.1 °C (97 °F) (Temporal)   Resp 22   Wt 63.1 kg (139 lb 1.6 oz)   SpO2 99%   BMI 21.79 kg/m²   Weight:   Vitals:    08/20/24 1018   Weight: 63.1 kg (139 lb 1.6 oz)       Physical Exam  Vitals reviewed.   Constitutional:       Appearance: Normal appearance.   Cardiovascular:      Rate and Rhythm: Normal rate and regular rhythm.      Pulses: Normal pulses.      Heart sounds: Normal heart sounds.   Pulmonary:      Effort: Pulmonary effort is normal.      Breath sounds: Normal breath sounds.   Abdominal:      General: Abdomen is flat.      Palpations: Abdomen is soft.   Musculoskeletal:         General: Normal range of motion.   Skin:     General: Skin is warm.      Capillary Refill: Capillary refill takes less than 2 seconds.   Neurological:      General: No focal deficit present.      Mental Status: He is alert and oriented to person, place, and time.   Psychiatric:         Mood and Affect: Mood normal.         Behavior: Behavior normal.         Diagnostic Results     Labs  Results from last 7 days   Lab Units 08/18/24  1344 08/17/24  0929   WBC AUTO x10*3/uL 11.9* 10.7   HEMOGLOBIN g/dL 13.9 14.5   HEMATOCRIT % 38.6* 41.0   PLATELETS AUTO x10*3/uL 259 271   NEUTROS ABS x10*3/uL 8.53* 6.19   LYMPHS ABS AUTO x10*3/uL 2.35 3.26   MONOS ABS AUTO x10*3/uL 0.88 1.07*   EOS ABS AUTO x10*3/uL 0.05 0.08   NEUTROS PCT AUTO % 71.9 58.0   LYMPHS PCT AUTO % 19.8 30.6   MONOS PCT AUTO % 7.4 10.0   EOS PCT AUTO % 0.4 0.8      Results from last 7 days   Lab Units 08/18/24  1344 08/17/24  0929   GLUCOSE mg/dL 85 121*   SODIUM mmol/L 138 140   POTASSIUM mmol/L 3.6 4.0   CHLORIDE mmol/L 103 108*   CO2 mmol/L 27 25   BUN mg/dL 5* 6    CREATININE mg/dL 0.95 0.86   EGFR mL/min/1.73m*2 >90 >90   CALCIUM mg/dL 9.1 8.5*   ALBUMIN g/dL 4.6 4.0   PROTEIN TOTAL g/dL 6.9 6.3*     Results from last 7 days   Lab Units 08/18/24  1344 08/17/24  0929   BILIRUBIN TOTAL mg/dL 0.6 0.4   ALK PHOS U/L 96 92   ALT U/L 9* 10   AST U/L 15 17         Results from last 7 days   Lab Units 08/18/24  1344 08/17/24  0929   RETIC CT PCT % 1.0 1.0   RETIC CT ABS x10*6/uL 0.044 0.046   IMMATURE RETIC FRACTION % 5.0 3.1   RETIC HGB pg 34 34     Results from last 7 days   Lab Units 08/18/24  1344 08/17/24  0929   LD U/L 174 225         Lab Results   Component Value Date    HGB 13.9 08/18/2024    HGB 14.5 08/17/2024    HGB 14.2 07/20/2024     08/18/2024     08/17/2024     07/20/2024     08/18/2024     08/17/2024     07/20/2024       Images  === 08/17/24 ===    XR CHEST 2 VIEWS    - Impression -  No acute abnormalities and no change since the prior exam    Signed by: Dilia Mckeon 8/17/2024 11:04 AM  Dictation workstation:   [id number]   === 10/23/21 ===    CT ABDOMEN PELVIS W IV CONTRAST    - Impression -  There is a pancolitis.  In a sickle cell patient who has received a  bone marrow transplant, differential includes graft versus host  disease as well as ischemic/venoocclusive colitis.  Inflammatory or  infectious colitis would also be in the differential.  No bowel  obstruction.  There is a small amount of pelvic free fluid.  No  pneumatosis or portal venous gas.    The appendix is seen with no evidence of acute appendicitis.    Minimal patchy opacity in the lower lobes, raising the possibility of  changes of [id number] disease, pneumonia or sickle cell crisis.  Consider early or mild interstitial fibrosis.    Cholecystectomy.  No biliary ductal dilatation.  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    Consider cystitis which can also represent a manifestation of  scfmy-jaghyj-uvdr disease as  well as infection.  Clinical correlation  is needed.    Osseous changes consistent with known sickle cell disease.  Mild  cardiomegaly consistent with known sickle cell disease.    NOTIFICATION:  The critical results of the study were discussed with,  and acknowledged by Dr. Bety Carr  by telephone on 10/23/2021 at  1322 hours.            Signed by Allyson Padgett MD     No echocardiogram results found for the past 12 months       Assessment/Plan   Xu is a 43 y.o. male with history of anxiety, depression, gastritis/GERD, priapism, and sickle cell disease s/p transplant, who presents to [facility] with uncontrolled pain    [facility] Course  - VSS  -  Hemolysis labs reviewed from 8/18- no indication to get repeat labs   - dilaudid 4mg PO (home dose) x1, followed by 12mg PO q1hr x2 for sickle cell related pain  - Flexeril 10mg, given for AVN  - 975mg tylenol ONCE given for sickle cell related pain     Disposition  - Patient discharged home with no further needs following [facility] Course  - Return to clinic/ED instructions given     PRABHJOT Alex-CNP

## 2024-08-21 PROCEDURE — RXMED WILLOW AMBULATORY MEDICATION CHARGE

## 2024-08-22 ENCOUNTER — PHARMACY VISIT (OUTPATIENT)
Dept: PHARMACY | Facility: CLINIC | Age: 44
End: 2024-08-22
Payer: MEDICAID

## 2024-08-22 ENCOUNTER — SPECIALTY PHARMACY (OUTPATIENT)
Dept: PHARMACY | Facility: CLINIC | Age: 44
End: 2024-08-22

## 2024-08-22 ENCOUNTER — TELEPHONE (OUTPATIENT)
Dept: PALLIATIVE MEDICINE | Facility: HOSPITAL | Age: 44
End: 2024-08-22
Payer: COMMERCIAL

## 2024-08-22 DIAGNOSIS — Z79.891 ENCOUNTER FOR MONITORING OPIOID MAINTENANCE THERAPY: ICD-10-CM

## 2024-08-22 DIAGNOSIS — Z51.81 ENCOUNTER FOR MONITORING OPIOID MAINTENANCE THERAPY: ICD-10-CM

## 2024-08-22 DIAGNOSIS — D57.00 SICKLE-CELL DISEASE WITH PAIN (MULTI): ICD-10-CM

## 2024-08-22 DIAGNOSIS — R52 ACUTE PAIN: ICD-10-CM

## 2024-08-22 PROCEDURE — RXMED WILLOW AMBULATORY MEDICATION CHARGE

## 2024-08-22 RX ORDER — HYDROMORPHONE HYDROCHLORIDE 4 MG/1
4 TABLET ORAL 4 TIMES DAILY PRN
Qty: 112 TABLET | Refills: 0 | Status: SHIPPED | OUTPATIENT
Start: 2024-08-22 | End: 2024-09-04 | Stop reason: SDUPTHER

## 2024-08-22 NOTE — TELEPHONE ENCOUNTER
Refill that was sent on 8/20 went over at just 8 tabs, should have been 120 tabs per 30 days. We will send a refill to [facility] for reaming 112 tabs today. Xu remy.

## 2024-08-23 ENCOUNTER — PHARMACY VISIT (OUTPATIENT)
Dept: PHARMACY | Facility: CLINIC | Age: 44
End: 2024-08-23
Payer: MEDICAID

## 2024-08-23 DIAGNOSIS — Z51.5 PALLIATIVE CARE ENCOUNTER: ICD-10-CM

## 2024-08-23 DIAGNOSIS — M79.601 PAIN OF RIGHT UPPER EXTREMITY: ICD-10-CM

## 2024-08-23 DIAGNOSIS — D57.1 SICKLE CELL DISEASE WITHOUT CRISIS (MULTI): Primary | ICD-10-CM

## 2024-08-23 PROCEDURE — RXMED WILLOW AMBULATORY MEDICATION CHARGE

## 2024-08-23 RX ORDER — HYDROMORPHONE HYDROCHLORIDE 4 MG/1
4 TABLET ORAL EVERY 6 HOURS PRN
Qty: 21 TABLET | Refills: 0 | Status: SHIPPED | OUTPATIENT
Start: 2024-08-23 | End: 2024-08-30

## 2024-08-23 NOTE — PROGRESS NOTES
Patient's wife emailed me. He had 2 day script sent over. Regular provider is on [facility]. Patient has SSD post transplant pain.   PDMP reviewed, no red flag.   Will refill hydromorphone 4mg  q6hprn for 1 week.    Curt Tolliver MD  08/23/24

## 2024-08-29 PROBLEM — T80.89XA: Status: ACTIVE | Noted: 2024-08-29

## 2024-08-29 PROBLEM — K51.00 ULCERATIVE PANCOLITIS (MULTI): Status: ACTIVE | Noted: 2024-08-29

## 2024-08-29 PROBLEM — M25.511 PAIN OF RIGHT SHOULDER REGION: Status: ACTIVE | Noted: 2024-08-29

## 2024-08-29 PROBLEM — M87.059 AVASCULAR NECROSIS OF BONE OF HIP (MULTI): Status: ACTIVE | Noted: 2024-08-29

## 2024-08-29 PROBLEM — K52.9 COLITIS: Status: ACTIVE | Noted: 2024-08-29

## 2024-08-29 PROBLEM — H52.10 MYOPIA: Status: ACTIVE | Noted: 2024-08-29

## 2024-08-29 PROBLEM — K74.60 HEPATIC CIRRHOSIS (MULTI): Status: ACTIVE | Noted: 2024-08-29

## 2024-08-29 PROBLEM — R52: Status: ACTIVE | Noted: 2024-08-29

## 2024-08-29 PROBLEM — M25.50 ARTHRALGIA: Status: ACTIVE | Noted: 2024-08-29

## 2024-08-29 PROBLEM — M54.16 LUMBAR RADICULOPATHY: Status: ACTIVE | Noted: 2024-08-29

## 2024-08-29 PROBLEM — M25.539 PAIN IN WRIST: Status: ACTIVE | Noted: 2024-08-29

## 2024-08-29 PROBLEM — M70.60 GREATER TROCHANTERIC BURSITIS: Status: ACTIVE | Noted: 2024-08-29

## 2024-08-29 PROBLEM — D57.1 SICKLING DISORDER DUE TO HEMOGLOBIN S (MULTI): Status: ACTIVE | Noted: 2017-04-12

## 2024-08-29 PROBLEM — R19.7 DIARRHEA: Status: RESOLVED | Noted: 2023-10-07 | Resolved: 2024-08-29

## 2024-08-29 PROBLEM — R93.89 ABNORMAL FINDING OF DIAGNOSTIC IMAGING: Status: ACTIVE | Noted: 2024-08-29

## 2024-08-29 PROBLEM — D57.1 SICKLE CELL ANEMIA (MULTI): Status: ACTIVE | Noted: 2024-08-29

## 2024-08-29 PROBLEM — F11.10 OPIOID ABUSE (MULTI): Status: ACTIVE | Noted: 2024-08-29

## 2024-08-29 PROBLEM — M25.569 KNEE PAIN: Status: ACTIVE | Noted: 2024-08-29

## 2024-08-29 PROBLEM — F32.9 MAJOR DEPRESSIVE DISORDER: Status: ACTIVE | Noted: 2017-04-17

## 2024-08-29 PROBLEM — M25.559 ARTHRALGIA OF HIP: Status: ACTIVE | Noted: 2024-08-29

## 2024-08-29 RX ORDER — HYDROXYZINE HYDROCHLORIDE 25 MG/1
25 TABLET, FILM COATED ORAL NIGHTLY
COMMUNITY
Start: 2023-09-20

## 2024-09-04 ENCOUNTER — TELEPHONE (OUTPATIENT)
Dept: PALLIATIVE MEDICINE | Facility: HOSPITAL | Age: 44
End: 2024-09-04
Payer: COMMERCIAL

## 2024-09-04 DIAGNOSIS — Z51.81 ENCOUNTER FOR MONITORING OPIOID MAINTENANCE THERAPY: ICD-10-CM

## 2024-09-04 DIAGNOSIS — D57.00 SICKLE-CELL DISEASE WITH PAIN (MULTI): ICD-10-CM

## 2024-09-04 DIAGNOSIS — R52 ACUTE PAIN: ICD-10-CM

## 2024-09-04 DIAGNOSIS — F33.1 MODERATE EPISODE OF RECURRENT MAJOR DEPRESSIVE DISORDER (MULTI): ICD-10-CM

## 2024-09-04 DIAGNOSIS — F41.9 ANXIETY: ICD-10-CM

## 2024-09-04 DIAGNOSIS — Z79.891 ENCOUNTER FOR MONITORING OPIOID MAINTENANCE THERAPY: ICD-10-CM

## 2024-09-04 RX ORDER — HYDROMORPHONE HYDROCHLORIDE 4 MG/1
4 TABLET ORAL 4 TIMES DAILY PRN
Qty: 120 TABLET | Refills: 0 | Status: SHIPPED | OUTPATIENT
Start: 2024-09-04 | End: 2024-10-04

## 2024-09-04 RX ORDER — DULOXETIN HYDROCHLORIDE 60 MG/1
60 CAPSULE, DELAYED RELEASE ORAL 2 TIMES DAILY
Qty: 180 CAPSULE | Refills: 0 | Status: SHIPPED | OUTPATIENT
Start: 2024-09-04

## 2024-09-04 NOTE — TELEPHONE ENCOUNTER
Patient was scheduled to be seen in [facility] clinic today by Dr. Andrew. Patient did not show up for appointment. Phone call to patient to reschedule missed appointment. Patient also requesting refill of Duloxetine now and will be due for Dilaudid prior to FUV. OARRS report reviewed and reflects  prescription history, no aberrancy noted. Per OARRS, patient last filled Dilaudid 4mg 112 tabs for 28 day supply on 8/22 and 8 tabs for 2 day supply on 8/23. Patient will be due for refill on 9/20 #120/30. Per last visit with Dr. Andrew on 7/24/24 patient to continue Dilaudid 4mg 4xday and Duloxetine 60mg BID. Patient with follow up visit scheduled with Dr. Andrew on 9/25/24 (soonest could be seen due to scrambler therapy). Patient updated that medication will be sent to [facility] with fill date of 9/20/24. Refill request routed to provider.

## 2024-09-11 ENCOUNTER — SPECIALTY PHARMACY (OUTPATIENT)
Dept: PHARMACY | Facility: CLINIC | Age: 44
End: 2024-09-11

## 2024-09-11 DIAGNOSIS — D89.811 CHRONIC GVHD (MULTI): ICD-10-CM

## 2024-09-12 ENCOUNTER — TELEMEDICINE (OUTPATIENT)
Dept: BEHAVIORAL HEALTH | Facility: HOSPITAL | Age: 44
End: 2024-09-12
Payer: COMMERCIAL

## 2024-09-12 DIAGNOSIS — F51.5 NIGHTMARES: ICD-10-CM

## 2024-09-12 DIAGNOSIS — F41.9 ANXIETY: ICD-10-CM

## 2024-09-12 DIAGNOSIS — F33.0 MILD EPISODE OF RECURRENT MAJOR DEPRESSIVE DISORDER (CMS-HCC): ICD-10-CM

## 2024-09-12 PROCEDURE — 99214 OFFICE O/P EST MOD 30 MIN: CPT | Performed by: PSYCHIATRY & NEUROLOGY

## 2024-09-12 PROCEDURE — 4004F PT TOBACCO SCREEN RCVD TLK: CPT | Performed by: PSYCHIATRY & NEUROLOGY

## 2024-09-12 PROCEDURE — 90833 PSYTX W PT W E/M 30 MIN: CPT | Performed by: PSYCHIATRY & NEUROLOGY

## 2024-09-12 PROCEDURE — RXMED WILLOW AMBULATORY MEDICATION CHARGE

## 2024-09-12 RX ORDER — PRAZOSIN HYDROCHLORIDE 1 MG/1
1 CAPSULE ORAL NIGHTLY
Qty: 90 CAPSULE | Refills: 1 | Status: SHIPPED | OUTPATIENT
Start: 2024-09-12 | End: 2025-03-11

## 2024-09-12 RX ORDER — ARIPIPRAZOLE 5 MG/1
5 TABLET ORAL DAILY
Qty: 90 TABLET | Refills: 1 | Status: SHIPPED | OUTPATIENT
Start: 2024-09-12

## 2024-09-12 NOTE — PROGRESS NOTES
Outpatient Psychiatry FUV      Subjective   Xu Maya, a 43 y.o. male, for virtual FUV.   At home for appt today      Assessment/Plan   Patient Discussion:  CONTINUE duloxetine 60mg 2x day  CONTINUE aripiprazole 5mg in the AM  CONTINUE prazosin 1mg at bedtime for dreams     RETURN to clinic 10/24 at 12PM for virtual FUV     call with questions or concerns. [phone]      Assessment:   43 y.o.  M with SCC disease with depression, sickle cell disease now s/p BMT. Previously on suboxone for management of pain/high utilization but now off since transplant, still having pain now trialing scrambler      FUV 9/12/2024  pt report increased stress at home but dreams improved on prazosin and sleep better.  Follow up 1 months sooner if needed    Diagnosis:   MDD, recurrent. P remission  WILFRID  Chronic pain disorder    Treatment Plan/Recommendations:   1. Safety Assessment: no current SI, no h/o SI/SA. has guns at home but unloaded and locked with kids at home. PF include , no previous attempts, kids, Mosque. RF include depression, pain, father with completed suicide. Pt has moderate baseline risk based on static factors but is not an imminent risk and safety plan addressed     2. MDD, WILFRID  CONTINUE duloxetine 60mg PO BID, r/b/ae discussed including higher dose of SNRI, si/sx excess serotonin/SS  CONTINUE aripiprazole 5mg PO daily   CONTINUE prazosin 1mg PO at bedtime, no dizziness     continue supportive therapy during appt     3. opioid misuse in the context of chronic pain 2/2 sickle cell disease with acute exacerbations currently no concerns  continues to struggle with chronic pain, more sedation with methadone, has scrambler therapy in September     nicotine use disorder --previous success with chantix but stopped and now smoking 3-4/day. monitor for now     4. Medical: SCC, back pain, GERD with h/o PUD: recent notes and labs reviewed. stable.   s/p BMT 1/2021  notes and labs reviewed,   coordinate care as  needed with sickle cell team, BMT as needed  Ongoing pain, managed by [facility], having scrambler therapy in sept 5. Social: lives with kids and wife, moved to [place] after transplant, planning to move [place]. stepfather passed away from leukemia, sister passed from sickle cell. More stress at home    Reason for Visit:   FUV depression and anxiety    Subjective:  Last visit 2 months ago  Since then notes that sleep is better, no more dreams. Not a groggy next day with prazosin instead of mirtazapine. No dizziness  Mood is ok but increased stress at home  Continues to be some level of conflict in relationship, now in marriage counseling    Will start scrambler therapy next week, nervous but hopeful  Off methadone, too groggy    Stable from sickle cell, Q6m now    No SI/HI or thoughts of not wanting to go on  No a/e to medication    Current Medications:    Current Outpatient Medications:     hydrOXYzine HCL (Atarax) 25 mg tablet, Take 1 tablet (25 mg) by mouth once daily at bedtime., Disp: , Rfl:     amitriptyline (Elavil) 25 mg tablet, Take 1 tablet (25 mg) by mouth., Disp: , Rfl:     ARIPiprazole (Abilify) 5 mg tablet, Take 1 tablet (5 mg) by mouth once daily., Disp: 90 tablet, Rfl: 1    cholecalciferol (Vitamin D-3) 25 MCG (1000 UT) tablet, Take 1 tablet (1,000 Units) by mouth once daily., Disp: 30 tablet, Rfl: 11    cyclobenzaprine (Flexeril) 10 mg tablet, Take 1 tablet (10 mg) by mouth 3 times a day as needed for muscle spasms for up to 3 days., Disp: 9 tablet, Rfl: 0    diphenhydrAMINE (BENADryl) 25 mg capsule, Take 1 capsule (25 mg) by mouth every 6 hours if needed., Disp: , Rfl:     DULoxetine (Cymbalta) 60 mg DR capsule, Take 1 capsule (60 mg) by mouth 2 times a day., Disp: 180 capsule, Rfl: 0    ergocalciferol (Vitamin D-2) 1.25 MG (55433 UT) capsule, Take 1 capsule (1,250 mcg) by mouth 1 (one) time per week., Disp: , Rfl:     gabapentin (Neurontin) 300 mg capsule, Take 1 capsule (300 mg) by mouth 2  times a day., Disp: 60 capsule, Rfl: 0    HYDROmorphone (Dilaudid) 4 mg tablet, Take 1 tablet (4 mg) by mouth every 6 hours if needed for severe pain (7 - 10) for up to 7 days., Disp: 21 tablet, Rfl: 0    HYDROmorphone (Dilaudid) 4 mg tablet, Take 1 tablet (4 mg) by mouth 4 times a day as needed for severe pain (7 - 10) for up to 2 days. Do not fill before August 22, 2024., Disp: 120 tablet, Rfl: 0    ketoconazole (NIZOral) 2 % cream, Apply topically 2 times a day., Disp: 30 g, Rfl: 11    methadone (Dolophine) 5 mg tablet, Take 1.5 tablets (7.5 mg) by mouth once daily with breakfast AND 2 tablets (10 mg) once daily at bedtime., Disp: 105 tablet, Rfl: 0    mirtazapine (Remeron) 7.5 mg tablet, Take 1 tablet (7.5 mg) by mouth once daily at bedtime., Disp: 30 tablet, Rfl: 2    naloxone (Narcan) 4 mg/0.1 mL nasal spray, Administer 1 spray (4 mg) into affected nostril(s) if needed for opioid reversal or respiratory depression. 1 spray to nostril for overdose, may repeat every 2-3 minutes until medical assistance arrives, Disp: , Rfl:     omeprazole (PriLOSEC) 20 mg DR capsule, Take 1 capsule (20 mg) by mouth early in the morning.., Disp: , Rfl:     omeprazole (PriLOSEC) 40 mg DR capsule, Take 1 capsule (40 mg) by mouth once daily in the morning. Take before meals. TAKE ONE CAPSULE BY MOUTH EVERY DAY IN THE MORNING BEFORE EATING, Disp: 30 capsule, Rfl: 2    penicillin V (Veetid) 250 mg tablet, Take 1 tablet (250 mg) by mouth every 12 hours., Disp: , Rfl:     potassium chloride CR 10 mEq ER tablet, Take 1 tablet (10 mEq) by mouth early in the morning.., Disp: , Rfl:     prazosin (Minipress) 1 mg capsule, Take 1 capsule (1 mg) by mouth once daily at bedtime., Disp: 30 capsule, Rfl: 2    Rezurock 200 mg tablet, Take one (1) tablet by mouth twice daily, Disp: 60 tablet, Rfl: 5    sennosides (Senokot) 8.6 mg tablet, Take 3 tablets (25.8 mg) by mouth 2 times a day., Disp: 180 tablet, Rfl: 3    tacrolimus (Protopic) 0.1 %  ointment, APPLY 1-2 TIMES PER WEEK, Disp: 30 g, Rfl: 1    Current Facility-Administered Medications:     heparin lock flush (porcine) 10 unit/mL injection 100 Units, 100 Units, intra-catheter, Once, CHRIS Garcia    heparin lock flush (porcine) injection 500 Units, 5 mL, intravenous, PRN, CHRIS Garcia  Medical History:  Past Medical History:   Diagnosis Date    Anxiety disorder, unspecified 04/17/2017    Anxiety    Depression, unspecified 04/17/2017    Depression    Diarrhea 10/07/2023    DIARRHEA      Family history of glaucoma 03/02/2017    Family history of glaucoma in father    Gastritis, unspecified, without bleeding 04/17/2017    Gastritis    Hematuria, unspecified 04/17/2017    Hematuria    Personal history of other diseases of the digestive system 12/02/2015    History of esophageal reflux    Priapism, unspecified 04/17/2017    Priapism    Sickle-cell disease without crisis (Multi) 04/17/2017    Sickle cell anemia       Surgical History:  Past Surgical History:   Procedure Laterality Date    GALLBLADDER SURGERY  04/12/2017    Gallbladder Surgery    IR CVC TUNNELED  4/24/2018    IR CVC TUNNELED 4/24/2018 [facility] [place] [facility]GAC[facility]    IR CVC TUNNELED  6/5/2018    IR CVC TUNNELED 6/5/2018 [facility] [place] [facility]GAC[facility]    IR CVC TUNNELED  3/1/2019    IR CVC TUNNELED 3/1/2019 [facility] [place] [facility]GAC[facility]    IR CVC TUNNELED  6/4/2019    IR CVC TUNNELED 6/4/2019 [facility] CLINICAL LEGACY    IR CVC TUNNELED  4/5/2019    IR CVC TUNNELED 4/5/2019 [facility] [place] [facility] LEGAC[facility]    IR CVC TUNNELED  7/17/2019    IR CVC TUNNELED 7/17/2019 [facility] [place] [facility]GAC[facility]    IR CVC TUNNELED  8/14/2019    IR CVC TUNNELED 8/14/2019 [facility] [place] [facility]GAC[facility]    IR CVC TUNNELED  12/18/2019    IR CVC TUNNELED 12/18/2019 [facility] CLINICAL LEGACY    IR CVC TUNNELED  10/9/2019    IR CVC TUNNELED 10/9/2019 [facility] [place] [facility]GAC[facility]    IR CVC TUNNELED  11/13/2019    IR CVC TUNNELED 11/13/2019 [facility] CLINICAL LEGACY    IR CVC TUNNELED  1/15/2020    IR CVC TUNNELED 1/15/2020 [facility] CLINICAL LEGACY    IR CVC TUNNELED   2/19/2020    IR CVC TUNNELED 2/19/2020 [facility] CLINICAL LEGACY    IR CVC TUNNELED  1/4/2021    IR CVC TUNNELED 1/4/2021 [facility] AIB LEGACY    IR CVC TUNNELED  1/25/2021    IR CVC TUNNELED 1/25/2021 [facility] ANCILLARY LEGACY    IR CVC TUNNELED  8/21/2018    IR CVC TUNNELED 8/21/2018 [facility] AIB LEGACY    IR CVC TUNNELED  9/26/2018    IR CVC TUNNELED 9/26/2018 [facility]IB LEGACY    IR CVC TUNNELED  11/5/2018    IR CVC TUNNELED 11/5/2018 [facility]IB [facility]GACY    IR CVC TUNNELED  12/19/2018    IR CVC TUNNELED 12/19/2018 [facility] AIB LEGACY    IR VENOGRAM HEPATIC  11/8/2017    IR VENOGRAM HEPATIC 11/8/2017 [facility]IB LEGACY    MR HEAD ANGIO WO IV CONTRAST  2/17/2017    MR HEAD ANGIO WO IV CONTRAST 2/17/2017 [facility] CLINICAL LEGACY    MR NECK ANGIO WO IV CONTRAST  2/17/2017    MR NECK ANGIO WO IV CONTRAST 2/17/2017 [facility] CLINICAL LEGACY    US GUIDED NEEDLE LIVER BIOPSY  9/8/2020    US GUIDED NEEDLE LIVER BIOPSY 9/8/2020 [facility]IB LEGACY       Family History:  Family History   Problem Relation Name Age of Onset    Diabetes Father      Sickle cell anemia Other sibling     Cancer Other grandfather     Cancer Other uncle        Social History:  Social History     Socioeconomic History    Marital status:      Spouse name: Not on file    Number of children: Not on file    Years of education: Not on file    Highest education level: Not on file   Occupational History    Not on file   Tobacco Use    Smoking status: Every Day     Types: Cigarettes     Start date: 1/1/1998     Passive exposure: Current    Smokeless tobacco: Never   Substance and Sexual Activity    Alcohol use: Not Currently     Comment: Occasional    Drug use: Not Currently     Types: Marijuana     Comment: Last use sometime in 2023.  No intention to re-start.    Sexual activity: Not on file   Other Topics Concern    Not on file   Social History Narrative    Not on file     Social Determinants of Health     Financial Resource Strain: Not on file   Food Insecurity: Not on file   Transportation Needs:  "Not on file   Physical Activity: Not on file   Stress: Not on file   Social Connections: Not on file   Intimate Partner Violence: Not on file   Housing Stability: Not on file              Medical Review Of Systems:  +pain worse   +sedation with methadone    Psychiatric Review Of Systems:  Depression ok  Sleep/dreams improved       Objective   Mental Status Exam:  Appearance: appropriate g/h.   Attitude: cooperative and engaged.   Behavior: sitting in chair, good eye contact.   Motor Activity: no tremor, spontaneous movement  Speech: regular in rate, volume, low tone, no dysarthria, no aphasia.   Mood: \"ok\"  Affect: congruent, appropriate range.   Thought Process: linear, goal directed.   Thought Content: no delusions, no SI/HI.   Thought Perception: no AVH.   Cognition: alert, oriented to person, place, time. attn intact.   Insight: fair.   Judgment: fair/intact.     Vitals:  There were no vitals filed for this visit.  Encounter Date: 05/13/24   ECG 12 lead   Result Value    Ventricular Rate 55    Atrial Rate 55    MO Interval 194    QRS Duration 102    QT Interval 442    QTC Calculation(Bazett) 422    P Axis 54    R Axis -43    T Axis -18    QRS Count 9    Q Onset 210    P Onset 113    P Offset 173    T Offset 431    QTC Fredericia 429    Narrative    Sinus bradycardia  Possible Left atrial enlargement  Left axis deviation  Incomplete right bundle branch block  T wave abnormality, consider lateral ischemia  Abnormal ECG  When compared with ECG of 08-APR-2024 14:10,  Incomplete right bundle branch block is now Present  See ED provider note for full interpretation and clinical correlation  Confirmed by Annamaria Pruitt ([id number]) on 5/16/2024 9:57:45 PM     Lab Results   Component Value Date    WBC 11.9 (H) 08/18/2024    HGB 13.9 08/18/2024    HCT 38.6 (L) 08/18/2024    MCV 88 08/18/2024     08/18/2024     Lab Results   Component Value Date    GLUCOSE 85 08/18/2024    CALCIUM 9.1 08/18/2024     " 08/18/2024    K 3.6 08/18/2024    CO2 27 08/18/2024     08/18/2024    BUN 5 (L) 08/18/2024    CREATININE 0.95 08/18/2024     Psychotherapy       Time: 19 minutes  Type: supportive, insight oriented  Target: mood, anxiety  Strategies: problem solving, insight oriented  Goal: decreased sx burden  Follow up: next visit 1-2 months  Response: good      Yola Contreras MD

## 2024-09-13 RX ORDER — BELUMOSUDIL 200 MG/1
200 TABLET ORAL 2 TIMES DAILY
Qty: 60 TABLET | Refills: 5 | Status: SHIPPED | OUTPATIENT
Start: 2024-09-13 | End: 2025-03-12

## 2024-09-16 ENCOUNTER — TELEPHONE (OUTPATIENT)
Dept: ADMISSION | Facility: HOSPITAL | Age: 44
End: 2024-09-16

## 2024-09-16 ENCOUNTER — OFFICE VISIT (OUTPATIENT)
Dept: PAIN MEDICINE | Facility: CLINIC | Age: 44
End: 2024-09-16
Payer: COMMERCIAL

## 2024-09-16 ENCOUNTER — SPECIALTY PHARMACY (OUTPATIENT)
Dept: HEMATOLOGY/ONCOLOGY | Facility: HOSPITAL | Age: 44
End: 2024-09-16

## 2024-09-16 VITALS
DIASTOLIC BLOOD PRESSURE: 70 MMHG | WEIGHT: 139 LBS | SYSTOLIC BLOOD PRESSURE: 104 MMHG | BODY MASS INDEX: 21.82 KG/M2 | RESPIRATION RATE: 18 BRPM | HEIGHT: 67 IN | OXYGEN SATURATION: 98 % | HEART RATE: 77 BPM

## 2024-09-16 DIAGNOSIS — D89.811 CHRONIC GVHD (MULTI): ICD-10-CM

## 2024-09-16 DIAGNOSIS — G89.29 OTHER CHRONIC PAIN: ICD-10-CM

## 2024-09-16 DIAGNOSIS — R52 ACUTE PAIN: ICD-10-CM

## 2024-09-16 DIAGNOSIS — Z51.81 ENCOUNTER FOR MONITORING OPIOID MAINTENANCE THERAPY: ICD-10-CM

## 2024-09-16 DIAGNOSIS — M54.16 LUMBAR RADICULOPATHY: Primary | ICD-10-CM

## 2024-09-16 DIAGNOSIS — D57.00 SICKLE-CELL DISEASE WITH PAIN (MULTI): ICD-10-CM

## 2024-09-16 DIAGNOSIS — Z79.891 ENCOUNTER FOR MONITORING OPIOID MAINTENANCE THERAPY: ICD-10-CM

## 2024-09-16 PROCEDURE — 3008F BODY MASS INDEX DOCD: CPT | Performed by: PHYSICIAN ASSISTANT

## 2024-09-16 PROCEDURE — 4004F PT TOBACCO SCREEN RCVD TLK: CPT | Performed by: PHYSICIAN ASSISTANT

## 2024-09-16 PROCEDURE — 99215 OFFICE O/P EST HI 40 MIN: CPT | Performed by: PHYSICIAN ASSISTANT

## 2024-09-16 RX ORDER — HYDROMORPHONE HYDROCHLORIDE 4 MG/1
4 TABLET ORAL 4 TIMES DAILY PRN
Qty: 40 TABLET | Refills: 0 | Status: SHIPPED | OUTPATIENT
Start: 2024-09-16 | End: 2024-09-27

## 2024-09-16 ASSESSMENT — ENCOUNTER SYMPTOMS
PAIN: 1
ACTIVITY CHANGE: 1
BACK PAIN: 1

## 2024-09-16 ASSESSMENT — PATIENT HEALTH QUESTIONNAIRE - PHQ9
1. LITTLE INTEREST OR PLEASURE IN DOING THINGS: NOT AT ALL
SUM OF ALL RESPONSES TO PHQ9 QUESTIONS 1 & 2: 0
2. FEELING DOWN, DEPRESSED OR HOPELESS: NOT AT ALL

## 2024-09-16 ASSESSMENT — LIFESTYLE VARIABLES
HOW MANY STANDARD DRINKS CONTAINING ALCOHOL DO YOU HAVE ON A TYPICAL DAY: PATIENT DOES NOT DRINK
AUDIT-C TOTAL SCORE: 0
SKIP TO QUESTIONS 9-10: 1
HOW OFTEN DO YOU HAVE A DRINK CONTAINING ALCOHOL: NEVER
HOW OFTEN DO YOU HAVE SIX OR MORE DRINKS ON ONE OCCASION: NEVER

## 2024-09-16 ASSESSMENT — PAIN SCALES - GENERAL
PAINLEVEL_OUTOF10: 7
PAINLEVEL: 7

## 2024-09-16 ASSESSMENT — PAIN - FUNCTIONAL ASSESSMENT: PAIN_FUNCTIONAL_ASSESSMENT: 0-10

## 2024-09-16 ASSESSMENT — PAIN DESCRIPTION - DESCRIPTORS: DESCRIPTORS: ACHING;THROBBING;SHARP

## 2024-09-16 NOTE — TELEPHONE ENCOUNTER
Pt requesting refill   Dilaudid 4mg. 1 tablet every 6 hrs prn  Pharmacy: [facility]   Last FUV 7/24 with sup onc.   Next FUV 9/25 with sup onc and 10/30 with Sickle Cell team   Pt states he only has 2 tablets left.

## 2024-09-16 NOTE — PROGRESS NOTES
Xu Maya is a 43 y.o. 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 Has not been taking PPI over the past 2 months but is taking rezurock BID. I told him to take rezurock daily since off PPI, and I will reach out to MD. Not having acid reflux since stopping PPI. Denies n/v/d/rash/edema/cough. Has not started any new medications or herbals/supplements. No issues with shipments or adherence - thought is taking BID without PPI. Reviewed fill history which demonstrates shipments March - Aug, 9/13 pending fill. Reminded patient of next appt for October. Next assessment in .    Objective    Lab Results   Component Value Date    WBC 11.9 (H) 08/18/2024    HGB 13.9 08/18/2024     08/18/2024    ANC 5.11 09/12/2023    SEGSABS 5.11 09/12/2023      Lab Results   Component Value Date    GLUCOSE 85 08/18/2024    CALCIUM 9.1 08/18/2024     08/18/2024    K 3.6 08/18/2024    CO2 27 08/18/2024     08/18/2024    BUN 5 (L) 08/18/2024    CREATININE 0.95 08/18/2024    URICACID 4.6 07/21/2023     Lab Results   Component Value Date    ALT 9 (L) 08/18/2024    AST 15 08/18/2024    ALKPHOS 96 08/18/2024    BILITOT 0.6 08/18/2024       Treatment history  Oncology History    No history exists.       Allergies and Medications   Allergies   Allergen Reactions    Hydrocodone-Acetaminophen Hives and Unknown    Naproxen Hives     Current Outpatient Medications   Medication Instructions    amitriptyline (ELAVIL) 25 mg, oral    ARIPiprazole (ABILIFY) 5 mg, oral, Daily    cholecalciferol (VITAMIN D-3) 1,000 Units, oral, Daily    cyclobenzaprine (FLEXERIL) 10 mg, oral, 3 times daily PRN    diphenhydrAMINE (BENADRYL) 25 mg, oral, Every 6 hours PRN    DULoxetine (CYMBALTA) 60 mg, oral, 2 times daily    ergocalciferol (Vitamin D-2) 1.25 MG (31090 UT) capsule 1 capsule, oral, Once Weekly    gabapentin (NEURONTIN) 300 mg, oral, 2 times daily    HYDROmorphone (Dilaudid) 4 mg tablet Take 1 tablet (4 mg) by mouth 4 times a day as needed  for severe pain (7 - 10) for up to 2 days. Do not fill before August 22, 2024.    HYDROmorphone (DILAUDID) 4 mg, oral, Every 6 hours PRN    hydrOXYzine HCL (ATARAX) 25 mg, oral, Nightly    ketoconazole (NIZOral) 2 % cream Topical, 2 times daily    naloxone (NARCAN) 4 mg, nasal, As needed, 1 spray to nostril for overdose, may repeat every 2-3 minutes until medical assistance arrives<BR>    omeprazole (PriLOSEC) 20 mg DR capsule 1 capsule, oral, Daily (0630)    omeprazole (PRILOSEC) 40 mg, oral, Daily before breakfast, TAKE ONE CAPSULE BY MOUTH EVERY DAY IN THE MORNING BEFORE EATING    penicillin V (Veetid) 250 mg tablet 1 tablet, oral, Every 12 hours scheduled (0630,1830)    potassium chloride CR 10 mEq ER tablet 1 tablet, oral, Daily (0630)    prazosin (MINIPRESS) 1 mg, oral, Nightly    Rezurock 200 mg tablet Take one (1) tablet by mouth twice daily    sennosides (SENOKOT) 25.8 mg, oral, 2 times daily    tacrolimus (Protopic) 0.1 % ointment APPLY 1-2 TIMES PER WEEK

## 2024-09-16 NOTE — TELEPHONE ENCOUNTER
Patient last seen by Dr. Andrew on 6/26 with plan to continue dilaudid 4mg QID PRN. Follow up visit is scheduled for 9/25. OARRS reviewed and no aberrancy noted. Patient last filled dilaudid 4mg #112/28 days on 8/23 and #8/2 days on 8/22. Prescription previously sent for a fill date of 9/20 at [facility], but patient would like prescription to go to [facility] and he reports he only has 2 tablets left. Patient reports he has had increased pain related to stress. Pain is in his lower back and legs. He has been taking 6 tablets per day some days. Per Dr. Andrew, we can send in a 10 day supply to fill today which should get him to his fuv on 9/25. Prescription dated for 9/20 to [facility] canceled. Prescription to be filled today sent to provider for approval. This prescription will go to [facility].

## 2024-09-16 NOTE — PROGRESS NOTES
Subjective   Patient ID: Xu Maya is a 43 y.o. male who presents for Pain (Scrambler therapy).  Patient is a 43-year-old male with a history of lumbar radiculopathy sickle cell anemia and chronic pain that presents today for calamari therapies patient did notes that he has been using as needed gabapentin his last dose was 9/15/24.  He does not consistently take this medication patient's symptoms have been going on since he has been a child.    Pt reports his pain is anterior/latral thigh. Pt reports 7/10 in the proximal thighs.     Pain      Review of Systems   Constitutional:  Positive for activity change.   Musculoskeletal:  Positive for back pain and gait problem.   All other systems reviewed and are negative.      Objective   Physical Exam  Musculoskeletal:        Legs:        Assessment/Plan   Problem List Items Addressed This Visit             ICD-10-CM    Lumbar radiculopathy - Primary M54.16     TREATMENT PLAN:  Day [ 1 ] of Scrambler Therapy.   Verbal consent obtained.  Session initiated by myself with proper electrode/lead placement to applicable dermatomes.   Patient tolerated treatment well .  Encouraged patient to keep a diary or log of symptoms to monitor for any changes in pain, sensation, etc.    Recommend follow-up as needed.       PROCEDURE NOTE:  Treatment [ 1 ] with Scrambler therapy was initiated by myself.   Verbal consent obtained from patient.  Therapy start time: [ 945  ]   Leads were applied to: [ Bilateral L5 and S1 ]  therapeutic dose :  [  1400  ]    Patient tolerated treatment [ well  ] .  Pain reported at start of therapy session was about a [  7/10 ].  Pain reported at end of therapy session;  [  7/10 ] .  Leads were removed and patient left the office without any difficulty.  60 min total scrambler treatment time.         Omar Kong PA-C 09/16/24 9:45 AM

## 2024-09-17 ENCOUNTER — OFFICE VISIT (OUTPATIENT)
Dept: PAIN MEDICINE | Facility: CLINIC | Age: 44
End: 2024-09-17
Payer: COMMERCIAL

## 2024-09-17 ENCOUNTER — HOSPITAL ENCOUNTER (EMERGENCY)
Facility: HOSPITAL | Age: 44
Discharge: HOME | End: 2024-09-17
Attending: INTERNAL MEDICINE
Payer: COMMERCIAL

## 2024-09-17 ENCOUNTER — PHARMACY VISIT (OUTPATIENT)
Dept: PHARMACY | Facility: CLINIC | Age: 44
End: 2024-09-17
Payer: MEDICAID

## 2024-09-17 ENCOUNTER — APPOINTMENT (OUTPATIENT)
Dept: CARDIOLOGY | Facility: HOSPITAL | Age: 44
End: 2024-09-17
Payer: COMMERCIAL

## 2024-09-17 ENCOUNTER — APPOINTMENT (OUTPATIENT)
Dept: RADIOLOGY | Facility: HOSPITAL | Age: 44
End: 2024-09-17
Payer: COMMERCIAL

## 2024-09-17 VITALS
BODY MASS INDEX: 20.73 KG/M2 | HEIGHT: 69 IN | RESPIRATION RATE: 19 BRPM | TEMPERATURE: 98.6 F | DIASTOLIC BLOOD PRESSURE: 72 MMHG | HEART RATE: 66 BPM | SYSTOLIC BLOOD PRESSURE: 105 MMHG | OXYGEN SATURATION: 95 % | WEIGHT: 140 LBS

## 2024-09-17 VITALS
BODY MASS INDEX: 21.82 KG/M2 | HEIGHT: 67 IN | DIASTOLIC BLOOD PRESSURE: 76 MMHG | SYSTOLIC BLOOD PRESSURE: 100 MMHG | OXYGEN SATURATION: 97 % | HEART RATE: 76 BPM | WEIGHT: 139 LBS | RESPIRATION RATE: 18 BRPM

## 2024-09-17 DIAGNOSIS — M54.16 LUMBAR RADICULOPATHY: Primary | ICD-10-CM

## 2024-09-17 DIAGNOSIS — D57.00 SICKLE CELL PAIN CRISIS (MULTI): Primary | ICD-10-CM

## 2024-09-17 LAB
ALBUMIN SERPL BCP-MCNC: 4.3 G/DL (ref 3.4–5)
ALP SERPL-CCNC: 110 U/L (ref 33–120)
ALT SERPL W P-5'-P-CCNC: 13 U/L (ref 10–52)
ANION GAP SERPL CALC-SCNC: 13 MMOL/L (ref 10–20)
AST SERPL W P-5'-P-CCNC: 14 U/L (ref 9–39)
BASOPHILS # BLD AUTO: 0.02 X10*3/UL (ref 0–0.1)
BASOPHILS NFR BLD AUTO: 0.2 %
BILIRUB SERPL-MCNC: 0.6 MG/DL (ref 0–1.2)
BUN SERPL-MCNC: 5 MG/DL (ref 6–23)
CALCIUM SERPL-MCNC: 9.4 MG/DL (ref 8.6–10.3)
CHLORIDE SERPL-SCNC: 106 MMOL/L (ref 98–107)
CO2 SERPL-SCNC: 26 MMOL/L (ref 21–32)
CREAT SERPL-MCNC: 0.82 MG/DL (ref 0.5–1.3)
EGFRCR SERPLBLD CKD-EPI 2021: >90 ML/MIN/1.73M*2
EOSINOPHIL # BLD AUTO: 0.04 X10*3/UL (ref 0–0.7)
EOSINOPHIL NFR BLD AUTO: 0.4 %
ERYTHROCYTE [DISTWIDTH] IN BLOOD BY AUTOMATED COUNT: 13.9 % (ref 11.5–14.5)
GLUCOSE SERPL-MCNC: 94 MG/DL (ref 74–99)
HAPTOGLOB SERPL NEPH-MCNC: 113 MG/DL (ref 30–200)
HCT VFR BLD AUTO: 43.8 % (ref 41–52)
HGB BLD-MCNC: 15.7 G/DL (ref 13.5–17.5)
HGB RETIC QN: 37 PG (ref 28–38)
IMM GRANULOCYTES # BLD AUTO: 0.02 X10*3/UL (ref 0–0.7)
IMM GRANULOCYTES NFR BLD AUTO: 0.2 % (ref 0–0.9)
IMMATURE RETIC FRACTION: 8.7 %
LDH SERPL L TO P-CCNC: 159 U/L (ref 84–246)
LYMPHOCYTES # BLD AUTO: 2.76 X10*3/UL (ref 1.2–4.8)
LYMPHOCYTES NFR BLD AUTO: 28 %
MCH RBC QN AUTO: 32.5 PG (ref 26–34)
MCHC RBC AUTO-ENTMCNC: 35.8 G/DL (ref 32–36)
MCV RBC AUTO: 91 FL (ref 80–100)
MONOCYTES # BLD AUTO: 0.66 X10*3/UL (ref 0.1–1)
MONOCYTES NFR BLD AUTO: 6.7 %
NEUTROPHILS # BLD AUTO: 6.35 X10*3/UL (ref 1.2–7.7)
NEUTROPHILS NFR BLD AUTO: 64.5 %
NRBC BLD-RTO: 0 /100 WBCS (ref 0–0)
PLATELET # BLD AUTO: 296 X10*3/UL (ref 150–450)
POTASSIUM SERPL-SCNC: 3.7 MMOL/L (ref 3.5–5.3)
PROT SERPL-MCNC: 7.2 G/DL (ref 6.4–8.2)
RBC # BLD AUTO: 4.83 X10*6/UL (ref 4.5–5.9)
RETICS #: 0.06 X10*6/UL (ref 0.02–0.12)
RETICS/RBC NFR AUTO: 1.3 % (ref 0.5–2)
SODIUM SERPL-SCNC: 141 MMOL/L (ref 136–145)
WBC # BLD AUTO: 9.9 X10*3/UL (ref 4.4–11.3)

## 2024-09-17 PROCEDURE — 96376 TX/PRO/DX INJ SAME DRUG ADON: CPT

## 2024-09-17 PROCEDURE — 83615 LACTATE (LD) (LDH) ENZYME: CPT

## 2024-09-17 PROCEDURE — RXMED WILLOW AMBULATORY MEDICATION CHARGE

## 2024-09-17 PROCEDURE — 71045 X-RAY EXAM CHEST 1 VIEW: CPT | Performed by: STUDENT IN AN ORGANIZED HEALTH CARE EDUCATION/TRAINING PROGRAM

## 2024-09-17 PROCEDURE — 3008F BODY MASS INDEX DOCD: CPT | Performed by: PHYSICIAN ASSISTANT

## 2024-09-17 PROCEDURE — 99215 OFFICE O/P EST HI 40 MIN: CPT | Performed by: PHYSICIAN ASSISTANT

## 2024-09-17 PROCEDURE — 99284 EMERGENCY DEPT VISIT MOD MDM: CPT | Mod: 25

## 2024-09-17 PROCEDURE — 83010 ASSAY OF HAPTOGLOBIN QUANT: CPT | Mod: AHULAB

## 2024-09-17 PROCEDURE — 4004F PT TOBACCO SCREEN RCVD TLK: CPT | Performed by: PHYSICIAN ASSISTANT

## 2024-09-17 PROCEDURE — 80053 COMPREHEN METABOLIC PANEL: CPT

## 2024-09-17 PROCEDURE — 96374 THER/PROPH/DIAG INJ IV PUSH: CPT

## 2024-09-17 PROCEDURE — 71045 X-RAY EXAM CHEST 1 VIEW: CPT

## 2024-09-17 PROCEDURE — 36415 COLL VENOUS BLD VENIPUNCTURE: CPT

## 2024-09-17 PROCEDURE — 85045 AUTOMATED RETICULOCYTE COUNT: CPT

## 2024-09-17 PROCEDURE — 93005 ELECTROCARDIOGRAM TRACING: CPT

## 2024-09-17 PROCEDURE — 85025 COMPLETE CBC W/AUTO DIFF WBC: CPT

## 2024-09-17 PROCEDURE — 2500000004 HC RX 250 GENERAL PHARMACY W/ HCPCS (ALT 636 FOR OP/ED)

## 2024-09-17 RX ORDER — HYDROMORPHONE HYDROCHLORIDE 2 MG/ML
2 INJECTION, SOLUTION INTRAMUSCULAR; INTRAVENOUS; SUBCUTANEOUS
Status: COMPLETED | OUTPATIENT
Start: 2024-09-17 | End: 2024-09-17

## 2024-09-17 ASSESSMENT — ENCOUNTER SYMPTOMS
ACTIVITY CHANGE: 1
BACK PAIN: 1
PAIN: 1

## 2024-09-17 ASSESSMENT — LIFESTYLE VARIABLES
SKIP TO QUESTIONS 9-10: 1
HOW MANY STANDARD DRINKS CONTAINING ALCOHOL DO YOU HAVE ON A TYPICAL DAY: PATIENT DOES NOT DRINK
HOW OFTEN DO YOU HAVE A DRINK CONTAINING ALCOHOL: NEVER
HOW OFTEN DO YOU HAVE SIX OR MORE DRINKS ON ONE OCCASION: NEVER
AUDIT-C TOTAL SCORE: 0

## 2024-09-17 ASSESSMENT — PAIN SCALES - GENERAL
PAINLEVEL_OUTOF10: 0 - NO PAIN
PAINLEVEL_OUTOF10: 8
PAINLEVEL: 8
PAINLEVEL_OUTOF10: 10 - WORST POSSIBLE PAIN
PAINLEVEL_OUTOF10: 10 - WORST POSSIBLE PAIN

## 2024-09-17 ASSESSMENT — PAIN DESCRIPTION - DESCRIPTORS: DESCRIPTORS: ACHING;DISCOMFORT

## 2024-09-17 ASSESSMENT — PAIN - FUNCTIONAL ASSESSMENT
PAIN_FUNCTIONAL_ASSESSMENT: 0-10
PAIN_FUNCTIONAL_ASSESSMENT: 0-10

## 2024-09-17 NOTE — ED PROVIDER NOTES
History of Present Illness     History provided by: Patient  Limitations to History: None  External Records Reviewed with Brief Summary:  Multiple nursing calls with family medicine and pain management clinic visit today.    HPI:  Xu Maya is a 43 y.o. male with a past medical history of sickle cell disease who is presenting today for evaluation of sickle cell pain.  Patient reports pain in his lower back arms and legs.  Reports this similar to her prior sickle cell crisis.  Reports that he took Dilaudid at home.  Patient reports that his symptoms are consistent with a sickle cell pain crisis.  Reports having a cough that is chronic.  No fevers no chills no shortness of breath.  No chest pain    Physical Exam   Triage vitals:  T 37 °C (98.6 °F)  HR 76  /71  RR 16  O2 100 %      General: Awake, alert, in no acute distress  Eyes: Gaze conjugate.  No scleral icterus or injection  HENT: Normo-cephalic, atraumatic. No stridor  CV: Regular rate, regular rhythm. Radial pulses 2+ bilaterally  Resp: Breathing non-labored, speaking in full sentences.  Clear to auscultation bilaterally  GI: Soft, non-distended, non-tender. No rebound or guarding.  MSK/Extremities: No gross bony deformities. Moving all extremities  Skin: Warm. Appropriate color  Neuro: Alert. Oriented. Face symmetric. Speech is fluent.  Gross strength and sensation intact in b/l UE and LEs  Psych: Appropriate mood and affect      Medical Decision Making & ED Course   Medical Decision Making:  Xu Maya is a 43 y.o. male with a past medical history of sickle cell disease who is presenting with a sickle cell pain crisis. Low concern for acute chest, stroke, splenic sequestration, avascular necrosis and acute limb ischemia. Low concern for traumatic injury, infection,  MSK pain or thrombosis. Patient reported a cough so will obtain a chest xray. Patient treated per their care plan with opioid pain medications. Chest xray was negative. Labs  were unremarkable.  Patient was improved after their pain medications and discharged home with follow up with their hematologist.       ----      Differential diagnoses considered include but are not limited to: sickle cell pain crisis, considered pneumonia or viral URI for cough     Social Determinants of Health which Significantly Impact Care: None identified     EKG Independent Interpretation: EKG interpreted by myself. Please see ED Course and [facility] for full interpretation.    Independent Result Review and Interpretation: Results were independently reviewed and interpreted by myself. Please see ED course and MDM for full interpretation.    Chronic conditions affecting the patient's care: As documented in the MDM    The patient was discussed with the following consultants/services: None    Care Considerations: As per [facility]    ED Course:  Diagnoses as of 09/19/24 1058   Sickle cell pain crisis (Multi)     Disposition   As a result of the work-up, the patient was discharged home.  he was informed of his diagnosis and instructed to come back with any concerns or worsening of condition.  he and was agreeable to the plan as discussed above.  he was given the opportunity to ask questions.  All of the patient's questions were answered.    Procedures   Procedures    Patient seen and discussed with ED attending physician.    Heidi Amato MD  Emergency Medicine     Heidi Amato MD  Resident  09/19/24 1100

## 2024-09-17 NOTE — PROGRESS NOTES
Subjective   Patient ID: Xu Maya is a 43 y.o. male who presents for Pain (Scrambler therapy).  Patient is a 43-year-old male with a history of lumbar radiculopathy sickle cell anemia and chronic pain that presents today for calamari therapies patient did notes that he has been using as needed gabapentin his last dose was 9/15/24.  He does not consistently take this medication patient's symptoms have been going on since he has been a child.    Pt reports his pain is anterior/latral thigh. Pt reports 8/10 in the proximal thighs. Pt reported that 30-1 hr post procedure he had a reduction in pain till midnight. He report that his chronic pain meds have not been filled yet today and he is out of medications.     Pain      Review of Systems   Constitutional:  Positive for activity change.   Musculoskeletal:  Positive for back pain and gait problem.   All other systems reviewed and are negative.      Objective   Physical Exam  Musculoskeletal:        Legs:        Assessment/Plan   Problem List Items Addressed This Visit             ICD-10-CM    Lumbar radiculopathy - Primary M54.16     TREATMENT PLAN:  Day [ 2 ] of Scrambler Therapy.   Verbal consent obtained.  Session initiated by myself with proper electrode/lead placement to applicable dermatomes.   Patient tolerated treatment well .  Encouraged patient to keep a diary or log of symptoms to monitor for any changes in pain, sensation, etc.    Recommend follow-up as needed.       PROCEDURE NOTE:  Treatment [ 2 ] with Scrambler therapy was initiated by myself.   Verbal consent obtained from patient.  Therapy start time: [ 935  ]   Leads were applied to: [ Bilateral L5 and S1 ]  therapeutic dose :  [  1200  ]     Patient tolerated treatment [ well  ] .  Pain reported at start of therapy session was about a [  8/10 ].  Pain reported at end of therapy session;  [  6/10 ] .  Leads were removed and patient left the office without any difficulty.  60 min total scrambler  treatment time.       Omar Kong PA-C 09/17/24 9:28 AM

## 2024-09-17 NOTE — DISCHARGE INSTRUCTIONS
You are seen today for your sickle cell pain.  Please take your pain medications as prescribed by your sickle cell clinic doctor.  Please return to the ED if your pain is uncontrolled, chest pain, shortness of breath, fevers or chills.

## 2024-09-18 ENCOUNTER — OFFICE VISIT (OUTPATIENT)
Dept: PAIN MEDICINE | Facility: CLINIC | Age: 44
End: 2024-09-18
Payer: COMMERCIAL

## 2024-09-18 ENCOUNTER — ONCOLOGY MEDICATION OUTREACH (OUTPATIENT)
Dept: HEMATOLOGY/ONCOLOGY | Facility: HOSPITAL | Age: 44
End: 2024-09-18

## 2024-09-18 VITALS
HEART RATE: 82 BPM | RESPIRATION RATE: 18 BRPM | DIASTOLIC BLOOD PRESSURE: 70 MMHG | BODY MASS INDEX: 20.73 KG/M2 | WEIGHT: 140 LBS | SYSTOLIC BLOOD PRESSURE: 94 MMHG | HEIGHT: 69 IN | OXYGEN SATURATION: 97 %

## 2024-09-18 DIAGNOSIS — D89.811 CHRONIC GVHD (MULTI): ICD-10-CM

## 2024-09-18 DIAGNOSIS — M54.16 LUMBAR RADICULOPATHY: Primary | ICD-10-CM

## 2024-09-18 PROCEDURE — 3008F BODY MASS INDEX DOCD: CPT | Performed by: PHYSICIAN ASSISTANT

## 2024-09-18 PROCEDURE — 99215 OFFICE O/P EST HI 40 MIN: CPT | Performed by: PHYSICIAN ASSISTANT

## 2024-09-18 PROCEDURE — 4004F PT TOBACCO SCREEN RCVD TLK: CPT | Performed by: PHYSICIAN ASSISTANT

## 2024-09-18 RX ORDER — BELUMOSUDIL 200 MG/1
200 TABLET ORAL DAILY
Qty: 30 TABLET | Refills: 11 | Status: SHIPPED | OUTPATIENT
Start: 2024-09-18 | End: 2025-09-13

## 2024-09-18 ASSESSMENT — LIFESTYLE VARIABLES
HOW MANY STANDARD DRINKS CONTAINING ALCOHOL DO YOU HAVE ON A TYPICAL DAY: PATIENT DOES NOT DRINK
HOW OFTEN DO YOU HAVE A DRINK CONTAINING ALCOHOL: NEVER
HOW OFTEN DO YOU HAVE SIX OR MORE DRINKS ON ONE OCCASION: NEVER
SKIP TO QUESTIONS 9-10: 1
AUDIT-C TOTAL SCORE: 0

## 2024-09-18 ASSESSMENT — ENCOUNTER SYMPTOMS
PAIN: 1
BACK PAIN: 1
ACTIVITY CHANGE: 1

## 2024-09-18 ASSESSMENT — PAIN SCALES - GENERAL
PAINLEVEL: 5
PAINLEVEL_OUTOF10: 5 - MODERATE PAIN

## 2024-09-18 ASSESSMENT — PAIN - FUNCTIONAL ASSESSMENT: PAIN_FUNCTIONAL_ASSESSMENT: 0-10

## 2024-09-18 NOTE — PROGRESS NOTES
Subjective   Patient ID: Xu Maya is a 43 y.o. male who presents for Pain (Scrambler therapy).  Patient is a 43-year-old male with a history of lumbar radiculopathy sickle cell anemia and chronic pain that presents today for calamari therapies patient did notes that he has been using as needed gabapentin his last dose was 9/15/24.  He does not consistently take this medication patient's symptoms have been going on since he has been a child.     Patient went to the emergency department due to sickle cell crisis.  He was given multiple treatments of pain relieving medications he states that his symptoms have reduced.  He is also stating that he currently is 5 out of 10.  Does state that he has been having spreading into the distal extremity some of his symptoms today    Pain      Review of Systems   Constitutional:  Positive for activity change.   Musculoskeletal:  Positive for back pain and gait problem.   All other systems reviewed and are negative.      Objective   Physical Exam  Musculoskeletal:        Legs:        Assessment/Plan   Problem List Items Addressed This Visit             ICD-10-CM    Lumbar radiculopathy - Primary M54.16     TREATMENT PLAN:  Day [ 3 ] of Scrambler Therapy.   Verbal consent obtained.  Session initiated by myself with proper electrode/lead placement to applicable dermatomes.   Patient tolerated treatment well .  Encouraged patient to keep a diary or log of symptoms to monitor for any changes in pain, sensation, etc.    Recommend follow-up as needed.       PROCEDURE NOTE:  Treatment [ 3 ] with Scrambler therapy was initiated by myself.   Verbal consent obtained from patient.  Therapy start time: [ 940  ]   Leads were applied to: [ Bilateral L5 and S1 ]  therapeutic dose :  [  1300  ]     Patient tolerated treatment [ well  ] .  Pain reported at start of therapy session was about a [  5/10 ].  Pain reported at end of therapy session;  [  3/10 ] .  Leads were removed and patient  left the office without any difficulty.  60 min total scrambler treatment time.       Omar Kong PA-C 09/18/24 9:46 AM

## 2024-09-18 NOTE — PROGRESS NOTES
Called patient to confirm PPI stopped. Rezurock is to be taken daily. Script updated using fill later function.

## 2024-09-19 ENCOUNTER — OFFICE VISIT (OUTPATIENT)
Dept: PAIN MEDICINE | Facility: CLINIC | Age: 44
End: 2024-09-19
Payer: COMMERCIAL

## 2024-09-19 VITALS
BODY MASS INDEX: 20.73 KG/M2 | SYSTOLIC BLOOD PRESSURE: 98 MMHG | OXYGEN SATURATION: 95 % | WEIGHT: 140 LBS | HEIGHT: 69 IN | DIASTOLIC BLOOD PRESSURE: 74 MMHG | RESPIRATION RATE: 18 BRPM | HEART RATE: 72 BPM

## 2024-09-19 DIAGNOSIS — M54.16 LUMBAR RADICULOPATHY: Primary | ICD-10-CM

## 2024-09-19 PROCEDURE — 3008F BODY MASS INDEX DOCD: CPT | Performed by: PHYSICIAN ASSISTANT

## 2024-09-19 PROCEDURE — 99215 OFFICE O/P EST HI 40 MIN: CPT | Performed by: PHYSICIAN ASSISTANT

## 2024-09-19 PROCEDURE — 4004F PT TOBACCO SCREEN RCVD TLK: CPT | Performed by: PHYSICIAN ASSISTANT

## 2024-09-19 ASSESSMENT — LIFESTYLE VARIABLES
HOW MANY STANDARD DRINKS CONTAINING ALCOHOL DO YOU HAVE ON A TYPICAL DAY: PATIENT DOES NOT DRINK
SKIP TO QUESTIONS 9-10: 1
HOW OFTEN DO YOU HAVE SIX OR MORE DRINKS ON ONE OCCASION: NEVER
AUDIT-C TOTAL SCORE: 0
HOW OFTEN DO YOU HAVE A DRINK CONTAINING ALCOHOL: NEVER

## 2024-09-19 ASSESSMENT — PAIN SCALES - GENERAL
PAINLEVEL: 5
PAINLEVEL_OUTOF10: 5 - MODERATE PAIN

## 2024-09-19 ASSESSMENT — ENCOUNTER SYMPTOMS
BACK PAIN: 1
ACTIVITY CHANGE: 1
PAIN: 1

## 2024-09-19 ASSESSMENT — PAIN - FUNCTIONAL ASSESSMENT: PAIN_FUNCTIONAL_ASSESSMENT: 0-10

## 2024-09-19 ASSESSMENT — PATIENT HEALTH QUESTIONNAIRE - PHQ9
2. FEELING DOWN, DEPRESSED OR HOPELESS: NOT AT ALL
1. LITTLE INTEREST OR PLEASURE IN DOING THINGS: NOT AT ALL
SUM OF ALL RESPONSES TO PHQ9 QUESTIONS 1 & 2: 0

## 2024-09-19 NOTE — PROGRESS NOTES
Subjective   Patient ID: Xu Maya is a 43 y.o. male who presents for Pain (Scrambler therapy).  Patient is a 43-year-old male with a history of lumbar radiculopathy sickle cell anemia and chronic pain that presents today for calamari therapies patient did notes that he has been using as needed gabapentin his last dose was 9/15/24.  He does not consistently take this medication patient's symptoms have been going on since he has been a child.    Pt report 5/10 symptoms no major changes to the pain pattern in the lower extremities.    Pain        Review of Systems   Constitutional:  Positive for activity change.   Musculoskeletal:  Positive for back pain and gait problem.   All other systems reviewed and are negative.      Objective   Physical Exam  Musculoskeletal:        Legs:          Assessment/Plan   Problem List Items Addressed This Visit             ICD-10-CM    Lumbar radiculopathy - Primary M54.16   TREATMENT PLAN:  Day [ 4 ] of Scrambler Therapy.   Verbal consent obtained.  Session initiated by myself with proper electrode/lead placement to applicable dermatomes.   Patient tolerated treatment well .  Encouraged patient to keep a diary or log of symptoms to monitor for any changes in pain, sensation, etc.    Recommend follow-up as needed.       PROCEDURE NOTE:  Treatment [ 4 ] with Scrambler therapy was initiated by myself.   Verbal consent obtained from patient.  Therapy start time: [ 936  ]   Leads were applied to: [ Bilateral L5 and S1 ]  therapeutic dose :  [  1300  ]     Patient tolerated treatment [ well  ] .  Pain reported at start of therapy session was about a [  5/10 ].  Pain reported at end of therapy session;  [  4/10 ] .  Leads were removed and patient left the office without any difficulty.  60 min total scrambler treatment time.         Omar Kong PA-C 09/19/24 12:44 PM

## 2024-09-20 ENCOUNTER — OFFICE VISIT (OUTPATIENT)
Dept: PAIN MEDICINE | Facility: CLINIC | Age: 44
End: 2024-09-20
Payer: COMMERCIAL

## 2024-09-20 VITALS
RESPIRATION RATE: 18 BRPM | BODY MASS INDEX: 20.73 KG/M2 | OXYGEN SATURATION: 98 % | HEART RATE: 75 BPM | HEIGHT: 69 IN | WEIGHT: 140 LBS | SYSTOLIC BLOOD PRESSURE: 104 MMHG | DIASTOLIC BLOOD PRESSURE: 70 MMHG

## 2024-09-20 DIAGNOSIS — M54.16 LUMBAR RADICULOPATHY: Primary | ICD-10-CM

## 2024-09-20 LAB
ATRIAL RATE: 69 BPM
P AXIS: 85 DEGREES
P OFFSET: 205 MS
P ONSET: 145 MS
PR INTERVAL: 158 MS
Q ONSET: 224 MS
QRS COUNT: 11 BEATS
QRS DURATION: 100 MS
QT INTERVAL: 410 MS
QTC CALCULATION(BAZETT): 439 MS
QTC FREDERICIA: 429 MS
R AXIS: -64 DEGREES
T AXIS: -13 DEGREES
T OFFSET: 429 MS
VENTRICULAR RATE: 69 BPM

## 2024-09-20 PROCEDURE — 4004F PT TOBACCO SCREEN RCVD TLK: CPT | Performed by: PHYSICIAN ASSISTANT

## 2024-09-20 PROCEDURE — 99215 OFFICE O/P EST HI 40 MIN: CPT | Performed by: PHYSICIAN ASSISTANT

## 2024-09-20 PROCEDURE — 3008F BODY MASS INDEX DOCD: CPT | Performed by: PHYSICIAN ASSISTANT

## 2024-09-20 ASSESSMENT — ENCOUNTER SYMPTOMS
ACTIVITY CHANGE: 1
BACK PAIN: 1
PAIN: 1

## 2024-09-20 ASSESSMENT — LIFESTYLE VARIABLES
HOW OFTEN DO YOU HAVE A DRINK CONTAINING ALCOHOL: NEVER
SKIP TO QUESTIONS 9-10: 1
AUDIT-C TOTAL SCORE: 0
HOW OFTEN DO YOU HAVE SIX OR MORE DRINKS ON ONE OCCASION: NEVER
HOW MANY STANDARD DRINKS CONTAINING ALCOHOL DO YOU HAVE ON A TYPICAL DAY: PATIENT DOES NOT DRINK

## 2024-09-20 ASSESSMENT — PAIN DESCRIPTION - DESCRIPTORS: DESCRIPTORS: ACHING

## 2024-09-20 ASSESSMENT — PAIN SCALES - GENERAL
PAINLEVEL: 6
PAINLEVEL_OUTOF10: 6

## 2024-09-20 ASSESSMENT — PAIN - FUNCTIONAL ASSESSMENT: PAIN_FUNCTIONAL_ASSESSMENT: 0-10

## 2024-09-20 NOTE — PROGRESS NOTES
Subjective   Patient ID: Xu Maya is a 43 y.o. male who presents for Pain (Scrambler therapy).  Patient is a 43-year-old male with a history of lumbar radiculopathy sickle cell anemia and chronic pain that presents today for calamari therapies patient did notes that he has been using as needed gabapentin his last dose was 9/15/24.  He does not consistently take this medication patient's symptoms have been going on since he has been a child.    Pt report 6/10 symptoms no major changes to the pain pattern in the lower extremities. He is not sure there has been much improvement of symptoms.     Pain        Review of Systems   Constitutional:  Positive for activity change.   Musculoskeletal:  Positive for back pain and gait problem.   All other systems reviewed and are negative.      Objective   Physical Exam  Musculoskeletal:        Legs:        Assessment/Plan   Problem List Items Addressed This Visit             ICD-10-CM    Lumbar radiculopathy - Primary M54.16   TREATMENT PLAN:  Day [ 5 ] of Scrambler Therapy.   Verbal consent obtained.  Session initiated by myself with proper electrode/lead placement to applicable dermatomes.   Patient tolerated treatment well .  Encouraged patient to keep a diary or log of symptoms to monitor for any changes in pain, sensation, etc.    Recommend follow-up as needed.       PROCEDURE NOTE:  Treatment [ 5 ] with Scrambler therapy was initiated by myself.   Verbal consent obtained from patient.  Therapy start time: [ 935  ]   Leads were applied to: [ Bilateral L4 and L5 ]  therapeutic dose :  [  1400  ]     Patient tolerated treatment [ well  ] .  Pain reported at start of therapy session was about a [  6/10 ].  Pain reported at end of therapy session;  [  4/10 ] .  Leads were removed and patient left the office without any difficulty.  60 min total scrambler treatment time         Omar Kong PA-C 09/20/24 9:42 AM

## 2024-09-23 ENCOUNTER — APPOINTMENT (OUTPATIENT)
Dept: PAIN MEDICINE | Facility: CLINIC | Age: 44
End: 2024-09-23
Payer: COMMERCIAL

## 2024-09-24 ENCOUNTER — APPOINTMENT (OUTPATIENT)
Dept: PAIN MEDICINE | Facility: CLINIC | Age: 44
End: 2024-09-24
Payer: COMMERCIAL

## 2024-09-25 ENCOUNTER — APPOINTMENT (OUTPATIENT)
Dept: PAIN MEDICINE | Facility: CLINIC | Age: 44
End: 2024-09-25
Payer: COMMERCIAL

## 2024-09-25 ENCOUNTER — PHARMACY VISIT (OUTPATIENT)
Dept: PHARMACY | Facility: CLINIC | Age: 44
End: 2024-09-25
Payer: MEDICAID

## 2024-09-25 ENCOUNTER — OFFICE VISIT (OUTPATIENT)
Dept: PALLIATIVE MEDICINE | Facility: HOSPITAL | Age: 44
End: 2024-09-25
Payer: COMMERCIAL

## 2024-09-25 VITALS
SYSTOLIC BLOOD PRESSURE: 124 MMHG | WEIGHT: 139 LBS | HEART RATE: 77 BPM | BODY MASS INDEX: 20.53 KG/M2 | DIASTOLIC BLOOD PRESSURE: 80 MMHG | OXYGEN SATURATION: 100 % | TEMPERATURE: 96.8 F

## 2024-09-25 DIAGNOSIS — D57.1 SICKLE CELL DISEASE WITHOUT CRISIS (MULTI): ICD-10-CM

## 2024-09-25 DIAGNOSIS — Z51.81 ENCOUNTER FOR MONITORING OPIOID MAINTENANCE THERAPY: ICD-10-CM

## 2024-09-25 DIAGNOSIS — F41.9 ANXIETY: ICD-10-CM

## 2024-09-25 DIAGNOSIS — Z79.891 ENCOUNTER FOR MONITORING OPIOID MAINTENANCE THERAPY: ICD-10-CM

## 2024-09-25 DIAGNOSIS — Z51.5 PALLIATIVE CARE ENCOUNTER: ICD-10-CM

## 2024-09-25 DIAGNOSIS — F33.1 MODERATE EPISODE OF RECURRENT MAJOR DEPRESSIVE DISORDER: ICD-10-CM

## 2024-09-25 DIAGNOSIS — D57.00 SICKLE-CELL DISEASE WITH PAIN (MULTI): ICD-10-CM

## 2024-09-25 DIAGNOSIS — R52 ACUTE PAIN: ICD-10-CM

## 2024-09-25 DIAGNOSIS — M79.601 PAIN OF RIGHT UPPER EXTREMITY: ICD-10-CM

## 2024-09-25 PROCEDURE — 99214 OFFICE O/P EST MOD 30 MIN: CPT | Performed by: INTERNAL MEDICINE

## 2024-09-25 PROCEDURE — RXMED WILLOW AMBULATORY MEDICATION CHARGE

## 2024-09-25 RX ORDER — HYDROMORPHONE HYDROCHLORIDE 4 MG/1
4 TABLET ORAL 4 TIMES DAILY PRN
Qty: 120 TABLET | Refills: 0 | Status: SHIPPED | OUTPATIENT
Start: 2024-09-25 | End: 2024-10-25

## 2024-09-25 RX ORDER — GABAPENTIN 300 MG/1
300 CAPSULE ORAL 2 TIMES DAILY
Qty: 60 CAPSULE | Refills: 0 | Status: SHIPPED | OUTPATIENT
Start: 2024-09-25 | End: 2024-10-25

## 2024-09-25 RX ORDER — DULOXETIN HYDROCHLORIDE 60 MG/1
60 CAPSULE, DELAYED RELEASE ORAL 2 TIMES DAILY
Qty: 180 CAPSULE | Refills: 3 | Status: SHIPPED | OUTPATIENT
Start: 2024-09-25

## 2024-09-25 ASSESSMENT — PAIN SCALES - GENERAL: PAINLEVEL: 0-NO PAIN

## 2024-09-25 NOTE — PROGRESS NOTES
Supportive Oncology Established Patient Note    Patient ID: Xu Maya is a 43 y.o. male who presents for Follow-up.    HPI     Symptoms  Pain is well controlled with dilaudid 4 mg tabs. Takes 4-5 tabs daily. Pain is mostly in legs. Tried scrambler therapy without any improvement of his pain.   Regular BM. Has not required senna. States he is regular. Also has miralax which he hasn't required much of.   Denies any anxiety and depression.     Allergies  Hydrocodone-acetaminophen and Naproxen     Objective:    Last Recorded Vitals  Blood pressure 124/80, pulse 77, temperature 36 °C (96.8 °F), temperature source Temporal, weight 63 kg (139 lb), SpO2 100%.    @.LASTWTTEXT[4]@     Physical Exam:  Constitutional:       General: Patient is not in acute distress.  HENT:      Head: Normocephalic.      Mouth: Mucous membranes are moist.   Eyes:      Conjunctiva/sclera: [place] sclera.     Pupils: Pupils are equal, round, and reactive to light.   Neck:      Vascular: No carotid bruit.   Cardiovascular:      Rate and Rhythm: Normal rate and regular rhythm.      Heart sounds: No murmur heard.  Pulmonary:      Effort: No respiratory distress.      Breath sounds: Clear to auscultation  Abdominal:      General: There is no distension.      Tenderness: There is no abdominal tenderness. There is no guarding.   Musculoskeletal:         General: No deformity.   Skin:     Coloration: Skin is not jaundiced.   Neurological:      General: No focal deficit present.      Mental Status: He is oriented to person, place, and time.   Psychiatric:         Behavior: Behavior normal. Behavior is cooperative.          Relevant Results  Lab Results   Component Value Date    WBC 9.9 09/17/2024    HGB 15.7 09/17/2024    HCT 43.8 09/17/2024    MCV 91 09/17/2024     09/17/2024      Lab Results   Component Value Date    GLUCOSE 94 09/17/2024    CALCIUM 9.4 09/17/2024     09/17/2024    K 3.7 09/17/2024    CO2 26 09/17/2024      09/17/2024    BUN 5 (L) 09/17/2024    CREATININE 0.82 09/17/2024      Lab Results   Component Value Date    ALT 13 09/17/2024    AST 14 09/17/2024    ALKPHOS 110 09/17/2024    BILITOT 0.6 09/17/2024        Relevant Imaging   No results found for this or any previous visit from the past 1000 days.     No image results found.       Medications:   Current Outpatient Medications   Medication Instructions    amitriptyline (ELAVIL) 25 mg, oral    ARIPiprazole (ABILIFY) 5 mg, oral, Daily    cholecalciferol (VITAMIN D-3) 1,000 Units, oral, Daily    cyclobenzaprine (FLEXERIL) 10 mg, oral, 3 times daily PRN    diphenhydrAMINE (BENADRYL) 25 mg, oral, Every 6 hours PRN    DULoxetine (CYMBALTA) 60 mg, oral, 2 times daily    ergocalciferol (Vitamin D-2) 1.25 MG (99060 UT) capsule 1 capsule, oral, Once Weekly    gabapentin (NEURONTIN) 300 mg, oral, 2 times daily    HYDROmorphone (DILAUDID) 4 mg, oral, Every 6 hours PRN    HYDROmorphone (DILAUDID) 4 mg, oral, 4 times daily PRN    hydrOXYzine HCL (ATARAX) 25 mg, oral, Nightly    ketoconazole (NIZOral) 2 % cream Topical, 2 times daily    naloxone (NARCAN) 4 mg, nasal, As needed, 1 spray to nostril for overdose, may repeat every 2-3 minutes until medical assistance arrives<BR>    penicillin V (Veetid) 250 mg tablet 1 tablet, oral, Every 12 hours scheduled (0630,1830)    potassium chloride CR 10 mEq ER tablet 1 tablet, oral, Daily (0630)    prazosin (MINIPRESS) 1 mg, oral, Nightly    Rezurock 200 mg, oral, Daily    sennosides (SENOKOT) 25.8 mg, oral, 2 times daily    tacrolimus (Protopic) 0.1 % ointment APPLY 1-2 TIMES PER WEEK        Assessment and Plan  Xu Maya is a 43 yr M, w/ PMHx of  Hemoglobin SS disease s/p Haplo SCT (2021), who follows closely with Dr. Luna for disease surveillance and management. Patient presents to palliative clinic for pain medication evaluation and adjustment. Patient stopped taking methadone as it was sedating him at work. He continues to be in  pain an dilaudid is only taking the edge off.      Problems:  # Hemoglobin SS disease  #Chronic Pain   - Haplo SCT from his brother (6/12 HLA match)   - T=0, 2/4/21  -Plan:   -Continue Dilaudid 4 mg 4 times daily PRN    -Continue duloxetine 60 mg BID  - has failed methadone  7.5 mg bid was max dose that he used but states it caused sedation; didn't tolerate suboxone - significant nausea but didn't help his pain at all.      #Constipation - none anymore  - has senna and miralax prn.   - I think Bms are normal given he is on duloxetine.      #Anxiety  Heart breathing and mindfulness     #Cognitive decline  -due to microvascular changes from SS disease  -has evidence of disease per imagine in 2020/21.   -will monitor him closely  -will engage his wife and update her on plan of care.          Code Status: Assume Full     Tabitha Andrew MD

## 2024-09-26 ENCOUNTER — APPOINTMENT (OUTPATIENT)
Dept: PAIN MEDICINE | Facility: CLINIC | Age: 44
End: 2024-09-26
Payer: COMMERCIAL

## 2024-09-27 ENCOUNTER — APPOINTMENT (OUTPATIENT)
Dept: PAIN MEDICINE | Facility: CLINIC | Age: 44
End: 2024-09-27
Payer: COMMERCIAL

## 2024-10-04 ENCOUNTER — APPOINTMENT (OUTPATIENT)
Dept: HEMATOLOGY/ONCOLOGY | Facility: HOSPITAL | Age: 44
End: 2024-10-04
Payer: COMMERCIAL

## 2024-10-11 ENCOUNTER — TELEPHONE (OUTPATIENT)
Dept: HEMATOLOGY/ONCOLOGY | Facility: HOSPITAL | Age: 44
End: 2024-10-11
Payer: COMMERCIAL

## 2024-10-11 ENCOUNTER — OFFICE VISIT (OUTPATIENT)
Dept: HEMATOLOGY/ONCOLOGY | Facility: HOSPITAL | Age: 44
End: 2024-10-11
Payer: COMMERCIAL

## 2024-10-11 VITALS
RESPIRATION RATE: 17 BRPM | WEIGHT: 138.45 LBS | SYSTOLIC BLOOD PRESSURE: 107 MMHG | DIASTOLIC BLOOD PRESSURE: 70 MMHG | TEMPERATURE: 96.8 F | BODY MASS INDEX: 20.45 KG/M2 | OXYGEN SATURATION: 98 % | HEART RATE: 72 BPM

## 2024-10-11 DIAGNOSIS — D89.811 CHRONIC GVHD (MULTI): ICD-10-CM

## 2024-10-11 PROCEDURE — 99215 OFFICE O/P EST HI 40 MIN: CPT | Performed by: NURSE PRACTITIONER

## 2024-10-11 PROCEDURE — 4004F PT TOBACCO SCREEN RCVD TLK: CPT | Performed by: NURSE PRACTITIONER

## 2024-10-11 ASSESSMENT — ENCOUNTER SYMPTOMS
NEUROLOGICAL NEGATIVE: 1
ENDOCRINE NEGATIVE: 1
EYES NEGATIVE: 1
PSYCHIATRIC NEGATIVE: 1
CONSTITUTIONAL NEGATIVE: 1
MYALGIAS: 1
HEMATOLOGIC/LYMPHATIC NEGATIVE: 1
GASTROINTESTINAL NEGATIVE: 1
CARDIOVASCULAR NEGATIVE: 1
RESPIRATORY NEGATIVE: 1

## 2024-10-11 ASSESSMENT — PAIN SCALES - GENERAL: PAINLEVEL: 0-NO PAIN
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
## 2024-10-11 NOTE — TELEPHONE ENCOUNTER
RN called and left a message for the patient that Devora Jose is putting in for a 6 month follow up visit instead of a year follow up. RN advised that the patient call the office back with any questions or concerns.

## 2024-10-14 ENCOUNTER — TELEPHONE (OUTPATIENT)
Dept: PALLIATIVE MEDICINE | Facility: HOSPITAL | Age: 44
End: 2024-10-14
Payer: COMMERCIAL

## 2024-10-14 NOTE — TELEPHONE ENCOUNTER
I spoke with Mona and confirmed Dr. Andrew and PRABHJOT Mcelroy work within the same group as patient had prescription from both providers and has a hx of opiate use disorder. No further questions.

## 2024-10-17 ENCOUNTER — TELEPHONE (OUTPATIENT)
Dept: ADMISSION | Facility: HOSPITAL | Age: 44
End: 2024-10-17
Payer: COMMERCIAL

## 2024-10-17 NOTE — TELEPHONE ENCOUNTER
Xu P Crayton called the refill line for Dilaudid. Requesting refills be sent to [facility] pharmacy; message sent to Sickle Cell team to submit.

## 2024-10-18 ENCOUNTER — OFFICE VISIT (OUTPATIENT)
Dept: HEMATOLOGY/ONCOLOGY | Facility: HOSPITAL | Age: 44
End: 2024-10-18
Payer: COMMERCIAL

## 2024-10-18 ENCOUNTER — TELEPHONE (OUTPATIENT)
Dept: HEMATOLOGY/ONCOLOGY | Facility: HOSPITAL | Age: 44
End: 2024-10-18

## 2024-10-18 VITALS
HEIGHT: 67 IN | WEIGHT: 137.13 LBS | HEART RATE: 79 BPM | RESPIRATION RATE: 20 BRPM | DIASTOLIC BLOOD PRESSURE: 77 MMHG | TEMPERATURE: 99 F | OXYGEN SATURATION: 99 % | BODY MASS INDEX: 21.52 KG/M2 | SYSTOLIC BLOOD PRESSURE: 121 MMHG

## 2024-10-18 DIAGNOSIS — D57.00 SICKLE CELL CRISIS (MULTI): Primary | ICD-10-CM

## 2024-10-18 LAB
ALBUMIN SERPL BCP-MCNC: 3.9 G/DL (ref 3.4–5)
ALP SERPL-CCNC: 87 U/L (ref 33–120)
ALT SERPL W P-5'-P-CCNC: 8 U/L (ref 10–52)
ANION GAP SERPL CALC-SCNC: 11 MMOL/L (ref 10–20)
AST SERPL W P-5'-P-CCNC: 14 U/L (ref 9–39)
BASOPHILS # BLD AUTO: 0.04 X10*3/UL (ref 0–0.1)
BASOPHILS NFR BLD AUTO: 0.4 %
BILIRUB SERPL-MCNC: 0.5 MG/DL (ref 0–1.2)
BUN SERPL-MCNC: 6 MG/DL (ref 6–23)
CALCIUM SERPL-MCNC: 9.1 MG/DL (ref 8.6–10.3)
CHLORIDE SERPL-SCNC: 108 MMOL/L (ref 98–107)
CO2 SERPL-SCNC: 27 MMOL/L (ref 21–32)
CREAT SERPL-MCNC: 0.86 MG/DL (ref 0.5–1.3)
EGFRCR SERPLBLD CKD-EPI 2021: >90 ML/MIN/1.73M*2
EOSINOPHIL # BLD AUTO: 0.16 X10*3/UL (ref 0–0.7)
EOSINOPHIL NFR BLD AUTO: 1.8 %
ERYTHROCYTE [DISTWIDTH] IN BLOOD BY AUTOMATED COUNT: 13.6 % (ref 11.5–14.5)
GLUCOSE SERPL-MCNC: 100 MG/DL (ref 74–99)
HCT VFR BLD AUTO: 43.6 % (ref 41–52)
HGB BLD-MCNC: 15.4 G/DL (ref 13.5–17.5)
HGB RETIC QN: 35 PG (ref 28–38)
IMM GRANULOCYTES # BLD AUTO: 0.02 X10*3/UL (ref 0–0.7)
IMM GRANULOCYTES NFR BLD AUTO: 0.2 % (ref 0–0.9)
IMMATURE RETIC FRACTION: 4 %
LDH SERPL L TO P-CCNC: 193 U/L (ref 84–246)
LYMPHOCYTES # BLD AUTO: 2.67 X10*3/UL (ref 1.2–4.8)
LYMPHOCYTES NFR BLD AUTO: 29.6 %
MCH RBC QN AUTO: 32.2 PG (ref 26–34)
MCHC RBC AUTO-ENTMCNC: 35.3 G/DL (ref 32–36)
MCV RBC AUTO: 91 FL (ref 80–100)
MONOCYTES # BLD AUTO: 0.62 X10*3/UL (ref 0.1–1)
MONOCYTES NFR BLD AUTO: 6.9 %
NEUTROPHILS # BLD AUTO: 5.5 X10*3/UL (ref 1.2–7.7)
NEUTROPHILS NFR BLD AUTO: 61.1 %
NRBC BLD-RTO: 0 /100 WBCS (ref 0–0)
PLATELET # BLD AUTO: 257 X10*3/UL (ref 150–450)
POTASSIUM SERPL-SCNC: 4.5 MMOL/L (ref 3.5–5.3)
PROT SERPL-MCNC: 6.4 G/DL (ref 6.4–8.2)
RBC # BLD AUTO: 4.79 X10*6/UL (ref 4.5–5.9)
RETICS #: 0.05 X10*6/UL (ref 0.02–0.12)
RETICS/RBC NFR AUTO: 1.1 % (ref 0.5–2)
SODIUM SERPL-SCNC: 141 MMOL/L (ref 136–145)
WBC # BLD AUTO: 9 X10*3/UL (ref 4.4–11.3)

## 2024-10-18 PROCEDURE — 99215 OFFICE O/P EST HI 40 MIN: CPT | Performed by: NURSE PRACTITIONER

## 2024-10-18 PROCEDURE — 80053 COMPREHEN METABOLIC PANEL: CPT

## 2024-10-18 PROCEDURE — 2500000001 HC RX 250 WO HCPCS SELF ADMINISTERED DRUGS (ALT 637 FOR MEDICARE OP): Performed by: NURSE PRACTITIONER

## 2024-10-18 PROCEDURE — 83615 LACTATE (LD) (LDH) ENZYME: CPT

## 2024-10-18 PROCEDURE — 85025 COMPLETE CBC W/AUTO DIFF WBC: CPT

## 2024-10-18 PROCEDURE — 3008F BODY MASS INDEX DOCD: CPT | Performed by: NURSE PRACTITIONER

## 2024-10-18 PROCEDURE — 85045 AUTOMATED RETICULOCYTE COUNT: CPT

## 2024-10-18 PROCEDURE — 2500000005 HC RX 250 GENERAL PHARMACY W/O HCPCS: Performed by: NURSE PRACTITIONER

## 2024-10-18 PROCEDURE — 99417 PROLNG OP E/M EACH 15 MIN: CPT | Performed by: NURSE PRACTITIONER

## 2024-10-18 RX ORDER — HYDROMORPHONE HYDROCHLORIDE 4 MG/1
12 TABLET ORAL
Status: COMPLETED | OUTPATIENT
Start: 2024-10-18 | End: 2024-10-18

## 2024-10-18 RX ORDER — NALOXONE HYDROCHLORIDE 0.4 MG/ML
0.4 INJECTION, SOLUTION INTRAMUSCULAR; INTRAVENOUS; SUBCUTANEOUS
Status: DISCONTINUED | OUTPATIENT
Start: 2024-10-18 | End: 2024-10-18 | Stop reason: HOSPADM

## 2024-10-18 RX ORDER — ONDANSETRON 4 MG/1
4 TABLET, FILM COATED ORAL ONCE
Status: COMPLETED | OUTPATIENT
Start: 2024-10-18 | End: 2024-10-18

## 2024-10-18 RX ORDER — DIPHENHYDRAMINE HCL 25 MG
25 CAPSULE ORAL ONCE
Status: COMPLETED | OUTPATIENT
Start: 2024-10-18 | End: 2024-10-18

## 2024-10-18 RX ORDER — CYCLOBENZAPRINE HCL 10 MG
10 TABLET ORAL ONCE
Status: COMPLETED | OUTPATIENT
Start: 2024-10-18 | End: 2024-10-18

## 2024-10-18 ASSESSMENT — PAIN SCALES - GENERAL
PAINLEVEL_OUTOF10: 9
PAINLEVEL_OUTOF10: 7
PAINLEVEL_OUTOF10: 5 - MODERATE PAIN
PAINLEVEL_OUTOF10: 9

## 2024-10-18 ASSESSMENT — PAIN DESCRIPTION - LOCATION: LOCATION: LEG

## 2024-10-18 NOTE — LETTER
October 18, 2024     Patient: Xu Maya   YOB: 1980   Date of Visit: 10/18/2024       To Whom It May Concern:    Xu Maya was seen in my clinic on 10/18/2024 at 10:30 am. Please excuse Xu for his absence from work on this day to make the appointment.    If you have any questions or concerns, please don't hesitate to call.         Sincerely,         [facility]        CC: No Recipients

## 2024-10-18 NOTE — PROGRESS NOTES
Patient ID:  Xu Maya is a 43 y.o. male.  Subjective   Chief Complaint: Uncontrolled Pain    HPI  Xu is a 43 y.o. male with history of anxiety, depression, gastritis/GERD, priapism, and sickle cell disease s/p transplant, who presents to [facility] with uncontrolled pain in BLE typical of his baseline pain. He said he has been taking his dilaudid and ibuprofen without enough relief. He last took dilaudid at 0130 this morning. He thinks the cold weather exacerbated his pain. Pt denies chest pain, cough, SOB, falls, fever or chills, n/v/d/abd pain, or urinary complaints.      He also c/o intermittent headaches over the past few days. He denies any auras and thinks excedrin and sleep help the most. He denies any vision changes. He notes he has been working a lot and under some stress at home that may be contributing.     ROS    Allergies  Allergies   Allergen Reactions    Hydrocodone-Acetaminophen Hives and Unknown    Naproxen Hives        Medications  Current Outpatient Medications   Medication Instructions    amitriptyline (ELAVIL) 25 mg, oral    ARIPiprazole (ABILIFY) 5 mg, oral, Daily    cyclobenzaprine (FLEXERIL) 10 mg, oral, 3 times daily PRN    diphenhydrAMINE (BENADRYL) 25 mg, oral, Every 6 hours PRN    DULoxetine (CYMBALTA) 60 mg, oral, 2 times daily    HYDROmorphone (DILAUDID) 4 mg, oral, 4 times daily PRN    hydrOXYzine HCL (ATARAX) 25 mg, oral, Nightly    naloxone (NARCAN) 4 mg, nasal, As needed, 1 spray to nostril for overdose, may repeat every 2-3 minutes until medical assistance arrives      sennosides (SENOKOT) 25.8 mg, oral, 2 times daily        Past Medical History:   Past Medical History:  04/17/2017: Anxiety disorder, unspecified      Comment:  Anxiety  04/17/2017: Depression, unspecified      Comment:  Depression  10/07/2023: Diarrhea      Comment:  DIARRHEA    03/02/2017: Family history of glaucoma      Comment:  Family history of glaucoma in father  04/17/2017: Gastritis, unspecified, without  bleeding      Comment:  Gastritis  04/17/2017: Hematuria, unspecified      Comment:  Hematuria  12/02/2015: Personal history of other diseases of the digestive system      Comment:  History of esophageal reflux  04/17/2017: Priapism, unspecified      Comment:  Priapism  04/17/2017: Sickle-cell disease without crisis (Multi)      Comment:  Sickle cell anemia   Surgical History:    Past Surgical History:   Procedure Laterality Date    GALLBLADDER SURGERY  04/12/2017    Gallbladder Surgery    IR CVC TUNNELED  4/24/2018    IR CVC TUNNELED 4/24/2018 [facility]    IR CVC TUNNELED  6/5/2018    IR CVC TUNNELED 6/5/2018 [facility]    IR CVC TUNNELED  3/1/2019    IR CVC TUNNELED 3/1/2019 [facility]    IR CVC TUNNELED  6/4/2019    IR CVC TUNNELED 6/4/2019 [facility]GAC[facility]    IR CVC TUNNELED  4/5/2019    IR CVC TUNNELED 4/5/2019 [facility]    IR CVC TUNNELED  7/17/2019    IR CVC TUNNELED 7/17/2019 [facility]    IR CVC TUNNELED  8/14/2019    IR CVC TUNNELED 8/14/2019 [facility]    IR CVC TUNNELED  12/18/2019    IR CVC TUNNELED 12/18/2019 [facility]    IR CVC TUNNELED  10/9/2019    IR CVC TUNNELED 10/9/2019 [facility]    IR CVC TUNNELED  11/13/2019    IR CVC TUNNELED 11/13/2019 [facility]GAC[facility]    IR CVC TUNNELED  1/15/2020    IR CVC TUNNELED 1/15/2020 [facility]    IR CVC TUNNELED  2/19/2020    IR CVC TUNNELED 2/19/2020 [facility]    IR CVC TUNNELED  1/4/2021    IR CVC TUNNELED 1/4/2021 [facility]    IR CVC TUNNELED  1/25/2021    IR CVC TUNNELED 1/25/2021 [facility]    IR CVC TUNNELED  8/21/2018    IR CVC TUNNELED 8/21/2018 [facility]    IR CVC TUNNELED  9/26/2018    IR CVC TUNNELED 9/26/2018 [facility]    IR CVC TUNNELED  11/5/2018    IR CVC TUNNELED 11/5/2018 [facility]    IR CVC TUNNELED  12/19/2018    IR CVC TUNNELED 12/19/2018 [facility]    IR VENOGRAM HEPATIC  11/8/2017    IR VENOGRAM HEPATIC 11/8/2017 [facility]    MR HEAD  "ANGIO WO IV CONTRAST  2/17/2017    MR HEAD ANGIO WO IV CONTRAST 2/17/2017 [facility]ICAL LEGAC[facility]    MR NECK ANGIO WO IV CONTRAST  2/17/2017    MR NECK ANGIO WO IV CONTRAST 2/17/2017 [facility] CL[facility]ICAL LEGAC[facility]    US GUIDED NEEDLE LIVER BIOPSY  9/8/2020    US GUIDED NEEDLE LIVER BIOPSY 9/8/2020 [facility]      Family History:    Family History   Problem Relation Name Age of Onset    Diabetes Father      Sickle cell anemia Other sibling     Cancer Other grandfather     Cancer Other uncle      Family Oncology History:    Cancer-related family history includes Cancer in some other family members.  Social History:    Social History     Tobacco Use    Smoking status: Every Day     Types: Cigarettes     Start date: 1/1/1998     Passive exposure: Current    Smokeless tobacco: Never   Substance Use Topics    Alcohol use: Not Currently     Comment: Occasional    Drug use: Not Currently     Types: Marijuana     Comment: Last use sometime in 2023.  No intention to re-start.        Objective   Vitals: /77 (BP Location: Right arm, Patient Position: Sitting, BP Cuff Size: Adult)   Pulse 79   Temp 37.2 °C (99 °F) (Temporal)   Resp 20   Ht 1.701 m (5' 6.97\")   Wt 62.2 kg (137 lb 2 oz)   SpO2 99%   BMI 21.50 kg/m²   Weight:   Vitals:    10/18/24 1027   Weight: 62.2 kg (137 lb 2 oz)       Physical Exam  Vitals reviewed.   Constitutional:       Appearance: Normal appearance.   HENT:      Head: Normocephalic and atraumatic.      Nose: Nose normal.      Mouth/Throat:      Mouth: Mucous membranes are moist.   Eyes:      Conjunctiva/sclera: Conjunctivae normal.      Pupils: Pupils are equal, round, and reactive to light.   Cardiovascular:      Rate and Rhythm: Normal rate and regular rhythm.   Pulmonary:      Effort: Pulmonary effort is normal.      Breath sounds: Normal breath sounds.   Abdominal:      General: Abdomen is flat. Bowel sounds are normal.      Palpations: Abdomen is soft.   Musculoskeletal:         General: Normal " range of motion.      Cervical back: Normal range of motion.   Skin:     General: Skin is warm and dry.      Capillary Refill: Capillary refill takes less than 2 seconds.   Neurological:      General: No focal deficit present.      Mental Status: He is alert and oriented to person, place, and time.   Psychiatric:         Mood and Affect: Mood normal.         Behavior: Behavior normal.         Diagnostic Results     Labs  Results from last 7 days   Lab Units 10/18/24  1115   WBC AUTO x10*3/uL 9.0   HEMOGLOBIN g/dL 15.4   HEMATOCRIT % 43.6   PLATELETS AUTO x10*3/uL 257   NEUTROS ABS x10*3/uL 5.50   LYMPHS ABS AUTO x10*3/uL 2.67   MONOS ABS AUTO x10*3/uL 0.62   EOS ABS AUTO x10*3/uL 0.16   NEUTROS PCT AUTO % 61.1   LYMPHS PCT AUTO % 29.6   MONOS PCT AUTO % 6.9   EOS PCT AUTO % 1.8      Results from last 7 days   Lab Units 10/18/24  1115   GLUCOSE mg/dL 100*   SODIUM mmol/L 141   POTASSIUM mmol/L 4.5   CHLORIDE mmol/L 108*   CO2 mmol/L 27   BUN mg/dL 6   CREATININE mg/dL 0.86   EGFR mL/min/1.73m*2 >90   CALCIUM mg/dL 9.1   ALBUMIN g/dL 3.9   PROTEIN TOTAL g/dL 6.4     Results from last 7 days   Lab Units 10/18/24  1115   BILIRUBIN TOTAL mg/dL 0.5   ALK PHOS U/L 87   ALT U/L 8*   AST U/L 14         Results from last 7 days   Lab Units 10/18/24  1115   RETIC CT PCT % 1.1   RETIC CT ABS x10*6/uL 0.053   IMMATURE RETIC FRACTION % 4.0   RETIC HGB pg 35     Results from last 7 days   Lab Units 10/18/24  1115   LD U/L 193         Lab Results   Component Value Date    HGB 15.4 10/18/2024    HGB 15.7 09/17/2024    HGB 13.9 08/18/2024     10/18/2024     09/17/2024     08/18/2024     10/18/2024     09/17/2024     08/18/2024       Images  === 05/13/24 ===    XR CHEST 2 VIEWS    - Impression -  Left ventricular enlargement without evidence of acute disease in the  chest.      MACRO:  None.    Signed by: Colin Jordan 5/14/2024 12:56 AM  Dictation workstation:   [id number]   === 10/23/21  ===    CT ABDOMEN PELVIS W IV CONTRAST    - Impression -  There is a pancolitis.  In a sickle cell patient who has received a  bone marrow transplant, differential includes graft versus host  disease as well as ischemic/venoocclusive colitis.  Inflammatory or  infectious colitis would also be in the differential.  No bowel  obstruction.  There is a small amount of pelvic free fluid.  No  pneumatosis or portal venous gas.    The appendix is seen with no evidence of acute appendicitis.    Minimal patchy opacity in the lower lobes, raising the possibility of  changes of kmsmg-hzvwer-vwmb disease, pneumonia or sickle cell crisis.  Consider early or mild interstitial fibrosis.    Cholecystectomy.  No biliary ductal dilatation.  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    Consider cystitis which can also represent a manifestation of  hraqu-fnwkhm-iiie disease as well as infection.  Clinical correlation  is needed.    Osseous changes consistent with known sickle cell disease.  Mild  cardiomegaly consistent with known sickle cell disease.    NOTIFICATION:  The critical results of the study were discussed with,  and acknowledged by Dr. Bety Carr  by telephone on 10/23/2021 at  1322 hours.            Signed by Allyson Padgett MD     No echocardiogram results found for the past 12 months       Assessment/Plan   Xu is a 43 y.o. male with history of anxiety, depression, gastritis/GERD, priapism, and sickle cell disease s/p transplant, who presents to [facility] with uncontrolled pain in BLE.     ACC Course  - VSS  - Hemolysis labs baseline  - dilaudid 12mg PO Q1 hr x3 doses for sickle cell related pain  - Flexeril 10mg, given for MSK pain  - benadryl and zofran as premeds for opioid induced pruritus and nausea    Disposition  - Patient discharged home with no further needs following ACC Course  - pt has scheduled FUV with palliative care Dr. Andrew and sickle cell team  - Return to clinic/ED  instructions given    Nelida Willis, APRN-CNP

## 2024-10-21 ENCOUNTER — TELEPHONE (OUTPATIENT)
Dept: ADMISSION | Facility: HOSPITAL | Age: 44
End: 2024-10-21
Payer: COMMERCIAL

## 2024-10-21 DIAGNOSIS — Z79.891 ENCOUNTER FOR MONITORING OPIOID MAINTENANCE THERAPY: ICD-10-CM

## 2024-10-21 DIAGNOSIS — D57.00 SICKLE-CELL DISEASE WITH PAIN (MULTI): ICD-10-CM

## 2024-10-21 DIAGNOSIS — S46.812A STRAIN OF LEFT TRAPEZIUS MUSCLE, INITIAL ENCOUNTER: ICD-10-CM

## 2024-10-21 DIAGNOSIS — R52 ACUTE PAIN: ICD-10-CM

## 2024-10-21 DIAGNOSIS — Z51.81 ENCOUNTER FOR MONITORING OPIOID MAINTENANCE THERAPY: ICD-10-CM

## 2024-10-21 PROCEDURE — RXMED WILLOW AMBULATORY MEDICATION CHARGE

## 2024-10-21 RX ORDER — HYDROMORPHONE HYDROCHLORIDE 4 MG/1
4 TABLET ORAL 4 TIMES DAILY PRN
Qty: 120 TABLET | Refills: 0 | Status: SHIPPED | OUTPATIENT
Start: 2024-10-21 | End: 2024-11-21

## 2024-10-21 RX ORDER — CYCLOBENZAPRINE HCL 10 MG
10 TABLET ORAL 3 TIMES DAILY PRN
Qty: 20 TABLET | Refills: 0 | Status: SHIPPED | OUTPATIENT
Start: 2024-10-21

## 2024-10-22 ENCOUNTER — PHARMACY VISIT (OUTPATIENT)
Dept: PHARMACY | Facility: CLINIC | Age: 44
End: 2024-10-22
Payer: MEDICAID

## 2024-10-24 ENCOUNTER — TELEMEDICINE (OUTPATIENT)
Dept: BEHAVIORAL HEALTH | Facility: HOSPITAL | Age: 44
End: 2024-10-24
Payer: COMMERCIAL

## 2024-10-24 DIAGNOSIS — F33.41 RECURRENT MAJOR DEPRESSIVE DISORDER, IN PARTIAL REMISSION (CMS-HCC): ICD-10-CM

## 2024-10-24 DIAGNOSIS — F51.5 NIGHTMARES: ICD-10-CM

## 2024-10-24 DIAGNOSIS — F41.9 ANXIETY: ICD-10-CM

## 2024-10-24 PROCEDURE — 99214 OFFICE O/P EST MOD 30 MIN: CPT | Performed by: PSYCHIATRY & NEUROLOGY

## 2024-10-24 PROCEDURE — 4004F PT TOBACCO SCREEN RCVD TLK: CPT | Performed by: PSYCHIATRY & NEUROLOGY

## 2024-10-24 RX ORDER — PRAZOSIN HYDROCHLORIDE 1 MG/1
1 CAPSULE ORAL NIGHTLY
Qty: 30 CAPSULE | Refills: 2 | Status: SHIPPED | OUTPATIENT
Start: 2024-10-24 | End: 2025-01-22

## 2024-10-24 NOTE — PROGRESS NOTES
Outpatient Psychiatry FUV      Subjective   Xu Maya, a 43 y.o. male, for virtual FUV.     Virtual or Telephone Consent    An interactive audio and video telecommunication system which permits real time communications between the patient (at the originating site) and provider (at the distant site) was utilized to provide this telehealth service.   Verbal consent was requested and obtained from Xu Maya on this date, 10/24/24 for a telehealth visit.     At home for appt today      Assessment/Plan   Patient Discussion:  CONTINUE duloxetine 60mg 2x day  CONTINUE aripiprazole 5mg in the AM  CONTINUE prazosin 1mg at bedtime for dreams     RETURN to clinic 1/9 at 9:30AM for virtual FUV     call with questions or concerns. [phone]      Assessment:   43 y.o.  M with SCC disease with depression, sickle cell disease now s/p BMT. Previously on suboxone for management of pain/high utilization but now off since transplant, still having pain now trialing scrambler      FUV 10/24/2024  pt reports overall mood/sleep are stable.  Follow up 2-3 months sooner if needed    Diagnosis:   MDD, recurrent. P remission  WILFRID  Chronic pain disorder    Treatment Plan/Recommendations:   1. Safety Assessment: no current SI, no h/o SI/SA. has guns at home but unloaded and locked with kids at home. PF include , no previous attempts, kids, Hoahaoism. RF include depression, pain, father with completed suicide. Pt has moderate baseline risk based on static factors but is not an imminent risk and safety plan addressed     2. MDD, WILFRID  CONTINUE duloxetine 60mg PO BID, r/b/ae discussed including higher dose of SNRI, si/sx excess serotonin/SS  CONTINUE aripiprazole 5mg PO daily   CONTINUE prazosin 1mg PO at bedtime, no dizziness     continue supportive therapy during appt     3. opioid misuse in the context of chronic pain 2/2 sickle cell disease with acute exacerbations currently no concerns  continues to struggle with chronic  pain, more sedation with methadone, had scrambler therapy not helpful  Will resume pain management through sickle cell     nicotine use disorder --previous success with chantix but stopped and now smoking 3-4/day. monitor for now     4. Medical: SCC, back pain, GERD with h/o PUD: recent notes and labs reviewed. stable.   s/p BMT 1/2021  notes and labs reviewed,   coordinate care as needed with sickle cell team, BMT as needed  Ongoing pain, had scrambler therapy not helpful  Will resume pain management through sickle cell     5. Social: lives with kids and wife, moved to [place] after transplant, [place]. stepfather passed away from leukemia, sister passed from sickle cell. In marriage counseling     Reason for Visit:   FUV depression and anxiety    Subjective:  Last visit 6 weeks ago  Since then things have been ok in terms of mood  Still in marriage counseling  Unsure of path forward  Sleep doing ok, no nightmares    Hoping to have  from home going by end of the year    Stable from sickle cell, Q6m now  Tried scrambler but wasn't helping so stopped  Will resume pain management with sickle cell since Dr. Andrew leaving    No SI/HI or thoughts of not wanting to go on  No a/e to medication    Current Medications:    Current Outpatient Medications:     amitriptyline (Elavil) 25 mg tablet, Take 1 tablet (25 mg) by mouth., Disp: , Rfl:     ARIPiprazole (Abilify) 5 mg tablet, Take 1 tablet (5 mg) by mouth once daily., Disp: 90 tablet, Rfl: 1    cyclobenzaprine (Flexeril) 10 mg tablet, Take 1 tablet (10 mg) by mouth 3 times a day as needed for muscle spasms., Disp: 20 tablet, Rfl: 0    diphenhydrAMINE (BENADryl) 25 mg capsule, Take 1 capsule (25 mg) by mouth every 6 hours if needed., Disp: , Rfl:     DULoxetine (Cymbalta) 60 mg DR capsule, Take 1 capsule (60 mg) by mouth 2 times a day., Disp: 180 capsule, Rfl: 3    HYDROmorphone (Dilaudid) 4 mg tablet, Take 1 tablet (4 mg) by mouth 4 times a day as  needed for severe pain (7 - 10)., Disp: 120 tablet, Rfl: 0    hydrOXYzine HCL (Atarax) 25 mg tablet, Take 1 tablet (25 mg) by mouth once daily at bedtime., Disp: , Rfl:     naloxone (Narcan) 4 mg/0.1 mL nasal spray, Administer 1 spray (4 mg) into affected nostril(s) if needed for opioid reversal or respiratory depression. 1 spray to nostril for overdose, may repeat every 2-3 minutes until medical assistance arrives, Disp: , Rfl:     sennosides (Senokot) 8.6 mg tablet, Take 3 tablets (25.8 mg) by mouth 2 times a day. (Patient not taking: Reported on 10/11/2024), Disp: 180 tablet, Rfl: 3    Current Facility-Administered Medications:     heparin lock flush (porcine) 10 unit/mL injection 100 Units, 100 Units, intra-catheter, Once, CHRIS Garcia    heparin lock flush (porcine) injection 500 Units, 5 mL, intravenous, PRN, CHRIS Garcia  Medical History:  Past Medical History:   Diagnosis Date    Anxiety disorder, unspecified 04/17/2017    Anxiety    Depression, unspecified 04/17/2017    Depression    Diarrhea 10/07/2023    DIARRHEA      Family history of glaucoma 03/02/2017    Family history of glaucoma in father    Gastritis, unspecified, without bleeding 04/17/2017    Gastritis    Hematuria, unspecified 04/17/2017    Hematuria    Personal history of other diseases of the digestive system 12/02/2015    History of esophageal reflux    Priapism, unspecified 04/17/2017    Priapism    Sickle-cell disease without crisis (Multi) 04/17/2017    Sickle cell anemia       Surgical History:  Past Surgical History:   Procedure Laterality Date    GALLBLADDER SURGERY  04/12/2017    Gallbladder Surgery    IR CVC TUNNELED  4/24/2018    IR CVC TUNNELED 4/24/2018 [facility]GAC[facility]    IR CVC TUNNELED  6/5/2018    IR CVC TUNNELED 6/5/2018 [facility]    IR CVC TUNNELED  3/1/2019    IR CVC TUNNELED 3/1/2019 [facility]    IR CVC TUNNELED  6/4/2019    IR CVC TUNNELED 6/4/2019 [facility] CL[facility]ICAL LEGACY    IR CVC TUNNELED  4/5/2019     IR CVC TUNNELED 4/5/2019 [facility]    IR CVC TUNNELED  7/17/2019    IR CVC TUNNELED 7/17/2019 [facility]    IR CVC TUNNELED  8/14/2019    IR CVC TUNNELED 8/14/2019 [facility]    IR CVC TUNNELED  12/18/2019    IR CVC TUNNELED 12/18/2019 [facility] CL[facility]ICAL LEGACY    IR CVC TUNNELED  10/9/2019    IR CVC TUNNELED 10/9/2019 [facility]    IR CVC TUNNELED  11/13/2019    IR CVC TUNNELED 11/13/2019 [facility] CL[facility]ICAL LEGACY    IR CVC TUNNELED  1/15/2020    IR CVC TUNNELED 1/15/2020 [facility] CL[facility]ICAL LEGACY    IR CVC TUNNELED  2/19/2020    IR CVC TUNNELED 2/19/2020 [facility] CL[facility]ICAL LEGACY    IR CVC TUNNELED  1/4/2021    IR CVC TUNNELED 1/4/2021 [facility]    IR CVC TUNNELED  1/25/2021    IR CVC TUNNELED 1/25/2021 [facility]    IR CVC TUNNELED  8/21/2018    IR CVC TUNNELED 8/21/2018 [facility]    IR CVC TUNNELED  9/26/2018    IR CVC TUNNELED 9/26/2018 [facility]    IR CVC TUNNELED  11/5/2018    IR CVC TUNNELED 11/5/2018 [facility]    IR CVC TUNNELED  12/19/2018    IR CVC TUNNELED 12/19/2018 [facility]    IR VENOGRAM HEPATIC  11/8/2017    IR VENOGRAM HEPATIC 11/8/2017 [facility]    MR HEAD ANGIO WO IV CONTRAST  2/17/2017    MR HEAD ANGIO WO IV CONTRAST 2/17/2017 [facility] CLINICAL LEGACY    MR NECK ANGIO WO IV CONTRAST  2/17/2017    MR NECK ANGIO WO IV CONTRAST 2/17/2017 [facility] CLINICAL LEGACY    US GUIDED NEEDLE LIVER BIOPSY  9/8/2020    US GUIDED NEEDLE LIVER BIOPSY 9/8/2020 [facility]       Family History:  Family History   Problem Relation Name Age of Onset    Diabetes Father      Sickle cell anemia Other sibling     Cancer Other grandfather     Cancer Other uncle        Social History:  Social History     Socioeconomic History    Marital status:      Spouse name: Not on file    Number of children: Not on file    Years of education: Not on file    Highest education level: Not on file   Occupational History    Not on file   Tobacco Use    Smoking status: Every Day     Types:  "Cigarettes     Start date: 1/1/1998     Passive exposure: Current    Smokeless tobacco: Never   Substance and Sexual Activity    Alcohol use: Not Currently     Comment: Occasional    Drug use: Not Currently     Types: Marijuana     Comment: Last use sometime in 2023.  No intention to re-start.    Sexual activity: Not on file   Other Topics Concern    Not on file   Social History Narrative    Not on file     Social Drivers of Health     Financial Resource Strain: Not on file   Food Insecurity: Not on file   Transportation Needs: Not on file   Physical Activity: Not on file   Stress: Not on file   Social Connections: Not on file   Intimate Partner Violence: Not on file   Housing Stability: Not on file              Medical Review Of Systems:  +pain, scrambler therapy didn't work    Psychiatric Review Of Systems:  Depression ok  Sleep/dreams ok       Objective   Mental Status Exam:  Appearance: appropriate g/h.   Attitude: cooperative and engaged.   Behavior: good eye contact via video  Motor Activity: no tremor, spontaneous movement  Speech: regular in rate, volume, low tone, no dysarthria, no aphasia.   Mood: \"ok\"  Affect: congruent, appropriate range.   Thought Process: linear, goal directed.   Thought Content: no delusions, no SI/HI.   Thought Perception: no AVH.   Cognition: alert, oriented to person, place, time. attn intact.   Insight: fair.   Judgment: fair/intact.     Vitals:  There were no vitals filed for this visit.  Encounter Date: 09/17/24   ECG 12 lead   Result Value    Ventricular Rate 69    Atrial Rate 69    MI Interval 158    QRS Duration 100    QT Interval 410    QTC Calculation(Bazett) 439    P Axis 85    R Axis -64    T Axis -13    QRS Count 11    Q Onset 224    P Onset 145    P Offset 205    T Offset 429    QTC Fredericia 429    Narrative    Normal sinus rhythm  Incomplete right bundle branch block  Left anterior fascicular block  Abnormal ECG  When compared with ECG of 14-MAY-2024 02:47,  T wave " inversion no longer evident in Lateral leads  See ED provider note for full interpretation and clinical correlation  Confirmed by Annamaria Deal ([id number]) on 9/20/2024 11:04:55 AM     Lab Results   Component Value Date    WBC 9.0 10/18/2024    HGB 15.4 10/18/2024    HCT 43.6 10/18/2024    MCV 91 10/18/2024     10/18/2024     Lab Results   Component Value Date    GLUCOSE 100 (H) 10/18/2024    CALCIUM 9.1 10/18/2024     10/18/2024    K 4.5 10/18/2024    CO2 27 10/18/2024     (H) 10/18/2024    BUN 6 10/18/2024    CREATININE 0.86 10/18/2024     Psychotherapy       Time: 12 minutes  Type: supportive, insight oriented  Target: mood, anxiety  Strategies: problem solving, insight oriented  Goal: decreased sx burden  Follow up: next visit 1-2 months  Response: mayuri Contreras MD

## 2024-10-30 ENCOUNTER — OFFICE VISIT (OUTPATIENT)
Dept: PALLIATIVE MEDICINE | Facility: HOSPITAL | Age: 44
End: 2024-10-30
Payer: COMMERCIAL

## 2024-10-30 ENCOUNTER — OFFICE VISIT (OUTPATIENT)
Dept: HEMATOLOGY/ONCOLOGY | Facility: HOSPITAL | Age: 44
End: 2024-10-30
Payer: COMMERCIAL

## 2024-10-30 VITALS
OXYGEN SATURATION: 99 % | RESPIRATION RATE: 20 BRPM | HEART RATE: 70 BPM | TEMPERATURE: 97.7 F | WEIGHT: 143.3 LBS | BODY MASS INDEX: 22.46 KG/M2 | DIASTOLIC BLOOD PRESSURE: 72 MMHG | SYSTOLIC BLOOD PRESSURE: 108 MMHG

## 2024-10-30 VITALS
BODY MASS INDEX: 22.46 KG/M2 | HEART RATE: 70 BPM | WEIGHT: 143.3 LBS | SYSTOLIC BLOOD PRESSURE: 108 MMHG | TEMPERATURE: 97.7 F | OXYGEN SATURATION: 99 % | RESPIRATION RATE: 20 BRPM | DIASTOLIC BLOOD PRESSURE: 72 MMHG

## 2024-10-30 DIAGNOSIS — Z51.5 PALLIATIVE CARE ENCOUNTER: ICD-10-CM

## 2024-10-30 DIAGNOSIS — D57.1 SICKLE CELL DISEASE WITHOUT CRISIS (MULTI): Primary | ICD-10-CM

## 2024-10-30 DIAGNOSIS — M89.8X9 BONE PAIN: Primary | ICD-10-CM

## 2024-10-30 DIAGNOSIS — K59.03 CONSTIPATION DUE TO OPIOID THERAPY: ICD-10-CM

## 2024-10-30 DIAGNOSIS — I69.319 COGNITIVE DEFICIT DUE TO OLD SUBCORTICAL INFARCTS: ICD-10-CM

## 2024-10-30 DIAGNOSIS — T40.2X5A CONSTIPATION DUE TO OPIOID THERAPY: ICD-10-CM

## 2024-10-30 DIAGNOSIS — D57.00 SICKLE CELL DISEASE WITH CRISIS (MULTI): ICD-10-CM

## 2024-10-30 PROCEDURE — 99214 OFFICE O/P EST MOD 30 MIN: CPT | Performed by: PEDIATRICS

## 2024-10-30 PROCEDURE — 99213 OFFICE O/P EST LOW 20 MIN: CPT | Performed by: INTERNAL MEDICINE

## 2024-10-30 ASSESSMENT — PAIN SCALES - GENERAL
PAINLEVEL_OUTOF10: 3
PAINLEVEL_OUTOF10: 3

## 2024-11-01 ASSESSMENT — ENCOUNTER SYMPTOMS
ABDOMINAL PAIN: 0
NAUSEA: 0
WOUND: 0
ARTHRALGIAS: 0
COUGH: 0
DIAPHORESIS: 0
FATIGUE: 1
LIGHT-HEADEDNESS: 0
SHORTNESS OF BREATH: 0
SCLERAL ICTERUS: 0
PALPITATIONS: 0
DEPRESSION: 0
DIARRHEA: 0
CONSTIPATION: 1
NECK PAIN: 0
CHEST TIGHTNESS: 0
CHILLS: 0
APPETITE CHANGE: 0
NUMBNESS: 0
WHEEZING: 0
UNEXPECTED WEIGHT CHANGE: 0
LEG SWELLING: 0
EYE PROBLEMS: 0
HEMATURIA: 0
NERVOUS/ANXIOUS: 0
VOMITING: 0
HEMOPTYSIS: 0
HEADACHES: 0
HEMATOLOGIC/LYMPHATIC NEGATIVE: 1
SORE THROAT: 0
FEVER: 0
FLANK PAIN: 0

## 2024-11-06 NOTE — PROGRESS NOTES
Supportive Oncology Established Patient Note    Patient ID: Xu Maya is a 43 y.o. male who presents for Follow-up.    HPI   Bone pain in arms/legs well tolerated with dilaudid 4 mg 3-4 times daily. Also takes the duloxetine 60 mg bid.   Denies any sedation, constipation, nausea, poor appetite, SOB, anxiety or depression. Able to function at his job and care for kids. He doesn't want his management changed any further- feels this is the sweet spot.     Allergies  Hydrocodone-acetaminophen and Naproxen     Objective:    Last Recorded Vitals  Blood pressure 108/72, pulse 70, temperature 36.5 °C (97.7 °F), temperature source Core, resp. rate 20, weight 65 kg (143 lb 4.8 oz), SpO2 99%.    Physical Exam:  Constitutional:       General: Patient is not in acute distress.  HENT:      Head: Normocephalic.      Mouth: Mucous membranes are moist.   Eyes:      Conjunctiva/sclera: Conjunctivae clear, sclerae white. No discharge.     Pupils: Pupils are equal, round, and reactive to light.   Neck:      Vascular: No carotid bruit.   Cardiovascular:      Rate and Rhythm: Normal rate and regular rhythm.      Heart sounds: No murmur heard.  Pulmonary:      Effort: No respiratory distress.      Breath sounds: Clear to auscultation  Abdominal:      General: There is no distension.      Tenderness: There is no abdominal tenderness. There is no guarding.   Musculoskeletal:         General: No deformity.   Skin:     Coloration: Skin is not jaundiced.   Neurological:      General: No focal deficit present.      Mental Status: He is oriented to person, place, and time.   Psychiatric:         Behavior: Behavior normal. Behavior is cooperative.          Relevant Results  Lab Results   Component Value Date    WBC 9.0 10/18/2024    HGB 15.4 10/18/2024    HCT 43.6 10/18/2024    MCV 91 10/18/2024     10/18/2024      Lab Results   Component Value Date    GLUCOSE 100 (H) 10/18/2024    CALCIUM 9.1 10/18/2024     10/18/2024    K 4.5  10/18/2024    CO2 27 10/18/2024     (H) 10/18/2024    BUN 6 10/18/2024    CREATININE 0.86 10/18/2024      Lab Results   Component Value Date    ALT 8 (L) 10/18/2024    AST 14 10/18/2024    ALKPHOS 87 10/18/2024    BILITOT 0.5 10/18/2024        Relevant Imaging   No results found for this or any previous visit from the past 1000 days.     No image results found.       Medications:   Current Outpatient Medications   Medication Instructions    amitriptyline (ELAVIL) 25 mg    ARIPiprazole (ABILIFY) 5 mg, oral, Daily    cyclobenzaprine (FLEXERIL) 10 mg, oral, 3 times daily PRN    diphenhydrAMINE (BENADRYL) 25 mg, Every 6 hours PRN    DULoxetine (CYMBALTA) 60 mg, oral, 2 times daily    HYDROmorphone (DILAUDID) 4 mg, oral, 4 times daily PRN    hydrOXYzine HCL (ATARAX) 25 mg, Nightly    naloxone (NARCAN) 4 mg, nasal, As needed, 1 spray to nostril for overdose, may repeat every 2-3 minutes until medical assistance arrives      prazosin (MINIPRESS) 1 mg, oral, Nightly    sennosides (SENOKOT) 25.8 mg, oral, 2 times daily        Assessment and Plan  Xu Maya is a 43 yr M, w/ PMHx of  Hemoglobin SS disease s/p Haplo SCT (2021), who follows closely with Dr. Luna for disease surveillance and management. Patient presents to palliative clinic for pain medication evaluation and adjustment. Patient stopped taking methadone as it was sedating him at work. He continues to be in pain an dilaudid is only taking the edge off.      Problems:  # Hemoglobin SS disease  #Chronic Pain   - Haplo SCT from his brother (6/12 HLA match)   - T=0, 2/4/21  -Plan:   -Continue Dilaudid 4 mg 4 times daily PRN    -Continue duloxetine 60 mg BID  - has failed methadone  7.5 mg bid was max dose that he used but states it caused sedation; didn't tolerate suboxone - significant nausea but didn't help his pain at all.   - Dr. Hook will manage patient's dilaudid from now on. I would recommend a slow weaning down in the spring as temperature improve  post winter.      #Constipation - none anymore  - has senna and miralax prn.   - I think Bms are normal given he is on duloxetine.      #Anxiety  Heart breathing and mindfulness     #Cognitive decline  -due to microvascular changes from SS disease  -has evidence of disease per imagine in 2020/21.   -please continue to engage wife and update her on plan of care.      Will need to limit his [facility] visits.        Code Status: Assume Full          Tabitha Andrew MD

## 2024-11-13 ENCOUNTER — HOSPITAL ENCOUNTER (OUTPATIENT)
Dept: RESPIRATORY THERAPY | Facility: HOSPITAL | Age: 44
Discharge: HOME | End: 2024-11-13
Payer: COMMERCIAL

## 2024-11-13 DIAGNOSIS — D89.811 CHRONIC GVHD (MULTI): ICD-10-CM

## 2024-11-13 PROCEDURE — 94060 EVALUATION OF WHEEZING: CPT | Performed by: INTERNAL MEDICINE

## 2024-11-13 PROCEDURE — 94729 DIFFUSING CAPACITY: CPT | Performed by: INTERNAL MEDICINE

## 2024-11-13 PROCEDURE — 94726 PLETHYSMOGRAPHY LUNG VOLUMES: CPT

## 2024-11-13 PROCEDURE — 94726 PLETHYSMOGRAPHY LUNG VOLUMES: CPT | Performed by: INTERNAL MEDICINE

## 2024-11-14 ENCOUNTER — OFFICE VISIT (OUTPATIENT)
Dept: HEMATOLOGY/ONCOLOGY | Facility: HOSPITAL | Age: 44
End: 2024-11-14
Payer: COMMERCIAL

## 2024-11-14 ENCOUNTER — TELEPHONE (OUTPATIENT)
Dept: ADMISSION | Facility: HOSPITAL | Age: 44
End: 2024-11-14

## 2024-11-14 ENCOUNTER — TELEPHONE (OUTPATIENT)
Dept: HEMATOLOGY/ONCOLOGY | Facility: HOSPITAL | Age: 44
End: 2024-11-14
Payer: COMMERCIAL

## 2024-11-14 VITALS
SYSTOLIC BLOOD PRESSURE: 126 MMHG | TEMPERATURE: 98.1 F | OXYGEN SATURATION: 99 % | BODY MASS INDEX: 21.95 KG/M2 | DIASTOLIC BLOOD PRESSURE: 77 MMHG | WEIGHT: 140 LBS | HEART RATE: 81 BPM | RESPIRATION RATE: 18 BRPM

## 2024-11-14 DIAGNOSIS — D57.00 SICKLE CELL ANEMIA WITH PAIN (MULTI): Primary | ICD-10-CM

## 2024-11-14 LAB
ALBUMIN SERPL BCP-MCNC: 3.8 G/DL (ref 3.4–5)
ALP SERPL-CCNC: 102 U/L (ref 33–120)
ALT SERPL W P-5'-P-CCNC: 11 U/L (ref 10–52)
ANION GAP SERPL CALC-SCNC: 12 MMOL/L (ref 10–20)
AST SERPL W P-5'-P-CCNC: 22 U/L (ref 9–39)
BASOPHILS # BLD AUTO: 0.02 X10*3/UL (ref 0–0.1)
BASOPHILS NFR BLD AUTO: 0.2 %
BILIRUB SERPL-MCNC: 0.6 MG/DL (ref 0–1.2)
BUN SERPL-MCNC: 6 MG/DL (ref 6–23)
CALCIUM SERPL-MCNC: 8.8 MG/DL (ref 8.6–10.6)
CHLORIDE SERPL-SCNC: 111 MMOL/L (ref 98–107)
CO2 SERPL-SCNC: 20 MMOL/L (ref 21–32)
CREAT SERPL-MCNC: 0.79 MG/DL (ref 0.5–1.3)
EGFRCR SERPLBLD CKD-EPI 2021: >90 ML/MIN/1.73M*2
EOSINOPHIL # BLD AUTO: 0.16 X10*3/UL (ref 0–0.7)
EOSINOPHIL NFR BLD AUTO: 1.6 %
ERYTHROCYTE [DISTWIDTH] IN BLOOD BY AUTOMATED COUNT: 13 % (ref 11.5–14.5)
GLUCOSE SERPL-MCNC: ABNORMAL MG/DL
HCT VFR BLD AUTO: 43 % (ref 41–52)
HGB BLD-MCNC: 15.9 G/DL (ref 13.5–17.5)
HGB RETIC QN: 34 PG (ref 28–38)
IMM GRANULOCYTES # BLD AUTO: 0.04 X10*3/UL (ref 0–0.7)
IMM GRANULOCYTES NFR BLD AUTO: 0.4 % (ref 0–0.9)
IMMATURE RETIC FRACTION: 6.7 %
LYMPHOCYTES # BLD AUTO: 4.1 X10*3/UL (ref 1.2–4.8)
LYMPHOCYTES NFR BLD AUTO: 42.1 %
MCH RBC QN AUTO: 33 PG (ref 26–34)
MCHC RBC AUTO-ENTMCNC: 37 G/DL (ref 32–36)
MCV RBC AUTO: 89 FL (ref 80–100)
MONOCYTES # BLD AUTO: 0.72 X10*3/UL (ref 0.1–1)
MONOCYTES NFR BLD AUTO: 7.4 %
NEUTROPHILS # BLD AUTO: 4.7 X10*3/UL (ref 1.2–7.7)
NEUTROPHILS NFR BLD AUTO: 48.3 %
NRBC BLD-RTO: 0 /100 WBCS (ref 0–0)
PLATELET # BLD AUTO: 283 X10*3/UL (ref 150–450)
POTASSIUM SERPL-SCNC: 4.1 MMOL/L (ref 3.5–5.3)
PROT SERPL-MCNC: 6.8 G/DL (ref 6.4–8.2)
RBC # BLD AUTO: 4.82 X10*6/UL (ref 4.5–5.9)
RETICS #: 0.06 X10*6/UL (ref 0.02–0.12)
RETICS/RBC NFR AUTO: 1.2 % (ref 0.5–2)
SODIUM SERPL-SCNC: 139 MMOL/L (ref 136–145)
WBC # BLD AUTO: 9.7 X10*3/UL (ref 4.4–11.3)

## 2024-11-14 PROCEDURE — 99213 OFFICE O/P EST LOW 20 MIN: CPT

## 2024-11-14 PROCEDURE — 85045 AUTOMATED RETICULOCYTE COUNT: CPT

## 2024-11-14 PROCEDURE — 2500000005 HC RX 250 GENERAL PHARMACY W/O HCPCS: Mod: SE

## 2024-11-14 PROCEDURE — 2500000001 HC RX 250 WO HCPCS SELF ADMINISTERED DRUGS (ALT 637 FOR MEDICARE OP): Mod: SE

## 2024-11-14 PROCEDURE — 85025 COMPLETE CBC W/AUTO DIFF WBC: CPT

## 2024-11-14 PROCEDURE — 82565 ASSAY OF CREATININE: CPT

## 2024-11-14 RX ORDER — CYCLOBENZAPRINE HCL 10 MG
10 TABLET ORAL ONCE
Status: COMPLETED | OUTPATIENT
Start: 2024-11-14 | End: 2024-11-14

## 2024-11-14 RX ORDER — DIPHENHYDRAMINE HCL 25 MG
25 CAPSULE ORAL ONCE
Status: COMPLETED | OUTPATIENT
Start: 2024-11-14 | End: 2024-11-14

## 2024-11-14 RX ORDER — ONDANSETRON HYDROCHLORIDE 8 MG/1
8 TABLET, FILM COATED ORAL ONCE
Status: COMPLETED | OUTPATIENT
Start: 2024-11-14 | End: 2024-11-14

## 2024-11-14 RX ORDER — HYDROMORPHONE HYDROCHLORIDE 4 MG/1
12 TABLET ORAL
Status: COMPLETED | OUTPATIENT
Start: 2024-11-14 | End: 2024-11-14

## 2024-11-14 RX ORDER — NALOXONE HYDROCHLORIDE 0.4 MG/ML
0.4 INJECTION, SOLUTION INTRAMUSCULAR; INTRAVENOUS; SUBCUTANEOUS
Status: DISCONTINUED | OUTPATIENT
Start: 2024-11-14 | End: 2024-11-14 | Stop reason: HOSPADM

## 2024-11-14 ASSESSMENT — PAIN SCALES - GENERAL
PAINLEVEL_OUTOF10: 10-WORST PAIN EVER
PAINLEVEL_OUTOF10: 6
PAINLEVEL_OUTOF10: 8
PAINLEVEL_OUTOF10: 9

## 2024-11-14 ASSESSMENT — PAIN DESCRIPTION - LOCATION: LOCATION: GENERALIZED

## 2024-11-14 NOTE — PROGRESS NOTES
Patient ID: Xu Maya is a 44 y.o. male.    Subjective    HPI    Xu is a 44 y.o. male with history of anxiety, depression, gastritis/GERD, priapism, and sickle cell disease s/p transplant, who presents to [facility] with uncontrolled generalized pain, worse in his b/l lower extremities. Reports that pain began last evening and home dilaudid dose was not working to relieve the pain. He denies any shortness of breath or chest pain. Also endorses and headache that started this morning, pain is localized to above his eyes. He gets headaches frequently at home and this headache feels typical of those. Pain is typically relieved with ibuprofen. Eating and drinking well and having bowel movements.     Objective    BSA: There is no height or weight on file to calculate BSA.  There were no vitals taken for this visit.     Physical Exam  Constitutional:       Appearance: Normal appearance.   HENT:      Mouth/Throat:      Mouth: Mucous membranes are moist.   Eyes:      Pupils: Pupils are equal, round, and reactive to light.   Cardiovascular:      Rate and Rhythm: Normal rate and regular rhythm.      Pulses: Normal pulses.      Heart sounds: Normal heart sounds.   Pulmonary:      Effort: Pulmonary effort is normal.      Breath sounds: Normal breath sounds.   Abdominal:      General: Abdomen is flat. Bowel sounds are normal.      Palpations: Abdomen is soft.   Musculoskeletal:         General: Normal range of motion.      Cervical back: Normal range of motion and neck supple.   Skin:     General: Skin is warm.   Neurological:      General: No focal deficit present.      Mental Status: He is alert.   Psychiatric:         Mood and Affect: Mood normal.       Performance Status:  Asymptomatic    Assessment/Plan    Oncology History    No history exists.      Xu is a 44 y.o. male with history of anxiety, depression, gastritis/GERD, priapism, and sickle cell disease s/p transplant, who presents to [facility] with uncontrolled generalized  pain, worse in his b/l lower extremities     [facility] Course  - VSS  - Hemolysis labs at patient's baseline baseline  - Dilaudid 12mg PO Q1 hr x3 doses for sickle cell related pain  - Flexeril 10mg, given for MSK pain  - Benadryl 25mg once for opioid induced pruritus  - Zofran 8mg once for opioid induced nausea     Disposition  - Patient discharged home with no further needs following ACC Course  - Return to clinic/ED instructions given    Aixa Lutz PA-C      Yes...

## 2024-11-14 NOTE — PROGRESS NOTES
Pt arrived to [facility] for sickle cell pain. Pt received 3 doses of 12mg PO dilaudid.  Pt came in rating pain a 9/10, by last dose pain was a 6/10.  Pt discharged in stable condition.

## 2024-11-14 NOTE — TELEPHONE ENCOUNTER
Patient calling to be seen in the [facility] clinic for generalized pain typical of his sickle cell pain. Reports that pain started last evening and has been unreleived with home po dilaudid. He denies any chest pain or shortness of breath. Patient to be seen in the [facility] clinic on 11/14 at 10:30.

## 2024-11-19 ENCOUNTER — TELEPHONE (OUTPATIENT)
Dept: HEMATOLOGY/ONCOLOGY | Facility: HOSPITAL | Age: 44
End: 2024-11-19
Payer: COMMERCIAL

## 2024-11-19 DIAGNOSIS — R52 ACUTE PAIN: ICD-10-CM

## 2024-11-19 DIAGNOSIS — D57.00 SICKLE-CELL DISEASE WITH PAIN (MULTI): ICD-10-CM

## 2024-11-19 DIAGNOSIS — Z51.81 ENCOUNTER FOR MONITORING OPIOID MAINTENANCE THERAPY: ICD-10-CM

## 2024-11-19 DIAGNOSIS — Z79.891 ENCOUNTER FOR MONITORING OPIOID MAINTENANCE THERAPY: ICD-10-CM

## 2024-11-19 PROCEDURE — RXMED WILLOW AMBULATORY MEDICATION CHARGE

## 2024-11-19 RX ORDER — HYDROMORPHONE HYDROCHLORIDE 4 MG/1
4 TABLET ORAL 4 TIMES DAILY PRN
Qty: 120 TABLET | Refills: 0 | Status: SHIPPED | OUTPATIENT
Start: 2024-11-19 | End: 2024-11-19 | Stop reason: SDUPTHER

## 2024-11-19 RX ORDER — HYDROMORPHONE HYDROCHLORIDE 8 MG/1
4 TABLET ORAL 4 TIMES DAILY PRN
Qty: 50 TABLET | Refills: 0 | Status: SHIPPED | OUTPATIENT
Start: 2024-11-19 | End: 2024-12-19

## 2024-11-19 NOTE — TELEPHONE ENCOUNTER
Refill request received for Hydromorphone 4mg.  Preferred pharmacy is [facility].  Message sent to Sickle Cell team.

## 2024-11-20 ENCOUNTER — TELEPHONE (OUTPATIENT)
Dept: HEMATOLOGY/ONCOLOGY | Facility: HOSPITAL | Age: 44
End: 2024-11-20

## 2024-11-20 ENCOUNTER — PHARMACY VISIT (OUTPATIENT)
Dept: PHARMACY | Facility: CLINIC | Age: 44
End: 2024-11-20
Payer: MEDICAID

## 2024-11-20 ENCOUNTER — OFFICE VISIT (OUTPATIENT)
Dept: HEMATOLOGY/ONCOLOGY | Facility: HOSPITAL | Age: 44
End: 2024-11-20
Payer: COMMERCIAL

## 2024-11-20 VITALS
SYSTOLIC BLOOD PRESSURE: 115 MMHG | DIASTOLIC BLOOD PRESSURE: 81 MMHG | BODY MASS INDEX: 22.98 KG/M2 | TEMPERATURE: 97.3 F | OXYGEN SATURATION: 97 % | HEART RATE: 67 BPM | RESPIRATION RATE: 18 BRPM | WEIGHT: 146.6 LBS

## 2024-11-20 DIAGNOSIS — D57.00 SICKLE-CELL DISEASE WITH PAIN (MULTI): Primary | ICD-10-CM

## 2024-11-20 LAB
ALBUMIN SERPL BCP-MCNC: 3.8 G/DL (ref 3.4–5)
ALP SERPL-CCNC: 91 U/L (ref 33–120)
ALT SERPL W P-5'-P-CCNC: 8 U/L (ref 10–52)
ANION GAP SERPL CALC-SCNC: 9 MMOL/L (ref 10–20)
AST SERPL W P-5'-P-CCNC: 11 U/L (ref 9–39)
BASOPHILS # BLD AUTO: 0.04 X10*3/UL (ref 0–0.1)
BASOPHILS NFR BLD AUTO: 0.4 %
BILIRUB SERPL-MCNC: 0.6 MG/DL (ref 0–1.2)
BUN SERPL-MCNC: 6 MG/DL (ref 6–23)
CALCIUM SERPL-MCNC: 8.4 MG/DL (ref 8.6–10.3)
CHLORIDE SERPL-SCNC: 108 MMOL/L (ref 98–107)
CO2 SERPL-SCNC: 28 MMOL/L (ref 21–32)
CREAT SERPL-MCNC: 0.78 MG/DL (ref 0.5–1.3)
EGFRCR SERPLBLD CKD-EPI 2021: >90 ML/MIN/1.73M*2
EOSINOPHIL # BLD AUTO: 0.25 X10*3/UL (ref 0–0.7)
EOSINOPHIL NFR BLD AUTO: 2.4 %
ERYTHROCYTE [DISTWIDTH] IN BLOOD BY AUTOMATED COUNT: 13.2 % (ref 11.5–14.5)
GLUCOSE SERPL-MCNC: 87 MG/DL (ref 74–99)
HCT VFR BLD AUTO: 39.4 % (ref 41–52)
HGB BLD-MCNC: 14.3 G/DL (ref 13.5–17.5)
HGB RETIC QN: 36 PG (ref 28–38)
IMM GRANULOCYTES # BLD AUTO: 0.03 X10*3/UL (ref 0–0.7)
IMM GRANULOCYTES NFR BLD AUTO: 0.3 % (ref 0–0.9)
IMMATURE RETIC FRACTION: 11 %
LDH SERPL L TO P-CCNC: 136 U/L (ref 84–246)
LYMPHOCYTES # BLD AUTO: 4.34 X10*3/UL (ref 1.2–4.8)
LYMPHOCYTES NFR BLD AUTO: 40.9 %
MCH RBC QN AUTO: 32.9 PG (ref 26–34)
MCHC RBC AUTO-ENTMCNC: 36.3 G/DL (ref 32–36)
MCV RBC AUTO: 91 FL (ref 80–100)
MONOCYTES # BLD AUTO: 0.73 X10*3/UL (ref 0.1–1)
MONOCYTES NFR BLD AUTO: 6.9 %
NEUTROPHILS # BLD AUTO: 5.21 X10*3/UL (ref 1.2–7.7)
NEUTROPHILS NFR BLD AUTO: 49.1 %
NRBC BLD-RTO: 0 /100 WBCS (ref 0–0)
PLATELET # BLD AUTO: 245 X10*3/UL (ref 150–450)
POTASSIUM SERPL-SCNC: 3.6 MMOL/L (ref 3.5–5.3)
PROT SERPL-MCNC: 6.1 G/DL (ref 6.4–8.2)
RBC # BLD AUTO: 4.34 X10*6/UL (ref 4.5–5.9)
RETICS #: 0.07 X10*6/UL (ref 0.02–0.12)
RETICS/RBC NFR AUTO: 1.5 % (ref 0.5–2)
SODIUM SERPL-SCNC: 141 MMOL/L (ref 136–145)
WBC # BLD AUTO: 10.6 X10*3/UL (ref 4.4–11.3)

## 2024-11-20 PROCEDURE — 80053 COMPREHEN METABOLIC PANEL: CPT

## 2024-11-20 PROCEDURE — 2500000005 HC RX 250 GENERAL PHARMACY W/O HCPCS: Mod: SE

## 2024-11-20 PROCEDURE — 4004F PT TOBACCO SCREEN RCVD TLK: CPT

## 2024-11-20 PROCEDURE — 36415 COLL VENOUS BLD VENIPUNCTURE: CPT

## 2024-11-20 PROCEDURE — 83615 LACTATE (LD) (LDH) ENZYME: CPT

## 2024-11-20 PROCEDURE — 85045 AUTOMATED RETICULOCYTE COUNT: CPT

## 2024-11-20 PROCEDURE — 85025 COMPLETE CBC W/AUTO DIFF WBC: CPT

## 2024-11-20 PROCEDURE — 2500000001 HC RX 250 WO HCPCS SELF ADMINISTERED DRUGS (ALT 637 FOR MEDICARE OP): Mod: SE

## 2024-11-20 PROCEDURE — 99214 OFFICE O/P EST MOD 30 MIN: CPT

## 2024-11-20 RX ORDER — DIPHENHYDRAMINE HCL 25 MG
25 CAPSULE ORAL ONCE
Status: COMPLETED | OUTPATIENT
Start: 2024-11-20 | End: 2024-11-20

## 2024-11-20 RX ORDER — CYCLOBENZAPRINE HCL 10 MG
10 TABLET ORAL ONCE
Status: COMPLETED | OUTPATIENT
Start: 2024-11-20 | End: 2024-11-20

## 2024-11-20 RX ORDER — ONDANSETRON HYDROCHLORIDE 8 MG/1
8 TABLET, FILM COATED ORAL ONCE
Status: COMPLETED | OUTPATIENT
Start: 2024-11-20 | End: 2024-11-20

## 2024-11-20 RX ORDER — HYDROMORPHONE HYDROCHLORIDE 4 MG/1
12 TABLET ORAL
Status: DISCONTINUED | OUTPATIENT
Start: 2024-11-20 | End: 2024-11-20 | Stop reason: HOSPADM

## 2024-11-20 ASSESSMENT — ENCOUNTER SYMPTOMS
ABDOMINAL PAIN: 0
MYALGIAS: 1
HEADACHES: 0
NAUSEA: 0
VOMITING: 0
FEVER: 0
BACK PAIN: 1
CHILLS: 0
ARTHRALGIAS: 1

## 2024-11-20 ASSESSMENT — PAIN DESCRIPTION - ORIENTATION: ORIENTATION: RIGHT;LEFT;LOWER

## 2024-11-20 ASSESSMENT — PAIN SCALES - GENERAL
PAINLEVEL_OUTOF10: 8
PAINLEVEL_OUTOF10: 8

## 2024-11-20 ASSESSMENT — PAIN DESCRIPTION - LOCATION: LOCATION: LEG

## 2024-11-20 NOTE — PROGRESS NOTES
Pt arrived to [facility] for sickle cell pain management.  Pt arrived with a pain of 8/10.  Pt received 12 mg of PO dialudid and pain went down to a 7/10.  Pt only received 2 doses d/t having to return back to work after his [facility] visit.  Pt discharged in stable condition.

## 2024-11-20 NOTE — PROGRESS NOTES
[facility]  Acute Care Clinic    Patient: Xu Maya  MRN: [id number]  Date of visit: 11/20/24  Visit type: In person visit  Clinician: Sandee Clark PA-C    Reason for visit: Uncontrolled pain    History of Present Illness:  Xu is a 44 y.o. male with hemoglobin SS sickle cell disease, who presents to [facility] with pain in his BLE and low back uncontrolled for the past 2 days. He states the pain is typical of his sickle cell-related pain, but has not improved with his home meds. Last dose of home dilaudid at 4:45 am today. He rates the pain as an 8/10 currently and states he's able to tolerate 4/10 pain normally. He denies fever, chills, nausea, vomiting, abdominal pain, chest pain, shortness of breath, or other acute concerns.    Review of Systems:  Review of Systems   Constitutional:  Negative for chills and fever.   Cardiovascular:  Negative for chest pain.   Gastrointestinal:  Negative for abdominal pain, nausea and vomiting.   Musculoskeletal:  Positive for arthralgias, back pain and myalgias.   Neurological:  Negative for headaches.   All other systems reviewed and are negative.      Past Medical History:  Past Medical History:   Diagnosis Date    Anxiety disorder, unspecified 04/17/2017    Anxiety    Depression, unspecified 04/17/2017    Depression    Diarrhea 10/07/2023    DIARRHEA      Family history of glaucoma 03/02/2017    Family history of glaucoma in father    Gastritis, unspecified, without bleeding 04/17/2017    Gastritis    Hematuria, unspecified 04/17/2017    Hematuria    Personal history of other diseases of the digestive system 12/02/2015    History of esophageal reflux    Priapism, unspecified 04/17/2017    Priapism    Sickle-cell disease without crisis (Multi) 04/17/2017    Sickle cell anemia     Allergies:  Allergies   Allergen Reactions    Hydrocodone-Acetaminophen Hives and Unknown    Naproxen Hives     Medications:  Current Outpatient Medications    Medication Instructions    amitriptyline (ELAVIL) 25 mg    ARIPiprazole (ABILIFY) 5 mg, oral, Daily    cyclobenzaprine (FLEXERIL) 10 mg, oral, 3 times daily PRN    diphenhydrAMINE (BENADRYL) 25 mg, Every 6 hours PRN    DULoxetine (CYMBALTA) 60 mg, oral, 2 times daily    HYDROmorphone (DILAUDID) 4 mg, oral, 4 times daily PRN    hydrOXYzine HCL (ATARAX) 25 mg, Nightly    naloxone (NARCAN) 4 mg, As needed    prazosin (MINIPRESS) 1 mg, oral, Nightly    sennosides (SENOKOT) 25.8 mg, oral, 2 times daily       Past Surgical History:  Past Surgical History:   Procedure Laterality Date    GALLBLADDER SURGERY  04/12/2017    Gallbladder Surgery    IR CVC TUNNELED  4/24/2018    IR CVC TUNNELED 4/24/2018 [facility]    IR CVC TUNNELED  6/5/2018    IR CVC TUNNELED 6/5/2018 [facility]    IR CVC TUNNELED  3/1/2019    IR CVC TUNNELED 3/1/2019 [facility]    IR CVC TUNNELED  6/4/2019    IR CVC TUNNELED 6/4/2019 [facility]    IR CVC TUNNELED  4/5/2019    IR CVC TUNNELED 4/5/2019 [facility]    IR CVC TUNNELED  7/17/2019    IR CVC TUNNELED 7/17/2019 [facility]    IR CVC TUNNELED  8/14/2019    IR CVC TUNNELED 8/14/2019 [facility]    IR CVC TUNNELED  12/18/2019    IR CVC TUNNELED 12/18/2019 [facility]    IR CVC TUNNELED  10/9/2019    IR CVC TUNNELED 10/9/2019 [facility]    IR CVC TUNNELED  11/13/2019    IR CVC TUNNELED 11/13/2019 [facility]    IR CVC TUNNELED  1/15/2020    IR CVC TUNNELED 1/15/2020 [facility]    IR CVC TUNNELED  2/19/2020    IR CVC TUNNELED 2/19/2020 [facility]    IR CVC TUNNELED  1/4/2021    IR CVC TUNNELED 1/4/2021 [facility]    IR CVC TUNNELED  1/25/2021    IR CVC TUNNELED 1/25/2021 [facility]    IR CVC TUNNELED  8/21/2018    IR CVC TUNNELED 8/21/2018 [facility]    IR CVC TUNNELED  9/26/2018    IR CVC TUNNELED 9/26/2018 [facility]    IR CVC TUNNELED  11/5/2018    IR CVC TUNNELED 11/5/2018 [facility]    IR CVC  TUNNELED  12/19/2018    IR CVC TUNNELED 12/19/2018 [facility]    IR VENOGRAM HEPATIC  11/8/2017    IR VENOGRAM HEPATIC 11/8/2017 [facility]    MR HEAD ANGIO WO IV CONTRAST  2/17/2017    MR HEAD ANGIO WO IV CONTRAST 2/17/2017 [facility]    MR NECK ANGIO WO IV CONTRAST  2/17/2017    MR NECK ANGIO WO IV CONTRAST 2/17/2017 [facility]    US GUIDED NEEDLE LIVER BIOPSY  9/8/2020    US GUIDED NEEDLE LIVER BIOPSY 9/8/2020 [facility]       Family History:  Family History   Problem Relation Name Age of Onset    Diabetes Father      Sickle cell anemia Other sibling     Cancer Other grandfather     Cancer Other uncle        Social History:  Social History     Tobacco Use    Smoking status: Every Day     Types: Cigarettes     Start date: 1/1/1998     Passive exposure: Current    Smokeless tobacco: Never   Substance Use Topics    Alcohol use: Not Currently     Comment: Occasional    Drug use: Not Currently     Types: Marijuana     Comment: Last use sometime in 2023.  No intention to re-start.       Vital Signs:  /81 (BP Location: Right arm, Patient Position: Sitting, BP Cuff Size: Adult)   Pulse 67   Temp 36.3 °C (97.3 °F) (Temporal)   Resp 18   Wt 66.5 kg (146 lb 9.6 oz)   SpO2 97%   BMI 22.98 kg/m²     Physical Exam:  Physical Exam  Vitals reviewed.   Constitutional:       Appearance: He is not toxic-appearing.      Comments: Appears uncomfortable   HENT:      Head: Normocephalic and atraumatic.   Eyes:      Extraocular Movements: Extraocular movements intact.      Conjunctiva/sclera: Conjunctivae normal.   Cardiovascular:      Rate and Rhythm: Normal rate and regular rhythm.      Heart sounds: Normal heart sounds.   Pulmonary:      Effort: Pulmonary effort is normal. No respiratory distress.      Breath sounds: Normal breath sounds.   Abdominal:      General: Abdomen is flat.   Musculoskeletal:         General: Normal range of motion.      Cervical back: Normal range of motion and  neck supple.      Right lower leg: No edema.      Left lower leg: No edema.   Skin:     General: Skin is warm and dry.   Neurological:      General: No focal deficit present.      Mental Status: He is alert and oriented to person, place, and time.   Psychiatric:         Mood and Affect: Mood normal.         Behavior: Behavior normal.         Results:  Lab Results   Component Value Date    WBC 10.6 11/20/2024    HGB 14.3 11/20/2024    HCT 39.4 (L) 11/20/2024    MCV 91 11/20/2024     11/20/2024       Lab Results   Component Value Date    GLUCOSE 87 11/20/2024    CALCIUM 8.4 (L) 11/20/2024     11/20/2024    K 3.6 11/20/2024    CO2 28 11/20/2024     (H) 11/20/2024    BUN 6 11/20/2024    CREATININE 0.78 11/20/2024       Lab Results   Component Value Date    ALT 8 (L) 11/20/2024    AST 11 11/20/2024    ALKPHOS 91 11/20/2024    BILITOT 0.6 11/20/2024       Lab Results   Component Value Date    RETICCTPCT 1.5 11/20/2024       Lab Results   Component Value Date     11/20/2024       Assessment & Plan:  Xu Maya is a 44 y.o. male with h/o anxiety, depression, gastritis/GERD, priapism, and hemoglobin SS sickle cell disease s/p stem cell transplant (2/4/2021), who was seen in [facility] today for uncontrolled pain. Labs with no significant abnormalities. 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 Discussed with Dr. Ricks -- we will administer a second dose (at 12pm), then stop to avoid CNS depression/decreased alertness out of concern for both his safety and the kids' safety. Discussed alternative option of him being able to receive a 3rd dose if needed if he weren't returning to work today. Pain improved somewhat after first and second doses. He may return to [facility] this week if needed.    [facility] Course  - VSS  -  Hemolysis labs near baseline, no indication of acute vaso-occlusive crisis or indication for blood transfusion  -  Flexeril 10mg PO, given for AVN  - Dilaudid 12 mg PO Q1hr x 2 doses for sickle cell related pain (3rd dose held due to patient going to work after appointment)  - Zofran 8mg PO once for opioid induced nausea/vomiting  - Benadryl 25mg PO once for opioid induced pruritus     Disposition  - Patient discharged home with no further needs following ACC Course  - Return to clinic/ED instructions given

## 2024-11-26 ENCOUNTER — HOSPITAL ENCOUNTER (OUTPATIENT)
Dept: RADIOLOGY | Facility: CLINIC | Age: 44
Discharge: HOME | End: 2024-11-26
Payer: COMMERCIAL

## 2024-11-26 DIAGNOSIS — D89.811 CHRONIC GVHD (MULTI): ICD-10-CM

## 2024-11-26 PROCEDURE — 77080 DXA BONE DENSITY AXIAL: CPT | Performed by: RADIOLOGY

## 2024-11-26 PROCEDURE — 77080 DXA BONE DENSITY AXIAL: CPT

## 2024-12-09 ENCOUNTER — TELEPHONE (OUTPATIENT)
Dept: ADMISSION | Facility: HOSPITAL | Age: 44
End: 2024-12-09
Payer: COMMERCIAL

## 2024-12-09 DIAGNOSIS — R52 ACUTE PAIN: ICD-10-CM

## 2024-12-09 DIAGNOSIS — D57.00 SICKLE-CELL DISEASE WITH PAIN (MULTI): ICD-10-CM

## 2024-12-09 DIAGNOSIS — Z79.891 ENCOUNTER FOR MONITORING OPIOID MAINTENANCE THERAPY: ICD-10-CM

## 2024-12-09 DIAGNOSIS — Z51.81 ENCOUNTER FOR MONITORING OPIOID MAINTENANCE THERAPY: ICD-10-CM

## 2024-12-10 ENCOUNTER — TELEPHONE (OUTPATIENT)
Dept: HEMATOLOGY/ONCOLOGY | Facility: HOSPITAL | Age: 44
End: 2024-12-10

## 2024-12-10 RX ORDER — HYDROMORPHONE HYDROCHLORIDE 8 MG/1
4 TABLET ORAL 4 TIMES DAILY PRN
Qty: 50 TABLET | Refills: 0 | Status: SHIPPED | OUTPATIENT
Start: 2024-12-10 | End: 2025-01-07

## 2024-12-11 ENCOUNTER — TELEPHONE (OUTPATIENT)
Dept: HEMATOLOGY/ONCOLOGY | Facility: HOSPITAL | Age: 44
End: 2024-12-11

## 2024-12-11 ENCOUNTER — OFFICE VISIT (OUTPATIENT)
Dept: HEMATOLOGY/ONCOLOGY | Facility: HOSPITAL | Age: 44
End: 2024-12-11
Payer: COMMERCIAL

## 2024-12-11 ENCOUNTER — TELEPHONE (OUTPATIENT)
Dept: HEMATOLOGY/ONCOLOGY | Facility: HOSPITAL | Age: 44
End: 2024-12-11
Payer: COMMERCIAL

## 2024-12-11 VITALS
BODY MASS INDEX: 22.26 KG/M2 | SYSTOLIC BLOOD PRESSURE: 121 MMHG | OXYGEN SATURATION: 100 % | DIASTOLIC BLOOD PRESSURE: 78 MMHG | WEIGHT: 141.98 LBS | RESPIRATION RATE: 18 BRPM | HEART RATE: 82 BPM | TEMPERATURE: 97.5 F

## 2024-12-11 DIAGNOSIS — D57.00 SICKLE CELL CRISIS (MULTI): Primary | ICD-10-CM

## 2024-12-11 LAB
ALBUMIN SERPL BCP-MCNC: 4.2 G/DL (ref 3.4–5)
ALP SERPL-CCNC: 98 U/L (ref 33–120)
ALT SERPL W P-5'-P-CCNC: 7 U/L (ref 10–52)
ANION GAP SERPL CALC-SCNC: 14 MMOL/L (ref 10–20)
AST SERPL W P-5'-P-CCNC: 16 U/L (ref 9–39)
BASOPHILS # BLD AUTO: 0.04 X10*3/UL (ref 0–0.1)
BASOPHILS NFR BLD AUTO: 0.4 %
BILIRUB SERPL-MCNC: 0.7 MG/DL (ref 0–1.2)
BUN SERPL-MCNC: 6 MG/DL (ref 6–23)
CALCIUM SERPL-MCNC: 9 MG/DL (ref 8.6–10.3)
CHLORIDE SERPL-SCNC: 106 MMOL/L (ref 98–107)
CO2 SERPL-SCNC: 22 MMOL/L (ref 21–32)
CREAT SERPL-MCNC: 0.73 MG/DL (ref 0.5–1.3)
EGFRCR SERPLBLD CKD-EPI 2021: >90 ML/MIN/1.73M*2
EOSINOPHIL # BLD AUTO: 0.19 X10*3/UL (ref 0–0.7)
EOSINOPHIL NFR BLD AUTO: 1.9 %
ERYTHROCYTE [DISTWIDTH] IN BLOOD BY AUTOMATED COUNT: 12.7 % (ref 11.5–14.5)
GLUCOSE SERPL-MCNC: 88 MG/DL (ref 74–99)
HCT VFR BLD AUTO: 43.4 % (ref 41–52)
HGB BLD-MCNC: 15.8 G/DL (ref 13.5–17.5)
HGB RETIC QN: 35 PG (ref 28–38)
IMM GRANULOCYTES # BLD AUTO: 0.01 X10*3/UL (ref 0–0.7)
IMM GRANULOCYTES NFR BLD AUTO: 0.1 % (ref 0–0.9)
IMMATURE RETIC FRACTION: 4.4 %
LDH SERPL L TO P-CCNC: 213 U/L (ref 84–246)
LYMPHOCYTES # BLD AUTO: 4.63 X10*3/UL (ref 1.2–4.8)
LYMPHOCYTES NFR BLD AUTO: 47.3 %
MCH RBC QN AUTO: 32.3 PG (ref 26–34)
MCHC RBC AUTO-ENTMCNC: 36.4 G/DL (ref 32–36)
MCV RBC AUTO: 89 FL (ref 80–100)
MONOCYTES # BLD AUTO: 0.78 X10*3/UL (ref 0.1–1)
MONOCYTES NFR BLD AUTO: 8 %
NEUTROPHILS # BLD AUTO: 4.14 X10*3/UL (ref 1.2–7.7)
NEUTROPHILS NFR BLD AUTO: 42.3 %
NRBC BLD-RTO: 0 /100 WBCS (ref 0–0)
PLATELET # BLD AUTO: 265 X10*3/UL (ref 150–450)
POTASSIUM SERPL-SCNC: 4.5 MMOL/L (ref 3.5–5.3)
PROT SERPL-MCNC: 7 G/DL (ref 6.4–8.2)
RBC # BLD AUTO: 4.89 X10*6/UL (ref 4.5–5.9)
RETICS #: 0.05 X10*6/UL (ref 0.02–0.12)
RETICS/RBC NFR AUTO: 1 % (ref 0.5–2)
SODIUM SERPL-SCNC: 137 MMOL/L (ref 136–145)
WBC # BLD AUTO: 9.8 X10*3/UL (ref 4.4–11.3)

## 2024-12-11 PROCEDURE — 2500000005 HC RX 250 GENERAL PHARMACY W/O HCPCS: Mod: SE

## 2024-12-11 PROCEDURE — 80053 COMPREHEN METABOLIC PANEL: CPT

## 2024-12-11 PROCEDURE — 2500000001 HC RX 250 WO HCPCS SELF ADMINISTERED DRUGS (ALT 637 FOR MEDICARE OP): Mod: SE

## 2024-12-11 PROCEDURE — 99215 OFFICE O/P EST HI 40 MIN: CPT

## 2024-12-11 PROCEDURE — 85025 COMPLETE CBC W/AUTO DIFF WBC: CPT

## 2024-12-11 PROCEDURE — 85045 AUTOMATED RETICULOCYTE COUNT: CPT

## 2024-12-11 PROCEDURE — 83615 LACTATE (LD) (LDH) ENZYME: CPT

## 2024-12-11 RX ORDER — DIPHENHYDRAMINE HCL 25 MG
25 CAPSULE ORAL ONCE
Status: COMPLETED | OUTPATIENT
Start: 2024-12-11 | End: 2024-12-11

## 2024-12-11 RX ORDER — CYCLOBENZAPRINE HCL 10 MG
10 TABLET ORAL ONCE
Status: COMPLETED | OUTPATIENT
Start: 2024-12-11 | End: 2024-12-11

## 2024-12-11 RX ORDER — NALOXONE HYDROCHLORIDE 0.4 MG/ML
0.4 INJECTION, SOLUTION INTRAMUSCULAR; INTRAVENOUS; SUBCUTANEOUS
Status: DISCONTINUED | OUTPATIENT
Start: 2024-12-11 | End: 2024-12-11 | Stop reason: HOSPADM

## 2024-12-11 RX ORDER — HYDROMORPHONE HYDROCHLORIDE 4 MG/1
12 TABLET ORAL
Status: COMPLETED | OUTPATIENT
Start: 2024-12-11 | End: 2024-12-11

## 2024-12-11 RX ORDER — ONDANSETRON HYDROCHLORIDE 8 MG/1
8 TABLET, FILM COATED ORAL ONCE
Status: COMPLETED | OUTPATIENT
Start: 2024-12-11 | End: 2024-12-11

## 2024-12-11 ASSESSMENT — ENCOUNTER SYMPTOMS
COUGH: 0
FEVER: 0
MYALGIAS: 1
CHILLS: 0
PSYCHIATRIC NEGATIVE: 1
ARTHRALGIAS: 1
HEMATOLOGIC/LYMPHATIC NEGATIVE: 1
ABDOMINAL PAIN: 0
CHEST TIGHTNESS: 0
EYES NEGATIVE: 1
CONSTITUTIONAL NEGATIVE: 1
NEUROLOGICAL NEGATIVE: 1
HEADACHES: 0
BACK PAIN: 1
GASTROINTESTINAL NEGATIVE: 1
NAUSEA: 0
CARDIOVASCULAR NEGATIVE: 1
VOMITING: 0

## 2024-12-11 ASSESSMENT — PAIN SCALES - GENERAL
PAINLEVEL_OUTOF10: 8
PAINLEVEL_OUTOF10: 9
PAINLEVEL_OUTOF10: 8
PAINLEVEL_OUTOF10: 7

## 2024-12-11 ASSESSMENT — PAIN DESCRIPTION - LOCATION: LOCATION: LEG

## 2024-12-11 ASSESSMENT — PAIN SCALES - PAIN ASSESSMENT IN ADVANCED DEMENTIA (PAINAD): TOTALSCORE: MEDICATION (SEE MAR)

## 2024-12-11 ASSESSMENT — PAIN DESCRIPTION - ORIENTATION: ORIENTATION: RIGHT;LEFT

## 2024-12-11 NOTE — PROGRESS NOTES
[facility]  Acute Care Clinic    Patient: Xu Maya  MRN: [id number]  Date of visit: 12/11/24  Visit type: In person visit  Clinician: CHRIS Martin    Reason for visit: Uncontrolled pain    History of Present Illness:  Xu is a 44 y.o. male with hemoglobin SS sickle cell disease, who presents to [facility] with pain in his BLE and low back uncontrolled for the past 2-3 days. He states the pain is typical of his sickle cell-related pain, but has not improved with his home meds. Last dose of home dilaudid at 5:00 am today. He rates the pain as an 8/10 currently. He denies fever, chills, headaches, vision changes, dizziness, nausea, vomiting, bowel changes, bleed/bruising issues, skin rashes/changes, abdominal pain, chest pain, shortness of breath, cough, or other acute concerns. No known exposure to sick contacts as well.     Review of Systems:  Review of Systems   Constitutional: Negative.  Negative for chills and fever.   HENT:  Negative.     Eyes: Negative.    Respiratory:  Negative for chest tightness and cough.    Cardiovascular: Negative.  Negative for chest pain.   Gastrointestinal: Negative.  Negative for abdominal pain, nausea and vomiting.   Genitourinary: Negative.     Musculoskeletal:  Positive for arthralgias, back pain and myalgias.   Skin: Negative.    Neurological: Negative.  Negative for headaches.   Hematological: Negative.    Psychiatric/Behavioral: Negative.     All other systems reviewed and are negative.      Past Medical History:  Past Medical History:   Diagnosis Date    Anxiety disorder, unspecified 04/17/2017    Anxiety    Depression, unspecified 04/17/2017    Depression    Diarrhea 10/07/2023    DIARRHEA      Family history of glaucoma 03/02/2017    Family history of glaucoma in father    Gastritis, unspecified, without bleeding 04/17/2017    Gastritis    Hematuria, unspecified 04/17/2017    Hematuria    Personal history of other diseases of the  digestive system 12/02/2015    History of esophageal reflux    Priapism, unspecified 04/17/2017    Priapism    Sickle-cell disease without crisis (Multi) 04/17/2017    Sickle cell anemia     Allergies:  Allergies   Allergen Reactions    Hydrocodone-Acetaminophen Hives and Unknown    Naproxen Hives     Medications:  Current Outpatient Medications   Medication Instructions    amitriptyline (ELAVIL) 25 mg    ARIPiprazole (ABILIFY) 5 mg, oral, Daily    cyclobenzaprine (FLEXERIL) 10 mg, oral, 3 times daily PRN    diphenhydrAMINE (BENADRYL) 25 mg, Every 6 hours PRN    DULoxetine (CYMBALTA) 60 mg, oral, 2 times daily    HYDROmorphone (DILAUDID) 4 mg, oral, 4 times daily PRN    hydrOXYzine HCL (ATARAX) 25 mg, Nightly    naloxone (NARCAN) 4 mg, As needed    prazosin (MINIPRESS) 1 mg, oral, Nightly    sennosides (SENOKOT) 25.8 mg, oral, 2 times daily       Past Surgical History:  Past Surgical History:   Procedure Laterality Date    GALLBLADDER SURGERY  04/12/2017    Gallbladder Surgery    IR CVC TUNNELED  4/24/2018    IR CVC TUNNELED 4/24/2018 [facility] AIB LEGAC[facility]    IR CVC TUNNELED  6/5/2018    IR CVC TUNNELED 6/5/2018 [facility] A[facility]    IR CVC TUNNELED  3/1/2019    IR CVC TUNNELED 3/1/2019 [facility] A[facility]GAC[facility]    IR CVC TUNNELED  6/4/2019    IR CVC TUNNELED 6/4/2019 [facility] CLINICAL LEGACY    IR CVC TUNNELED  4/5/2019    IR CVC TUNNELED 4/5/2019 [facility] AIB [facility]GAC[facility]    IR CVC TUNNELED  7/17/2019    IR CVC TUNNELED 7/17/2019 [facility] A[facility]    IR CVC TUNNELED  8/14/2019    IR CVC TUNNELED 8/14/2019 [facility] A[facility]    IR CVC TUNNELED  12/18/2019    IR CVC TUNNELED 12/18/2019 [facility] CL[facility]ICAL LEGAC[facility]    IR CVC TUNNELED  10/9/2019    IR CVC TUNNELED 10/9/2019 [facility] A[facility]    IR CVC TUNNELED  11/13/2019    IR CVC TUNNELED 11/13/2019 [facility] CL[facility]ICAL [facility]GAC[facility]    IR CVC TUNNELED  1/15/2020    IR CVC TUNNELED 1/15/2020 [facility] CL[facility]ICAL [facility]GAC[facility]    IR CVC TUNNELED  2/19/2020    IR CVC TUNNELED 2/19/2020 [facility]    IR CVC TUNNELED  1/4/2021    IR CVC  TUNNELED 1/4/2021 [facility]IB [facility]GAC[facility]    IR CVC TUNNELED  1/25/2021    IR CVC TUNNELED 1/25/2021 [facility]CILLARY LEGAC[facility]    IR CVC TUNNELED  8/21/2018    IR CVC TUNNELED 8/21/2018 [facility]    IR CVC TUNNELED  9/26/2018    IR CVC TUNNELED 9/26/2018 [facility]    IR CVC TUNNELED  11/5/2018    IR CVC TUNNELED 11/5/2018 [facility]    IR CVC TUNNELED  12/19/2018    IR CVC TUNNELED 12/19/2018 [facility]    IR VENOGRAM HEPATIC  11/8/2017    IR VENOGRAM HEPATIC 11/8/2017 [facility]    MR HEAD ANGIO WO IV CONTRAST  2/17/2017    MR HEAD ANGIO WO IV CONTRAST 2/17/2017 [facility] CLINICAL LEGACY    MR NECK ANGIO WO IV CONTRAST  2/17/2017    MR NECK ANGIO WO IV CONTRAST 2/17/2017 [facility] CLINICAL LEGACY    US GUIDED NEEDLE LIVER BIOPSY  9/8/2020    US GUIDED NEEDLE LIVER BIOPSY 9/8/2020 [facility]       Family History:  Family History   Problem Relation Name Age of Onset    Diabetes Father      Sickle cell anemia Other sibling     Cancer Other grandfather     Cancer Other uncle        Social History:  Social History     Tobacco Use    Smoking status: Every Day     Types: Cigarettes     Start date: 1/1/1998     Passive exposure: Current    Smokeless tobacco: Never   Substance Use Topics    Alcohol use: Not Currently     Comment: Occasional    Drug use: Not Currently     Types: Marijuana     Comment: Last use sometime in 2023.  No intention to re-start.       Vital Signs:  /78 (BP Location: Right arm, Patient Position: Sitting, BP Cuff Size: Adult)   Pulse 82   Temp 36.4 °C (97.5 °F) (Temporal)   Resp 18   Wt 64.4 kg (141 lb 15.6 oz)   SpO2 100%   BMI 22.26 kg/m²     Physical Exam  Vitals reviewed.   Constitutional:       General: He is not in acute distress.     Appearance: Normal appearance. He is not toxic-appearing.   HENT:      Head: Normocephalic and atraumatic.   Eyes:      Extraocular Movements: Extraocular movements intact.      Conjunctiva/sclera: Conjunctivae normal.   Cardiovascular:      Rate and  Rhythm: Normal rate and regular rhythm.      Pulses: Normal pulses.      Heart sounds: Normal heart sounds.   Pulmonary:      Effort: Pulmonary effort is normal. No respiratory distress.      Breath sounds: Normal breath sounds.   Abdominal:      General: Abdomen is flat. Bowel sounds are normal.      Palpations: Abdomen is soft.   Musculoskeletal:         General: Normal range of motion.      Cervical back: Normal range of motion and neck supple.      Right lower leg: No edema.      Left lower leg: No edema.   Skin:     General: Skin is warm and dry.   Neurological:      General: No focal deficit present.      Mental Status: He is alert and oriented to person, place, and time.   Psychiatric:         Mood and Affect: Mood normal.         Behavior: Behavior normal.         Thought Content: Thought content normal.         Judgment: Judgment normal.         Results:  Lab Results   Component Value Date    WBC 9.8 12/11/2024    HGB 15.8 12/11/2024    HCT 43.4 12/11/2024    MCV 89 12/11/2024     12/11/2024       Lab Results   Component Value Date    GLUCOSE 88 12/11/2024    CALCIUM 9.0 12/11/2024     12/11/2024    K 4.5 12/11/2024    CO2 22 12/11/2024     12/11/2024    BUN 6 12/11/2024    CREATININE 0.73 12/11/2024       Lab Results   Component Value Date    ALT 7 (L) 12/11/2024    AST 16 12/11/2024    ALKPHOS 98 12/11/2024    BILITOT 0.7 12/11/2024       Lab Results   Component Value Date    RETICCTPCT 1.0 12/11/2024       Lab Results   Component Value Date     12/11/2024       Assessment & Plan:  Xu Maya is a 44 y.o. male with h/o anxiety, depression, gastritis/GERD, priapism, and hemoglobin SS sickle cell disease s/p stem cell transplant (2/4/2021), who presents to [facility] today for uncontrolled pain.     ACC Course  - VSS  -  Hemolysis labs near baseline, no indication of acute vaso-occlusive crisis or indication for blood transfusion  - Flexeril 10mg PO, given for MSK pain  - Dilaudid 12  mg PO Q1hr x 2 doses for sickle cell related pain   - Zofran 8mg PO once for opioid induced nausea/vomiting  - Benadryl 25mg PO once for opioid induced pruritus     Disposition  - Patient discharged home with no further needs following ACC Course  - Patient has ride home provided by wife and instructed no driving post pain medication. He reports is off work today and will NOT be driving.   - Return to clinic/ED instructions given  - Follow up with sickle cell team as planned

## 2024-12-11 NOTE — TELEPHONE ENCOUNTER
Spoke with patient. Pain has been going on for 2-3 days. States typical sickle cell pain in legs and back. Was not getting relief with home dilaudid and motrin. No CP/ SOB. No cold or flu like symptoms. Patient states he has a ride and he is off work, so he will not be driving. His wife will bring him at 10:30 today.

## 2024-12-11 NOTE — PROGRESS NOTES
Patient in the [facility] for uncontrolled pain. Patient received medication and stated that pain was more manageable. Patient was discharged from the [facility] with transportation home.

## 2024-12-13 ENCOUNTER — PHARMACY VISIT (OUTPATIENT)
Dept: PHARMACY | Facility: CLINIC | Age: 44
End: 2024-12-13
Payer: MEDICAID

## 2024-12-13 PROCEDURE — RXMED WILLOW AMBULATORY MEDICATION CHARGE

## 2024-12-30 ENCOUNTER — APPOINTMENT (OUTPATIENT)
Dept: HEMATOLOGY/ONCOLOGY | Facility: HOSPITAL | Age: 44
End: 2024-12-30
Payer: COMMERCIAL

## 2024-12-30 ENCOUNTER — TELEPHONE (OUTPATIENT)
Dept: HEMATOLOGY/ONCOLOGY | Facility: HOSPITAL | Age: 44
End: 2024-12-30
Payer: COMMERCIAL

## 2024-12-30 NOTE — TELEPHONE ENCOUNTER
Called patient to discuss his scripts. He had 50 tablets of the 8 mg tablet of hydromorphone for which should be equivalent to 100 doses if administered at 4 mg/dose and would last 25 days. I plan to give him 120 tablets with his next refill of the 4 mg tablets.

## 2024-12-30 NOTE — TELEPHONE ENCOUNTER
"Spoke to Xu.    He reports \"all over\" pain for the past 2-3 days. Pain is his usual sickle-cell related pain, uncontrolled with his home pain med regimen. He last took Dilaudid around 530 this morning without relief. He denies fever, chills, CP, SOB, abd pain, nausea, vomiting, vision changes, or other symptoms.    States he has a ride. He was called into work today, but has informed them he's not feeling well and needs someone to cover him. He states he would not return to work after being seen in [facility] today.    He will be added to the [facility] schedule for an 11 am appointment today.  "

## 2024-12-31 ENCOUNTER — TELEPHONE (OUTPATIENT)
Dept: HEMATOLOGY/ONCOLOGY | Facility: HOSPITAL | Age: 44
End: 2024-12-31
Payer: COMMERCIAL

## 2024-12-31 ENCOUNTER — OFFICE VISIT (OUTPATIENT)
Dept: HEMATOLOGY/ONCOLOGY | Facility: HOSPITAL | Age: 44
End: 2024-12-31
Payer: COMMERCIAL

## 2024-12-31 VITALS
DIASTOLIC BLOOD PRESSURE: 80 MMHG | RESPIRATION RATE: 18 BRPM | TEMPERATURE: 98.4 F | HEART RATE: 80 BPM | BODY MASS INDEX: 22.63 KG/M2 | SYSTOLIC BLOOD PRESSURE: 121 MMHG | HEIGHT: 67 IN | OXYGEN SATURATION: 100 % | WEIGHT: 144.18 LBS

## 2024-12-31 DIAGNOSIS — R52 ACUTE PAIN: Primary | ICD-10-CM

## 2024-12-31 DIAGNOSIS — E87.6 HYPOKALEMIA: ICD-10-CM

## 2024-12-31 DIAGNOSIS — D57.00 SICKLE CELL CRISIS (MULTI): ICD-10-CM

## 2024-12-31 LAB
ALBUMIN SERPL BCP-MCNC: 4.2 G/DL (ref 3.4–5)
ALP SERPL-CCNC: 98 U/L (ref 33–120)
ALT SERPL W P-5'-P-CCNC: 9 U/L (ref 10–52)
ANION GAP SERPL CALC-SCNC: 11 MMOL/L (ref 10–20)
AST SERPL W P-5'-P-CCNC: 13 U/L (ref 9–39)
BASOPHILS # BLD AUTO: 0.03 X10*3/UL (ref 0–0.1)
BASOPHILS NFR BLD AUTO: 0.3 %
BILIRUB SERPL-MCNC: 0.5 MG/DL (ref 0–1.2)
BUN SERPL-MCNC: 5 MG/DL (ref 6–23)
CALCIUM SERPL-MCNC: 9 MG/DL (ref 8.6–10.3)
CHLORIDE SERPL-SCNC: 109 MMOL/L (ref 98–107)
CO2 SERPL-SCNC: 26 MMOL/L (ref 21–32)
CREAT SERPL-MCNC: 0.77 MG/DL (ref 0.5–1.3)
EGFRCR SERPLBLD CKD-EPI 2021: >90 ML/MIN/1.73M*2
EOSINOPHIL # BLD AUTO: 0.15 X10*3/UL (ref 0–0.7)
EOSINOPHIL NFR BLD AUTO: 1.3 %
ERYTHROCYTE [DISTWIDTH] IN BLOOD BY AUTOMATED COUNT: 13.4 % (ref 11.5–14.5)
GLUCOSE SERPL-MCNC: 170 MG/DL (ref 74–99)
HCT VFR BLD AUTO: 40.8 % (ref 41–52)
HGB BLD-MCNC: 14.9 G/DL (ref 13.5–17.5)
HGB RETIC QN: 35 PG (ref 28–38)
IMM GRANULOCYTES # BLD AUTO: 0.04 X10*3/UL (ref 0–0.7)
IMM GRANULOCYTES NFR BLD AUTO: 0.3 % (ref 0–0.9)
IMMATURE RETIC FRACTION: 8.1 %
LDH SERPL L TO P-CCNC: 148 U/L (ref 84–246)
LYMPHOCYTES # BLD AUTO: 3.42 X10*3/UL (ref 1.2–4.8)
LYMPHOCYTES NFR BLD AUTO: 29.1 %
MCH RBC QN AUTO: 32.5 PG (ref 26–34)
MCHC RBC AUTO-ENTMCNC: 36.5 G/DL (ref 32–36)
MCV RBC AUTO: 89 FL (ref 80–100)
MONOCYTES # BLD AUTO: 0.73 X10*3/UL (ref 0.1–1)
MONOCYTES NFR BLD AUTO: 6.2 %
NEUTROPHILS # BLD AUTO: 7.4 X10*3/UL (ref 1.2–7.7)
NEUTROPHILS NFR BLD AUTO: 62.8 %
NRBC BLD-RTO: 0 /100 WBCS (ref 0–0)
PLATELET # BLD AUTO: 249 X10*3/UL (ref 150–450)
POTASSIUM SERPL-SCNC: 3.3 MMOL/L (ref 3.5–5.3)
PROT SERPL-MCNC: 6.5 G/DL (ref 6.4–8.2)
RBC # BLD AUTO: 4.58 X10*6/UL (ref 4.5–5.9)
RETICS #: 0.07 X10*6/UL (ref 0.02–0.12)
RETICS/RBC NFR AUTO: 1.6 % (ref 0.5–2)
SODIUM SERPL-SCNC: 143 MMOL/L (ref 136–145)
WBC # BLD AUTO: 11.8 X10*3/UL (ref 4.4–11.3)

## 2024-12-31 PROCEDURE — 2500000004 HC RX 250 GENERAL PHARMACY W/ HCPCS (ALT 636 FOR OP/ED): Mod: SE

## 2024-12-31 PROCEDURE — 36415 COLL VENOUS BLD VENIPUNCTURE: CPT

## 2024-12-31 PROCEDURE — RXMED WILLOW AMBULATORY MEDICATION CHARGE

## 2024-12-31 PROCEDURE — 85045 AUTOMATED RETICULOCYTE COUNT: CPT

## 2024-12-31 PROCEDURE — 85025 COMPLETE CBC W/AUTO DIFF WBC: CPT

## 2024-12-31 PROCEDURE — 99215 OFFICE O/P EST HI 40 MIN: CPT

## 2024-12-31 PROCEDURE — 83615 LACTATE (LD) (LDH) ENZYME: CPT

## 2024-12-31 PROCEDURE — 2500000001 HC RX 250 WO HCPCS SELF ADMINISTERED DRUGS (ALT 637 FOR MEDICARE OP): Mod: SE

## 2024-12-31 PROCEDURE — 80053 COMPREHEN METABOLIC PANEL: CPT

## 2024-12-31 PROCEDURE — 3008F BODY MASS INDEX DOCD: CPT

## 2024-12-31 RX ORDER — CYCLOBENZAPRINE HCL 10 MG
10 TABLET ORAL ONCE
Status: COMPLETED | OUTPATIENT
Start: 2024-12-31 | End: 2024-12-31

## 2024-12-31 RX ORDER — DIPHENHYDRAMINE HCL 25 MG
25 CAPSULE ORAL EVERY 6 HOURS PRN
Status: COMPLETED | OUTPATIENT
Start: 2024-12-31 | End: 2024-12-31

## 2024-12-31 RX ORDER — POTASSIUM CHLORIDE 20 MEQ/1
20 TABLET, EXTENDED RELEASE ORAL 2 TIMES DAILY
Qty: 28 TABLET | Refills: 0 | Status: SHIPPED | OUTPATIENT
Start: 2024-12-31 | End: 2025-01-17

## 2024-12-31 RX ORDER — HYDROMORPHONE HYDROCHLORIDE 4 MG/1
12 TABLET ORAL
Status: COMPLETED | OUTPATIENT
Start: 2024-12-31 | End: 2024-12-31

## 2024-12-31 RX ORDER — NALOXONE HYDROCHLORIDE 0.4 MG/ML
0.4 INJECTION, SOLUTION INTRAMUSCULAR; INTRAVENOUS; SUBCUTANEOUS
Status: DISCONTINUED | OUTPATIENT
Start: 2024-12-31 | End: 2024-12-31 | Stop reason: HOSPADM

## 2024-12-31 RX ORDER — ONDANSETRON HYDROCHLORIDE 8 MG/1
8 TABLET, FILM COATED ORAL ONCE
Status: COMPLETED | OUTPATIENT
Start: 2024-12-31 | End: 2024-12-31

## 2024-12-31 RX ADMIN — ONDANSETRON HYDROCHLORIDE 8 MG: 8 TABLET, FILM COATED ORAL at 11:50

## 2024-12-31 RX ADMIN — CYCLOBENZAPRINE 10 MG: 10 TABLET, FILM COATED ORAL at 11:50

## 2024-12-31 RX ADMIN — HYDROMORPHONE HYDROCHLORIDE 12 MG: 4 TABLET ORAL at 12:52

## 2024-12-31 RX ADMIN — HYDROMORPHONE HYDROCHLORIDE 12 MG: 4 TABLET ORAL at 14:04

## 2024-12-31 RX ADMIN — HYDROMORPHONE HYDROCHLORIDE 12 MG: 4 TABLET ORAL at 11:50

## 2024-12-31 RX ADMIN — DIPHENHYDRAMINE HYDROCHLORIDE 25 MG: 25 CAPSULE ORAL at 11:50

## 2024-12-31 ASSESSMENT — PAIN SCALES - GENERAL
PAINLEVEL_OUTOF10: 6
PAINLEVEL_OUTOF10: 8

## 2024-12-31 ASSESSMENT — PAIN DESCRIPTION - LOCATION: LOCATION: LEG

## 2024-12-31 ASSESSMENT — PAIN DESCRIPTION - ORIENTATION: ORIENTATION: RIGHT;LEFT

## 2024-12-31 NOTE — PROGRESS NOTES
[facility]  Acute Care Clinic    Patient: Xu Maya  MRN: [id number]  Date of visit: 12/31/24  Visit type: In person visit  Clinician: Lori Jimenez PA-C    Reason for visit: Uncontrolled pain    History of Present Illness:  Xu is a 44 y.o. male with hemoglobin SS sickle cell disease, who presents to [facility] with pain in his BLE and low back uncontrolled for the past 2-3 days. He states the pain is typical of his sickle cell-related pain, but has not improved with his home meds. Last dose of home dilaudid at 6:30 am today. He rates the pain as an 8/10.  He does note having increased penile pain when having pain crisis over the past few months, better with pain control. He denies fever, chills, headaches, vision changes, dizziness, nausea, vomiting, bowel changes, bleed/bruising issues, skin rashes/changes, abdominal pain, chest pain, shortness of breath, cough, urinary symptoms or other acute concerns. No known exposure to sick contacts as well.     Review of Systems:  ROS 14 points performed, See HPI for exceptions     Past Medical History:  Past Medical History:   Diagnosis Date   • Anxiety disorder, unspecified 04/17/2017    Anxiety   • Depression, unspecified 04/17/2017    Depression   • Diarrhea 10/07/2023    DIARRHEA     • Family history of glaucoma 03/02/2017    Family history of glaucoma in father   • Gastritis, unspecified, without bleeding 04/17/2017    Gastritis   • Hematuria, unspecified 04/17/2017    Hematuria   • Personal history of other diseases of the digestive system 12/02/2015    History of esophageal reflux   • Priapism, unspecified 04/17/2017    Priapism   • Sickle-cell disease without crisis (Multi) 04/17/2017    Sickle cell anemia     Allergies:  Allergies   Allergen Reactions   • Hydrocodone-Acetaminophen Hives and Unknown   • Naproxen Hives     Medications:  Current Outpatient Medications   Medication Instructions   • amitriptyline (ELAVIL) 25 mg   •  ARIPiprazole (ABILIFY) 5 mg, oral, Daily   • cyclobenzaprine (FLEXERIL) 10 mg, oral, 3 times daily PRN   • diphenhydrAMINE (BENADRYL) 25 mg, Every 6 hours PRN   • DULoxetine (CYMBALTA) 60 mg, oral, 2 times daily   • HYDROmorphone (DILAUDID) 4 mg, oral, 4 times daily PRN   • hydrOXYzine HCL (ATARAX) 25 mg, Nightly   • naloxone (NARCAN) 4 mg, As needed   • prazosin (MINIPRESS) 1 mg, oral, Nightly   • sennosides (SENOKOT) 25.8 mg, oral, 2 times daily       Past Surgical History:  Past Surgical History:   Procedure Laterality Date   • GALLBLADDER SURGERY  04/12/2017    Gallbladder Surgery   • IR CVC TUNNELED  4/24/2018    IR CVC TUNNELED 4/24/2018 [facility]   • IR CVC TUNNELED  6/5/2018    IR CVC TUNNELED 6/5/2018 [facility]   • IR CVC TUNNELED  3/1/2019    IR CVC TUNNELED 3/1/2019 [facility]   • IR CVC TUNNELED  6/4/2019    IR CVC TUNNELED 6/4/2019 [facility] CLINICAL LEGACY   • IR CVC TUNNELED  4/5/2019    IR CVC TUNNELED 4/5/2019 [facility]   • IR CVC TUNNELED  7/17/2019    IR CVC TUNNELED 7/17/2019 [facility]   • IR CVC TUNNELED  8/14/2019    IR CVC TUNNELED 8/14/2019 [facility]   • IR CVC TUNNELED  12/18/2019    IR CVC TUNNELED 12/18/2019 [facility] CLINICAL LEGACY   • IR CVC TUNNELED  10/9/2019    IR CVC TUNNELED 10/9/2019 [facility]   • IR CVC TUNNELED  11/13/2019    IR CVC TUNNELED 11/13/2019 [facility] CLINICAL LEGACY   • IR CVC TUNNELED  1/15/2020    IR CVC TUNNELED 1/15/2020 [facility] CLINICAL LEGACY   • IR CVC TUNNELED  2/19/2020    IR CVC TUNNELED 2/19/2020 [facility] CLINICAL LEGACY   • IR CVC TUNNELED  1/4/2021    IR CVC TUNNELED 1/4/2021 [facility]   • IR CVC TUNNELED  1/25/2021    IR CVC TUNNELED 1/25/2021 [facility]CILL[facility]   • IR CVC TUNNELED  8/21/2018    IR CVC TUNNELED 8/21/2018 [facility]   • IR CVC TUNNELED  9/26/2018    IR CVC TUNNELED 9/26/2018 [facility]   • IR CVC TUNNELED  11/5/2018    IR CVC TUNNELED 11/5/2018 [facility]   • IR CVC TUNNELED  12/19/2018    IR CVC TUNNELED  12/19/2018 [facility]   • IR VENOGRAM HEPATIC  11/8/2017    IR VENOGRAM HEPATIC 11/8/2017 [facility]   • MR HEAD ANGIO WO IV CONTRAST  2/17/2017    MR HEAD ANGIO WO IV CONTRAST 2/17/2017 [facility] CLINICAL LEGACY   • MR NECK ANGIO WO IV CONTRAST  2/17/2017    MR NECK ANGIO WO IV CONTRAST 2/17/2017 [facility] CLINICAL LEGACY   • US GUIDED NEEDLE LIVER BIOPSY  9/8/2020    US GUIDED NEEDLE LIVER BIOPSY 9/8/2020 [facility]       Family History:  Family History   Problem Relation Name Age of Onset   • Diabetes Father     • Sickle cell anemia Other sibling    • Cancer Other grandfather    • Cancer Other uncle        Social History:  Social History     Tobacco Use   • Smoking status: Every Day     Types: Cigarettes     Start date: 1/1/1998     Passive exposure: Current   • Smokeless tobacco: Never   Substance Use Topics   • Alcohol use: Not Currently     Comment: Occasional   • Drug use: Not Currently     Types: Marijuana     Comment: Last use sometime in 2023.  No intention to re-start.       Vital Signs:  There were no vitals taken for this visit.    Physical Exam  Vitals reviewed.   Constitutional:       General: He is not in acute distress.     Appearance: Normal appearance. He is not toxic-appearing.   HENT:      Head: Normocephalic and atraumatic.   Eyes:      Extraocular Movements: Extraocular movements intact.      Conjunctiva/sclera: Conjunctivae normal.   Cardiovascular:      Rate and Rhythm: Normal rate and regular rhythm.      Pulses: Normal pulses.      Heart sounds: Normal heart sounds.   Pulmonary:      Effort: Pulmonary effort is normal. No respiratory distress.      Breath sounds: Normal breath sounds.   Abdominal:      General: Abdomen is flat. Bowel sounds are normal.      Palpations: Abdomen is soft.   Musculoskeletal:         General: Normal range of motion.      Cervical back: Normal range of motion and neck supple.      Right lower leg: No edema.      Left lower leg: No edema.   Skin:     General:  Skin is warm and dry.   Neurological:      General: No focal deficit present.      Mental Status: He is alert and oriented to person, place, and time.   Psychiatric:         Mood and Affect: Mood normal.         Behavior: Behavior normal.         Thought Content: Thought content normal.         Judgment: Judgment normal.       Results:  Lab Results   Component Value Date    WBC 9.8 12/11/2024    HGB 15.8 12/11/2024    HCT 43.4 12/11/2024    MCV 89 12/11/2024     12/11/2024       Lab Results   Component Value Date    GLUCOSE 88 12/11/2024    CALCIUM 9.0 12/11/2024     12/11/2024    K 4.5 12/11/2024    CO2 22 12/11/2024     12/11/2024    BUN 6 12/11/2024    CREATININE 0.73 12/11/2024       Lab Results   Component Value Date    ALT 7 (L) 12/11/2024    AST 16 12/11/2024    ALKPHOS 98 12/11/2024    BILITOT 0.7 12/11/2024       Lab Results   Component Value Date    RETICCTPCT 1.0 12/11/2024       Lab Results   Component Value Date     12/11/2024       Assessment & Plan:  Xu Maya is a 44 y.o. male with h/o anxiety, depression, gastritis/GERD, priapism, and hemoglobin SS sickle cell disease s/p stem cell transplant (2/4/2021), who presents to [facility] today for uncontrolled pain.     ACC Course  - VSS  -  Hemolysis labs near baseline, no indication of acute vaso-occlusive crisis or indication for blood transfusion  - Flexeril 10mg PO, given for MSK pain  - Dilaudid 12 mg PO Q1hr x 3 doses for sickle cell related pain   - Zofran 8mg PO once for opioid induced nausea/vomiting  - Benadryl 25mg PO once for opioid induced pruritus     - Hypokalemia: 20 mEq BID x 14 days. Should get rechecked at follow up with Dr. Ricks in Jan 2025     Disposition  - Patient discharged home with no further needs following ACC Course  - Return to clinic/ED instructions given  - Follow up with sickle cell team as planned    Lori Jimenez PA-C

## 2025-01-02 ENCOUNTER — TELEPHONE (OUTPATIENT)
Dept: ADMISSION | Facility: HOSPITAL | Age: 45
End: 2025-01-02
Payer: COMMERCIAL

## 2025-01-02 DIAGNOSIS — R52 ACUTE PAIN: ICD-10-CM

## 2025-01-02 DIAGNOSIS — D57.00 SICKLE-CELL DISEASE WITH PAIN (MULTI): ICD-10-CM

## 2025-01-02 DIAGNOSIS — G89.4 CHRONIC PAIN SYNDROME: Primary | ICD-10-CM

## 2025-01-02 DIAGNOSIS — Z79.891 ENCOUNTER FOR MONITORING OPIOID MAINTENANCE THERAPY: ICD-10-CM

## 2025-01-02 DIAGNOSIS — Z51.81 ENCOUNTER FOR MONITORING OPIOID MAINTENANCE THERAPY: ICD-10-CM

## 2025-01-02 RX ORDER — HYDROMORPHONE HYDROCHLORIDE 8 MG/1
4 TABLET ORAL 4 TIMES DAILY PRN
Qty: 50 TABLET | Refills: 0 | Status: SHIPPED | OUTPATIENT
Start: 2025-01-07 | End: 2025-01-03 | Stop reason: DRUGHIGH

## 2025-01-02 NOTE — TELEPHONE ENCOUNTER
Pt is requesting a refill of dilaudid 4mg QID prn.  Last prescription was written 12/10/24.  Preferred pharmacy is [facility].

## 2025-01-03 ENCOUNTER — PHARMACY VISIT (OUTPATIENT)
Dept: PHARMACY | Facility: CLINIC | Age: 45
End: 2025-01-03
Payer: MEDICAID

## 2025-01-03 PROCEDURE — RXMED WILLOW AMBULATORY MEDICATION CHARGE

## 2025-01-03 RX ORDER — HYDROMORPHONE HYDROCHLORIDE 4 MG/1
4 TABLET ORAL 4 TIMES DAILY PRN
Qty: 56 TABLET | Refills: 0 | Status: SHIPPED | OUTPATIENT
Start: 2025-01-03 | End: 2025-01-17

## 2025-01-03 NOTE — PROGRESS NOTES
I called and spoke to patient about his Dilaudid dose, since he requested it early. He stated that he wasn't aware that the dose had been increased to 8 mg due to pharmacy availability. I cancelled his 8 mg dose and ordered 4 mg for a two week supply. He gave a secondary pharmacy [facility] [address] in [place] for future instances if his primary pharmacy is out of the medications ordered. He voiced understanding.

## 2025-01-09 ENCOUNTER — TELEMEDICINE (OUTPATIENT)
Dept: BEHAVIORAL HEALTH | Facility: HOSPITAL | Age: 45
End: 2025-01-09
Payer: COMMERCIAL

## 2025-01-09 DIAGNOSIS — F33.41 RECURRENT MAJOR DEPRESSIVE DISORDER, IN PARTIAL REMISSION (CMS-HCC): ICD-10-CM

## 2025-01-09 DIAGNOSIS — F51.5 NIGHTMARES: ICD-10-CM

## 2025-01-09 DIAGNOSIS — F41.9 ANXIETY: ICD-10-CM

## 2025-01-09 PROCEDURE — 99214 OFFICE O/P EST MOD 30 MIN: CPT | Performed by: PSYCHIATRY & NEUROLOGY

## 2025-01-09 PROCEDURE — RXMED WILLOW AMBULATORY MEDICATION CHARGE

## 2025-01-09 RX ORDER — ARIPIPRAZOLE 5 MG/1
5 TABLET ORAL DAILY
Qty: 90 TABLET | Refills: 3 | Status: SHIPPED | OUTPATIENT
Start: 2025-01-09

## 2025-01-09 RX ORDER — DULOXETIN HYDROCHLORIDE 60 MG/1
60 CAPSULE, DELAYED RELEASE ORAL 2 TIMES DAILY
Qty: 180 CAPSULE | Refills: 3 | Status: SHIPPED | OUTPATIENT
Start: 2025-01-09

## 2025-01-09 NOTE — PROGRESS NOTES
Outpatient Psychiatry FUV      Subjective   Xu Maya, a 44 y.o. male, for virtual FUV.     Virtual or Telephone Consent    An interactive audio and video telecommunication system which permits real time communications between the patient (at the originating site) and provider (at the distant site) was utilized to provide this telehealth service.   Verbal consent was requested and obtained from Xu Maya on this date, 01/09/25 for a telehealth visit.     At home for appt today      Assessment/Plan   Patient Discussion:  CONTINUE duloxetine 60mg 2x day  CONTINUE aripiprazole 5mg in the AM  HOLD prazosin 1mg at bedtime, can resume if further nightmares     RETURN to clinic 3/6 at 10AM for virtual FUV     call with questions or concerns. [phone]      Assessment:   44 y.o.  M with SCC disease with depression, sickle cell disease now s/p BMT. Previously on suboxone for management of pain/high utilization but now off since transplant, still having pain now trialing scrambler      FUV 1/9/2025  pt reports overall mood/sleep are stable. No further nightmares so holding prazosin. Follow up 2-3 months sooner if needed    Diagnosis:   MDD, recurrent. P remission  WILFRID  Chronic pain disorder    Treatment Plan/Recommendations:   1. Safety Assessment: no current SI, no h/o SI/SA. has guns at home but unloaded and locked with kids at home. PF include , no previous attempts, kids, Jain. RF include depression, pain, father with completed suicide. Pt has moderate baseline risk based on static factors but is not an imminent risk and safety plan addressed     2. MDD, WILFRID  CONTINUE duloxetine 60mg PO BID, r/b/ae discussed including higher dose of SNRI, si/sx excess serotonin/SS  CONTINUE aripiprazole 5mg PO daily   HOLD prazosin 1mg PO at bedtime, can resume if more nightmares     continue supportive therapy during appt     3. opioid misuse in the context of chronic pain 2/2 sickle cell disease with acute  exacerbations currently no concerns  continues to struggle with chronic pain, more sedation with methadone, had scrambler therapy not helpful  Following up with sickle cell and new pain management later this month     nicotine use disorder --previous success with chantix but stopped and now smoking 3-4/day. monitor for now     4. Medical: SCC, back pain, GERD with h/o PUD: recent notes and labs reviewed. stable.   s/p BMT 1/2021  notes and labs reviewed,   coordinate care as needed with sickle cell team, BMT as needed  Ongoing pain, had scrambler therapy not helpful  Will resume pain management through sickle cell     5. Social: lives with kids and wife, moved to [place] after transplant, planning to move [place]. stepfather passed away from leukemia, sister passed from sickle cell. In marriage counseling     Reason for Visit:   FUV depression and anxiety    Subjective:  Last visit 2 months ago  Since then things have been ok in terms of mood  Busy at work, more kids over holidays but now back in school    Continues to work towards home     Sees sickle cell next week and then pain management end of the month to see plan of care for pain since Dr. Andrew left    No SI/HI or thoughts of not wanting to go on  No a/e to medication    Current Medications:    Current Outpatient Medications:     amitriptyline (Elavil) 25 mg tablet, Take 1 tablet (25 mg) by mouth., Disp: , Rfl:     ARIPiprazole (Abilify) 5 mg tablet, Take 1 tablet (5 mg) by mouth once daily., Disp: 90 tablet, Rfl: 1    cyclobenzaprine (Flexeril) 10 mg tablet, Take 1 tablet (10 mg) by mouth 3 times a day as needed for muscle spasms., Disp: 20 tablet, Rfl: 0    diphenhydrAMINE (BENADryl) 25 mg capsule, Take 1 capsule (25 mg) by mouth every 6 hours if needed., Disp: , Rfl:     DULoxetine (Cymbalta) 60 mg DR capsule, Take 1 capsule (60 mg) by mouth 2 times a day., Disp: 180 capsule, Rfl: 3    HYDROmorphone (Dilaudid) 4 mg tablet, Take 1 tablet (4 mg) by  mouth 4 times a day as needed for severe pain (7 - 10) for up to 14 days., Disp: 56 tablet, Rfl: 0    hydrOXYzine HCL (Atarax) 25 mg tablet, Take 1 tablet (25 mg) by mouth once daily at bedtime., Disp: , Rfl:     naloxone (Narcan) 4 mg/0.1 mL nasal spray, Administer 1 spray (4 mg) into affected nostril(s) if needed for opioid reversal or respiratory depression. 1 spray to nostril for overdose, may repeat every 2-3 minutes until medical assistance arrives, Disp: , Rfl:     potassium chloride CR (Klor-Con M20) 20 mEq ER tablet, Take 1 tablet (20 mEq) by mouth 2 times a day for 14 days. Do not crush or chew., Disp: 28 tablet, Rfl: 0    prazosin (Minipress) 1 mg capsule, Take 1 capsule (1 mg) by mouth once daily at bedtime., Disp: 30 capsule, Rfl: 2    sennosides (Senokot) 8.6 mg tablet, Take 3 tablets (25.8 mg) by mouth 2 times a day., Disp: 180 tablet, Rfl: 3    Current Facility-Administered Medications:     heparin lock flush (porcine) 10 unit/mL injection 100 Units, 100 Units, intra-catheter, Once, CHRIS Garcia    heparin lock flush (porcine) injection 500 Units, 5 mL, intravenous, PRN, HCRIS Garcia  Medical History:  Past Medical History:   Diagnosis Date    Anxiety disorder, unspecified 04/17/2017    Anxiety    Depression, unspecified 04/17/2017    Depression    Diarrhea 10/07/2023    DIARRHEA      Family history of glaucoma 03/02/2017    Family history of glaucoma in father    Gastritis, unspecified, without bleeding 04/17/2017    Gastritis    Hematuria, unspecified 04/17/2017    Hematuria    Personal history of other diseases of the digestive system 12/02/2015    History of esophageal reflux    Priapism, unspecified 04/17/2017    Priapism    Sickle-cell disease without crisis (Multi) 04/17/2017    Sickle cell anemia       Surgical History:  Past Surgical History:   Procedure Laterality Date    GALLBLADDER SURGERY  04/12/2017    Gallbladder Surgery    IR CVC TUNNELED  4/24/2018    IR CVC  TUNNELED 4/24/2018 [facility]    IR CVC TUNNELED  6/5/2018    IR CVC TUNNELED 6/5/2018 [facility]    IR CVC TUNNELED  3/1/2019    IR CVC TUNNELED 3/1/2019 [facility]    IR CVC TUNNELED  6/4/2019    IR CVC TUNNELED 6/4/2019 [facility]    IR CVC TUNNELED  4/5/2019    IR CVC TUNNELED 4/5/2019 [facility]    IR CVC TUNNELED  7/17/2019    IR CVC TUNNELED 7/17/2019 [facility]    IR CVC TUNNELED  8/14/2019    IR CVC TUNNELED 8/14/2019 [facility]    IR CVC TUNNELED  12/18/2019    IR CVC TUNNELED 12/18/2019 [facility]    IR CVC TUNNELED  10/9/2019    IR CVC TUNNELED 10/9/2019 [facility]    IR CVC TUNNELED  11/13/2019    IR CVC TUNNELED 11/13/2019 [facility]    IR CVC TUNNELED  1/15/2020    IR CVC TUNNELED 1/15/2020 [facility]    IR CVC TUNNELED  2/19/2020    IR CVC TUNNELED 2/19/2020 [facility]    IR CVC TUNNELED  1/4/2021    IR CVC TUNNELED 1/4/2021 [facility]    IR CVC TUNNELED  1/25/2021    IR CVC TUNNELED 1/25/2021 [facility]    IR CVC TUNNELED  8/21/2018    IR CVC TUNNELED 8/21/2018 [facility]    IR CVC TUNNELED  9/26/2018    IR CVC TUNNELED 9/26/2018 [facility]    IR CVC TUNNELED  11/5/2018    IR CVC TUNNELED 11/5/2018 [facility]    IR CVC TUNNELED  12/19/2018    IR CVC TUNNELED 12/19/2018 [facility]    IR VENOGRAM HEPATIC  11/8/2017    IR VENOGRAM HEPATIC 11/8/2017 [facility]    MR HEAD ANGIO WO IV CONTRAST  2/17/2017    MR HEAD ANGIO WO IV CONTRAST 2/17/2017 [facility]    MR NECK ANGIO WO IV CONTRAST  2/17/2017    MR NECK ANGIO WO IV CONTRAST 2/17/2017 [facility]    US GUIDED NEEDLE LIVER BIOPSY  9/8/2020    US GUIDED NEEDLE LIVER BIOPSY 9/8/2020 [facility]       Family History:  Family History   Problem Relation Name Age of Onset    Diabetes Father      Sickle cell anemia Other sibling     Cancer Other grandfather     Cancer Other uncle        Social History:  Social History  "    Socioeconomic History    Marital status:      Spouse name: Not on file    Number of children: Not on file    Years of education: Not on file    Highest education level: Not on file   Occupational History    Not on file   Tobacco Use    Smoking status: Every Day     Types: Cigarettes     Start date: 1/1/1998     Passive exposure: Current    Smokeless tobacco: Never   Substance and Sexual Activity    Alcohol use: Not Currently     Comment: Occasional    Drug use: Not Currently     Types: Marijuana     Comment: Last use sometime in 2023.  No intention to re-start.    Sexual activity: Not on file   Other Topics Concern    Not on file   Social History Narrative    Not on file     Social Drivers of Health     Financial Resource Strain: Not on file   Food Insecurity: Not on file   Transportation Needs: Not on file   Physical Activity: Not on file   Stress: Not on file   Social Connections: Not on file   Intimate Partner Violence: Not on file   Housing Stability: Not on file              Medical Review Of Systems:  +pain, scrambler therapy didn't work    Psychiatric Review Of Systems:  Depression ok  Sleep/dreams ok       Objective   Mental Status Exam:  Appearance: appropriate g/h.   Attitude: cooperative and engaged.   Behavior: good eye contact via video  Motor Activity: no tremor, spontaneous movement  Speech: regular in rate, volume, low tone, no dysarthria, no aphasia.   Mood: \"good\"  Affect: congruent, appropriate range.   Thought Process: linear, goal directed.   Thought Content: no delusions, no SI/HI.   Thought Perception: no AVH.   Cognition: alert, oriented to person, place, time. attn intact.   Insight: fair.   Judgment: fair/intact.     Vitals:  There were no vitals filed for this visit.  Encounter Date: 09/17/24   ECG 12 lead   Result Value    Ventricular Rate 69    Atrial Rate 69    ID Interval 158    QRS Duration 100    QT Interval 410    QTC Calculation(Bazett) 439    P Axis 85    R Axis -64    " T Axis -13    QRS Count 11    Q Onset 224    P Onset 145    P Offset 205    T Offset 429    QTC Fredericia 429    Narrative    Normal sinus rhythm  Incomplete right bundle branch block  Left anterior fascicular block  Abnormal ECG  When compared with ECG of 14-MAY-2024 02:47,  T wave inversion no longer evident in Lateral leads  See ED provider note for full interpretation and clinical correlation  Confirmed by Annamaria Deal ([id number]) on 9/20/2024 11:04:55 AM     Lab Results   Component Value Date    WBC 11.8 (H) 12/31/2024    HGB 14.9 12/31/2024    HCT 40.8 (L) 12/31/2024    MCV 89 12/31/2024     12/31/2024     Lab Results   Component Value Date    GLUCOSE 170 (H) 12/31/2024    CALCIUM 9.0 12/31/2024     12/31/2024    K 3.3 (L) 12/31/2024    CO2 26 12/31/2024     (H) 12/31/2024    BUN 5 (L) 12/31/2024    CREATININE 0.77 12/31/2024     Psychotherapy       Time: 11 minutes  Type: supportive, insight oriented  Target: mood, anxiety  Strategies: problem solving, insight oriented  Goal: decreased sx burden  Follow up: next visit 1-2 months  Response: mayuri Contreras MD

## 2025-01-15 ENCOUNTER — LAB (OUTPATIENT)
Dept: LAB | Facility: HOSPITAL | Age: 45
End: 2025-01-15
Payer: COMMERCIAL

## 2025-01-15 ENCOUNTER — OFFICE VISIT (OUTPATIENT)
Dept: HEMATOLOGY/ONCOLOGY | Facility: HOSPITAL | Age: 45
End: 2025-01-15
Payer: COMMERCIAL

## 2025-01-15 ENCOUNTER — LAB (OUTPATIENT)
Dept: LAB | Facility: LAB | Age: 45
End: 2025-01-15
Payer: COMMERCIAL

## 2025-01-15 VITALS
WEIGHT: 138.4 LBS | DIASTOLIC BLOOD PRESSURE: 76 MMHG | SYSTOLIC BLOOD PRESSURE: 115 MMHG | RESPIRATION RATE: 16 BRPM | HEART RATE: 78 BPM | TEMPERATURE: 98.1 F | OXYGEN SATURATION: 99 % | BODY MASS INDEX: 21.7 KG/M2

## 2025-01-15 DIAGNOSIS — D57.1 SICKLE CELL DISEASE WITHOUT CRISIS (MULTI): ICD-10-CM

## 2025-01-15 DIAGNOSIS — D57.1 SICKLE CELL DISEASE WITHOUT CRISIS (MULTI): Primary | ICD-10-CM

## 2025-01-15 DIAGNOSIS — D57.00 SICKLE-CELL DISEASE WITH PAIN (MULTI): ICD-10-CM

## 2025-01-15 LAB
ABO GROUP (TYPE) IN BLOOD: NORMAL
ALBUMIN SERPL BCP-MCNC: 4.4 G/DL (ref 3.4–5)
ALP SERPL-CCNC: 113 U/L (ref 33–120)
ALT SERPL W P-5'-P-CCNC: 10 U/L (ref 10–52)
AMPHETAMINES UR QL SCN: ABNORMAL
ANION GAP SERPL CALC-SCNC: 11 MMOL/L (ref 10–20)
ANTIBODY SCREEN: NORMAL
AST SERPL W P-5'-P-CCNC: 13 U/L (ref 9–39)
BARBITURATES UR QL SCN: ABNORMAL
BASOPHILS # BLD AUTO: 0.03 X10*3/UL (ref 0–0.1)
BASOPHILS NFR BLD AUTO: 0.3 %
BENZODIAZ UR QL SCN: ABNORMAL
BILIRUB SERPL-MCNC: 0.8 MG/DL (ref 0–1.2)
BUN SERPL-MCNC: 8 MG/DL (ref 6–23)
BZE UR QL SCN: ABNORMAL
CALCIUM SERPL-MCNC: 9.5 MG/DL (ref 8.6–10.3)
CANNABINOIDS UR QL SCN: ABNORMAL
CHLORIDE SERPL-SCNC: 106 MMOL/L (ref 98–107)
CO2 SERPL-SCNC: 28 MMOL/L (ref 21–32)
CREAT SERPL-MCNC: 0.82 MG/DL (ref 0.5–1.3)
EGFRCR SERPLBLD CKD-EPI 2021: >90 ML/MIN/1.73M*2
EOSINOPHIL # BLD AUTO: 0.09 X10*3/UL (ref 0–0.7)
EOSINOPHIL NFR BLD AUTO: 0.9 %
ERYTHROCYTE [DISTWIDTH] IN BLOOD BY AUTOMATED COUNT: 13.3 % (ref 11.5–14.5)
FENTANYL+NORFENTANYL UR QL SCN: ABNORMAL
FERRITIN SERPL-MCNC: 140 NG/ML (ref 20–300)
GLUCOSE SERPL-MCNC: 115 MG/DL (ref 74–99)
HCT VFR BLD AUTO: 45 % (ref 41–52)
HGB BLD-MCNC: 16.3 G/DL (ref 13.5–17.5)
HGB RETIC QN: 35 PG (ref 28–38)
IMM GRANULOCYTES # BLD AUTO: 0.02 X10*3/UL (ref 0–0.7)
IMM GRANULOCYTES NFR BLD AUTO: 0.2 % (ref 0–0.9)
IMMATURE RETIC FRACTION: 4.3 %
LDH SERPL L TO P-CCNC: 144 U/L (ref 84–246)
LYMPHOCYTES # BLD AUTO: 3.4 X10*3/UL (ref 1.2–4.8)
LYMPHOCYTES NFR BLD AUTO: 34.8 %
MCH RBC QN AUTO: 32.3 PG (ref 26–34)
MCHC RBC AUTO-ENTMCNC: 36.2 G/DL (ref 32–36)
MCV RBC AUTO: 89 FL (ref 80–100)
METHADONE UR QL SCN: ABNORMAL
MONOCYTES # BLD AUTO: 0.79 X10*3/UL (ref 0.1–1)
MONOCYTES NFR BLD AUTO: 8.1 %
NEUTROPHILS # BLD AUTO: 5.44 X10*3/UL (ref 1.2–7.7)
NEUTROPHILS NFR BLD AUTO: 55.7 %
NRBC BLD-RTO: 0 /100 WBCS (ref 0–0)
OPIATES UR QL SCN: ABNORMAL
OXYCODONE+OXYMORPHONE UR QL SCN: ABNORMAL
PCP UR QL SCN: ABNORMAL
PLATELET # BLD AUTO: 282 X10*3/UL (ref 150–450)
POTASSIUM SERPL-SCNC: 3.8 MMOL/L (ref 3.5–5.3)
PROT SERPL-MCNC: 7.1 G/DL (ref 6.4–8.2)
RBC # BLD AUTO: 5.04 X10*6/UL (ref 4.5–5.9)
RETICS #: 0.06 X10*6/UL (ref 0.02–0.12)
RETICS/RBC NFR AUTO: 1.1 % (ref 0.5–2)
RH FACTOR (ANTIGEN D): NORMAL
SODIUM SERPL-SCNC: 141 MMOL/L (ref 136–145)
WBC # BLD AUTO: 9.8 X10*3/UL (ref 4.4–11.3)

## 2025-01-15 PROCEDURE — 36415 COLL VENOUS BLD VENIPUNCTURE: CPT

## 2025-01-15 PROCEDURE — 82728 ASSAY OF FERRITIN: CPT

## 2025-01-15 PROCEDURE — 80307 DRUG TEST PRSMV CHEM ANLYZR: CPT

## 2025-01-15 PROCEDURE — 83021 HEMOGLOBIN CHROMOTOGRAPHY: CPT

## 2025-01-15 PROCEDURE — 80365 DRUG SCREENING OXYCODONE: CPT

## 2025-01-15 PROCEDURE — 82043 UR ALBUMIN QUANTITATIVE: CPT

## 2025-01-15 PROCEDURE — 86901 BLOOD TYPING SEROLOGIC RH(D): CPT

## 2025-01-15 PROCEDURE — 85025 COMPLETE CBC W/AUTO DIFF WBC: CPT

## 2025-01-15 PROCEDURE — 83615 LACTATE (LD) (LDH) ENZYME: CPT

## 2025-01-15 PROCEDURE — 85045 AUTOMATED RETICULOCYTE COUNT: CPT

## 2025-01-15 PROCEDURE — 99214 OFFICE O/P EST MOD 30 MIN: CPT | Performed by: NURSE PRACTITIONER

## 2025-01-15 PROCEDURE — 80361 OPIATES 1 OR MORE: CPT

## 2025-01-15 PROCEDURE — 84075 ASSAY ALKALINE PHOSPHATASE: CPT

## 2025-01-15 PROCEDURE — 82570 ASSAY OF URINE CREATININE: CPT

## 2025-01-15 RX ORDER — HYDROMORPHONE HYDROCHLORIDE 4 MG/1
4 TABLET ORAL 4 TIMES DAILY PRN
Qty: 56 TABLET | Refills: 0 | Status: SHIPPED | OUTPATIENT
Start: 2025-01-16 | End: 2025-01-30

## 2025-01-15 ASSESSMENT — PAIN SCALES - GENERAL: PAINLEVEL_OUTOF10: 7

## 2025-01-15 NOTE — PROGRESS NOTES
Patient ID: Xu Maya is a 44 y.o. male.  Referring Physician: No referring provider defined for this encounter.  Primary Care Provider: CHRIS Ames  Visit Type: Follow Up      Subjective  44 year old black male presents for Follow up visit for Sickle Cell SS. He is S/P BMT but still has needs for pain meds. He was seen in ED on 9/17/2024, 8/17/2024 and 8/18/2024 for pain crisis without admission. He denies constipation, but states he recently had Priapism and used Sudafed to treat it. He uses Dilaudid 4 mg for pain and requests it 2 days early. His pain is in his legs from thigh to lower leg. He works and has 2 children. He sees Dr. Contreras for Behavioral health. He does not use Oxygen at home. He admits to some depression. He admits to smoking.        HPI    Review of Systems - Oncology     Objective   BSA: 1.72 meters squared  /76 (BP Location: Right arm, Patient Position: Sitting)   Pulse 78   Temp 36.7 °C (98.1 °F) (Temporal)   Resp 16   Wt 62.8 kg (138 lb 6.4 oz)   SpO2 99%   BMI 21.70 kg/m²      has a past medical history of Anxiety disorder, unspecified (04/17/2017), Depression, unspecified (04/17/2017), Diarrhea (10/07/2023), Family history of glaucoma (03/02/2017), Gastritis, unspecified, without bleeding (04/17/2017), Hematuria, unspecified (04/17/2017), Personal history of other diseases of the digestive system (12/02/2015), Priapism, unspecified (04/17/2017), and Sickle-cell disease without crisis (Multi) (04/17/2017).   has a past surgical history that includes Gallbladder surgery (04/12/2017); IR CVC tunneled (4/24/2018); IR CVC tunneled (6/5/2018); IR CVC tunneled (3/1/2019); IR CVC tunneled (6/4/2019); IR CVC tunneled (4/5/2019); IR CVC tunneled (7/17/2019); IR CVC tunneled (8/14/2019); IR CVC tunneled (12/18/2019); IR CVC tunneled (10/9/2019); IR CVC tunneled (11/13/2019); IR CVC tunneled (1/15/2020); IR CVC tunneled (2/19/2020); US guided needle liver biopsy  (9/8/2020); IR CVC tunneled (1/4/2021); IR CVC tunneled (1/25/2021); MR angio head wo IV contrast (2/17/2017); MR angio neck wo IV contrast (2/17/2017); IR venogram hepatic (11/8/2017); IR CVC tunneled (8/21/2018); IR CVC tunneled (9/26/2018); IR CVC tunneled (11/5/2018); and IR CVC tunneled (12/19/2018).  Family History   Problem Relation Name Age of Onset    Diabetes Father      Sickle cell anemia Other sibling     Cancer Other grandfather     Cancer Other uncle      Oncology History    No history exists.       Xu Maya  reports that he has been smoking cigarettes. He started smoking about 27 years ago. He has been exposed to tobacco smoke. He has never used smokeless tobacco.  He  reports that he does not currently use alcohol.  He  reports that he does not currently use drugs after having used the following drugs: Marijuana.    Physical Exam  Vitals reviewed.   Constitutional:       Appearance: Normal appearance.   HENT:      Head: Normocephalic and atraumatic.      Nose: Nose normal.      Mouth/Throat:      Mouth: Mucous membranes are moist.   Eyes:      General: Scleral icterus present.      Pupils: Pupils are equal, round, and reactive to light.   Cardiovascular:      Rate and Rhythm: Normal rate.   Pulmonary:      Effort: Pulmonary effort is normal.      Breath sounds: Normal breath sounds.   Abdominal:      General: Abdomen is flat. Bowel sounds are normal.      Palpations: Abdomen is soft.   Musculoskeletal:         General: Normal range of motion.      Cervical back: Normal range of motion.   Skin:     General: Skin is warm and dry.      Capillary Refill: Capillary refill takes 2 to 3 seconds.   Neurological:      Mental Status: He is alert and oriented to person, place, and time.   Psychiatric:         Mood and Affect: Mood normal.         Behavior: Behavior normal.       WBC   Date/Time Value Ref Range Status   12/31/2024 11:43 AM 11.8 (H) 4.4 - 11.3 x10*3/uL Final   12/11/2024 10:48 AM 9.8 4.4 -  11.3 x10*3/uL Final   11/20/2024 11:01 AM 10.6 4.4 - 11.3 x10*3/uL Final     nRBC   Date Value Ref Range Status   12/31/2024 0.0 0.0 - 0.0 /100 WBCs Final   12/11/2024 0.0 0.0 - 0.0 /100 WBCs Final   11/20/2024 0.0 0.0 - 0.0 /100 WBCs Final     RBC   Date Value Ref Range Status   12/31/2024 4.58 4.50 - 5.90 x10*6/uL Final   12/11/2024 4.89 4.50 - 5.90 x10*6/uL Final   11/20/2024 4.34 (L) 4.50 - 5.90 x10*6/uL Final     Hemoglobin   Date Value Ref Range Status   12/31/2024 14.9 13.5 - 17.5 g/dL Final   12/11/2024 15.8 13.5 - 17.5 g/dL Final   11/20/2024 14.3 13.5 - 17.5 g/dL Final     Hematocrit   Date Value Ref Range Status   12/31/2024 40.8 (L) 41.0 - 52.0 % Final   12/11/2024 43.4 41.0 - 52.0 % Final   11/20/2024 39.4 (L) 41.0 - 52.0 % Final     MCV   Date/Time Value Ref Range Status   12/31/2024 11:43 AM 89 80 - 100 fL Final   12/11/2024 10:48 AM 89 80 - 100 fL Final   11/20/2024 11:01 AM 91 80 - 100 fL Final     MCH   Date/Time Value Ref Range Status   12/31/2024 11:43 AM 32.5 26.0 - 34.0 pg Final   12/11/2024 10:48 AM 32.3 26.0 - 34.0 pg Final   11/20/2024 11:01 AM 32.9 26.0 - 34.0 pg Final     MCHC   Date/Time Value Ref Range Status   12/31/2024 11:43 AM 36.5 (H) 32.0 - 36.0 g/dL Final   12/11/2024 10:48 AM 36.4 (H) 32.0 - 36.0 g/dL Final   11/20/2024 11:01 AM 36.3 (H) 32.0 - 36.0 g/dL Final     RDW   Date/Time Value Ref Range Status   12/31/2024 11:43 AM 13.4 11.5 - 14.5 % Final   12/11/2024 10:48 AM 12.7 11.5 - 14.5 % Final   11/20/2024 11:01 AM 13.2 11.5 - 14.5 % Final     Platelets   Date/Time Value Ref Range Status   12/31/2024 11:43  150 - 450 x10*3/uL Final   12/11/2024 10:48  150 - 450 x10*3/uL Final   11/20/2024 11:01  150 - 450 x10*3/uL Final     MPV   Date/Time Value Ref Range Status   10/30/2023 09:39 AM 8.4 7.5 - 11.5 fL Final   10/20/2023 10:54 AM 8.5 7.5 - 11.5 fL Final   10/16/2023 10:33 AM 8.6 7.5 - 11.5 fL Final     Neutrophils %   Date/Time Value Ref Range Status    12/31/2024 11:43 AM 62.8 40.0 - 80.0 % Final   12/11/2024 10:48 AM 42.3 40.0 - 80.0 % Final   11/20/2024 11:01 AM 49.1 40.0 - 80.0 % Final     Immature Granulocytes %, Automated   Date/Time Value Ref Range Status   12/31/2024 11:43 AM 0.3 0.0 - 0.9 % Final     Comment:     Immature Granulocyte Count (IG) includes promyelocytes, myelocytes and metamyelocytes but does not include bands. Percent differential counts (%) should be interpreted in the context of the absolute cell counts (cells/UL).   12/11/2024 10:48 AM 0.1 0.0 - 0.9 % Final     Comment:     Immature Granulocyte Count (IG) includes promyelocytes, myelocytes and metamyelocytes but does not include bands. Percent differential counts (%) should be interpreted in the context of the absolute cell counts (cells/UL).   11/20/2024 11:01 AM 0.3 0.0 - 0.9 % Final     Comment:     Immature Granulocyte Count (IG) includes promyelocytes, myelocytes and metamyelocytes but does not include bands. Percent differential counts (%) should be interpreted in the context of the absolute cell counts (cells/UL).     Lymphocytes %   Date/Time Value Ref Range Status   12/31/2024 11:43 AM 29.1 13.0 - 44.0 % Final   12/11/2024 10:48 AM 47.3 13.0 - 44.0 % Final   11/20/2024 11:01 AM 40.9 13.0 - 44.0 % Final     Monocytes %   Date/Time Value Ref Range Status   12/31/2024 11:43 AM 6.2 2.0 - 10.0 % Final   12/11/2024 10:48 AM 8.0 2.0 - 10.0 % Final   11/20/2024 11:01 AM 6.9 2.0 - 10.0 % Final     Eosinophils %   Date/Time Value Ref Range Status   12/31/2024 11:43 AM 1.3 0.0 - 6.0 % Final   12/11/2024 10:48 AM 1.9 0.0 - 6.0 % Final   11/20/2024 11:01 AM 2.4 0.0 - 6.0 % Final     Basophils %   Date/Time Value Ref Range Status   12/31/2024 11:43 AM 0.3 0.0 - 2.0 % Final   12/11/2024 10:48 AM 0.4 0.0 - 2.0 % Final   11/20/2024 11:01 AM 0.4 0.0 - 2.0 % Final     Neutrophils Absolute   Date/Time Value Ref Range Status   12/31/2024 11:43 AM 7.40 1.20 - 7.70 x10*3/uL Final     Comment:      "Percent differential counts (%) should be interpreted in the context of the absolute cell counts (cells/uL).   12/11/2024 10:48 AM 4.14 1.20 - 7.70 x10*3/uL Final     Comment:     Percent differential counts (%) should be interpreted in the context of the absolute cell counts (cells/uL).   11/20/2024 11:01 AM 5.21 1.20 - 7.70 x10*3/uL Final     Comment:     Percent differential counts (%) should be interpreted in the context of the absolute cell counts (cells/uL).     Immature Granulocytes Absolute, Automated   Date/Time Value Ref Range Status   12/31/2024 11:43 AM 0.04 0.00 - 0.70 x10*3/uL Final   12/11/2024 10:48 AM 0.01 0.00 - 0.70 x10*3/uL Final   11/20/2024 11:01 AM 0.03 0.00 - 0.70 x10*3/uL Final     Lymphocytes Absolute   Date/Time Value Ref Range Status   12/31/2024 11:43 AM 3.42 1.20 - 4.80 x10*3/uL Final   12/11/2024 10:48 AM 4.63 1.20 - 4.80 x10*3/uL Final   11/20/2024 11:01 AM 4.34 1.20 - 4.80 x10*3/uL Final     Monocytes Absolute   Date/Time Value Ref Range Status   12/31/2024 11:43 AM 0.73 0.10 - 1.00 x10*3/uL Final   12/11/2024 10:48 AM 0.78 0.10 - 1.00 x10*3/uL Final   11/20/2024 11:01 AM 0.73 0.10 - 1.00 x10*3/uL Final     Eosinophils Absolute   Date/Time Value Ref Range Status   12/31/2024 11:43 AM 0.15 0.00 - 0.70 x10*3/uL Final   12/11/2024 10:48 AM 0.19 0.00 - 0.70 x10*3/uL Final   11/20/2024 11:01 AM 0.25 0.00 - 0.70 x10*3/uL Final     Basophils Absolute   Date/Time Value Ref Range Status   12/31/2024 11:43 AM 0.03 0.00 - 0.10 x10*3/uL Final   12/11/2024 10:48 AM 0.04 0.00 - 0.10 x10*3/uL Final   11/20/2024 11:01 AM 0.04 0.00 - 0.10 x10*3/uL Final       No components found for: \"PT\"  aPTT   Date/Time Value Ref Range Status   06/06/2023 11:33 AM 33 26 - 39 sec Final     Comment:       THE APTT IS NO LONGER USED FOR MONITORING     UNFRACTIONATED HEPARIN THERAPY.    FOR MONITORING HEPARIN THERAPY,     USE THE HEPARIN ASSAY.     06/01/2023 03:18 PM 32 26 - 39 sec Final     Comment:       THE APTT " IS NO LONGER USED FOR MONITORING     UNFRACTIONATED HEPARIN THERAPY.    FOR MONITORING HEPARIN THERAPY,     USE THE HEPARIN ASSAY.     10/25/2021 01:50 AM 28 25 - 35 sec Final     Comment:       THE APTT IS NO LONGER USED FOR MONITORING     UNFRACTIONATED HEPARIN THERAPY.    FOR MONITORING HEPARIN THERAPY,     USE THE HEPARIN ASSAY.         Assessment/Plan  3 month follow up  Continue with Hydromorphine 4 mg Q 6 hours prn pain  Do blood and urine tests today  Follow up with Dr. Contreras

## 2025-01-16 ENCOUNTER — OFFICE VISIT (OUTPATIENT)
Dept: HEMATOLOGY/ONCOLOGY | Facility: HOSPITAL | Age: 45
End: 2025-01-16
Payer: COMMERCIAL

## 2025-01-16 ENCOUNTER — PHARMACY VISIT (OUTPATIENT)
Dept: PHARMACY | Facility: CLINIC | Age: 45
End: 2025-01-16
Payer: MEDICAID

## 2025-01-16 ENCOUNTER — TELEPHONE (OUTPATIENT)
Dept: HEMATOLOGY/ONCOLOGY | Facility: HOSPITAL | Age: 45
End: 2025-01-16

## 2025-01-16 VITALS
OXYGEN SATURATION: 100 % | HEART RATE: 74 BPM | TEMPERATURE: 98.4 F | RESPIRATION RATE: 18 BRPM | SYSTOLIC BLOOD PRESSURE: 126 MMHG | DIASTOLIC BLOOD PRESSURE: 84 MMHG | BODY MASS INDEX: 21.83 KG/M2 | HEIGHT: 67 IN | WEIGHT: 139.11 LBS

## 2025-01-16 DIAGNOSIS — D57.00 SICKLE CELL CRISIS (MULTI): Primary | ICD-10-CM

## 2025-01-16 LAB
CREAT UR-MCNC: 74.2 MG/DL (ref 20–370)
HEMOGLOBIN A2: 3.3 % (ref 2–3.5)
HEMOGLOBIN A: 56.1 % (ref 95.8–98)
HEMOGLOBIN F: 0.3 % (ref 0–2)
HEMOGLOBIN IDENTIFICATION INTERPRETATION: ABNORMAL
HEMOGLOBIN S: 40.3 %
MICROALBUMIN UR-MCNC: <7 MG/L
MICROALBUMIN/CREAT UR: NORMAL MG/G{CREAT}
PATH REVIEW-HGB IDENTIFICATION: ABNORMAL

## 2025-01-16 PROCEDURE — 2500000001 HC RX 250 WO HCPCS SELF ADMINISTERED DRUGS (ALT 637 FOR MEDICARE OP): Mod: SE | Performed by: NURSE PRACTITIONER

## 2025-01-16 PROCEDURE — 3008F BODY MASS INDEX DOCD: CPT | Performed by: NURSE PRACTITIONER

## 2025-01-16 PROCEDURE — 99215 OFFICE O/P EST HI 40 MIN: CPT | Performed by: NURSE PRACTITIONER

## 2025-01-16 PROCEDURE — RXMED WILLOW AMBULATORY MEDICATION CHARGE

## 2025-01-16 PROCEDURE — 2500000004 HC RX 250 GENERAL PHARMACY W/ HCPCS (ALT 636 FOR OP/ED): Mod: SE | Performed by: NURSE PRACTITIONER

## 2025-01-16 RX ORDER — CYCLOBENZAPRINE HCL 10 MG
10 TABLET ORAL ONCE
Status: COMPLETED | OUTPATIENT
Start: 2025-01-16 | End: 2025-01-16

## 2025-01-16 RX ORDER — ONDANSETRON 4 MG/1
4 TABLET, FILM COATED ORAL ONCE
Status: COMPLETED | OUTPATIENT
Start: 2025-01-16 | End: 2025-01-16

## 2025-01-16 RX ORDER — NALOXONE HYDROCHLORIDE 0.4 MG/ML
0.4 INJECTION, SOLUTION INTRAMUSCULAR; INTRAVENOUS; SUBCUTANEOUS
Status: DISCONTINUED | OUTPATIENT
Start: 2025-01-16 | End: 2025-01-16 | Stop reason: HOSPADM

## 2025-01-16 RX ORDER — ACETAMINOPHEN 325 MG/1
650 TABLET ORAL ONCE
Status: COMPLETED | OUTPATIENT
Start: 2025-01-16 | End: 2025-01-16

## 2025-01-16 RX ORDER — DIPHENHYDRAMINE HCL 25 MG
25 CAPSULE ORAL ONCE
Status: COMPLETED | OUTPATIENT
Start: 2025-01-16 | End: 2025-01-16

## 2025-01-16 RX ORDER — HYDROMORPHONE HYDROCHLORIDE 4 MG/1
12 TABLET ORAL
Status: DISCONTINUED | OUTPATIENT
Start: 2025-01-16 | End: 2025-01-16 | Stop reason: HOSPADM

## 2025-01-16 RX ADMIN — HYDROMORPHONE HYDROCHLORIDE 12 MG: 4 TABLET ORAL at 12:46

## 2025-01-16 RX ADMIN — HYDROMORPHONE HYDROCHLORIDE 12 MG: 4 TABLET ORAL at 11:44

## 2025-01-16 RX ADMIN — ONDANSETRON HYDROCHLORIDE 4 MG: 4 TABLET, FILM COATED ORAL at 11:44

## 2025-01-16 RX ADMIN — CYCLOBENZAPRINE 10 MG: 10 TABLET, FILM COATED ORAL at 11:44

## 2025-01-16 RX ADMIN — ACETAMINOPHEN 650 MG: 325 TABLET ORAL at 11:44

## 2025-01-16 RX ADMIN — DIPHENHYDRAMINE HYDROCHLORIDE 25 MG: 25 CAPSULE ORAL at 11:44

## 2025-01-16 ASSESSMENT — PAIN SCALES - GENERAL
PAINLEVEL_OUTOF10: 7
PAINLEVEL_OUTOF10: 7
PAINLEVEL_OUTOF10: 8

## 2025-01-16 ASSESSMENT — PAIN DESCRIPTION - LOCATION: LOCATION: LEG

## 2025-01-16 ASSESSMENT — PAIN DESCRIPTION - ORIENTATION: ORIENTATION: RIGHT;LEFT

## 2025-01-16 NOTE — PROGRESS NOTES
[facility]  Acute Care Clinic    Patient: Xu Maya  MRN: [id number]  Date of visit: 01/16/25  Visit type: In person visit  Clinician: CHRIS Ames    Reason for visit: Uncontrolled pain    History of Present Illness:  Xu is a 44 y.o. male with hemoglobin SS sickle cell disease, who presents to [facility] with pain in his BLE typical of his baseline pain. He reports he took his last dilaudid dose yesterday along with ibuprofen without enough relief. Pt denies chest pain, cough, SOB, headaches, blurry vision, falls, fever or chills, n/v/d/abd pain, or urinary complaints. He said he has been trying to manage his pain at home by relying more on heating pads and hot showers as well as adding ibuprofen as needed. He said the daycares closed at noon today because of the weather and so he doesn't have to work. He reports his wife drove him to his appointment today.     We discussed the eventual plan to wean him off his opioids since he is considered cured without a clear indication for opioid therapy. He understands this and is supportive of the process although admits it will be challenging.     Review of Systems:  ROS 14 points performed, See HPI for exceptions     Past Medical History:  Past Medical History:   Diagnosis Date    Anxiety disorder, unspecified 04/17/2017    Anxiety    Depression, unspecified 04/17/2017    Depression    Diarrhea 10/07/2023    DIARRHEA      Family history of glaucoma 03/02/2017    Family history of glaucoma in father    Gastritis, unspecified, without bleeding 04/17/2017    Gastritis    Hematuria, unspecified 04/17/2017    Hematuria    Personal history of other diseases of the digestive system 12/02/2015    History of esophageal reflux    Priapism, unspecified 04/17/2017    Priapism    Sickle-cell disease without crisis (Multi) 04/17/2017    Sickle cell anemia     Allergies:  Allergies   Allergen Reactions    Hydrocodone-Acetaminophen Hives  and Unknown    Naproxen Hives     Medications:  Current Outpatient Medications   Medication Instructions    amitriptyline (ELAVIL) 25 mg    ARIPiprazole (ABILIFY) 5 mg, oral, Daily    cyclobenzaprine (FLEXERIL) 10 mg, oral, 3 times daily PRN    diphenhydrAMINE (BENADRYL) 25 mg, Every 6 hours PRN    DULoxetine (CYMBALTA) 60 mg, oral, 2 times daily    HYDROmorphone (Dilaudid) 4 mg tablet Take 1 tablet (4 mg) by mouth 4 times a day as needed for severe pain (7 - 10) for up to 14 days. Do not fill before January 16, 2025.    hydrOXYzine HCL (ATARAX) 25 mg, Nightly    naloxone (NARCAN) 4 mg, As needed    potassium chloride CR (Klor-Con M20) 20 mEq ER tablet 20 mEq, oral, 2 times daily, Do not crush or chew.    prazosin (MINIPRESS) 1 mg, oral, Nightly    sennosides (SENOKOT) 25.8 mg, oral, 2 times daily       Past Surgical History:  Past Surgical History:   Procedure Laterality Date    GALLBLADDER SURGERY  04/12/2017    Gallbladder Surgery    IR CVC TUNNELED  4/24/2018    IR CVC TUNNELED 4/24/2018 [facility]    IR CVC TUNNELED  6/5/2018    IR CVC TUNNELED 6/5/2018 [facility]    IR CVC TUNNELED  3/1/2019    IR CVC TUNNELED 3/1/2019 [facility]    IR CVC TUNNELED  6/4/2019    IR CVC TUNNELED 6/4/2019 [facility]    IR CVC TUNNELED  4/5/2019    IR CVC TUNNELED 4/5/2019 [facility]    IR CVC TUNNELED  7/17/2019    IR CVC TUNNELED 7/17/2019 [facility]    IR CVC TUNNELED  8/14/2019    IR CVC TUNNELED 8/14/2019 [facility]    IR CVC TUNNELED  12/18/2019    IR CVC TUNNELED 12/18/2019 [facility]    IR CVC TUNNELED  10/9/2019    IR CVC TUNNELED 10/9/2019 [facility]    IR CVC TUNNELED  11/13/2019    IR CVC TUNNELED 11/13/2019 [facility]    IR CVC TUNNELED  1/15/2020    IR CVC TUNNELED 1/15/2020 [facility]    IR CVC TUNNELED  2/19/2020    IR CVC TUNNELED 2/19/2020 [facility]    IR CVC TUNNELED  1/4/2021    IR CVC TUNNELED 1/4/2021 [facility]    IR CVC  TUNNELED  1/25/2021    IR CVC TUNNELED 1/25/2021 [facility]    IR CVC TUNNELED  8/21/2018    IR CVC TUNNELED 8/21/2018 [facility]    IR CVC TUNNELED  9/26/2018    IR CVC TUNNELED 9/26/2018 [facility]    IR CVC TUNNELED  11/5/2018    IR CVC TUNNELED 11/5/2018 [facility]    IR CVC TUNNELED  12/19/2018    IR CVC TUNNELED 12/19/2018 [facility]    IR VENOGRAM HEPATIC  11/8/2017    IR VENOGRAM HEPATIC 11/8/2017 [facility]    MR HEAD ANGIO WO IV CONTRAST  2/17/2017    MR HEAD ANGIO WO IV CONTRAST 2/17/2017 [facility]ICAL LEGAC[facility]    MR NECK ANGIO WO IV CONTRAST  2/17/2017    MR NECK ANGIO WO IV CONTRAST 2/17/2017 [facility] CL[facility]ICAL [facility]    US GUIDED NEEDLE LIVER BIOPSY  9/8/2020    US GUIDED NEEDLE LIVER BIOPSY 9/8/2020 [facility]       Family History:  Family History   Problem Relation Name Age of Onset    Diabetes Father      Sickle cell anemia Other sibling     Cancer Other grandfather     Cancer Other uncle        Social History:  Social History     Tobacco Use    Smoking status: Every Day     Types: Cigarettes     Start date: 1/1/1998     Passive exposure: Current    Smokeless tobacco: Never   Substance Use Topics    Alcohol use: Not Currently     Comment: Occasional    Drug use: Not Currently     Types: Marijuana     Comment: Last use sometime in 2023.  No intention to re-start.       Vital Signs:  There were no vitals taken for this visit.    Physical Exam  Vitals reviewed.   Constitutional:       Appearance: Normal appearance.   HENT:      Head: Normocephalic and atraumatic.   Eyes:      Extraocular Movements: Extraocular movements intact.      Conjunctiva/sclera: Conjunctivae normal.   Cardiovascular:      Rate and Rhythm: Normal rate and regular rhythm.      Pulses: Normal pulses.      Heart sounds: Normal heart sounds.   Pulmonary:      Effort: Pulmonary effort is normal.      Breath sounds: Normal breath sounds.   Abdominal:      General: Abdomen is flat. Bowel sounds are normal.       Palpations: Abdomen is soft.   Musculoskeletal:         General: Normal range of motion.      Cervical back: Normal range of motion and neck supple.      Right lower leg: No edema.      Left lower leg: No edema.   Skin:     General: Skin is warm and dry.   Neurological:      General: No focal deficit present.      Mental Status: He is alert and oriented to person, place, and time.   Psychiatric:         Mood and Affect: Mood normal.         Behavior: Behavior normal.         Thought Content: Thought content normal.         Judgment: Judgment normal.       Results:  Lab Results   Component Value Date    WBC 9.8 01/15/2025    HGB 16.3 01/15/2025    HCT 45.0 01/15/2025    MCV 89 01/15/2025     01/15/2025       Lab Results   Component Value Date    GLUCOSE 115 (H) 01/15/2025    CALCIUM 9.5 01/15/2025     01/15/2025    K 3.8 01/15/2025    CO2 28 01/15/2025     01/15/2025    BUN 8 01/15/2025    CREATININE 0.82 01/15/2025       Lab Results   Component Value Date    ALT 10 01/15/2025    AST 13 01/15/2025    ALKPHOS 113 01/15/2025    BILITOT 0.8 01/15/2025       Lab Results   Component Value Date    RETICCTPCT 1.1 01/15/2025       Lab Results   Component Value Date     01/15/2025       Assessment & Plan:  Xu Maya is a 44 y.o. male with hx of hemoglobin SS sickle cell disease s/p stem cell transplant (2/4/2021), who presents to [facility] today for uncontrolled pain.     [facility] Course  - VSS  - no indication for repeat labs   - Flexeril and tylenol, given for MSK pain  - Dilaudid 12 mg PO Q1hr x 3 doses for sickle cell related pain   - Zofran PO once for opioid induced nausea/vomiting  - Benadryl PO once for opioid induced pruritus     Disposition  - Patient discharged home with no further needs following [facility] Course  - Return to clinic/ED instructions given  - Follow up with sickle cell team as planned, discussed need for updated careplan with Dr. Millicent Willis, APRN-CNP

## 2025-01-17 LAB
6MAM UR CFM-MCNC: <25 NG/ML
CODEINE UR CFM-MCNC: <50 NG/ML
HYDROCODONE CTO UR CFM-MCNC: <25 NG/ML
HYDROMORPHONE UR CFM-MCNC: 549 NG/ML
MORPHINE UR CFM-MCNC: <50 NG/ML
NORHYDROCODONE UR CFM-MCNC: <25 NG/ML
NOROXYCODONE UR CFM-MCNC: <25 NG/ML
OXYCODONE UR CFM-MCNC: <25 NG/ML
OXYMORPHONE UR CFM-MCNC: <25 NG/ML

## 2025-01-28 ENCOUNTER — OFFICE VISIT (OUTPATIENT)
Dept: PAIN MEDICINE | Facility: HOSPITAL | Age: 45
End: 2025-01-28
Payer: COMMERCIAL

## 2025-01-28 DIAGNOSIS — D57.00 SICKLE CELL DISEASE WITH CRISIS (MULTI): ICD-10-CM

## 2025-01-28 DIAGNOSIS — G89.4 CHRONIC PAIN SYNDROME: ICD-10-CM

## 2025-01-28 PROCEDURE — 99204 OFFICE O/P NEW MOD 45 MIN: CPT | Performed by: ANESTHESIOLOGY

## 2025-01-28 PROCEDURE — 99214 OFFICE O/P EST MOD 30 MIN: CPT | Performed by: ANESTHESIOLOGY

## 2025-01-28 ASSESSMENT — PAIN SCALES - GENERAL: PAINLEVEL_OUTOF10: 6

## 2025-01-29 ENCOUNTER — HOSPITAL ENCOUNTER (EMERGENCY)
Facility: HOSPITAL | Age: 45
Discharge: HOME | End: 2025-01-29
Attending: EMERGENCY MEDICINE
Payer: COMMERCIAL

## 2025-01-29 ENCOUNTER — CLINICAL SUPPORT (OUTPATIENT)
Dept: EMERGENCY MEDICINE | Facility: HOSPITAL | Age: 45
End: 2025-01-29
Payer: COMMERCIAL

## 2025-01-29 ENCOUNTER — TELEPHONE (OUTPATIENT)
Dept: ADMISSION | Facility: HOSPITAL | Age: 45
End: 2025-01-29
Payer: COMMERCIAL

## 2025-01-29 VITALS
RESPIRATION RATE: 18 BRPM | BODY MASS INDEX: 23.07 KG/M2 | SYSTOLIC BLOOD PRESSURE: 123 MMHG | HEART RATE: 69 BPM | TEMPERATURE: 97.7 F | DIASTOLIC BLOOD PRESSURE: 83 MMHG | HEIGHT: 67 IN | WEIGHT: 147 LBS | OXYGEN SATURATION: 97 %

## 2025-01-29 DIAGNOSIS — D57.00 SICKLE CELL ANEMIA WITH PAIN (MULTI): Primary | ICD-10-CM

## 2025-01-29 DIAGNOSIS — D57.00 SICKLE-CELL DISEASE WITH PAIN (MULTI): ICD-10-CM

## 2025-01-29 LAB
ALBUMIN SERPL BCP-MCNC: 4.2 G/DL (ref 3.4–5)
ALP SERPL-CCNC: 102 U/L (ref 33–120)
ALT SERPL W P-5'-P-CCNC: 13 U/L (ref 10–52)
ANION GAP SERPL CALC-SCNC: 14 MMOL/L (ref 10–20)
AST SERPL W P-5'-P-CCNC: 17 U/L (ref 9–39)
BASOPHILS # BLD AUTO: 0.02 X10*3/UL (ref 0–0.1)
BASOPHILS NFR BLD AUTO: 0.2 %
BILIRUB SERPL-MCNC: 0.7 MG/DL (ref 0–1.2)
BUN SERPL-MCNC: 8 MG/DL (ref 6–23)
CALCIUM SERPL-MCNC: 9.6 MG/DL (ref 8.6–10.6)
CHLORIDE SERPL-SCNC: 107 MMOL/L (ref 98–107)
CK SERPL-CCNC: 74 U/L (ref 0–325)
CO2 SERPL-SCNC: 25 MMOL/L (ref 21–32)
CREAT SERPL-MCNC: 0.82 MG/DL (ref 0.5–1.3)
EGFRCR SERPLBLD CKD-EPI 2021: >90 ML/MIN/1.73M*2
EOSINOPHIL # BLD AUTO: 0.12 X10*3/UL (ref 0–0.7)
EOSINOPHIL NFR BLD AUTO: 1.2 %
ERYTHROCYTE [DISTWIDTH] IN BLOOD BY AUTOMATED COUNT: 12.7 % (ref 11.5–14.5)
GLUCOSE SERPL-MCNC: 89 MG/DL (ref 74–99)
HCT VFR BLD AUTO: 43.1 % (ref 41–52)
HGB BLD-MCNC: 15.8 G/DL (ref 13.5–17.5)
HGB RETIC QN: 36 PG (ref 28–38)
IMM GRANULOCYTES # BLD AUTO: 0.02 X10*3/UL (ref 0–0.7)
IMM GRANULOCYTES NFR BLD AUTO: 0.2 % (ref 0–0.9)
IMMATURE RETIC FRACTION: 11.1 %
LDH SERPL L TO P-CCNC: 171 U/L (ref 84–246)
LYMPHOCYTES # BLD AUTO: 2.93 X10*3/UL (ref 1.2–4.8)
LYMPHOCYTES NFR BLD AUTO: 30.4 %
MCH RBC QN AUTO: 31.3 PG (ref 26–34)
MCHC RBC AUTO-ENTMCNC: 36.7 G/DL (ref 32–36)
MCV RBC AUTO: 86 FL (ref 80–100)
MONOCYTES # BLD AUTO: 0.66 X10*3/UL (ref 0.1–1)
MONOCYTES NFR BLD AUTO: 6.8 %
NEUTROPHILS # BLD AUTO: 5.89 X10*3/UL (ref 1.2–7.7)
NEUTROPHILS NFR BLD AUTO: 61.2 %
NRBC BLD-RTO: 0 /100 WBCS (ref 0–0)
PLATELET # BLD AUTO: 256 X10*3/UL (ref 150–450)
POTASSIUM SERPL-SCNC: 3.7 MMOL/L (ref 3.5–5.3)
PROT SERPL-MCNC: 7.1 G/DL (ref 6.4–8.2)
PSA SERPL-MCNC: 0.35 NG/ML
RBC # BLD AUTO: 5.04 X10*6/UL (ref 4.5–5.9)
RETICS #: 0.07 X10*6/UL (ref 0.02–0.12)
RETICS/RBC NFR AUTO: 1.4 % (ref 0.5–2)
SODIUM SERPL-SCNC: 142 MMOL/L (ref 136–145)
WBC # BLD AUTO: 9.6 X10*3/UL (ref 4.4–11.3)

## 2025-01-29 PROCEDURE — 36415 COLL VENOUS BLD VENIPUNCTURE: CPT | Performed by: EMERGENCY MEDICINE

## 2025-01-29 PROCEDURE — 83615 LACTATE (LD) (LDH) ENZYME: CPT | Performed by: EMERGENCY MEDICINE

## 2025-01-29 PROCEDURE — 2500000001 HC RX 250 WO HCPCS SELF ADMINISTERED DRUGS (ALT 637 FOR MEDICARE OP): Mod: SE | Performed by: EMERGENCY MEDICINE

## 2025-01-29 PROCEDURE — 82550 ASSAY OF CK (CPK): CPT | Performed by: EMERGENCY MEDICINE

## 2025-01-29 PROCEDURE — 96374 THER/PROPH/DIAG INJ IV PUSH: CPT

## 2025-01-29 PROCEDURE — 2500000004 HC RX 250 GENERAL PHARMACY W/ HCPCS (ALT 636 FOR OP/ED): Mod: JZ,SE | Performed by: EMERGENCY MEDICINE

## 2025-01-29 PROCEDURE — 84075 ASSAY ALKALINE PHOSPHATASE: CPT | Performed by: EMERGENCY MEDICINE

## 2025-01-29 PROCEDURE — 93005 ELECTROCARDIOGRAM TRACING: CPT

## 2025-01-29 PROCEDURE — 85025 COMPLETE CBC W/AUTO DIFF WBC: CPT | Performed by: EMERGENCY MEDICINE

## 2025-01-29 PROCEDURE — 93005 ELECTROCARDIOGRAM TRACING: CPT | Mod: 76

## 2025-01-29 PROCEDURE — 84153 ASSAY OF PSA TOTAL: CPT | Performed by: EMERGENCY MEDICINE

## 2025-01-29 PROCEDURE — RXMED WILLOW AMBULATORY MEDICATION CHARGE

## 2025-01-29 PROCEDURE — 96375 TX/PRO/DX INJ NEW DRUG ADDON: CPT

## 2025-01-29 PROCEDURE — 99285 EMERGENCY DEPT VISIT HI MDM: CPT | Performed by: EMERGENCY MEDICINE

## 2025-01-29 PROCEDURE — 85045 AUTOMATED RETICULOCYTE COUNT: CPT | Performed by: EMERGENCY MEDICINE

## 2025-01-29 PROCEDURE — 99284 EMERGENCY DEPT VISIT MOD MDM: CPT | Performed by: EMERGENCY MEDICINE

## 2025-01-29 RX ORDER — HYDROMORPHONE HYDROCHLORIDE 2 MG/ML
2 INJECTION, SOLUTION INTRAMUSCULAR; INTRAVENOUS; SUBCUTANEOUS EVERY 2 HOUR PRN
Status: DISCONTINUED | OUTPATIENT
Start: 2025-01-29 | End: 2025-01-29

## 2025-01-29 RX ORDER — HYDROMORPHONE HYDROCHLORIDE 2 MG/1
12 TABLET ORAL ONCE
Status: COMPLETED | OUTPATIENT
Start: 2025-01-29 | End: 2025-01-29

## 2025-01-29 RX ORDER — CYCLOBENZAPRINE HCL 10 MG
10 TABLET ORAL ONCE
Status: COMPLETED | OUTPATIENT
Start: 2025-01-29 | End: 2025-01-29

## 2025-01-29 RX ORDER — HYDROMORPHONE HYDROCHLORIDE 2 MG/1
12 TABLET ORAL EVERY 2 HOUR PRN
Status: DISCONTINUED | OUTPATIENT
Start: 2025-01-29 | End: 2025-01-29

## 2025-01-29 RX ORDER — HYDROMORPHONE HYDROCHLORIDE 1 MG/ML
INJECTION, SOLUTION INTRAMUSCULAR; INTRAVENOUS; SUBCUTANEOUS
Status: DISCONTINUED
Start: 2025-01-29 | End: 2025-01-29 | Stop reason: HOSPADM

## 2025-01-29 RX ORDER — WATER
250 LIQUID (ML) MISCELLANEOUS
Status: DISCONTINUED | OUTPATIENT
Start: 2025-01-29 | End: 2025-01-29 | Stop reason: HOSPADM

## 2025-01-29 RX ORDER — HYDROMORPHONE HYDROCHLORIDE 4 MG/1
4 TABLET ORAL 4 TIMES DAILY PRN
Qty: 54 TABLET | Refills: 0 | Status: SHIPPED | OUTPATIENT
Start: 2025-01-29 | End: 2025-02-13

## 2025-01-29 RX ORDER — KETOROLAC TROMETHAMINE 15 MG/ML
15 INJECTION, SOLUTION INTRAMUSCULAR; INTRAVENOUS ONCE
Status: COMPLETED | OUTPATIENT
Start: 2025-01-29 | End: 2025-01-29

## 2025-01-29 RX ADMIN — Medication 250 ML: at 12:25

## 2025-01-29 RX ADMIN — Medication 250 ML: at 14:01

## 2025-01-29 RX ADMIN — HYDROMORPHONE HYDROCHLORIDE 2 MG: 2 INJECTION, SOLUTION INTRAMUSCULAR; INTRAVENOUS; SUBCUTANEOUS at 10:12

## 2025-01-29 RX ADMIN — Medication 250 ML: at 10:19

## 2025-01-29 RX ADMIN — HYDROMORPHONE HYDROCHLORIDE 12 MG: 2 TABLET ORAL at 12:25

## 2025-01-29 RX ADMIN — Medication 250 ML: at 11:57

## 2025-01-29 RX ADMIN — HYDROMORPHONE HYDROCHLORIDE 12 MG: 2 TABLET ORAL at 14:03

## 2025-01-29 RX ADMIN — KETOROLAC TROMETHAMINE 15 MG: 15 INJECTION, SOLUTION INTRAMUSCULAR; INTRAVENOUS at 09:57

## 2025-01-29 RX ADMIN — Medication 250 ML: at 13:59

## 2025-01-29 RX ADMIN — CYCLOBENZAPRINE 10 MG: 10 TABLET, FILM COATED ORAL at 12:25

## 2025-01-29 ASSESSMENT — PAIN DESCRIPTION - PAIN TYPE: TYPE: ACUTE PAIN;CHRONIC PAIN

## 2025-01-29 ASSESSMENT — PAIN SCALES - GENERAL
PAINLEVEL_OUTOF10: 7
PAINLEVEL_OUTOF10: 8
PAINLEVEL_OUTOF10: 7

## 2025-01-29 ASSESSMENT — COLUMBIA-SUICIDE SEVERITY RATING SCALE - C-SSRS
2. HAVE YOU ACTUALLY HAD ANY THOUGHTS OF KILLING YOURSELF?: NO
1. IN THE PAST MONTH, HAVE YOU WISHED YOU WERE DEAD OR WISHED YOU COULD GO TO SLEEP AND NOT WAKE UP?: NO
6. HAVE YOU EVER DONE ANYTHING, STARTED TO DO ANYTHING, OR PREPARED TO DO ANYTHING TO END YOUR LIFE?: NO

## 2025-01-29 ASSESSMENT — PAIN DESCRIPTION - ORIENTATION: ORIENTATION: RIGHT;LEFT

## 2025-01-29 ASSESSMENT — PAIN - FUNCTIONAL ASSESSMENT
PAIN_FUNCTIONAL_ASSESSMENT: 0-10

## 2025-01-29 ASSESSMENT — PAIN DESCRIPTION - LOCATION: LOCATION: LEG

## 2025-01-29 NOTE — ED PROVIDER NOTES
History of Present Illness     History provided by: Patient  Limitations to History: None  External Records Reviewed: Prior acute care clinic notes from September 2024 as well as prior ED notes throughout 2024    HPI:  Xu Maya is a 44 y.o. male with a history of hemoglobin SS disease status post stem cell transplant in cure who presents to the emergency department with bilateral lower extremity pain for the last couple of days.  He is uncertain of what the instigator was.  He states this is typical of his pain.  He stated that it has been a very long time since he has been admitted.  He denies any chest pain shortness of breath.  He states used to be on nighttime oxygen but not anymore.  He does not feel like he has any viral illness.  No neurologic symptoms, no headache.  His complete review of systems was obtained is otherwise negative.  He states that his home pain medicine patient is not working.  He also states that he has been having priapism of the last couple of days, none now.    Physical Exam   Triage vitals:  T 36.7 °C (98.1 °F)  HR 84  /71  RR 17  O2 98 % None (Room air)    Physical Exam  Vitals and nursing note reviewed.   Constitutional:       Appearance: Normal appearance. He is normal weight.   HENT:      Head: Normocephalic and atraumatic.      Nose: Nose normal.      Mouth/Throat:      Mouth: Mucous membranes are moist.   Eyes:      General: No scleral icterus.     Extraocular Movements: Extraocular movements intact.   Cardiovascular:      Rate and Rhythm: Normal rate and regular rhythm.   Pulmonary:      Effort: Pulmonary effort is normal.      Breath sounds: Normal breath sounds.   Abdominal:      General: Abdomen is flat. There is no distension.      Palpations: Abdomen is soft.      Tenderness: There is no abdominal tenderness. There is no guarding.   Musculoskeletal:         General: No swelling, tenderness, deformity or signs of injury. Normal range of motion.      Cervical  back: Normal range of motion and neck supple.      Right lower leg: No edema.      Left lower leg: No edema.   Skin:     General: Skin is warm and dry.      Capillary Refill: Capillary refill takes less than 2 seconds.      Coloration: Skin is not jaundiced.   Neurological:      General: No focal deficit present.      Mental Status: He is alert and oriented to person, place, and time.   Psychiatric:         Mood and Affect: Mood normal.         Behavior: Behavior normal.         Thought Content: Thought content normal.         Judgment: Judgment normal.          Medical Decision Making & ED Course   Medical Decision Makin y.o. male with a history of apparent cured sickle cell disease who presents with lower extremity pain.  I am uncertain about the source of his pain given that he is status post a successful stem cell transplant.  I will go ahead and treat him as a pain crisis with labs, analgesia and repeat exam. At this time, he has no concerning aspects, no concern for CVA, acute chest syndrome, septic arthritis    With significant improvement after care plan here.  Patient requesting discharge.  Patient is to follow-up with his hematologist.  PSA has been drawn and sent per hematology.  ----         Social Determinants of Health which Significantly Impact Care: None identified The following actions were taken to address these social determinants: none      Chronic conditions affecting the patient's care: See HPI    The patient was discussed with the following consultants/services: Please see ED course for consult transcript        ED Course:  ED Course as of 25 1505      1159 D/w with Dr. Mihir Ricks [YT]   1201 Pt challenge in regards to ongoing pain.  In remission.  His donor has sickle cell trait.  For unknown reason continues to have pain.  Dr Ricks last week indicated to patient that he was going to start weaning narcotics.  Oral dilaudid 12 mg every 1-2 hours for 2-3 hour.   Flexeril 10 mg as needed for pain.   [YT]   1208 Recommends adding a PSA which Dr. Ricks will follow-up with [YT]      ED Course User Index  [YT] Nusrat Valles MD         Diagnoses as of 01/29/25 1503   Sickle cell anemia with pain (Multi)     Disposition   As a result of the work-up, the patient was discharged home.  he was informed of his diagnosis and instructed to come back with any concerns or worsening of condition.  he and was agreeable to the plan as discussed above.  he was given the opportunity to ask questions.  All of the patient's questions were answered.    Procedures   Procedures    Patient was seen and discussed with the attending of record.    Nusrat Valles MD  Emergency Medicine     Nusrat Valles MD  01/29/25 1136

## 2025-01-29 NOTE — TELEPHONE ENCOUNTER
Pt requesting refill   Dilaudid 4mg. 1 tablet 4 times a day prn  Pharmacy: [facility]  Last FUV 1/15, next FUV 4/16.

## 2025-01-29 NOTE — PROGRESS NOTES
"Chief Complaint   Patient presents with    Pain        HPI   Xu Maya is a 44-year-old male with a history of leg pain.  He says that he has had sickle cell disease for many years, decades.  The patient says that he is always suffered with bilateral lower extremity pain since he was in his teens.  He says the symptoms are aching.  He says that this area will flareup and is the starting zone of a crisis.  On occasion recently he has also been feeling symptoms that can travel up to his low back.  He has tried heat, hot showers, lidocaine patch, ketamine infusions x 1, scrambler therapy, duloxetine which he currently takes for depression/dealing with death in the family, and has done acupuncture.  He does say that his leg pain is there at all times to some degree but can be worsened at times.  In terms of the ketamine he said that he does not recall it but says he does not remember it helping.  He said that when he had the ketamine infusion his wife was worried and said that he was \"tripping.\"  He has not done any psychological based treatments for his pain.    Med list includes hydromorphone 4 mg as needed, cyclobenzaprine, amitriptyline, duloxetine.    ROS: 13 point review of systems is complete and is negative listed above in HPI    Past Medical History:   Diagnosis Date    Anxiety disorder, unspecified 04/17/2017    Anxiety    Depression, unspecified 04/17/2017    Depression    Diarrhea 10/07/2023    DIARRHEA      Family history of glaucoma 03/02/2017    Family history of glaucoma in father    Gastritis, unspecified, without bleeding 04/17/2017    Gastritis    Hematuria, unspecified 04/17/2017    Hematuria    Personal history of other diseases of the digestive system 12/02/2015    History of esophageal reflux    Priapism, unspecified 04/17/2017    Priapism    Sickle-cell disease without crisis (Multi) 04/17/2017    Sickle cell anemia       Past Surgical History:   Procedure Laterality Date    GALLBLADDER " SURGERY  04/12/2017    Gallbladder Surgery    IR CVC TUNNELED  4/24/2018    IR CVC TUNNELED 4/24/2018 [facility]    IR CVC TUNNELED  6/5/2018    IR CVC TUNNELED 6/5/2018 [facility]    IR CVC TUNNELED  3/1/2019    IR CVC TUNNELED 3/1/2019 [facility]    IR CVC TUNNELED  6/4/2019    IR CVC TUNNELED 6/4/2019 [facility]    IR CVC TUNNELED  4/5/2019    IR CVC TUNNELED 4/5/2019 [facility]    IR CVC TUNNELED  7/17/2019    IR CVC TUNNELED 7/17/2019 [facility]    IR CVC TUNNELED  8/14/2019    IR CVC TUNNELED 8/14/2019 [facility]    IR CVC TUNNELED  12/18/2019    IR CVC TUNNELED 12/18/2019 [facility]    IR CVC TUNNELED  10/9/2019    IR CVC TUNNELED 10/9/2019 [facility]    IR CVC TUNNELED  11/13/2019    IR CVC TUNNELED 11/13/2019 [facility]    IR CVC TUNNELED  1/15/2020    IR CVC TUNNELED 1/15/2020 [facility]    IR CVC TUNNELED  2/19/2020    IR CVC TUNNELED 2/19/2020 [facility]    IR CVC TUNNELED  1/4/2021    IR CVC TUNNELED 1/4/2021 [facility]    IR CVC TUNNELED  1/25/2021    IR CVC TUNNELED 1/25/2021 [facility]    IR CVC TUNNELED  8/21/2018    IR CVC TUNNELED 8/21/2018 [facility]    IR CVC TUNNELED  9/26/2018    IR CVC TUNNELED 9/26/2018 [facility]    IR CVC TUNNELED  11/5/2018    IR CVC TUNNELED 11/5/2018 [facility]    IR CVC TUNNELED  12/19/2018    IR CVC TUNNELED 12/19/2018 [facility]    IR VENOGRAM HEPATIC  11/8/2017    IR VENOGRAM HEPATIC 11/8/2017 [facility]    MR HEAD ANGIO WO IV CONTRAST  2/17/2017    MR HEAD ANGIO WO IV CONTRAST 2/17/2017 [facility]    MR NECK ANGIO WO IV CONTRAST  2/17/2017    MR NECK ANGIO WO IV CONTRAST 2/17/2017 [facility]    US GUIDED NEEDLE LIVER BIOPSY  9/8/2020    US GUIDED NEEDLE LIVER BIOPSY 9/8/2020 [facility]       Family History   Problem Relation Name Age of Onset    Diabetes Father      Sickle cell anemia Other sibling     Cancer Other grandfather      Cancer Other uncle        Social History     Tobacco Use    Smoking status: Every Day     Types: Cigarettes     Start date: 1/1/1998     Passive exposure: Current    Smokeless tobacco: Never   Substance Use Topics    Alcohol use: Not Currently     Comment: Occasional    Drug use: Not Currently     Types: Marijuana     Comment: Last use sometime in 2023.  No intention to re-start.       Current Facility-Administered Medications on File Prior to Visit   Medication Dose Route Frequency Provider Last Rate Last Admin    heparin lock flush (porcine) 10 unit/mL injection 100 Units  100 Units intra-catheter Once CHRIS Garcia        heparin lock flush (porcine) injection 500 Units  5 mL intravenous PRN CHRIS Garcia         Current Outpatient Medications on File Prior to Visit   Medication Sig Dispense Refill    amitriptyline (Elavil) 25 mg tablet Take 1 tablet (25 mg) by mouth.      ARIPiprazole (Abilify) 5 mg tablet Take 1 tablet (5 mg) by mouth once daily. 90 tablet 3    cyclobenzaprine (Flexeril) 10 mg tablet Take 1 tablet (10 mg) by mouth 3 times a day as needed for muscle spasms. 20 tablet 0    diphenhydrAMINE (BENADryl) 25 mg capsule Take 1 capsule (25 mg) by mouth every 6 hours if needed.      DULoxetine (Cymbalta) 60 mg DR capsule Take 1 capsule (60 mg) by mouth 2 times a day. 180 capsule 3    HYDROmorphone (Dilaudid) 4 mg tablet Take 1 tablet (4 mg) by mouth 4 times a day as needed for severe pain (7 - 10) for up to 14 days. Do not fill before January 16, 2025. 56 tablet 0    hydrOXYzine HCL (Atarax) 25 mg tablet Take 1 tablet (25 mg) by mouth once daily at bedtime.      naloxone (Narcan) 4 mg/0.1 mL nasal spray Administer 1 spray (4 mg) into affected nostril(s) if needed for opioid reversal or respiratory depression. 1 spray to nostril for overdose, may repeat every 2-3 minutes until medical assistance arrives      prazosin (Minipress) 1 mg capsule Take 1 capsule (1 mg) by mouth once daily at  bedtime. 30 capsule 2    sennosides (Senokot) 8.6 mg tablet Take 3 tablets (25.8 mg) by mouth 2 times a day. 180 tablet 3        Allergies   Allergen Reactions    Hydrocodone-Acetaminophen Hives and Unknown    Naproxen Hives          Imaging:  Patient has multiple MRIs of his hips and pelvis which I reviewed.  He does have 1 MRI of his spine which was done in 2020 which revealed a degenerated disc at L5-S1 with a high intensity zone at L5-S1/annular tear.  There was no high-grade impingement or surgical lesion noted    Physical Exam:  Gen.: No acute distress  Eyes: Pupils symmetric  ENT: Hearing without deficit  Respiratory: No gasping or shortness of breath  Cardiovascular: Extremity show no edema  Skin: No rashes or open lesions or ulcers identified on the skin  Musculoskeletal: Gait is grossly normal, walks without assist device, normal lower extremity exam  Neurologic: Cranial nerves II through XII are grossly intact  Psychiatric:  Patient is alert and oriented x3    Impression/Plan:  44-year-old male with a history of sickle cell disease who has tried many measures to help with his pain.  He says his goal is to stay out of the hospital and have better control of his pain.  He feels that his current regimen is acceptable but he still has a good deal of pain that affects his quality of life.  He is interested in any treatment that may help improve his overall symptoms.    -Would recommend psychological based treatments and did discuss the potential for referral to Dr. Sawyer.    -Will give a TENS unit to be used.    -If he is having increasing back pain an MRI could be considered in the future as he did have degenerative changes in the disc.  I will say however that I am not sure all of his symptoms correlate to that specifically.  It does appear as if his other providers did order MRIs of the lower extremities as further workup as well.  Prior imaging of the hips/pelvic bones have not shown any areas of avascular  necrosis.

## 2025-01-30 ENCOUNTER — PHARMACY VISIT (OUTPATIENT)
Dept: PHARMACY | Facility: CLINIC | Age: 45
End: 2025-01-30
Payer: MEDICAID

## 2025-02-10 ENCOUNTER — TELEPHONE (OUTPATIENT)
Dept: ADMISSION | Facility: HOSPITAL | Age: 45
End: 2025-02-10
Payer: COMMERCIAL

## 2025-02-10 DIAGNOSIS — D57.00 SICKLE-CELL DISEASE WITH PAIN (MULTI): ICD-10-CM

## 2025-02-10 PROCEDURE — RXMED WILLOW AMBULATORY MEDICATION CHARGE

## 2025-02-10 RX ORDER — HYDROMORPHONE HYDROCHLORIDE 4 MG/1
4 TABLET ORAL 4 TIMES DAILY PRN
Qty: 52 TABLET | Refills: 0 | Status: SHIPPED | OUTPATIENT
Start: 2025-02-10 | End: 2025-02-25

## 2025-02-10 NOTE — TELEPHONE ENCOUNTER
Refill Request  Hydromorphone(Dilaudid) 4mg 4times daily PRN    Preferred Pharmacy  [facility]

## 2025-02-11 ENCOUNTER — PHARMACY VISIT (OUTPATIENT)
Dept: PHARMACY | Facility: CLINIC | Age: 45
End: 2025-02-11
Payer: MEDICAID

## 2025-02-13 ASSESSMENT — ENCOUNTER SYMPTOMS
RESPIRATORY NEGATIVE: 1
HEMATOLOGIC/LYMPHATIC NEGATIVE: 1
NEUROLOGICAL NEGATIVE: 1
GASTROINTESTINAL NEGATIVE: 1
MUSCULOSKELETAL NEGATIVE: 1
ENDOCRINE NEGATIVE: 1
CARDIOVASCULAR NEGATIVE: 1
PSYCHIATRIC NEGATIVE: 1
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
## 2025-02-14 ENCOUNTER — OFFICE VISIT (OUTPATIENT)
Dept: HEMATOLOGY/ONCOLOGY | Facility: HOSPITAL | Age: 45
End: 2025-02-14
Payer: COMMERCIAL

## 2025-02-14 VITALS
DIASTOLIC BLOOD PRESSURE: 81 MMHG | WEIGHT: 147.93 LBS | TEMPERATURE: 97.7 F | HEART RATE: 77 BPM | BODY MASS INDEX: 23.17 KG/M2 | SYSTOLIC BLOOD PRESSURE: 127 MMHG | RESPIRATION RATE: 16 BRPM | OXYGEN SATURATION: 96 %

## 2025-02-14 DIAGNOSIS — Z94.84 HISTORY OF STEM CELL TRANSPLANT (MULTI): Primary | ICD-10-CM

## 2025-02-14 PROCEDURE — 99215 OFFICE O/P EST HI 40 MIN: CPT | Performed by: NURSE PRACTITIONER

## 2025-02-14 ASSESSMENT — ENCOUNTER SYMPTOMS
EYE PROBLEMS: 1
FATIGUE: 1

## 2025-02-14 ASSESSMENT — PAIN SCALES - GENERAL: PAINLEVEL_OUTOF10: 4

## 2025-02-24 ENCOUNTER — TELEPHONE (OUTPATIENT)
Dept: ADMISSION | Facility: HOSPITAL | Age: 45
End: 2025-02-24
Payer: COMMERCIAL

## 2025-02-24 ENCOUNTER — PHARMACY VISIT (OUTPATIENT)
Dept: PHARMACY | Facility: CLINIC | Age: 45
End: 2025-02-24
Payer: MEDICAID

## 2025-02-24 DIAGNOSIS — D57.00 SICKLE-CELL DISEASE WITH PAIN (MULTI): ICD-10-CM

## 2025-02-24 PROCEDURE — RXMED WILLOW AMBULATORY MEDICATION CHARGE

## 2025-02-24 RX ORDER — HYDROMORPHONE HYDROCHLORIDE 4 MG/1
4 TABLET ORAL 4 TIMES DAILY PRN
Qty: 50 TABLET | Refills: 0 | Status: SHIPPED | OUTPATIENT
Start: 2025-02-24 | End: 2025-03-10

## 2025-02-24 NOTE — TELEPHONE ENCOUNTER
Pt requesting refill   Dilaudid 4mg. 1 tablet 4 times a day prn  Pharmacy: [facility]   Pt requesting script be completed this morning so he can  today  Last FUV 1/15, next FUV 4/16

## 2025-03-06 ENCOUNTER — TELEMEDICINE (OUTPATIENT)
Dept: BEHAVIORAL HEALTH | Facility: HOSPITAL | Age: 45
End: 2025-03-06
Payer: COMMERCIAL

## 2025-03-06 DIAGNOSIS — F51.5 NIGHTMARES: ICD-10-CM

## 2025-03-06 DIAGNOSIS — F41.9 ANXIETY: ICD-10-CM

## 2025-03-06 DIAGNOSIS — F33.0 MILD EPISODE OF RECURRENT MAJOR DEPRESSIVE DISORDER (CMS-HCC): ICD-10-CM

## 2025-03-06 PROCEDURE — 90833 PSYTX W PT W E/M 30 MIN: CPT | Performed by: PSYCHIATRY & NEUROLOGY

## 2025-03-06 PROCEDURE — 99214 OFFICE O/P EST MOD 30 MIN: CPT | Performed by: PSYCHIATRY & NEUROLOGY

## 2025-03-06 PROCEDURE — RXMED WILLOW AMBULATORY MEDICATION CHARGE

## 2025-03-06 RX ORDER — ARIPIPRAZOLE 5 MG/1
5 TABLET ORAL DAILY
Qty: 90 TABLET | Refills: 3 | Status: SHIPPED | OUTPATIENT
Start: 2025-03-06

## 2025-03-06 RX ORDER — MIRTAZAPINE 7.5 MG/1
7.5 TABLET, FILM COATED ORAL NIGHTLY
Qty: 30 TABLET | Refills: 2 | Status: SHIPPED | OUTPATIENT
Start: 2025-03-06 | End: 2025-06-04
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
## 2025-03-07 ENCOUNTER — HOSPITAL ENCOUNTER (EMERGENCY)
Facility: HOSPITAL | Age: 45
Discharge: HOME | End: 2025-03-08
Attending: EMERGENCY MEDICINE
Payer: COMMERCIAL

## 2025-03-07 ENCOUNTER — TELEPHONE (OUTPATIENT)
Dept: HEMATOLOGY/ONCOLOGY | Facility: HOSPITAL | Age: 45
End: 2025-03-07
Payer: COMMERCIAL

## 2025-03-07 VITALS
DIASTOLIC BLOOD PRESSURE: 67 MMHG | WEIGHT: 144 LBS | HEIGHT: 67 IN | TEMPERATURE: 97.7 F | HEART RATE: 83 BPM | OXYGEN SATURATION: 97 % | BODY MASS INDEX: 22.6 KG/M2 | SYSTOLIC BLOOD PRESSURE: 109 MMHG | RESPIRATION RATE: 16 BRPM

## 2025-03-07 DIAGNOSIS — D57.00 SICKLE-CELL DISEASE WITH PAIN (MULTI): ICD-10-CM

## 2025-03-07 DIAGNOSIS — D57.00 SICKLE CELL PAIN CRISIS (MULTI): Primary | ICD-10-CM

## 2025-03-07 PROCEDURE — 85025 COMPLETE CBC W/AUTO DIFF WBC: CPT | Performed by: PHYSICIAN ASSISTANT

## 2025-03-07 PROCEDURE — 99284 EMERGENCY DEPT VISIT MOD MDM: CPT | Performed by: EMERGENCY MEDICINE

## 2025-03-07 PROCEDURE — 80053 COMPREHEN METABOLIC PANEL: CPT | Performed by: PHYSICIAN ASSISTANT

## 2025-03-07 PROCEDURE — 36415 COLL VENOUS BLD VENIPUNCTURE: CPT | Performed by: EMERGENCY MEDICINE

## 2025-03-07 PROCEDURE — 82248 BILIRUBIN DIRECT: CPT | Performed by: EMERGENCY MEDICINE

## 2025-03-07 PROCEDURE — 83615 LACTATE (LD) (LDH) ENZYME: CPT | Performed by: PHYSICIAN ASSISTANT

## 2025-03-07 PROCEDURE — 85045 AUTOMATED RETICULOCYTE COUNT: CPT | Performed by: PHYSICIAN ASSISTANT

## 2025-03-07 RX ORDER — HYDROMORPHONE HYDROCHLORIDE 4 MG/1
4 TABLET ORAL 4 TIMES DAILY PRN
Qty: 48 TABLET | Refills: 0 | Status: SHIPPED | OUTPATIENT
Start: 2025-03-07 | End: 2025-03-21

## 2025-03-07 RX ORDER — HYDROMORPHONE HYDROCHLORIDE 2 MG/ML
2 INJECTION, SOLUTION INTRAMUSCULAR; INTRAVENOUS; SUBCUTANEOUS
Status: DISCONTINUED | OUTPATIENT
Start: 2025-03-07 | End: 2025-03-07 | Stop reason: RX

## 2025-03-07 ASSESSMENT — PAIN DESCRIPTION - FREQUENCY: FREQUENCY: CONSTANT/CONTINUOUS

## 2025-03-07 ASSESSMENT — PAIN DESCRIPTION - PAIN TYPE: TYPE: ACUTE PAIN

## 2025-03-07 ASSESSMENT — PAIN SCALES - GENERAL: PAINLEVEL_OUTOF10: 9

## 2025-03-07 ASSESSMENT — PAIN DESCRIPTION - ORIENTATION: ORIENTATION: LOWER

## 2025-03-07 ASSESSMENT — PAIN DESCRIPTION - DESCRIPTORS: DESCRIPTORS: ACHING

## 2025-03-07 ASSESSMENT — PAIN DESCRIPTION - LOCATION: LOCATION: BACK

## 2025-03-07 ASSESSMENT — PAIN - FUNCTIONAL ASSESSMENT: PAIN_FUNCTIONAL_ASSESSMENT: 0-10

## 2025-03-07 NOTE — TELEPHONE ENCOUNTER
Jason called and left VM on refill line requesting refill of Dilaudid to be sent to [facility] pharmacy. Message routed to team.   Margo MENDESN, RN CMSRN

## 2025-03-08 ENCOUNTER — HOSPITAL ENCOUNTER (EMERGENCY)
Facility: HOSPITAL | Age: 45
Discharge: HOME | End: 2025-03-09
Attending: EMERGENCY MEDICINE
Payer: COMMERCIAL

## 2025-03-08 ENCOUNTER — CLINICAL SUPPORT (OUTPATIENT)
Dept: EMERGENCY MEDICINE | Facility: HOSPITAL | Age: 45
End: 2025-03-08
Payer: COMMERCIAL

## 2025-03-08 VITALS
HEART RATE: 75 BPM | WEIGHT: 144 LBS | HEIGHT: 67 IN | DIASTOLIC BLOOD PRESSURE: 65 MMHG | OXYGEN SATURATION: 95 % | TEMPERATURE: 96.7 F | SYSTOLIC BLOOD PRESSURE: 102 MMHG | BODY MASS INDEX: 22.6 KG/M2 | RESPIRATION RATE: 18 BRPM

## 2025-03-08 DIAGNOSIS — D57.00 VASO-OCCLUSIVE PAIN DUE TO SICKLE CELL DISEASE (MULTI): Primary | ICD-10-CM

## 2025-03-08 LAB
ALBUMIN SERPL BCP-MCNC: 4 G/DL (ref 3.4–5)
ALP SERPL-CCNC: 105 U/L (ref 33–120)
ALT SERPL W P-5'-P-CCNC: 17 U/L (ref 10–52)
ANION GAP SERPL CALC-SCNC: 8 MMOL/L (ref 10–20)
AST SERPL W P-5'-P-CCNC: 16 U/L (ref 9–39)
BASOPHILS # BLD AUTO: 0.04 X10*3/UL (ref 0–0.1)
BASOPHILS NFR BLD AUTO: 0.4 %
BILIRUB DIRECT SERPL-MCNC: 0.1 MG/DL (ref 0–0.3)
BILIRUB SERPL-MCNC: 0.4 MG/DL (ref 0–1.2)
BUN SERPL-MCNC: 7 MG/DL (ref 6–23)
CALCIUM SERPL-MCNC: 8.9 MG/DL (ref 8.6–10.3)
CHLORIDE SERPL-SCNC: 108 MMOL/L (ref 98–107)
CO2 SERPL-SCNC: 27 MMOL/L (ref 21–32)
CREAT SERPL-MCNC: 0.91 MG/DL (ref 0.5–1.3)
EGFRCR SERPLBLD CKD-EPI 2021: >90 ML/MIN/1.73M*2
EOSINOPHIL # BLD AUTO: 0.23 X10*3/UL (ref 0–0.7)
EOSINOPHIL NFR BLD AUTO: 2.1 %
ERYTHROCYTE [DISTWIDTH] IN BLOOD BY AUTOMATED COUNT: 13.6 % (ref 11.5–14.5)
GLUCOSE SERPL-MCNC: 109 MG/DL (ref 74–99)
HCT VFR BLD AUTO: 46.7 % (ref 41–52)
HGB BLD-MCNC: 16.5 G/DL (ref 13.5–17.5)
HGB RETIC QN: 35 PG (ref 28–38)
IMM GRANULOCYTES # BLD AUTO: 0.03 X10*3/UL (ref 0–0.7)
IMM GRANULOCYTES NFR BLD AUTO: 0.3 % (ref 0–0.9)
IMMATURE RETIC FRACTION: 11 %
LDH SERPL L TO P-CCNC: 202 U/L (ref 84–246)
LYMPHOCYTES # BLD AUTO: 4.22 X10*3/UL (ref 1.2–4.8)
LYMPHOCYTES NFR BLD AUTO: 37.6 %
MCH RBC QN AUTO: 31.5 PG (ref 26–34)
MCHC RBC AUTO-ENTMCNC: 35.3 G/DL (ref 32–36)
MCV RBC AUTO: 89 FL (ref 80–100)
MONOCYTES # BLD AUTO: 0.92 X10*3/UL (ref 0.1–1)
MONOCYTES NFR BLD AUTO: 8.2 %
NEUTROPHILS # BLD AUTO: 5.77 X10*3/UL (ref 1.2–7.7)
NEUTROPHILS NFR BLD AUTO: 51.4 %
NRBC BLD-RTO: 0 /100 WBCS (ref 0–0)
PLATELET # BLD AUTO: 278 X10*3/UL (ref 150–450)
POTASSIUM SERPL-SCNC: 4.1 MMOL/L (ref 3.5–5.3)
PROT SERPL-MCNC: 6.7 G/DL (ref 6.4–8.2)
RBC # BLD AUTO: 5.23 X10*6/UL (ref 4.5–5.9)
RETICS #: 0.09 X10*6/UL (ref 0.02–0.12)
RETICS/RBC NFR AUTO: 1.6 % (ref 0.5–2)
SODIUM SERPL-SCNC: 139 MMOL/L (ref 136–145)
WBC # BLD AUTO: 11.2 X10*3/UL (ref 4.4–11.3)

## 2025-03-08 PROCEDURE — 99284 EMERGENCY DEPT VISIT MOD MDM: CPT | Performed by: EMERGENCY MEDICINE

## 2025-03-08 PROCEDURE — 2500000004 HC RX 250 GENERAL PHARMACY W/ HCPCS (ALT 636 FOR OP/ED): Mod: JZ | Performed by: PHARMACIST

## 2025-03-08 PROCEDURE — 93005 ELECTROCARDIOGRAM TRACING: CPT

## 2025-03-08 PROCEDURE — 2500000001 HC RX 250 WO HCPCS SELF ADMINISTERED DRUGS (ALT 637 FOR MEDICARE OP): Performed by: EMERGENCY MEDICINE

## 2025-03-08 PROCEDURE — 96372 THER/PROPH/DIAG INJ SC/IM: CPT

## 2025-03-08 PROCEDURE — 96374 THER/PROPH/DIAG INJ IV PUSH: CPT

## 2025-03-08 PROCEDURE — 2500000004 HC RX 250 GENERAL PHARMACY W/ HCPCS (ALT 636 FOR OP/ED): Mod: JZ,SE

## 2025-03-08 PROCEDURE — 2500000001 HC RX 250 WO HCPCS SELF ADMINISTERED DRUGS (ALT 637 FOR MEDICARE OP): Mod: SE

## 2025-03-08 PROCEDURE — 96376 TX/PRO/DX INJ SAME DRUG ADON: CPT

## 2025-03-08 RX ORDER — DIPHENHYDRAMINE HCL 25 MG
50 CAPSULE ORAL ONCE AS NEEDED
Status: COMPLETED | OUTPATIENT
Start: 2025-03-08 | End: 2025-03-08

## 2025-03-08 RX ORDER — ONDANSETRON 4 MG/1
4 TABLET, ORALLY DISINTEGRATING ORAL ONCE AS NEEDED
Status: COMPLETED | OUTPATIENT
Start: 2025-03-08 | End: 2025-03-08

## 2025-03-08 RX ORDER — HYDROMORPHONE HYDROCHLORIDE 2 MG/1
4 TABLET ORAL ONCE
Status: COMPLETED | OUTPATIENT
Start: 2025-03-08 | End: 2025-03-08

## 2025-03-08 RX ORDER — KETOROLAC TROMETHAMINE 15 MG/ML
15 INJECTION, SOLUTION INTRAMUSCULAR; INTRAVENOUS ONCE
Status: DISCONTINUED | OUTPATIENT
Start: 2025-03-08 | End: 2025-03-08

## 2025-03-08 RX ORDER — KETOROLAC TROMETHAMINE 30 MG/ML
30 INJECTION, SOLUTION INTRAMUSCULAR; INTRAVENOUS ONCE
Status: COMPLETED | OUTPATIENT
Start: 2025-03-08 | End: 2025-03-08

## 2025-03-08 RX ORDER — ONDANSETRON HYDROCHLORIDE 2 MG/ML
4 INJECTION, SOLUTION INTRAVENOUS ONCE
Status: DISCONTINUED | OUTPATIENT
Start: 2025-03-08 | End: 2025-03-08

## 2025-03-08 RX ADMIN — DIPHENHYDRAMINE HYDROCHLORIDE 50 MG: 25 CAPSULE ORAL at 20:55

## 2025-03-08 RX ADMIN — HYDROMORPHONE HYDROCHLORIDE 2 MG: 2 INJECTION, SOLUTION INTRAMUSCULAR; INTRAVENOUS; SUBCUTANEOUS at 23:11

## 2025-03-08 RX ADMIN — HYDROMORPHONE HYDROCHLORIDE 2 MG: 2 INJECTION, SOLUTION INTRAMUSCULAR; INTRAVENOUS; SUBCUTANEOUS at 02:17

## 2025-03-08 RX ADMIN — HYDROMORPHONE HYDROCHLORIDE 4 MG: 2 TABLET ORAL at 02:54

## 2025-03-08 RX ADMIN — KETOROLAC TROMETHAMINE 30 MG: 30 INJECTION, SOLUTION INTRAMUSCULAR; INTRAVENOUS at 20:55

## 2025-03-08 RX ADMIN — HYDROMORPHONE HYDROCHLORIDE 2 MG: 2 INJECTION, SOLUTION INTRAMUSCULAR; INTRAVENOUS; SUBCUTANEOUS at 00:02

## 2025-03-08 RX ADMIN — HYDROMORPHONE HYDROCHLORIDE 2 MG: 2 INJECTION, SOLUTION INTRAMUSCULAR; INTRAVENOUS; SUBCUTANEOUS at 20:55

## 2025-03-08 RX ADMIN — HYDROMORPHONE HYDROCHLORIDE 2 MG: 2 INJECTION, SOLUTION INTRAMUSCULAR; INTRAVENOUS; SUBCUTANEOUS at 01:04

## 2025-03-08 RX ADMIN — HYDROMORPHONE HYDROCHLORIDE 2 MG: 2 INJECTION, SOLUTION INTRAMUSCULAR; INTRAVENOUS; SUBCUTANEOUS at 22:32

## 2025-03-08 RX ADMIN — ONDANSETRON 4 MG: 4 TABLET, ORALLY DISINTEGRATING ORAL at 20:55

## 2025-03-08 ASSESSMENT — PAIN - FUNCTIONAL ASSESSMENT
PAIN_FUNCTIONAL_ASSESSMENT: 0-10

## 2025-03-08 ASSESSMENT — PAIN DESCRIPTION - PAIN TYPE: TYPE: ACUTE PAIN

## 2025-03-08 ASSESSMENT — PAIN SCALES - GENERAL
PAINLEVEL_OUTOF10: 8
PAINLEVEL_OUTOF10: 8
PAINLEVEL_OUTOF10: 7
PAINLEVEL_OUTOF10: 6
PAINLEVEL_OUTOF10: 6

## 2025-03-08 ASSESSMENT — PAIN DESCRIPTION - LOCATION: LOCATION: LEG

## 2025-03-08 NOTE — ED PROVIDER NOTES
"Emergency Department Provider Note             History of Present Illness   CC: Sickle Cell Pain Crisis (Since this afternoon)    History provided by: {History Provided By:[id number]::\"Patient\"}  Limitations to History: {History Limitations:05137::\"None\"}  External Records Reviewed: prior ED provider notes, clinic notes, discharge summaries    HPI:  Xu Maya is a 44-year-old male with history including sickle cell disease, s/p stem cell transplant, GVHD, PUD, anxiety, depression presenting to the emergency department for pain in his back and legs, consistent with his typical sickle cell pain crises. States this started earlier today and the pain is not alleviated by his oral dilaudid. Denies paresthesia, weakness, fever, chills, abdominal pain, nausea, vomiting, respiratory symptoms.     Physical Exam   Triage vitals:  T 36.5 °C (97.7 °F)  HR 83  /67  RR 16  O2 97 % None (Room air)    General: awake, well-appearing, no distress  Head: normocephalic, atraumatic  Eyes: pupils equal, extraocular movements grossly intact, no conjunctival injection or scleral icterus  ENT: nares patent, moist mucous membranes  Neck: supple, trachea midline, no masses  CV: regular rate and rhythm, well-perfused  Resp: breathing is non-labored, speaking in full sentences. Lungs are clear to auscultation bilaterally  GI: soft, non-distended, non-tender, no rebound or guarding  Extremities: no edema, no gross deformity   Neuro: alert, oriented, speech is fluent, face is symmetric, moving all extremities   Psych: Appropriate mood and affect    ED Course & Medical Decision Making     44 y.o. male ***    Social Determinants Limiting Care: {Social Determinants of Health Impacting Care:08855::\"None identified\"}    EKG: See ED course.     Results: Independently reviewed and interpreted by me. Please see ED course and MDM for my full interpretation.     Chronic Medical Conditions Significantly Affecting Care: as per MDM    Patient was " "discussed with the following consultants/services: {ED Consult:43072::\"None\"}     Care Considerations: as per MDM         Disposition   {ED Disposition:29567}    Procedures   Procedures    Lilia Nova MD      "

## 2025-03-08 NOTE — DISCHARGE INSTRUCTIONS
You were seen in the emergency department for sickle cell pain crisis.  Your labs were stable.  Your pain improved with IV medications and you felt comfortable going home.  Monitor symptoms closely.  Return to the emergency department if you have worsening symptoms or develop fever, difficulty breathing, cough, or any other concerning symptoms.

## 2025-03-08 NOTE — ED TRIAGE NOTES
TRIAGE NOTE   I saw the patient as the Clinician in Triage and performed a brief history and physical exam, established acuity, and ordered appropriate tests to develop basic plan of care. Patient will be seen by an [facility], resident and/or physician who will independently evaluate the patient. Please see subsequent provider notes for further details and disposition.     Brief HPI: In brief, Xu Maya is a 44 y.o. male that presents for bilateral lower extremity pain consistent with sickle cell pain that he experienced before.  No trauma or injury.  No other complaints of pain injury or illness.  No fever.  No chest pain or difficulty breathing.  No abdominal pain.  Use Dilaudid as an outpatient which is not controlling his pain.  Not on anticoagulants..     Focused Physical exam:   Afebrile vital signs are stable.  No tachycardia or tachypnea.  Pulse ox 97%.  No respiratory difficulty increased work of breathing.  Patient is ambulating upright posture steady gait.  Extremities have no edema or deformity.  He is alert and coherent with normal mental status.  Nontoxic.    Plan/MDM:   Workup initiated.  Patient stable pending bed availability and further evaluation.  Please see subsequent provider note for further details and disposition

## 2025-03-09 LAB
ATRIAL RATE: 86 BPM
P AXIS: 71 DEGREES
P OFFSET: 184 MS
P ONSET: 133 MS
PR INTERVAL: 154 MS
Q ONSET: 210 MS
QRS COUNT: 14 BEATS
QRS DURATION: 94 MS
QT INTERVAL: 374 MS
QTC CALCULATION(BAZETT): 447 MS
QTC FREDERICIA: 421 MS
R AXIS: 160 DEGREES
T AXIS: -25 DEGREES
T OFFSET: 397 MS
VENTRICULAR RATE: 86 BPM

## 2025-03-09 NOTE — ED PROVIDER NOTES
History of Present Illness     History provided by: Patient  Limitations to History: None  External Records Reviewed with Brief Summary:  Outpatient notes, telephone encounter    HPI:  Xu Maya is a 44 y.o. male past medical history of sickle cell disease with previous AVN, exchange transfusion status post stem cell transplant 2021 who presents today for bilateral lower extremity pain.  Patient states that he has been taking his home Dilaudid 4 mg 3 times daily to 4 times daily, but has not had any significant relief of his pain.  He states that he was seen at [facility] yesterday, was feeling somewhat better so did attempt to go home.  He states that he has had continued pain at home, rates it 10 out of 10 in his bilateral lower extremities.  Denies any chest pain or shortness of breath.  Denies any headaches, changes in vision.  Denies any swelling in his legs, redness.  Denies any fevers at home, cough, sore throat.    Physical Exam   Triage vitals:  T 35.9 °C (96.7 °F)  HR 89  /65  RR 18  O2 96 %      Physical Exam  Vitals and nursing note reviewed.   Constitutional:       General: He is not in acute distress.     Appearance: Normal appearance. He is not ill-appearing or diaphoretic.      Comments: Uncomfortable appearing, otherwise no acute distress   HENT:      Head: Normocephalic and atraumatic.      Mouth/Throat:      Mouth: Mucous membranes are moist.      Pharynx: No oropharyngeal exudate or posterior oropharyngeal erythema.   Eyes:      General: No scleral icterus.     Extraocular Movements: Extraocular movements intact.      Pupils: Pupils are equal, round, and reactive to light.   Cardiovascular:      Rate and Rhythm: Normal rate and regular rhythm.      Pulses: Normal pulses.      Heart sounds: Normal heart sounds. No murmur heard.     No gallop.      Comments: Palpable DP and PT pulses bilaterally  Pulmonary:      Effort: Pulmonary effort is normal. No respiratory distress.      Breath  sounds: Normal breath sounds. No stridor. No wheezing, rhonchi or rales.   Abdominal:      General: Bowel sounds are normal. There is no distension.      Palpations: Abdomen is soft. There is no mass.      Tenderness: There is no abdominal tenderness.      Hernia: No hernia is present.   Musculoskeletal:         General: No swelling, deformity or signs of injury. Normal range of motion.      Cervical back: Normal range of motion and neck supple. No tenderness.      Right lower leg: No edema.      Left lower leg: No edema.      Comments: Tenderness to palpation of bilateral lower extremities below the knee 5-5 strength dorsiflexion plantarflexion, no erythema edema or induration of bilateral lower extremity   Skin:     General: Skin is warm.      Capillary Refill: Capillary refill takes less than 2 seconds.      Findings: No erythema, lesion or rash.   Neurological:      General: No focal deficit present.      Mental Status: He is alert and oriented to person, place, and time. Mental status is at baseline.      Cranial Nerves: No cranial nerve deficit.      Motor: No weakness.      Gait: Gait normal.   Psychiatric:         Mood and Affect: Mood normal.         Behavior: Behavior normal.          Medical Decision Making & ED Course   Medical Decision Makin y.o. male past medical history of sickle cell disease with previous AVN and acute chest, status post bone marrow transplant  who presents today for sickle cell pain crisis.  Given fact the patient was seen yesterday and did have labs done yesterday, I do not feel strongly about getting repeat labs here.  Given that we are not getting access, he did request that we give IM meds rather than IV meds.  I feel this is reasonable.  Given his home meds are 4 mg of Dilaudid 3 times daily to 4 times daily, I believe it is reasonable treated with 2 mg of Dilaudid every hour x 3 doses.  I did attempt to look for a care plan, but it does not appear that the patient  has 1.  I will also provide him with 30 of IM Toradol, will also offer Zofran and Benadryl as needed for symptom relief due to opiate administration.  Addition, the patient does not have any chest pain or shortness breath, I do not believe patient requires any chest imaging or EKG here given this.  After patient received his treatment with IM Dilaudid, he did feel significant improvement in his pain.  Given this, I did discuss with him staying here versus being discharged home.  He requested to be discharged, which I believe is reasonable given the fact that he had symptomatic improvement and no concerning lab findings yesterday.  All questions were answered prior to discharge, return precautions provided.  I did recommend that the patient reach out to his hematologist for prescription of his pain medications  ----      Differential diagnoses considered include but are not limited to: Sickle cell acute pain crisis, cellulitis, DVT     Social Determinants of Health which Significantly Impact Care: None identified     EKG Independent Interpretation: EKG not obtained    Independent Result Review and Interpretation: Relevant laboratory and radiographic results were reviewed and independently interpreted by myself.  As necessary, they are commented on in the ED Course.    Chronic conditions affecting the patient's care: As documented above in [facility]    The patient was discussed with the following consultants/services: None    Care Considerations: As documented above in [facility]    ED Course:  Diagnoses as of 03/08/25 2344   Vaso-occlusive pain due to sickle cell disease (Multi)     Disposition   As a result of the work-up, the patient was discharged home.  he was informed of his diagnosis and instructed to come back with any concerns or worsening of condition.  he and was agreeable to the plan as discussed above.  he was given the opportunity to ask questions.  All of the patient's questions were answered.    Procedures    Procedures        Jenaro Mckee MD  Emergency Medicine       Jenaro Mckee MD  Resident  03/08/25 7345

## 2025-03-09 NOTE — ED TRIAGE NOTES
Pt to ED c/o sickle cell pain, pt states his pain is bilateral legs and hips, his typical SCC pain. Pt has been taking dilaudid at home for pain but states he ran out of his meds last night. Pt denies chest pain & shortness of breath.

## 2025-03-09 NOTE — DISCHARGE INSTRUCTIONS
You were seen today for sickle cell pain.  We treated you with IM Dilaudid for 3 doses, you had improvement in your symptoms.  Given this, will discharge at this time.  If you begin to develop fevers, chest pain shortness of breath or anything else you find concerning, I would recommend the return here to the emergency department for further treatment and evaluation.  Additionally, I would recommend that you follow-up with Dr. Wei, your hematologist who prescribes your pain medications as they generally do not like when we give you oral pain medicines to go home with from the emergency department.

## 2025-03-10 ENCOUNTER — PHARMACY VISIT (OUTPATIENT)
Dept: PHARMACY | Facility: CLINIC | Age: 45
End: 2025-03-10
Payer: MEDICAID

## 2025-03-10 PROCEDURE — RXMED WILLOW AMBULATORY MEDICATION CHARGE

## 2025-03-20 ENCOUNTER — TELEPHONE (OUTPATIENT)
Dept: ADMISSION | Facility: HOSPITAL | Age: 45
End: 2025-03-20
Payer: COMMERCIAL

## 2025-03-20 DIAGNOSIS — D57.00 SICKLE-CELL DISEASE WITH PAIN (MULTI): ICD-10-CM

## 2025-03-20 RX ORDER — HYDROMORPHONE HYDROCHLORIDE 4 MG/1
4 TABLET ORAL 4 TIMES DAILY PRN
Qty: 48 TABLET | Refills: 0 | Status: SHIPPED | OUTPATIENT
Start: 2025-03-24 | End: 2025-04-07

## 2025-03-21 ENCOUNTER — HOSPITAL ENCOUNTER (EMERGENCY)
Facility: HOSPITAL | Age: 45
Discharge: HOME | End: 2025-03-21
Attending: EMERGENCY MEDICINE
Payer: COMMERCIAL

## 2025-03-21 VITALS
SYSTOLIC BLOOD PRESSURE: 105 MMHG | WEIGHT: 144 LBS | BODY MASS INDEX: 22.6 KG/M2 | HEIGHT: 67 IN | RESPIRATION RATE: 14 BRPM | OXYGEN SATURATION: 95 % | TEMPERATURE: 97.5 F | HEART RATE: 81 BPM | DIASTOLIC BLOOD PRESSURE: 72 MMHG

## 2025-03-21 DIAGNOSIS — D57.00 SICKLE CELL PAIN CRISIS (MULTI): Primary | ICD-10-CM

## 2025-03-21 LAB
ALBUMIN SERPL BCP-MCNC: 4.1 G/DL (ref 3.4–5)
ALP SERPL-CCNC: 105 U/L (ref 33–120)
ALT SERPL W P-5'-P-CCNC: 9 U/L (ref 10–52)
ANION GAP SERPL CALC-SCNC: 9 MMOL/L (ref 10–20)
AST SERPL W P-5'-P-CCNC: 14 U/L (ref 9–39)
BASOPHILS # BLD AUTO: 0.05 X10*3/UL (ref 0–0.1)
BASOPHILS NFR BLD AUTO: 0.4 %
BILIRUB SERPL-MCNC: 0.5 MG/DL (ref 0–1.2)
BUN SERPL-MCNC: 9 MG/DL (ref 6–23)
CALCIUM SERPL-MCNC: 9.2 MG/DL (ref 8.6–10.3)
CHLORIDE SERPL-SCNC: 107 MMOL/L (ref 98–107)
CK SERPL-CCNC: 66 U/L (ref 0–325)
CO2 SERPL-SCNC: 26 MMOL/L (ref 21–32)
CREAT SERPL-MCNC: 0.91 MG/DL (ref 0.5–1.3)
EGFRCR SERPLBLD CKD-EPI 2021: >90 ML/MIN/1.73M*2
EOSINOPHIL # BLD AUTO: 0.26 X10*3/UL (ref 0–0.7)
EOSINOPHIL NFR BLD AUTO: 1.9 %
ERYTHROCYTE [DISTWIDTH] IN BLOOD BY AUTOMATED COUNT: 13.3 % (ref 11.5–14.5)
GLUCOSE SERPL-MCNC: 85 MG/DL (ref 74–99)
HCT VFR BLD AUTO: 45.9 % (ref 41–52)
HGB BLD-MCNC: 16.6 G/DL (ref 13.5–17.5)
HGB RETIC QN: 36 PG (ref 28–38)
IMM GRANULOCYTES # BLD AUTO: 0.04 X10*3/UL (ref 0–0.7)
IMM GRANULOCYTES NFR BLD AUTO: 0.3 % (ref 0–0.9)
IMMATURE RETIC FRACTION: 11.4 %
LDH SERPL L TO P-CCNC: 199 U/L (ref 84–246)
LYMPHOCYTES # BLD AUTO: 6.55 X10*3/UL (ref 1.2–4.8)
LYMPHOCYTES NFR BLD AUTO: 48.5 %
MCH RBC QN AUTO: 32.2 PG (ref 26–34)
MCHC RBC AUTO-ENTMCNC: 36.2 G/DL (ref 32–36)
MCV RBC AUTO: 89 FL (ref 80–100)
MONOCYTES # BLD AUTO: 0.98 X10*3/UL (ref 0.1–1)
MONOCYTES NFR BLD AUTO: 7.3 %
NEUTROPHILS # BLD AUTO: 5.62 X10*3/UL (ref 1.2–7.7)
NEUTROPHILS NFR BLD AUTO: 41.6 %
NRBC BLD-RTO: 0 /100 WBCS (ref 0–0)
PLATELET # BLD AUTO: 273 X10*3/UL (ref 150–450)
POTASSIUM SERPL-SCNC: 3.9 MMOL/L (ref 3.5–5.3)
PROT SERPL-MCNC: 6.9 G/DL (ref 6.4–8.2)
RBC # BLD AUTO: 5.15 X10*6/UL (ref 4.5–5.9)
RETICS #: 0.07 X10*6/UL (ref 0.02–0.12)
RETICS/RBC NFR AUTO: 1.5 % (ref 0.5–2)
SODIUM SERPL-SCNC: 138 MMOL/L (ref 136–145)
WBC # BLD AUTO: 13.5 X10*3/UL (ref 4.4–11.3)

## 2025-03-21 PROCEDURE — 82550 ASSAY OF CK (CPK): CPT | Performed by: SURGERY

## 2025-03-21 PROCEDURE — 80053 COMPREHEN METABOLIC PANEL: CPT | Performed by: SURGERY

## 2025-03-21 PROCEDURE — 85045 AUTOMATED RETICULOCYTE COUNT: CPT | Performed by: SURGERY

## 2025-03-21 PROCEDURE — 36415 COLL VENOUS BLD VENIPUNCTURE: CPT | Performed by: SURGERY

## 2025-03-21 PROCEDURE — 83615 LACTATE (LD) (LDH) ENZYME: CPT | Performed by: SURGERY

## 2025-03-21 PROCEDURE — 2500000004 HC RX 250 GENERAL PHARMACY W/ HCPCS (ALT 636 FOR OP/ED): Mod: JZ | Performed by: SURGERY

## 2025-03-21 PROCEDURE — 99284 EMERGENCY DEPT VISIT MOD MDM: CPT | Performed by: EMERGENCY MEDICINE

## 2025-03-21 PROCEDURE — 85025 COMPLETE CBC W/AUTO DIFF WBC: CPT | Performed by: SURGERY

## 2025-03-21 PROCEDURE — 96372 THER/PROPH/DIAG INJ SC/IM: CPT | Performed by: SURGERY

## 2025-03-21 PROCEDURE — 2500000001 HC RX 250 WO HCPCS SELF ADMINISTERED DRUGS (ALT 637 FOR MEDICARE OP): Performed by: EMERGENCY MEDICINE

## 2025-03-21 RX ORDER — HYDROMORPHONE HYDROCHLORIDE 2 MG/1
4 TABLET ORAL ONCE
Status: COMPLETED | OUTPATIENT
Start: 2025-03-21 | End: 2025-03-21

## 2025-03-21 RX ORDER — HYDROMORPHONE HYDROCHLORIDE 1 MG/ML
1 INJECTION, SOLUTION INTRAMUSCULAR; INTRAVENOUS; SUBCUTANEOUS ONCE
Status: DISCONTINUED | OUTPATIENT
Start: 2025-03-21 | End: 2025-03-21 | Stop reason: DRUGHIGH

## 2025-03-21 RX ADMIN — HYDROMORPHONE HYDROCHLORIDE 2 MG: 2 INJECTION, SOLUTION INTRAMUSCULAR; INTRAVENOUS; SUBCUTANEOUS at 02:56

## 2025-03-21 RX ADMIN — HYDROMORPHONE HYDROCHLORIDE 4 MG: 2 TABLET ORAL at 06:04

## 2025-03-21 RX ADMIN — HYDROMORPHONE HYDROCHLORIDE 2 MG: 2 INJECTION, SOLUTION INTRAMUSCULAR; INTRAVENOUS; SUBCUTANEOUS at 01:48

## 2025-03-21 RX ADMIN — HYDROMORPHONE HYDROCHLORIDE 2 MG: 2 INJECTION, SOLUTION INTRAMUSCULAR; INTRAVENOUS; SUBCUTANEOUS at 03:44

## 2025-03-21 ASSESSMENT — PAIN DESCRIPTION - ONSET: ONSET: SUDDEN

## 2025-03-21 ASSESSMENT — PAIN - FUNCTIONAL ASSESSMENT: PAIN_FUNCTIONAL_ASSESSMENT: 0-10

## 2025-03-21 ASSESSMENT — PAIN SCALES - GENERAL
PAINLEVEL_OUTOF10: 4
PAINLEVEL_OUTOF10: 5 - MODERATE PAIN
PAINLEVEL_OUTOF10: 7
PAINLEVEL_OUTOF10: 9

## 2025-03-21 ASSESSMENT — PAIN DESCRIPTION - LOCATION: LOCATION: OTHER (COMMENT)

## 2025-03-21 ASSESSMENT — PAIN DESCRIPTION - PROGRESSION: CLINICAL_PROGRESSION: NOT CHANGED

## 2025-03-21 ASSESSMENT — PAIN DESCRIPTION - PAIN TYPE: TYPE: CHRONIC PAIN

## 2025-03-21 ASSESSMENT — PAIN DESCRIPTION - FREQUENCY: FREQUENCY: OTHER (COMMENT)

## 2025-03-21 ASSESSMENT — PAIN DESCRIPTION - DESCRIPTORS: DESCRIPTORS: ACHING

## 2025-03-21 NOTE — ED TRIAGE NOTES
Pt presents to the ED for sickle cell pian that has been going on for the past day and took his home meds and had no relief. Pt states the pain is generalized. Pt denies and CP and SOB.

## 2025-03-21 NOTE — DISCHARGE INSTRUCTIONS
You are seen in the emergency department for a sickle cell pain crisis.  Your labs were overall stable with exception of slightly elevated white blood cell count. Monitor closely for signs of infection. Follow up with your primary doctor. Return to the ED with worsening pain, fever, shortness of breath, cough, other concerning symptoms.

## 2025-03-21 NOTE — ED PROVIDER NOTES
Chief Complaint   Patient presents with    Sickle Cell Pain Crisis     HPI:   Xu Maya is an 44 y.o. male with a past medical history of sickle cell disease, status post stem cell transplant, AVN, PUD, anxiety and depression presenting to the ED for bilateral extremity pain.  Explains that he has severe pain in his bilateral lower extremities consistent with his typical pain crisis.  States that this started earlier today and is not alleviated by his oral Dilaudid that he takes at home.  He denies any injury no paresthesias or weaknesses.  No chest pain shortness of breath fever chills sweats.  No abdominal pain NVD.        Allergies   Allergen Reactions    Hydrocodone-Acetaminophen Hives    Naproxen Hives   :  Past Medical History:   Diagnosis Date    Anxiety disorder, unspecified 04/17/2017    Anxiety    Depression, unspecified 04/17/2017    Depression    Diarrhea 10/07/2023    DIARRHEA      Family history of glaucoma 03/02/2017    Family history of glaucoma in father    Gastritis, unspecified, without bleeding 04/17/2017    Gastritis    Hematuria, unspecified 04/17/2017    Hematuria    Personal history of other diseases of the digestive system 12/02/2015    History of esophageal reflux    Priapism, unspecified 04/17/2017    Priapism    Sickle-cell disease without crisis (Multi) 04/17/2017    Sickle cell anemia     Past Surgical History:   Procedure Laterality Date    GALLBLADDER SURGERY  04/12/2017    Gallbladder Surgery    IR CVC TUNNELED  4/24/2018    IR CVC TUNNELED 4/24/[place] [facility] [place]    IR CVC TUNNELED  6/5/2018    IR CVC TUNNELED 6/5/2018 [facility] [place] [facility]    IR CVC TUNNELED  3/1/2019    IR CVC TUNNELED 3/1/2019 [facility] [place] [facility]    IR CVC TUNNELED  6/4/2019    IR CVC TUNNELED 6/4/2019 [facility]    IR CVC TUNNELED  4/5/2019    IR CVC TUNNELED 4/5/2019 [facility] [place]    IR CVC TUNNELED  7/17/2019    IR CVC TUNNELED 7/17/2019 [facility] [place] [facility]    IR CVC TUNNELED  8/14/2019    IR CVC TUNNELED  8/14/2019 [facility]    IR CVC TUNNELED  12/18/2019    IR CVC TUNNELED 12/18/2019 [facility] CL[facility]GAC[facility]    IR CVC TUNNELED  10/9/2019    IR CVC TUNNELED 10/9/2019 [facility]    IR CVC TUNNELED  11/13/2019    IR CVC TUNNELED 11/13/2019 [facility] CL[facility]GAC[facility]    IR CVC TUNNELED  1/15/2020    IR CVC TUNNELED 1/15/2020 [facility]    IR CVC TUNNELED  2/19/2020    IR CVC TUNNELED 2/19/2020 [facility]    IR CVC TUNNELED  1/4/2021    IR CVC TUNNELED 1/4/2021 [facility]    IR CVC TUNNELED  1/25/2021    IR CVC TUNNELED 1/25/2021 [facility]CILL[facility]GAC[facility]    IR CVC TUNNELED  8/21/2018    IR CVC TUNNELED 8/21/2018 [facility]    IR CVC TUNNELED  9/26/2018    IR CVC TUNNELED 9/26/2018 [facility]    IR CVC TUNNELED  11/5/2018    IR CVC TUNNELED 11/5/2018 [facility]    IR CVC TUNNELED  12/19/2018    IR CVC TUNNELED 12/19/2018 [facility]    IR VENOGRAM HEPATIC  11/8/2017    IR VENOGRAM HEPATIC 11/8/2017 [facility]    MR HEAD ANGIO WO IV CONTRAST  2/17/2017    MR HEAD ANGIO WO IV CONTRAST 2/17/2017 [facility] CL[facility]ICAL LEGAC[facility]    MR NECK ANGIO WO IV CONTRAST  2/17/2017    MR NECK ANGIO WO IV CONTRAST 2/17/2017 [facility] CL[facility]ICAL [facility]    US GUIDED NEEDLE LIVER BIOPSY  9/8/2020    US GUIDED NEEDLE LIVER BIOPSY 9/8/2020 [facility]     Family History   Problem Relation Name Age of Onset    Diabetes Father      Sickle cell anemia Other sibling     Cancer Other grandfather     Cancer Other uncle         Physical Exam  Vitals and nursing note reviewed.   Constitutional:       General: He is not in acute distress.     Appearance: He is well-developed.   HENT:      Head: Normocephalic and atraumatic.   Eyes:      Conjunctiva/sclera: Conjunctivae normal.   Cardiovascular:      Rate and Rhythm: Normal rate and regular rhythm.      Heart sounds: No murmur heard.  Pulmonary:      Effort: Pulmonary effort is normal. No respiratory distress.      Breath sounds: Normal breath sounds.   Abdominal:      Palpations:  Abdomen is soft.      Tenderness: There is no abdominal tenderness.   Musculoskeletal:         General: No swelling.      Cervical back: Neck supple.   Skin:     General: Skin is warm and dry.      Capillary Refill: Capillary refill takes less than 2 seconds.   Neurological:      Mental Status: He is alert.   Psychiatric:         Mood and Affect: Mood normal.        VS: As documented in the triage note and EMR flowsheet from this visit were reviewed.    Medical Decision Making: This is a 44-year-old male presenting to the ED for chief complaint of bilateral lower extremity sickle cell pain that is consistent with his typical symptoms.  He is denying any symptoms of acute chest.  On exam he is obviously uncomfortable in the exam bed shaking and pain.  He is moving all 4 limbs spontaneously.  There is no obvious injury.  No distal edema.  The joints move smoothly and freely.  His heart and lungs were clear.  His labs did show slight leukocytosis at 13.5.  Rechecks were normal and LDH was normal.  I have low suspicion for vaso-occlusive crisis.  CPK was normal.  Kidney and liver function was normal.  He was treated with 3 doses of 2 mg of Dilaudid.  After his second dose on reevaluation he was much better appearing and felt that his symptoms had resolved.  Patient was signed out to Dr. Nova pending evaluation after his third dose of pain medication.  Likely to be DC homegoing.  Diagnoses as of 03/21/25 0428   Sickle cell pain crisis (Multi)     Counseling: Spoke with the patient and discussed today´s findings, in addition to providing specific details for the plan of care and expected course.  Patient was given the opportunity to ask questions.    Discussed return precautions and importance of follow-up.  Advised to follow-up with hematology.  I specifically advised to return to the ED for changing or worsening symptoms, new symptoms, complaint specific precautions, and precautions listed on the discharge  paperwork.  Educated on the common potential side effects of medications prescribed.    I advised the patient that the emergency evaluation and treatment provided today doesn't end their need for medical care. It is very important that they follow-up with their primary care provider or other specialist as instructed.    The plan of care was mutually agreed upon with the patient. The patient and/or family were given the opportunity to ask questions. All questions asked today in the ED were answered to the best of my ability with today's information.    This report was transcribed using voice recognition software.  Every effort was made to ensure accuracy, however, inadvertently computerized transcription errors may be present.       Rodolfo Tom PA-C  03/21/25 2027

## 2025-03-23 ENCOUNTER — HOSPITAL ENCOUNTER (EMERGENCY)
Facility: HOSPITAL | Age: 45
Discharge: HOME | End: 2025-03-23
Attending: EMERGENCY MEDICINE
Payer: COMMERCIAL

## 2025-03-23 VITALS
DIASTOLIC BLOOD PRESSURE: 76 MMHG | BODY MASS INDEX: 22.6 KG/M2 | HEART RATE: 65 BPM | WEIGHT: 144 LBS | HEIGHT: 67 IN | OXYGEN SATURATION: 94 % | RESPIRATION RATE: 16 BRPM | TEMPERATURE: 98.3 F | SYSTOLIC BLOOD PRESSURE: 107 MMHG

## 2025-03-23 DIAGNOSIS — D57.00 SICKLE CELL PAIN CRISIS (MULTI): Primary | ICD-10-CM

## 2025-03-23 LAB
ALBUMIN SERPL BCP-MCNC: 4.2 G/DL (ref 3.4–5)
ALP SERPL-CCNC: 104 U/L (ref 33–120)
ALT SERPL W P-5'-P-CCNC: 9 U/L (ref 10–52)
ANION GAP SERPL CALC-SCNC: 9 MMOL/L (ref 10–20)
AST SERPL W P-5'-P-CCNC: 13 U/L (ref 9–39)
BASOPHILS # BLD AUTO: 0.03 X10*3/UL (ref 0–0.1)
BASOPHILS NFR BLD AUTO: 0.3 %
BILIRUB SERPL-MCNC: 0.6 MG/DL (ref 0–1.2)
BUN SERPL-MCNC: 9 MG/DL (ref 6–23)
CALCIUM SERPL-MCNC: 9.3 MG/DL (ref 8.6–10.3)
CHLORIDE SERPL-SCNC: 108 MMOL/L (ref 98–107)
CO2 SERPL-SCNC: 26 MMOL/L (ref 21–32)
CREAT SERPL-MCNC: 0.88 MG/DL (ref 0.5–1.3)
EGFRCR SERPLBLD CKD-EPI 2021: >90 ML/MIN/1.73M*2
EOSINOPHIL # BLD AUTO: 0.18 X10*3/UL (ref 0–0.7)
EOSINOPHIL NFR BLD AUTO: 2 %
ERYTHROCYTE [DISTWIDTH] IN BLOOD BY AUTOMATED COUNT: 13.2 % (ref 11.5–14.5)
GLUCOSE SERPL-MCNC: 106 MG/DL (ref 74–99)
HCT VFR BLD AUTO: 47.2 % (ref 41–52)
HGB BLD-MCNC: 17.1 G/DL (ref 13.5–17.5)
HGB RETIC QN: 37 PG (ref 28–38)
IMM GRANULOCYTES # BLD AUTO: 0.02 X10*3/UL (ref 0–0.7)
IMM GRANULOCYTES NFR BLD AUTO: 0.2 % (ref 0–0.9)
IMMATURE RETIC FRACTION: 10.4 %
LDH SERPL L TO P-CCNC: 159 U/L (ref 84–246)
LYMPHOCYTES # BLD AUTO: 3.13 X10*3/UL (ref 1.2–4.8)
LYMPHOCYTES NFR BLD AUTO: 35 %
MCH RBC QN AUTO: 31.8 PG (ref 26–34)
MCHC RBC AUTO-ENTMCNC: 36.2 G/DL (ref 32–36)
MCV RBC AUTO: 88 FL (ref 80–100)
MONOCYTES # BLD AUTO: 0.71 X10*3/UL (ref 0.1–1)
MONOCYTES NFR BLD AUTO: 7.9 %
NEUTROPHILS # BLD AUTO: 4.88 X10*3/UL (ref 1.2–7.7)
NEUTROPHILS NFR BLD AUTO: 54.6 %
NRBC BLD-RTO: 0 /100 WBCS (ref 0–0)
PLATELET # BLD AUTO: 275 X10*3/UL (ref 150–450)
POTASSIUM SERPL-SCNC: 3.9 MMOL/L (ref 3.5–5.3)
PROT SERPL-MCNC: 6.8 G/DL (ref 6.4–8.2)
RBC # BLD AUTO: 5.38 X10*6/UL (ref 4.5–5.9)
RETICS #: 0.09 X10*6/UL (ref 0.02–0.12)
RETICS/RBC NFR AUTO: 1.6 % (ref 0.5–2)
SODIUM SERPL-SCNC: 139 MMOL/L (ref 136–145)
WBC # BLD AUTO: 9 X10*3/UL (ref 4.4–11.3)

## 2025-03-23 PROCEDURE — 99284 EMERGENCY DEPT VISIT MOD MDM: CPT | Mod: 25 | Performed by: EMERGENCY MEDICINE

## 2025-03-23 PROCEDURE — 80053 COMPREHEN METABOLIC PANEL: CPT | Performed by: EMERGENCY MEDICINE

## 2025-03-23 PROCEDURE — 36415 COLL VENOUS BLD VENIPUNCTURE: CPT | Performed by: EMERGENCY MEDICINE

## 2025-03-23 PROCEDURE — 96376 TX/PRO/DX INJ SAME DRUG ADON: CPT

## 2025-03-23 PROCEDURE — 96374 THER/PROPH/DIAG INJ IV PUSH: CPT

## 2025-03-23 PROCEDURE — 85045 AUTOMATED RETICULOCYTE COUNT: CPT | Performed by: EMERGENCY MEDICINE

## 2025-03-23 PROCEDURE — 2500000004 HC RX 250 GENERAL PHARMACY W/ HCPCS (ALT 636 FOR OP/ED): Mod: JZ | Performed by: EMERGENCY MEDICINE

## 2025-03-23 PROCEDURE — 83615 LACTATE (LD) (LDH) ENZYME: CPT | Performed by: EMERGENCY MEDICINE

## 2025-03-23 PROCEDURE — 96361 HYDRATE IV INFUSION ADD-ON: CPT

## 2025-03-23 PROCEDURE — 85025 COMPLETE CBC W/AUTO DIFF WBC: CPT | Performed by: EMERGENCY MEDICINE

## 2025-03-23 RX ORDER — HYDROMORPHONE HYDROCHLORIDE 1 MG/ML
2 INJECTION, SOLUTION INTRAMUSCULAR; INTRAVENOUS; SUBCUTANEOUS EVERY 30 MIN PRN
Status: COMPLETED | OUTPATIENT
Start: 2025-03-23 | End: 2025-03-23

## 2025-03-23 RX ORDER — HYDROMORPHONE HYDROCHLORIDE 2 MG/ML
2 INJECTION, SOLUTION INTRAMUSCULAR; INTRAVENOUS; SUBCUTANEOUS EVERY 30 MIN PRN
Status: DISCONTINUED | OUTPATIENT
Start: 2025-03-23 | End: 2025-03-23

## 2025-03-23 RX ADMIN — HYDROMORPHONE HYDROCHLORIDE 2 MG: 1 INJECTION, SOLUTION INTRAMUSCULAR; INTRAVENOUS; SUBCUTANEOUS at 13:41

## 2025-03-23 RX ADMIN — SODIUM CHLORIDE 1000 ML: 0.9 INJECTION, SOLUTION INTRAVENOUS at 13:02

## 2025-03-23 RX ADMIN — HYDROMORPHONE HYDROCHLORIDE 2 MG: 1 INJECTION, SOLUTION INTRAMUSCULAR; INTRAVENOUS; SUBCUTANEOUS at 15:14

## 2025-03-23 RX ADMIN — HYDROMORPHONE HYDROCHLORIDE 2 MG: 1 INJECTION, SOLUTION INTRAMUSCULAR; INTRAVENOUS; SUBCUTANEOUS at 13:02

## 2025-03-23 ASSESSMENT — PAIN SCALES - GENERAL
PAINLEVEL_OUTOF10: 9
PAINLEVEL_OUTOF10: 7
PAINLEVEL_OUTOF10: 8
PAINLEVEL_OUTOF10: 4
PAINLEVEL_OUTOF10: 6

## 2025-03-23 ASSESSMENT — PAIN - FUNCTIONAL ASSESSMENT
PAIN_FUNCTIONAL_ASSESSMENT: 0-10
PAIN_FUNCTIONAL_ASSESSMENT: 0-10

## 2025-03-23 ASSESSMENT — PAIN DESCRIPTION - ORIENTATION
ORIENTATION: RIGHT;LEFT
ORIENTATION: RIGHT;LEFT
ORIENTATION: LEFT;RIGHT

## 2025-03-23 ASSESSMENT — PAIN DESCRIPTION - LOCATION
LOCATION: LEG

## 2025-03-23 ASSESSMENT — PAIN DESCRIPTION - PAIN TYPE: TYPE: CHRONIC PAIN

## 2025-03-23 NOTE — DISCHARGE INSTRUCTIONS
You were seen in the emergency room today for evaluation of sickle cell pain crisis.  Your pain improved with medications.  Please follow-up with your primary care provider regarding this emergency room visit.  Please return if you have worsening symptoms or if you have any other concerns.

## 2025-03-23 NOTE — ED TRIAGE NOTES
Pt presents to the ED from home with c/o sickle cell pain. Pt states the pain is in his legs. Pt states the pain started yesterday. Pt states he tried to take medication at home with no relief. No CP at this time.

## 2025-03-23 NOTE — ED PROVIDER NOTES
History/Exam limitations: none.   Additional history was obtained from patient and past medical records.          HPI:    Xu Maya is a 44 y.o. male PMH sickle cell disease presenting for evaluation of lower extremity pain.  Patient states that he has pain both legs.  He states that is typical for his pain crises.  States that often times he gets pain in his low back and lower legs at this time somewhat in the legs.  No focal weakness numbness.  No swelling.  Denies any atypical features.  No chest pain shortness breath cough fever chills or other signs consistent with acute chest syndrome.         Physical Exam:  ED Triage Vitals [03/23/25 1135]   Temperature Heart Rate Respirations BP   36.8 °C (98.3 °F) 87 16 110/77      Pulse Ox Temp Source Heart Rate Source Patient Position   98 % Temporal -- --      BP Location FiO2 (%)     -- --        GEN:      Alert, uncomfortable appearing  Eyes:       PERRL, EOMI  HENT:      NC/AT, OP clear, airway patent, MM  CV:      RRR, no MRG, no LE pitting edema, 2+ radial and pedal pulses  PULM:     CTAB, no w/r/r, easy WOB, symmetric chest rise  ABD:      Soft, NT, ND, no masses, BS +  :       No CVA TTP  NEURO:   A&Ox3, no focal deficits    MSK:      FROM, no joint deformities or swelling, no e/o trauma  SKIN:       Warm and dry  PSYCH:    Appropriate mood and affect         MDM/ED Course:   Xu Maya is a 44 y.o. male PMH sickle cell disease presenting for evaluation of lower extremity pain.  Vitals reassuring.  Exam as documented above.  Presentation most consistent with patient's typical sickle cell pain crisis related pain.  Differential includes anemia, acute chest syndrome unlikely given no cardiorespiratory symptoms, chest pain, etc.  No trauma low sufficient for fracture.  No change from baseline quality of pain, now lower extremity swelling, low suspicion for DVT.  Strong peripheral pulses, low suspicion for acute arterial vascular occlusion/normal knee.   IV placed and labs drawn.  Patient was given IV fluids and 2 mg IV Dilaudid as needed x 3 doses.  CBC with no leukocytosis, hemoglobin above baseline.  Chemistry reassuring.  LDH normal reticulocyte percent normal.  Reassessment patient was feeling improved and feels comfortable with discharge.  Explained findings, exam/study results, the importance of follow up and the plan moving forward; patient and/or caregiver verbalized understanding and agreement. All questions were answered and no new questions at this time. Patient will be discharged with strict return precautions, follow up plan with PCP/hematology.     Diagnoses as of 03/26/25 0956   Sickle cell pain crisis (Multi)         Chronic medical conditions affecting care listed in MDM. I independently reviewed imaging studies and agreed with radiology reads. I reviewed recent and relevant outside records including PCP notes, Prior discharge summaries, and prior radiology reports.    Procedure  Procedures    Diagnosis:   1.  Sickle cell pain crisis    Dispo: Discharged in stable condition      Disclaimer: Portions of this note were dictated by speech recognition. An attempt at proof reading was made to minimize errors. Minor errors in transcription may be present.  Please call if questions.     Rodolfo Shen MD  03/26/25 0956

## 2025-03-24 ENCOUNTER — PHARMACY VISIT (OUTPATIENT)
Dept: PHARMACY | Facility: CLINIC | Age: 45
End: 2025-03-24
Payer: MEDICAID

## 2025-03-24 PROCEDURE — RXMED WILLOW AMBULATORY MEDICATION CHARGE

## 2025-04-04 ENCOUNTER — TELEPHONE (OUTPATIENT)
Dept: ADMISSION | Facility: HOSPITAL | Age: 45
End: 2025-04-04
Payer: COMMERCIAL

## 2025-04-04 DIAGNOSIS — D57.00 SICKLE-CELL DISEASE WITH PAIN (MULTI): ICD-10-CM

## 2025-04-04 RX ORDER — HYDROMORPHONE HYDROCHLORIDE 4 MG/1
4 TABLET ORAL 4 TIMES DAILY PRN
Qty: 48 TABLET | Refills: 0 | Status: SHIPPED | OUTPATIENT
Start: 2025-04-07 | End: 2025-04-21

## 2025-04-04 NOTE — TELEPHONE ENCOUNTER
Refill Request  Hydromorphone (Dilaudid) 4mg 4 times daily PRN    Preferred Pharmacy  [facility]

## 2025-04-07 ENCOUNTER — PHARMACY VISIT (OUTPATIENT)
Dept: PHARMACY | Facility: CLINIC | Age: 45
End: 2025-04-07
Payer: MEDICAID

## 2025-04-07 PROCEDURE — RXMED WILLOW AMBULATORY MEDICATION CHARGE

## 2025-04-14 DIAGNOSIS — D57.00 SICKLE-CELL DISEASE WITH PAIN (MULTI): ICD-10-CM

## 2025-04-16 ENCOUNTER — OFFICE VISIT (OUTPATIENT)
Dept: HEMATOLOGY/ONCOLOGY | Facility: HOSPITAL | Age: 45
End: 2025-04-16
Payer: COMMERCIAL

## 2025-04-16 VITALS
WEIGHT: 146.61 LBS | TEMPERATURE: 97.5 F | RESPIRATION RATE: 17 BRPM | SYSTOLIC BLOOD PRESSURE: 115 MMHG | OXYGEN SATURATION: 100 % | BODY MASS INDEX: 22.96 KG/M2 | HEART RATE: 83 BPM | DIASTOLIC BLOOD PRESSURE: 78 MMHG

## 2025-04-16 DIAGNOSIS — D57.1 SICKLE CELL DISEASE WITHOUT CRISIS (MULTI): ICD-10-CM

## 2025-04-16 PROCEDURE — 99214 OFFICE O/P EST MOD 30 MIN: CPT | Performed by: NURSE PRACTITIONER

## 2025-04-16 ASSESSMENT — PAIN SCALES - GENERAL: PAINLEVEL_OUTOF10: 5

## 2025-04-16 NOTE — PROGRESS NOTES
Patient ID: Xu Maya is a 44 y.o. male.  Referring Physician: Radha Erazo, APRN-CNP  [address]  Department of Medicine-Hematology [place] [address]  Primary Care Provider: No Assigned PCP Generic Provider, MD  Visit Type: Follow Up      Subjective  44 year old black male presents for follow up appointment for Sickle Cell trait S/T BMT 3 years ago. His wife is in attendance throughout the visit. He still has pain in his back and legs 7/10. He uses Dilaudid 4 mg for pain management at home. OARRSS reviewed. He also takes 3 anti depressants and sees Dr. Contreras. He sees Dr. Luna for BMT. Had discussion about slowly weaning his pain meds by 2 tablets at each refill due to his transition to Sickle Cell trait. Also discussed that he will not be using ACC. He admits to smoking cigarettes. He does not use Oxygen at home. He is not working. He has 2 sons. He has no disease modifying therapy. He was last admitted to the hospital in January 2025 for pain crisis. Prior to that he was seen in ED in September 2024.      HPI    Review of Systems - Oncology     Objective   BSA: 1.77 meters squared  /78 (BP Location: Left arm, Patient Position: Sitting, BP Cuff Size: Adult)   Pulse 83   Temp 36.4 °C (97.5 °F) (Skin)   Resp 17   Wt 66.5 kg (146 lb 9.7 oz)   SpO2 100%   BMI 22.96 kg/m²      has a past medical history of Anxiety disorder, unspecified (04/17/2017), Depression, unspecified (04/17/2017), Diarrhea (10/07/2023), Family history of glaucoma (03/02/2017), Gastritis, unspecified, without bleeding (04/17/2017), Hematuria, unspecified (04/17/2017), Personal history of other diseases of the digestive system (12/02/2015), Priapism, unspecified (04/17/2017), and Sickle-cell disease without crisis (Multi) (04/17/2017).   has a past surgical history that includes Gallbladder surgery (04/12/2017); IR CVC tunneled (4/24/2018); IR CVC tunneled (6/5/2018); IR CVC tunneled  (3/1/2019); IR CVC tunneled (6/4/2019); IR CVC tunneled (4/5/2019); IR CVC tunneled (7/17/2019); IR CVC tunneled (8/14/2019); IR CVC tunneled (12/18/2019); IR CVC tunneled (10/9/2019); IR CVC tunneled (11/13/2019); IR CVC tunneled (1/15/2020); IR CVC tunneled (2/19/2020); US guided needle liver biopsy (9/8/2020); IR CVC tunneled (1/4/2021); IR CVC tunneled (1/25/2021); MR angio head wo IV contrast (2/17/2017); MR angio neck wo IV contrast (2/17/2017); IR venogram hepatic (11/8/2017); IR CVC tunneled (8/21/2018); IR CVC tunneled (9/26/2018); IR CVC tunneled (11/5/2018); and IR CVC tunneled (12/19/2018).  Family History[1]  Oncology History    No history exists.       Xu Maya  reports that he has been smoking cigarettes. He started smoking about 27 years ago. He has been exposed to tobacco smoke. He has never used smokeless tobacco.  He  reports that he does not currently use alcohol.  He  reports that he does not currently use drugs after having used the following drugs: Marijuana.    Physical Exam  Vitals reviewed. Exam conducted with a chaperone present (Wife in attendance).   Constitutional:       Appearance: Normal appearance. He is normal weight.   HENT:      Head: Normocephalic and atraumatic.      Nose: Nose normal.      Mouth/Throat:      Mouth: Mucous membranes are moist.   Eyes:      General: Scleral icterus present.      Pupils: Pupils are equal, round, and reactive to light.   Cardiovascular:      Rate and Rhythm: Normal rate.      Pulses: Normal pulses.   Pulmonary:      Effort: Pulmonary effort is normal.      Breath sounds: Normal breath sounds.   Abdominal:      General: Abdomen is flat. Bowel sounds are normal.      Palpations: Abdomen is soft.   Musculoskeletal:         General: Normal range of motion.      Cervical back: Normal range of motion and neck supple.   Skin:     General: Skin is warm and dry.      Capillary Refill: Capillary refill takes 2 to 3 seconds.   Neurological:       General: No focal deficit present.      Mental Status: He is alert and oriented to person, place, and time.   Psychiatric:         Mood and Affect: Mood normal.         Behavior: Behavior normal.      Comments: Patient seems sad and depressed.         WBC   Date/Time Value Ref Range Status   03/23/2025 12:02 PM 9.0 4.4 - 11.3 x10*3/uL Final   03/21/2025 02:10 AM 13.5 (H) 4.4 - 11.3 x10*3/uL Final   03/07/2025 11:59 PM 11.2 4.4 - 11.3 x10*3/uL Final     nRBC   Date Value Ref Range Status   03/23/2025 0.0 0.0 - 0.0 /100 WBCs Final   03/21/2025 0.0 0.0 - 0.0 /100 WBCs Final   03/07/2025 0.0 0.0 - 0.0 /100 WBCs Final     RBC   Date Value Ref Range Status   03/23/2025 5.38 4.50 - 5.90 x10*6/uL Final   03/21/2025 5.15 4.50 - 5.90 x10*6/uL Final   03/07/2025 5.23 4.50 - 5.90 x10*6/uL Final     Hemoglobin   Date Value Ref Range Status   03/23/2025 17.1 13.5 - 17.5 g/dL Final   03/21/2025 16.6 13.5 - 17.5 g/dL Final   03/07/2025 16.5 13.5 - 17.5 g/dL Final     Hematocrit   Date Value Ref Range Status   03/23/2025 47.2 41.0 - 52.0 % Final   03/21/2025 45.9 41.0 - 52.0 % Final   03/07/2025 46.7 41.0 - 52.0 % Final     MCV   Date/Time Value Ref Range Status   03/23/2025 12:02 PM 88 80 - 100 fL Final   03/21/2025 02:10 AM 89 80 - 100 fL Final   03/07/2025 11:59 PM 89 80 - 100 fL Final     MCH   Date/Time Value Ref Range Status   03/23/2025 12:02 PM 31.8 26.0 - 34.0 pg Final   03/21/2025 02:10 AM 32.2 26.0 - 34.0 pg Final   03/07/2025 11:59 PM 31.5 26.0 - 34.0 pg Final     MCHC   Date/Time Value Ref Range Status   03/23/2025 12:02 PM 36.2 (H) 32.0 - 36.0 g/dL Final   03/21/2025 02:10 AM 36.2 (H) 32.0 - 36.0 g/dL Final   03/07/2025 11:59 PM 35.3 32.0 - 36.0 g/dL Final     RDW   Date/Time Value Ref Range Status   03/23/2025 12:02 PM 13.2 11.5 - 14.5 % Final   03/21/2025 02:10 AM 13.3 11.5 - 14.5 % Final   03/07/2025 11:59 PM 13.6 11.5 - 14.5 % Final     Platelets   Date/Time Value Ref Range Status   03/23/2025 12:02  150 -  450 x10*3/uL Final   03/21/2025 02:10  150 - 450 x10*3/uL Final   03/07/2025 11:59  150 - 450 x10*3/uL Final     MPV   Date/Time Value Ref Range Status   10/30/2023 09:39 AM 8.4 7.5 - 11.5 fL Final   10/20/2023 10:54 AM 8.5 7.5 - 11.5 fL Final   10/16/2023 10:33 AM 8.6 7.5 - 11.5 fL Final     Neutrophils %   Date/Time Value Ref Range Status   03/23/2025 12:02 PM 54.6 40.0 - 80.0 % Final   03/21/2025 02:10 AM 41.6 40.0 - 80.0 % Final   03/07/2025 11:59 PM 51.4 40.0 - 80.0 % Final     Immature Granulocytes %, Automated   Date/Time Value Ref Range Status   03/23/2025 12:02 PM 0.2 0.0 - 0.9 % Final     Comment:     Immature Granulocyte Count (IG) includes promyelocytes, myelocytes and metamyelocytes but does not include bands. Percent differential counts (%) should be interpreted in the context of the absolute cell counts (cells/UL).   03/21/2025 02:10 AM 0.3 0.0 - 0.9 % Final     Comment:     Immature Granulocyte Count (IG) includes promyelocytes, myelocytes and metamyelocytes but does not include bands. Percent differential counts (%) should be interpreted in the context of the absolute cell counts (cells/UL).   03/07/2025 11:59 PM 0.3 0.0 - 0.9 % Final     Comment:     Immature Granulocyte Count (IG) includes promyelocytes, myelocytes and metamyelocytes but does not include bands. Percent differential counts (%) should be interpreted in the context of the absolute cell counts (cells/UL).     Lymphocytes %   Date/Time Value Ref Range Status   03/23/2025 12:02 PM 35.0 13.0 - 44.0 % Final   03/21/2025 02:10 AM 48.5 13.0 - 44.0 % Final   03/07/2025 11:59 PM 37.6 13.0 - 44.0 % Final     Monocytes %   Date/Time Value Ref Range Status   03/23/2025 12:02 PM 7.9 2.0 - 10.0 % Final   03/21/2025 02:10 AM 7.3 2.0 - 10.0 % Final   03/07/2025 11:59 PM 8.2 2.0 - 10.0 % Final     Eosinophils %   Date/Time Value Ref Range Status   03/23/2025 12:02 PM 2.0 0.0 - 6.0 % Final   03/21/2025 02:10 AM 1.9 0.0 - 6.0 % Final    03/07/2025 11:59 PM 2.1 0.0 - 6.0 % Final     Basophils %   Date/Time Value Ref Range Status   03/23/2025 12:02 PM 0.3 0.0 - 2.0 % Final   03/21/2025 02:10 AM 0.4 0.0 - 2.0 % Final   03/07/2025 11:59 PM 0.4 0.0 - 2.0 % Final     Neutrophils Absolute   Date/Time Value Ref Range Status   03/23/2025 12:02 PM 4.88 1.20 - 7.70 x10*3/uL Final     Comment:     Percent differential counts (%) should be interpreted in the context of the absolute cell counts (cells/uL).   03/21/2025 02:10 AM 5.62 1.20 - 7.70 x10*3/uL Final     Comment:     Percent differential counts (%) should be interpreted in the context of the absolute cell counts (cells/uL).   03/07/2025 11:59 PM 5.77 1.20 - 7.70 x10*3/uL Final     Comment:     Percent differential counts (%) should be interpreted in the context of the absolute cell counts (cells/uL).     Immature Granulocytes Absolute, Automated   Date/Time Value Ref Range Status   03/23/2025 12:02 PM 0.02 0.00 - 0.70 x10*3/uL Final   03/21/2025 02:10 AM 0.04 0.00 - 0.70 x10*3/uL Final   03/07/2025 11:59 PM 0.03 0.00 - 0.70 x10*3/uL Final     Lymphocytes Absolute   Date/Time Value Ref Range Status   03/23/2025 12:02 PM 3.13 1.20 - 4.80 x10*3/uL Final   03/21/2025 02:10 AM 6.55 (H) 1.20 - 4.80 x10*3/uL Final   03/07/2025 11:59 PM 4.22 1.20 - 4.80 x10*3/uL Final     Monocytes Absolute   Date/Time Value Ref Range Status   03/23/2025 12:02 PM 0.71 0.10 - 1.00 x10*3/uL Final   03/21/2025 02:10 AM 0.98 0.10 - 1.00 x10*3/uL Final   03/07/2025 11:59 PM 0.92 0.10 - 1.00 x10*3/uL Final     Eosinophils Absolute   Date/Time Value Ref Range Status   03/23/2025 12:02 PM 0.18 0.00 - 0.70 x10*3/uL Final   03/21/2025 02:10 AM 0.26 0.00 - 0.70 x10*3/uL Final   03/07/2025 11:59 PM 0.23 0.00 - 0.70 x10*3/uL Final     Basophils Absolute   Date/Time Value Ref Range Status   03/23/2025 12:02 PM 0.03 0.00 - 0.10 x10*3/uL Final   03/21/2025 02:10 AM 0.05 0.00 - 0.10 x10*3/uL Final   03/07/2025 11:59 PM 0.04 0.00 - 0.10  "x10*3/uL Final       No components found for: \"PT\"  aPTT   Date/Time Value Ref Range Status   06/06/2023 11:33 AM 33 26 - 39 sec Final     Comment:       THE APTT IS NO LONGER USED FOR MONITORING     UNFRACTIONATED HEPARIN THERAPY.    FOR MONITORING HEPARIN THERAPY,     USE THE HEPARIN ASSAY.     06/01/2023 03:18 PM 32 26 - 39 sec Final     Comment:       THE APTT IS NO LONGER USED FOR MONITORING     UNFRACTIONATED HEPARIN THERAPY.    FOR MONITORING HEPARIN THERAPY,     USE THE HEPARIN ASSAY.     10/25/2021 01:50 AM 28 25 - 35 sec Final     Comment:       THE APTT IS NO LONGER USED FOR MONITORING     UNFRACTIONATED HEPARIN THERAPY.    FOR MONITORING HEPARIN THERAPY,     USE THE HEPARIN ASSAY.         Assessment/Plan    1 month follow up with Dr. Ricks  Wean down Dilaudid 4 mg tablets by 2 tablets each refill                    [1]   Family History  Problem Relation Name Age of Onset    Diabetes Father      Sickle cell anemia Other sibling     Cancer Other grandfather     Cancer Other uncle      "

## 2025-04-17 ENCOUNTER — TELEPHONE (OUTPATIENT)
Dept: ADMISSION | Facility: HOSPITAL | Age: 45
End: 2025-04-17

## 2025-04-17 ENCOUNTER — TELEMEDICINE (OUTPATIENT)
Dept: BEHAVIORAL HEALTH | Facility: HOSPITAL | Age: 45
End: 2025-04-17
Payer: COMMERCIAL

## 2025-04-17 DIAGNOSIS — F41.9 ANXIETY: ICD-10-CM

## 2025-04-17 DIAGNOSIS — F33.0 MILD EPISODE OF RECURRENT MAJOR DEPRESSIVE DISORDER (CMS-HCC): ICD-10-CM

## 2025-04-17 PROCEDURE — 99214 OFFICE O/P EST MOD 30 MIN: CPT | Performed by: PSYCHIATRY & NEUROLOGY

## 2025-04-17 PROCEDURE — RXMED WILLOW AMBULATORY MEDICATION CHARGE

## 2025-04-17 RX ORDER — HYDROMORPHONE HYDROCHLORIDE 4 MG/1
2 TABLET ORAL EVERY 4 HOURS PRN
Qty: 46 TABLET | Refills: 0 | Status: SHIPPED | OUTPATIENT
Start: 2025-04-17 | End: 2025-05-05

## 2025-04-17 RX ORDER — HYDROMORPHONE HYDROCHLORIDE 4 MG/1
4 TABLET ORAL 4 TIMES DAILY PRN
Qty: 48 TABLET | Refills: 0 | Status: SHIPPED | OUTPATIENT
Start: 2025-04-19 | End: 2025-04-17

## 2025-04-17 RX ORDER — MIRTAZAPINE 7.5 MG/1
7.5 TABLET, FILM COATED ORAL NIGHTLY
Qty: 90 TABLET | Refills: 1 | Status: SHIPPED | OUTPATIENT
Start: 2025-04-17 | End: 2025-10-14

## 2025-04-17 NOTE — PROGRESS NOTES
Outpatient Psychiatry FUV      Subjective   Xu Maya, a 44 y.o. male, for virtual FUV.     Virtual or Telephone Consent    An interactive audio and video telecommunication system which permits real time communications between the patient (at the originating site) and provider (at the distant site) was utilized to provide this telehealth service.   Verbal consent was requested and obtained from Xu Maya on this date, 04/17/25 for a telehealth visit.     At work for appt today      Assessment/Plan   Patient Discussion:  CONTINUE duloxetine 60mg 2x day  CONTINUE aripiprazole 5mg in the AM  CONTINUE mirtazapine 7.5mg at bedtime  HOLD prazosin 1mg at bedtime, can resume if further nightmares     RETURN to clinic 5/29 at 12PM for virtual FUV     call with questions or concerns. [phone]      Assessment:   44 y.o.  M with SCC disease with depression, sickle cell disease now s/p BMT. Previously on suboxone for management of pain/high utilization but now off since transplant, still having pain now trialing scrambler      FUV 4/17/2025  pt reports mood ok despite stressors. Sleep better with mirtazapine, remains interested in therapy. Follow up 1-2 months sooner if needed    Diagnosis:   MDD, recurrent, mild  WILFRID  Chronic pain disorder    Treatment Plan/Recommendations:   1. Safety Assessment: no current SI, no h/o SI/SA. has guns at home but unloaded and locked with kids at home. PF include , no previous attempts, kids, [employer]. RF include depression, pain, father with completed suicide. Pt has moderate baseline risk based on static factors but is not an imminent risk and safety plan addressed     2. MDD, WILFRID  CONTINUE duloxetine 60mg PO BID, r/b/ae discussed including higher dose of SNRI, si/sx excess serotonin/SS  CONTINUE aripiprazole 5mg PO daily   CONTINUE mirtazapine 7.5mg PO at bedtime  HOLD prazosin 1mg PO at bedtime, can resume if more nightmares     continue supportive therapy during  appt     3. opioid misuse in the context of chronic pain 2/2 sickle cell disease with acute exacerbations currently no concerns  continues to struggle with chronic pain, more sedation with methadone, had scrambler therapy not helpful  Following up with sickle cell and pain management, current plan is to wean opioids given remission status of sickle cell disease    EMAIL sent to Dr. Chambers to connect pt for therapy     nicotine use disorder --previous success with chantix but stopped and now smoking 3-4/day. monitor for now     4. Medical: SCC, back pain, GERD with h/o PUD: recent notes and labs reviewed. stable.   s/p BMT 1/2021  notes and labs reviewed,   coordinate care as needed with sickle cell team, BMT as needed  Ongoing pain, had scrambler therapy not helpful  Weaning opioids     5. Social: lives with kids and wife, moved to [place] after transplant, [place]. stepfather passed away from leukemia, sister passed from sickle cell. In marriage counseling     Reason for Visit:   FUV depression and anxiety    Subjective:  Last visit 3/6/25  Since then notes ongoing stressors  Legal charges  Recently dicussed with sickle cell team need to transition to PCP now that disease is in remission  Continues to wean opioids, ongoing chronic pain  Sleep better with mirtazapine  Mood ok despite stressors, occ thoughts of wanting to escape, no SI/HI    Notes things are good at home, busy with sons' sports    Discussed therapy for chronic pain, is open to Dr. Chambers    No a/e to medication    Current Medications:    Current Outpatient Medications:     amitriptyline (Elavil) 25 mg tablet, Take 1 tablet (25 mg) by mouth., Disp: , Rfl:     ARIPiprazole (Abilify) 5 mg tablet, Take 1 tablet (5 mg) by mouth once daily., Disp: 90 tablet, Rfl: 3    diphenhydrAMINE (BENADryl) 25 mg capsule, Take 1 capsule (25 mg) by mouth every 6 hours if needed., Disp: , Rfl:     DULoxetine (Cymbalta) 60 mg DR capsule, Take 1 capsule  (60 mg) by mouth 2 times a day., Disp: 180 capsule, Rfl: 3    HYDROmorphone (Dilaudid) 4 mg tablet, Take 1 tablet (4 mg) by mouth 4 times a day as needed for severe pain (7 - 10) for up to 14 days., Disp: 48 tablet, Rfl: 0    mirtazapine (Remeron) 7.5 mg tablet, Take 1 tablet (7.5 mg) by mouth once daily at bedtime., Disp: 30 tablet, Rfl: 2    naloxone (Narcan) 4 mg/0.1 mL nasal spray, Administer 1 spray (4 mg) into affected nostril(s) if needed for opioid reversal or respiratory depression. 1 spray to nostril for overdose, may repeat every 2-3 minutes until medical assistance arrives, Disp: , Rfl:     prazosin (Minipress) 1 mg capsule, Take 1 capsule (1 mg) by mouth once daily at bedtime., Disp: 30 capsule, Rfl: 2    Current Facility-Administered Medications:     heparin lock flush (porcine) 10 unit/mL injection 100 Units, 100 Units, intra-catheter, Once, PRABHJOT Garcia-CNP    heparin lock flush (porcine) injection 500 Units, 5 mL, intravenous, PRN, CHRIS Garcia  Medical History:  Past Medical History:   Diagnosis Date    Anxiety disorder, unspecified 04/17/2017    Anxiety    Depression, unspecified 04/17/2017    Depression    Diarrhea 10/07/2023    DIARRHEA      Family history of glaucoma 03/02/2017    Family history of glaucoma in father    Gastritis, unspecified, without bleeding 04/17/2017    Gastritis    Hematuria, unspecified 04/17/2017    Hematuria    Personal history of other diseases of the digestive system 12/02/2015    History of esophageal reflux    Priapism, unspecified 04/17/2017    Priapism    Sickle-cell disease without crisis (Multi) 04/17/2017    Sickle cell anemia       Surgical History:  Past Surgical History:   Procedure Laterality Date    GALLBLADDER SURGERY  04/12/2017    Gallbladder Surgery    IR CVC TUNNELED  4/24/2018    IR CVC TUNN[place] [facility] [place] [facility]    IR CVC TUNNELED  6/5/2018    IR CVC TUNNELED 6/5/2018 [facility]    IR CVC TUNNELED  3/1/2019    IR CVC  TUNNELED 3/1/2019 [facility]    IR CVC TUNNELED  6/4/2019    IR CVC TUNNELED 6/4/2019 [facility]    IR CVC TUNNELED  4/5/2019    IR CVC TUNNELED 4/5/2019 [facility]    IR CVC TUNNELED  7/17/2019    IR CVC TUNNELED 7/17/2019 [facility]    IR CVC TUNNELED  8/14/2019    IR CVC TUNNELED 8/14/2019 [facility]    IR CVC TUNNELED  12/18/2019    IR CVC TUNNELED 12/18/2019 [facility]    IR CVC TUNNELED  10/9/2019    IR CVC TUNNELED 10/9/2019 [facility]    IR CVC TUNNELED  11/13/2019    IR CVC TUNNELED 11/13/2019 [facility]    IR CVC TUNNELED  1/15/2020    IR CVC TUNNELED 1/15/2020 [facility]    IR CVC TUNNELED  2/19/2020    IR CVC TUNNELED 2/19/2020 [facility]    IR CVC TUNNELED  1/4/2021    IR CVC TUNNELED 1/4/2021 [facility]    IR CVC TUNNELED  1/25/2021    IR CVC TUNNELED 1/25/2021 [facility]    IR CVC TUNNELED  8/21/2018    IR CVC TUNNELED 8/21/2018 [facility]    IR CVC TUNNELED  9/26/2018    IR CVC TUNNELED 9/26/2018 [facility]    IR CVC TUNNELED  11/5/2018    IR CVC TUNNELED 11/5/2018 [facility]    IR CVC TUNNELED  12/19/2018    IR CVC TUNNELED 12/19/2018 [facility]    IR VENOGRAM HEPATIC  11/8/2017    IR VENOGRAM HEPATIC 11/8/2017 [facility]    MR HEAD ANGIO WO IV CONTRAST  2/17/2017    MR HEAD ANGIO WO IV CONTRAST 2/17/2017 [facility]    MR NECK ANGIO WO IV CONTRAST  2/17/2017    MR NECK ANGIO WO IV CONTRAST 2/17/2017 [facility]    US GUIDED NEEDLE LIVER BIOPSY  9/8/2020    US GUIDED NEEDLE LIVER BIOPSY 9/8/2020 [facility]       Family History:  Family History   Problem Relation Name Age of Onset    Diabetes Father      Sickle cell anemia Other sibling     Cancer Other grandfather     Cancer Other uncle        Social History:  Social History     Socioeconomic History    Marital status:      Spouse name: Not on file    Number of children: Not on file    Years of education: Not on file  "   Highest education level: Not on file   Occupational History    Not on file   Tobacco Use    Smoking status: Every Day     Types: Cigarettes     Start date: 1/1/1998     Passive exposure: Current    Smokeless tobacco: Never   Substance and Sexual Activity    Alcohol use: Not Currently     Comment: Occasional    Drug use: Not Currently     Types: Marijuana     Comment: Last use sometime in 2023.  No intention to re-start.    Sexual activity: Not on file   Other Topics Concern    Not on file   Social History Narrative    Not on file     Social Drivers of Health     Financial Resource Strain: Not on file   Food Insecurity: Not on file   Transportation Needs: Not on file   Physical Activity: Not on file   Stress: Not on file   Social Connections: Not on file   Intimate Partner Violence: Not on file   Housing Stability: Not on file              Medical Review Of Systems:  +pain, tapering opioids  Chart notes recent incidence of priapism    Psychiatric Review Of Systems:  Depression ok  Sleep/dreams ok       Objective   Mental Status Exam:  Appearance: appropriate g/h.   Attitude: cooperative and engaged.   Behavior: good eye contact via video  Motor Activity: no tremor, spontaneous movement  Speech: regular in rate, volume, low tone, no dysarthria, no aphasia.   Mood: \"ok\"  Affect: congruent, appropriate range.   Thought Process: linear, goal directed.   Thought Content: no delusions, no SI/HI.   Thought Perception: no AVH.   Cognition: alert, oriented to person, place, time. attn intact.   Insight: fair.   Judgment: fair/intact.     Vitals:  There were no vitals filed for this visit.  Encounter Date: 01/29/25   ECG 12 lead   Result Value    Ventricular Rate 86    Atrial Rate 86    VT Interval 154    QRS Duration 94    QT Interval 374    QTC Calculation(Bazett) 447    P Axis 71    R Axis 160    T Axis -25    QRS Count 14    Q Onset 210    P Onset 133    P Offset 184    T Offset 397    QTC Fredericia 421    Narrative    " Normal sinus rhythm  Right axis deviation  Anterior infarct , age undetermined  T wave abnormality, consider inferior ischemia  Abnormal ECG  When compared with ECG of 29-JAN-2025 08:54,  Left anterior fascicular block is no longer Present  T wave inversion more evident in Inferior leads  T wave inversion now evident in Lateral leads  See ED provider note for full interpretation and clinical correlation  Confirmed by Montse Garcia ([id number]) on 3/9/2025 4:04:01 AM     Lab Results   Component Value Date    WBC 9.0 03/23/2025    HGB 17.1 03/23/2025    HCT 47.2 03/23/2025    MCV 88 03/23/2025     03/23/2025     Lab Results   Component Value Date    GLUCOSE 106 (H) 03/23/2025    CALCIUM 9.3 03/23/2025     03/23/2025    K 3.9 03/23/2025    CO2 26 03/23/2025     (H) 03/23/2025    BUN 9 03/23/2025    CREATININE 0.88 03/23/2025     Psychotherapy       Time: 18 minutes  Type: supportive, insight oriented  Target: mood, anxiety  Strategies: problem solving, insight oriented  Goal: decreased sx burden  Follow up: next visit 1-2 months  Response: mayuri Contreras MD

## 2025-04-19 ENCOUNTER — PHARMACY VISIT (OUTPATIENT)
Dept: PHARMACY | Facility: CLINIC | Age: 45
End: 2025-04-19
Payer: MEDICAID

## 2025-04-19 ENCOUNTER — HOSPITAL ENCOUNTER (EMERGENCY)
Facility: HOSPITAL | Age: 45
Discharge: HOME | End: 2025-04-19
Attending: EMERGENCY MEDICINE
Payer: COMMERCIAL

## 2025-04-19 ENCOUNTER — CLINICAL SUPPORT (OUTPATIENT)
Dept: EMERGENCY MEDICINE | Facility: HOSPITAL | Age: 45
End: 2025-04-19
Payer: COMMERCIAL

## 2025-04-19 VITALS
DIASTOLIC BLOOD PRESSURE: 74 MMHG | RESPIRATION RATE: 16 BRPM | WEIGHT: 143 LBS | BODY MASS INDEX: 22.44 KG/M2 | HEIGHT: 67 IN | HEART RATE: 77 BPM | OXYGEN SATURATION: 99 % | TEMPERATURE: 98.6 F | SYSTOLIC BLOOD PRESSURE: 125 MMHG

## 2025-04-19 DIAGNOSIS — D57.00 SICKLE CELL DISEASE WITH CRISIS (MULTI): Primary | ICD-10-CM

## 2025-04-19 LAB
ALBUMIN SERPL BCP-MCNC: 4.6 G/DL (ref 3.4–5)
ALP SERPL-CCNC: 112 U/L (ref 33–120)
ALT SERPL W P-5'-P-CCNC: 12 U/L (ref 10–52)
ANION GAP SERPL CALC-SCNC: 14 MMOL/L (ref 10–20)
AST SERPL W P-5'-P-CCNC: 14 U/L (ref 9–39)
ATRIAL RATE: 80 BPM
BASOPHILS # BLD AUTO: 0.02 X10*3/UL (ref 0–0.1)
BASOPHILS NFR BLD AUTO: 0.2 %
BILIRUB SERPL-MCNC: 0.8 MG/DL (ref 0–1.2)
BUN SERPL-MCNC: 10 MG/DL (ref 6–23)
CALCIUM SERPL-MCNC: 9.5 MG/DL (ref 8.6–10.6)
CHLORIDE SERPL-SCNC: 105 MMOL/L (ref 98–107)
CO2 SERPL-SCNC: 24 MMOL/L (ref 21–32)
CREAT SERPL-MCNC: 0.99 MG/DL (ref 0.5–1.3)
EGFRCR SERPLBLD CKD-EPI 2021: >90 ML/MIN/1.73M*2
EOSINOPHIL # BLD AUTO: 0.14 X10*3/UL (ref 0–0.7)
EOSINOPHIL NFR BLD AUTO: 1.1 %
ERYTHROCYTE [DISTWIDTH] IN BLOOD BY AUTOMATED COUNT: 12.8 % (ref 11.5–14.5)
GLUCOSE SERPL-MCNC: 87 MG/DL (ref 74–99)
HCT VFR BLD AUTO: 50.5 % (ref 41–52)
HGB BLD-MCNC: 18.2 G/DL (ref 13.5–17.5)
HGB RETIC QN: 37 PG (ref 28–38)
IMM GRANULOCYTES # BLD AUTO: 0.05 X10*3/UL (ref 0–0.7)
IMM GRANULOCYTES NFR BLD AUTO: 0.4 % (ref 0–0.9)
IMMATURE RETIC FRACTION: 7.2 %
LDH SERPL L TO P-CCNC: 151 U/L (ref 84–246)
LYMPHOCYTES # BLD AUTO: 3.32 X10*3/UL (ref 1.2–4.8)
LYMPHOCYTES NFR BLD AUTO: 27.2 %
MCH RBC QN AUTO: 31.7 PG (ref 26–34)
MCHC RBC AUTO-ENTMCNC: 36 G/DL (ref 32–36)
MCV RBC AUTO: 88 FL (ref 80–100)
MONOCYTES # BLD AUTO: 0.81 X10*3/UL (ref 0.1–1)
MONOCYTES NFR BLD AUTO: 6.6 %
NEUTROPHILS # BLD AUTO: 7.86 X10*3/UL (ref 1.2–7.7)
NEUTROPHILS NFR BLD AUTO: 64.5 %
NRBC BLD-RTO: 0 /100 WBCS (ref 0–0)
P AXIS: 79 DEGREES
P OFFSET: 188 MS
P ONSET: 132 MS
PLATELET # BLD AUTO: 275 X10*3/UL (ref 150–450)
POTASSIUM SERPL-SCNC: 4.1 MMOL/L (ref 3.5–5.3)
PR INTERVAL: 158 MS
PROT SERPL-MCNC: 7.9 G/DL (ref 6.4–8.2)
Q ONSET: 211 MS
QRS COUNT: 14 BEATS
QRS DURATION: 100 MS
QT INTERVAL: 396 MS
QTC CALCULATION(BAZETT): 456 MS
QTC FREDERICIA: 436 MS
R AXIS: -15 DEGREES
RBC # BLD AUTO: 5.75 X10*6/UL (ref 4.5–5.9)
RETICS #: 0.09 X10*6/UL (ref 0.02–0.12)
RETICS/RBC NFR AUTO: 1.6 % (ref 0.5–2)
SODIUM SERPL-SCNC: 139 MMOL/L (ref 136–145)
T AXIS: 56 DEGREES
T OFFSET: 409 MS
VENTRICULAR RATE: 80 BPM
WBC # BLD AUTO: 12.2 X10*3/UL (ref 4.4–11.3)

## 2025-04-19 PROCEDURE — 99285 EMERGENCY DEPT VISIT HI MDM: CPT | Performed by: EMERGENCY MEDICINE

## 2025-04-19 PROCEDURE — 96374 THER/PROPH/DIAG INJ IV PUSH: CPT

## 2025-04-19 PROCEDURE — 2500000004 HC RX 250 GENERAL PHARMACY W/ HCPCS (ALT 636 FOR OP/ED): Mod: JZ,SE

## 2025-04-19 PROCEDURE — 96376 TX/PRO/DX INJ SAME DRUG ADON: CPT

## 2025-04-19 PROCEDURE — 85045 AUTOMATED RETICULOCYTE COUNT: CPT | Performed by: EMERGENCY MEDICINE

## 2025-04-19 PROCEDURE — 80053 COMPREHEN METABOLIC PANEL: CPT | Performed by: EMERGENCY MEDICINE

## 2025-04-19 PROCEDURE — 85025 COMPLETE CBC W/AUTO DIFF WBC: CPT | Performed by: EMERGENCY MEDICINE

## 2025-04-19 PROCEDURE — 36415 COLL VENOUS BLD VENIPUNCTURE: CPT | Performed by: EMERGENCY MEDICINE

## 2025-04-19 PROCEDURE — 99284 EMERGENCY DEPT VISIT MOD MDM: CPT | Performed by: EMERGENCY MEDICINE

## 2025-04-19 PROCEDURE — 93005 ELECTROCARDIOGRAM TRACING: CPT

## 2025-04-19 PROCEDURE — 83615 LACTATE (LD) (LDH) ENZYME: CPT | Performed by: EMERGENCY MEDICINE

## 2025-04-19 RX ORDER — ONDANSETRON 4 MG/1
4 TABLET, FILM COATED ORAL EVERY 8 HOURS PRN
Status: DISCONTINUED | OUTPATIENT
Start: 2025-04-19 | End: 2025-04-19 | Stop reason: HOSPADM

## 2025-04-19 RX ADMIN — ONDANSETRON HYDROCHLORIDE 4 MG: 4 TABLET, FILM COATED ORAL at 14:19

## 2025-04-19 RX ADMIN — HYDROMORPHONE HYDROCHLORIDE 2 MG: 2 INJECTION, SOLUTION INTRAMUSCULAR; INTRAVENOUS; SUBCUTANEOUS at 17:02

## 2025-04-19 RX ADMIN — HYDROMORPHONE HYDROCHLORIDE 2 MG: 2 INJECTION, SOLUTION INTRAMUSCULAR; INTRAVENOUS; SUBCUTANEOUS at 14:16

## 2025-04-19 RX ADMIN — HYDROMORPHONE HYDROCHLORIDE 2 MG: 2 INJECTION, SOLUTION INTRAMUSCULAR; INTRAVENOUS; SUBCUTANEOUS at 15:52

## 2025-04-19 ASSESSMENT — PAIN DESCRIPTION - LOCATION
LOCATION: ABDOMEN
LOCATION: ABDOMEN
LOCATION: LEG
LOCATION: ABDOMEN

## 2025-04-19 ASSESSMENT — PAIN - FUNCTIONAL ASSESSMENT: PAIN_FUNCTIONAL_ASSESSMENT: 0-10

## 2025-04-19 ASSESSMENT — PAIN SCALES - GENERAL
PAINLEVEL_OUTOF10: 0 - NO PAIN
PAINLEVEL_OUTOF10: 3
PAINLEVEL_OUTOF10: 8
PAINLEVEL_OUTOF10: 9
PAINLEVEL_OUTOF10: 9
PAINLEVEL_OUTOF10: 3
PAINLEVEL_OUTOF10: 8

## 2025-04-19 ASSESSMENT — PAIN DESCRIPTION - ORIENTATION: ORIENTATION: RIGHT;LEFT

## 2025-04-19 ASSESSMENT — PAIN SCALES - PAIN ASSESSMENT IN ADVANCED DEMENTIA (PAINAD): TOTALSCORE: MEDICATION (SEE MAR)

## 2025-04-19 ASSESSMENT — PAIN DESCRIPTION - PAIN TYPE: TYPE: ACUTE PAIN

## 2025-04-19 NOTE — ED TRIAGE NOTES
Pt to ED with c/o SCC. Pt states pain to both legs. Denies CP/SOB. Pain been going on at home for the last 2 days. States this is typical SCC pain.

## 2025-04-19 NOTE — DISCHARGE INSTRUCTIONS
You were seen in the emergency room for sickle cell crisis. You were managed with pain medication. Please follow-up with your hematologist for further management. Please return to the emergency room with any worsening symptoms, chest pain, and vomiting.

## 2025-04-19 NOTE — ED PROVIDER NOTES
History of Present Illness     History provided by: Patient  Limitations to History: None  External Records Reviewed with Brief Summary:  ED note from 3/23 and outpatient notes from hematology provider    HPI:  Xu Maya is a 44 y.o. male with history of sickle cell disease, s/p stem cell transplant (), PUD, anxiety, and depression who presents for bilateral lower extremity pain c/f vaso-occlusive crisis. No focal weakness numbness. No swelling. Denies any atypical features. No chest pain shortness breath cough fever chills or other signs consistent with acute chest syndrome.     Physical Exam   Triage vitals:  T (!) 38.1 °C (100.6 °F)  HR 82  /73  RR 17  O2 97 % None (Room air)    Physical Exam  Vitals reviewed.   Constitutional:       General: He is not in acute distress.     Appearance: Normal appearance. He is normal weight.   HENT:      Head: Atraumatic.      Mouth/Throat:      Mouth: Mucous membranes are moist.      Pharynx: Oropharynx is clear.   Eyes:      Extraocular Movements: Extraocular movements intact.      Pupils: Pupils are equal, round, and reactive to light.   Cardiovascular:      Rate and Rhythm: Normal rate and regular rhythm.      Pulses: Normal pulses.      Heart sounds: Normal heart sounds.   Pulmonary:      Effort: Pulmonary effort is normal.      Breath sounds: Normal breath sounds.   Abdominal:      General: Bowel sounds are normal.   Musculoskeletal:         General: No tenderness. Normal range of motion.      Cervical back: Normal range of motion.   Skin:     General: Skin is warm and dry.      Capillary Refill: Capillary refill takes less than 2 seconds.   Neurological:      General: No focal deficit present.      Mental Status: He is alert and oriented to person, place, and time.          Medical Decision Making & ED Course   Medical Decision Makin y.o. male who presents for vaso-occlusive crisis. Vital stable on arrival, except for falsely elevated temp to  100.6 F repeat 97 f and patient denies fevers. Low concern for DVT given no tenderness, swelling with strong peripheral pulses on exam. Patient does not have care plan in chart however, on chart review he takes 4 mg PO dilaudid q6 hr prn at home therefore, will give 2 mg IV dilaudid every hour for 3 doses and monitor for improvement. Low concern for infectious process, CBC with leukocytosis and hgb 12 likely concentrated given no left shift and afebrile. LDH and retics wnl possibly I/s/o BMT. Patient received 3 doses of IV dilaudid with improvement in his pain.   ----      Differential diagnoses considered include but are not limited to: As above in the [facility]     Social Determinants of Health which Significantly Impact Care: None identified     EKG Independent Interpretation: EKG interpreted by myself. Please see ED Course for full interpretation.    Independent Result Review and Interpretation: Relevant laboratory and radiographic results were reviewed and independently interpreted by myself.  As necessary, they are commented on in the ED Course.    Chronic conditions affecting the patient's care: As documented above in [facility]    The patient was discussed with the following consultants/services: None    Care Considerations: As documented above in [facility]    ED Course:  ED Course as of 04/19/25 1733   Sat Apr 19, 2025   1508 Temperature: 36.1 °C (97 °F) [HA]      ED Course User Index  [RODRIGUEZ] Candis June MD         Diagnoses as of 04/19/25 1733   Sickle cell disease with crisis (Multi)     Disposition   Patient discharged home in stable condition with strict return precaution.     Procedures   Procedures    Patient seen and discussed with ED attending physician.    Candis June MD  Emergency Medicine     Candis June MD  Resident  04/19/25 9709

## 2025-05-01 ENCOUNTER — TELEPHONE (OUTPATIENT)
Dept: ADMISSION | Facility: HOSPITAL | Age: 45
End: 2025-05-01
Payer: COMMERCIAL

## 2025-05-01 ENCOUNTER — PHARMACY VISIT (OUTPATIENT)
Dept: PHARMACY | Facility: CLINIC | Age: 45
End: 2025-05-01
Payer: MEDICAID

## 2025-05-01 DIAGNOSIS — D57.00 SICKLE-CELL DISEASE WITH PAIN (MULTI): ICD-10-CM

## 2025-05-01 PROCEDURE — RXMED WILLOW AMBULATORY MEDICATION CHARGE

## 2025-05-01 RX ORDER — HYDROMORPHONE HYDROCHLORIDE 2 MG/1
2 TABLET ORAL EVERY 4 HOURS PRN
Qty: 88 TABLET | Refills: 0 | Status: SHIPPED | OUTPATIENT
Start: 2025-05-01 | End: 2025-05-15

## 2025-05-01 RX ORDER — HYDROMORPHONE HYDROCHLORIDE 4 MG/1
2 TABLET ORAL EVERY 4 HOURS PRN
Qty: 44 TABLET | Refills: 0 | Status: SHIPPED | OUTPATIENT
Start: 2025-05-01 | End: 2025-05-01 | Stop reason: RX

## 2025-05-01 NOTE — TELEPHONE ENCOUNTER
Reason for Conversation  Med Refill    Background   Dilaudid 4mg. 1/2 tablet every 4 hrs prn  Pharmacy : [facility]     Pt asking for call back when script is completed.   Last FUV 4/16, next FUV 5/21

## 2025-05-02 NOTE — PROGRESS NOTES
Call to patient to inform him that his refill of Dilaudid was using the 2 mg tablets instead of the 4 mg which the pharmacy was out of.

## 2025-05-13 PROCEDURE — 99284 EMERGENCY DEPT VISIT MOD MDM: CPT | Performed by: EMERGENCY MEDICINE

## 2025-05-14 ENCOUNTER — PHARMACY VISIT (OUTPATIENT)
Dept: PHARMACY | Facility: CLINIC | Age: 45
End: 2025-05-14
Payer: MEDICAID

## 2025-05-14 ENCOUNTER — TELEPHONE (OUTPATIENT)
Dept: ADMISSION | Facility: HOSPITAL | Age: 45
End: 2025-05-14
Payer: COMMERCIAL

## 2025-05-14 ENCOUNTER — HOSPITAL ENCOUNTER (EMERGENCY)
Facility: HOSPITAL | Age: 45
Discharge: HOME | End: 2025-05-14
Attending: EMERGENCY MEDICINE
Payer: COMMERCIAL

## 2025-05-14 VITALS
HEART RATE: 82 BPM | BODY MASS INDEX: 22.44 KG/M2 | WEIGHT: 143 LBS | DIASTOLIC BLOOD PRESSURE: 101 MMHG | SYSTOLIC BLOOD PRESSURE: 153 MMHG | OXYGEN SATURATION: 97 % | HEIGHT: 67 IN | TEMPERATURE: 98.1 F | RESPIRATION RATE: 18 BRPM

## 2025-05-14 DIAGNOSIS — D57.00 SICKLE-CELL DISEASE WITH PAIN (MULTI): ICD-10-CM

## 2025-05-14 DIAGNOSIS — D57.00 SICKLE CELL PAIN CRISIS (MULTI): Primary | ICD-10-CM

## 2025-05-14 LAB
ANION GAP SERPL CALC-SCNC: 9 MMOL/L (ref 10–20)
BASOPHILS # BLD AUTO: 0.04 X10*3/UL (ref 0–0.1)
BASOPHILS NFR BLD AUTO: 0.3 %
BUN SERPL-MCNC: 11 MG/DL (ref 6–23)
CALCIUM SERPL-MCNC: 8.4 MG/DL (ref 8.6–10.3)
CHLORIDE SERPL-SCNC: 105 MMOL/L (ref 98–107)
CO2 SERPL-SCNC: 24 MMOL/L (ref 21–32)
CREAT SERPL-MCNC: 0.84 MG/DL (ref 0.5–1.3)
EGFRCR SERPLBLD CKD-EPI 2021: >90 ML/MIN/1.73M*2
EOSINOPHIL # BLD AUTO: 0.11 X10*3/UL (ref 0–0.7)
EOSINOPHIL NFR BLD AUTO: 0.9 %
ERYTHROCYTE [DISTWIDTH] IN BLOOD BY AUTOMATED COUNT: 13.2 % (ref 11.5–14.5)
GLUCOSE SERPL-MCNC: 105 MG/DL (ref 74–99)
HCT VFR BLD AUTO: 42.3 % (ref 41–52)
HGB BLD-MCNC: 15.2 G/DL (ref 13.5–17.5)
HGB RETIC QN: 35 PG (ref 28–38)
IMM GRANULOCYTES # BLD AUTO: 0.03 X10*3/UL (ref 0–0.7)
IMM GRANULOCYTES NFR BLD AUTO: 0.3 % (ref 0–0.9)
IMMATURE RETIC FRACTION: 3.4 %
LDH SERPL L TO P-CCNC: 187 U/L (ref 84–246)
LYMPHOCYTES # BLD AUTO: 3.61 X10*3/UL (ref 1.2–4.8)
LYMPHOCYTES NFR BLD AUTO: 31.1 %
MCH RBC QN AUTO: 31.1 PG (ref 26–34)
MCHC RBC AUTO-ENTMCNC: 35.9 G/DL (ref 32–36)
MCV RBC AUTO: 87 FL (ref 80–100)
MONOCYTES # BLD AUTO: 1.3 X10*3/UL (ref 0.1–1)
MONOCYTES NFR BLD AUTO: 11.2 %
NEUTROPHILS # BLD AUTO: 6.52 X10*3/UL (ref 1.2–7.7)
NEUTROPHILS NFR BLD AUTO: 56.2 %
NRBC BLD-RTO: 0 /100 WBCS (ref 0–0)
PLATELET # BLD AUTO: 220 X10*3/UL (ref 150–450)
POTASSIUM SERPL-SCNC: 3.4 MMOL/L (ref 3.5–5.3)
RBC # BLD AUTO: 4.88 X10*6/UL (ref 4.5–5.9)
RETICS #: 0.05 X10*6/UL (ref 0.02–0.12)
RETICS/RBC NFR AUTO: 1.1 % (ref 0.5–2)
SODIUM SERPL-SCNC: 135 MMOL/L (ref 136–145)
WBC # BLD AUTO: 11.6 X10*3/UL (ref 4.4–11.3)

## 2025-05-14 PROCEDURE — 36415 COLL VENOUS BLD VENIPUNCTURE: CPT | Performed by: EMERGENCY MEDICINE

## 2025-05-14 PROCEDURE — 85045 AUTOMATED RETICULOCYTE COUNT: CPT | Performed by: EMERGENCY MEDICINE

## 2025-05-14 PROCEDURE — 96376 TX/PRO/DX INJ SAME DRUG ADON: CPT

## 2025-05-14 PROCEDURE — RXMED WILLOW AMBULATORY MEDICATION CHARGE

## 2025-05-14 PROCEDURE — 80048 BASIC METABOLIC PNL TOTAL CA: CPT | Performed by: EMERGENCY MEDICINE

## 2025-05-14 PROCEDURE — 85025 COMPLETE CBC W/AUTO DIFF WBC: CPT | Performed by: EMERGENCY MEDICINE

## 2025-05-14 PROCEDURE — 96375 TX/PRO/DX INJ NEW DRUG ADDON: CPT

## 2025-05-14 PROCEDURE — 83615 LACTATE (LD) (LDH) ENZYME: CPT | Performed by: EMERGENCY MEDICINE

## 2025-05-14 PROCEDURE — 2500000004 HC RX 250 GENERAL PHARMACY W/ HCPCS (ALT 636 FOR OP/ED): Mod: JZ | Performed by: EMERGENCY MEDICINE

## 2025-05-14 PROCEDURE — 96374 THER/PROPH/DIAG INJ IV PUSH: CPT

## 2025-05-14 RX ORDER — HYDROMORPHONE HYDROCHLORIDE 1 MG/ML
1 INJECTION, SOLUTION INTRAMUSCULAR; INTRAVENOUS; SUBCUTANEOUS ONCE
Status: DISCONTINUED | OUTPATIENT
Start: 2025-05-14 | End: 2025-05-14

## 2025-05-14 RX ORDER — HYDROMORPHONE HYDROCHLORIDE 2 MG/1
2 TABLET ORAL EVERY 4 HOURS PRN
Qty: 84 TABLET | Refills: 0 | Status: SHIPPED | OUTPATIENT
Start: 2025-05-14 | End: 2025-05-23 | Stop reason: SDUPTHER

## 2025-05-14 RX ORDER — DROPERIDOL 2.5 MG/ML
1.25 INJECTION, SOLUTION INTRAMUSCULAR; INTRAVENOUS ONCE
Status: COMPLETED | OUTPATIENT
Start: 2025-05-14 | End: 2025-05-14

## 2025-05-14 RX ADMIN — HYDROMORPHONE HYDROCHLORIDE 2 MG: 2 INJECTION INTRAMUSCULAR; INTRAVENOUS; SUBCUTANEOUS at 00:48

## 2025-05-14 RX ADMIN — DROPERIDOL 1.25 MG: 2.5 INJECTION, SOLUTION INTRAMUSCULAR; INTRAVENOUS at 00:46

## 2025-05-14 RX ADMIN — HYDROMORPHONE HYDROCHLORIDE 2 MG: 2 INJECTION INTRAMUSCULAR; INTRAVENOUS; SUBCUTANEOUS at 02:52

## 2025-05-14 RX ADMIN — HYDROMORPHONE HYDROCHLORIDE 2 MG: 2 INJECTION INTRAMUSCULAR; INTRAVENOUS; SUBCUTANEOUS at 01:51

## 2025-05-14 ASSESSMENT — PAIN - FUNCTIONAL ASSESSMENT: PAIN_FUNCTIONAL_ASSESSMENT: 0-10

## 2025-05-14 ASSESSMENT — PAIN DESCRIPTION - PAIN TYPE: TYPE: ACUTE PAIN

## 2025-05-14 ASSESSMENT — PAIN DESCRIPTION - ORIENTATION: ORIENTATION: LEFT;RIGHT

## 2025-05-14 ASSESSMENT — PAIN DESCRIPTION - LOCATION: LOCATION: LEG

## 2025-05-14 ASSESSMENT — PAIN SCALES - GENERAL: PAINLEVEL_OUTOF10: 8

## 2025-05-14 NOTE — DISCHARGE INSTRUCTIONS
Follow-up with your primary care physician.  Take your medications as indicated.  Return immediately if concerning symptoms, as discussed.

## 2025-05-14 NOTE — ED PROVIDER NOTES
HPI   Chief Complaint   Patient presents with    Sickle Cell Pain Crisis       Chief complaint: Leg pain    History of present illness: Patient is a 44-year-old male with history of sickle cell anemia status post stem cell transplant 2021 presenting to the emergency department complaints of sickle cell pain.  According to the patient, he has been experiencing pain over the past approximately 24 hours.  Patient states that is in his lower legs.  The patient states that this is typical for his sickle cell pain.  Patient denies any fever with this he denies any chest pain or shortness of breath.  Patient states he has been compliant with his medication.  Concern, the patient presented to the emergency department for further evaluation.  He has no other complaints at this time.      History provided by:  Patient   used: No            Patient History   Medical History[1]  Surgical History[2]  Family History[3]  Social History[4]    Physical Exam   ED Triage Vitals   Temperature Heart Rate Respirations BP   05/13/25 2359 05/14/25 0000 05/14/25 0000 05/14/25 0000   36.7 °C (98.1 °F) 82 18 (!) 153/101      Pulse Ox Temp Source Heart Rate Source Patient Position   05/14/25 0000 05/13/25 2359 05/14/25 0000 --   97 % Temporal Monitor       BP Location FiO2 (%)     -- --             Physical Exam  Constitutional:       General: He is not in acute distress.     Appearance: Normal appearance. He is not ill-appearing, toxic-appearing or diaphoretic.      Comments: Pt is uncomfortable during examination.   HENT:      Head: Normocephalic and atraumatic.      Right Ear: External ear normal.      Left Ear: External ear normal.      Nose: Nose normal.   Eyes:      General: Lids are normal. No scleral icterus.     Extraocular Movements: Extraocular movements intact.      Pupils: Pupils are equal, round, and reactive to light.   Cardiovascular:      Rate and Rhythm: Normal rate and regular rhythm.      Heart sounds:       No friction rub.   Pulmonary:      Effort: Pulmonary effort is normal. No tachypnea, bradypnea, accessory muscle usage, respiratory distress or retractions.      Breath sounds: No stridor. No wheezing, rhonchi or rales.   Abdominal:      General: Abdomen is flat. There is no distension.      Palpations: Abdomen is soft.      Tenderness: There is no abdominal tenderness. There is no guarding or rebound.   Musculoskeletal:         General: No signs of injury. Normal range of motion.      Cervical back: Normal range of motion. No rigidity.   Skin:     General: Skin is warm.      Capillary Refill: Capillary refill takes less than 2 seconds.      Coloration: Skin is not jaundiced.   Neurological:      General: No focal deficit present.      Mental Status: He is alert and oriented to person, place, and time.      Sensory: No sensory deficit.   Psychiatric:         Mood and Affect: Mood and affect normal.         Behavior: Behavior normal.           ED Course & MDM                  No data recorded     [place] Coma Scale Score: 15 (05/13/25 2359 : Camila Fontana RN)                           Medical Decision Making      Procedure  Procedures         [1]   Past Medical History:  Diagnosis Date    Anxiety disorder, unspecified 04/17/2017    Anxiety    Depression, unspecified 04/17/2017    Depression    Diarrhea 10/07/2023    DIARRHEA      Family history of glaucoma 03/02/2017    Family history of glaucoma in father    Gastritis, unspecified, without bleeding 04/17/2017    Gastritis    Hematuria, unspecified 04/17/2017    Hematuria    Personal history of other diseases of the digestive system 12/02/2015    History of esophageal reflux    Priapism, unspecified 04/17/2017    Priapism    Sickle-cell disease without crisis (Multi) 04/17/2017    Sickle cell anemia   [2]   Past Surgical History:  Procedure Laterality Date    GALLBLADDER SURGERY  04/12/2017    Gallbladder Surgery    IR CVC TUNNELED  4/24/2018    IR CVC  TUNNELED 4/24/2018 [facility]    IR CVC TUNNELED  6/5/2018    IR CVC TUNNELED 6/5/2018 [facility]    IR CVC TUNNELED  3/1/2019    IR CVC TUNNELED 3/1/2019 [facility]    IR CVC TUNNELED  6/4/2019    IR CVC TUNNELED 6/4/2019 [facility]    IR CVC TUNNELED  4/5/2019    IR CVC TUNNELED 4/5/2019 [facility]    IR CVC TUNNELED  7/17/2019    IR CVC TUNNELED 7/17/2019 [facility]    IR CVC TUNNELED  8/14/2019    IR CVC TUNNELED 8/14/2019 [facility]    IR CVC TUNNELED  12/18/2019    IR CVC TUNNELED 12/18/2019 [facility]    IR CVC TUNNELED  10/9/2019    IR CVC TUNNELED 10/9/2019 [facility]    IR CVC TUNNELED  11/13/2019    IR CVC TUNNELED 11/13/2019 [facility]    IR CVC TUNNELED  1/15/2020    IR CVC TUNNELED 1/15/2020 [facility]    IR CVC TUNNELED  2/19/2020    IR CVC TUNNELED 2/19/2020 [facility]    IR CVC TUNNELED  1/4/2021    IR CVC TUNNELED 1/4/2021 [facility]    IR CVC TUNNELED  1/25/2021    IR CVC TUNNELED 1/25/2021 [facility]    IR CVC TUNNELED  8/21/2018    IR CVC TUNNELED 8/21/2018 [facility]    IR CVC TUNNELED  9/26/2018    IR CVC TUNNELED 9/26/2018 [facility]    IR CVC TUNNELED  11/5/2018    IR CVC TUNNELED 11/5/2018 [facility]    IR CVC TUNNELED  12/19/2018    IR CVC TUNNELED 12/19/2018 [facility]    IR VENOGRAM HEPATIC  11/8/2017    IR VENOGRAM HEPATIC 11/8/2017 [facility]    MR HEAD ANGIO WO IV CONTRAST  2/17/2017    MR HEAD ANGIO WO IV CONTRAST 2/17/2017 [facility]    MR NECK ANGIO WO IV CONTRAST  2/17/2017    MR NECK ANGIO WO IV CONTRAST 2/17/2017 [facility]    US GUIDED NEEDLE LIVER BIOPSY  9/8/2020    US GUIDED NEEDLE LIVER BIOPSY 9/8/2020 [facility]   [3]   Family History  Problem Relation Name Age of Onset    Diabetes Father      Sickle cell anemia Other sibling     Cancer Other grandfather     Cancer Other uncle    [4]   Social History  Tobacco Use    Smoking status: Every  Day     Types: Cigarettes     Start date: 1/1/1998     Passive exposure: Current    Smokeless tobacco: Never   Substance Use Topics    Alcohol use: Not Currently     Comment: Occasional    Drug use: Not Currently     Types: Marijuana     Comment: Last use sometime in 2023.  No intention to re-start.

## 2025-05-14 NOTE — ED TRIAGE NOTES
Pt from private vehicle c/c of sickle cell pain bilateral legs. Denies chest pain     Pt A&Ox3, pt alert calm cooperative with care. Pt resp even and unlabored.     PMH: sickle cell

## 2025-05-14 NOTE — PROGRESS NOTES
Patient presented to the emergency room with a chief complaint of sickle cell pain.  He was initially seen and evaluated by Dr. Williamson and signed out to me pending reevaluation after third dose of medication.  Please see his initial physician note for details.  On reevaluation patient felt much improved.  He feels ready to be discharged home and wife is coming to pick him up.  He is instructed to follow-up with his primary care physician and educated on supportive care at home as well as signs and symptoms that should prompt an immediate turn to the emergency room.      Madina Caldera MD

## 2025-05-21 ENCOUNTER — TELEMEDICINE (OUTPATIENT)
Dept: HEMATOLOGY/ONCOLOGY | Facility: HOSPITAL | Age: 45
End: 2025-05-21
Payer: COMMERCIAL

## 2025-05-21 DIAGNOSIS — M81.0 OSTEOPOROSIS WITHOUT CURRENT PATHOLOGICAL FRACTURE, UNSPECIFIED OSTEOPOROSIS TYPE: ICD-10-CM

## 2025-05-21 DIAGNOSIS — D57.1 SICKLE CELL DISEASE WITHOUT CRISIS (MULTI): ICD-10-CM

## 2025-05-21 DIAGNOSIS — G89.4 CHRONIC PAIN SYNDROME: Primary | ICD-10-CM

## 2025-05-21 PROCEDURE — RXMED WILLOW AMBULATORY MEDICATION CHARGE

## 2025-05-21 PROCEDURE — 99212 OFFICE O/P EST SF 10 MIN: CPT | Performed by: PEDIATRICS

## 2025-05-21 RX ORDER — CALCIUM CARBONATE 500(1250)
1 TABLET,CHEWABLE ORAL DAILY
Qty: 30 TABLET | Refills: 11 | Status: SHIPPED | OUTPATIENT
Start: 2025-05-21 | End: 2026-05-21

## 2025-05-21 RX ORDER — NALOXONE HYDROCHLORIDE 4 MG/.1ML
4 SPRAY NASAL AS NEEDED
Qty: 2 EACH | Refills: 1 | Status: SHIPPED | OUTPATIENT
Start: 2025-05-21

## 2025-05-21 RX ORDER — ERGOCALCIFEROL 1.25 MG/1
1.25 CAPSULE ORAL WEEKLY
Qty: 4 CAPSULE | Refills: 11 | Status: SHIPPED | OUTPATIENT
Start: 2025-05-21 | End: 2026-05-21

## 2025-05-23 ENCOUNTER — TELEPHONE (OUTPATIENT)
Dept: ADMISSION | Facility: HOSPITAL | Age: 45
End: 2025-05-23
Payer: COMMERCIAL

## 2025-05-23 DIAGNOSIS — D57.00 SICKLE-CELL DISEASE WITH PAIN (MULTI): ICD-10-CM

## 2025-05-23 RX ORDER — HYDROMORPHONE HYDROCHLORIDE 2 MG/1
2 TABLET ORAL EVERY 4 HOURS PRN
Qty: 80 TABLET | Refills: 0 | Status: SHIPPED | OUTPATIENT
Start: 2025-05-28 | End: 2025-06-11

## 2025-05-23 NOTE — TELEPHONE ENCOUNTER
Xu P Crayton called the refill line for Dilaudid. Requesting refills be sent to [facility] pharmacy; message sent to Sickle Cell team to submit.

## 2025-05-26 ENCOUNTER — HOSPITAL ENCOUNTER (EMERGENCY)
Facility: HOSPITAL | Age: 45
Discharge: HOME | End: 2025-05-26
Attending: EMERGENCY MEDICINE
Payer: COMMERCIAL

## 2025-05-26 VITALS
BODY MASS INDEX: 22.44 KG/M2 | DIASTOLIC BLOOD PRESSURE: 83 MMHG | HEIGHT: 67 IN | HEART RATE: 81 BPM | WEIGHT: 143 LBS | RESPIRATION RATE: 16 BRPM | OXYGEN SATURATION: 93 % | TEMPERATURE: 98.7 F | SYSTOLIC BLOOD PRESSURE: 110 MMHG

## 2025-05-26 DIAGNOSIS — D57.00 SICKLE CELL PAIN CRISIS (MULTI): Primary | ICD-10-CM

## 2025-05-26 LAB
ALBUMIN SERPL BCP-MCNC: 4.1 G/DL (ref 3.4–5)
ALP SERPL-CCNC: 111 U/L (ref 33–120)
ALT SERPL W P-5'-P-CCNC: 12 U/L (ref 10–52)
ANION GAP SERPL CALC-SCNC: 13 MMOL/L (ref 10–20)
AST SERPL W P-5'-P-CCNC: 16 U/L (ref 9–39)
BASOPHILS # BLD AUTO: 0.03 X10*3/UL (ref 0–0.1)
BASOPHILS NFR BLD AUTO: 0.2 %
BILIRUB SERPL-MCNC: 1 MG/DL (ref 0–1.2)
BUN SERPL-MCNC: 4 MG/DL (ref 6–23)
CALCIUM SERPL-MCNC: 8.8 MG/DL (ref 8.6–10.3)
CHLORIDE SERPL-SCNC: 110 MMOL/L (ref 98–107)
CO2 SERPL-SCNC: 22 MMOL/L (ref 21–32)
CREAT SERPL-MCNC: 0.78 MG/DL (ref 0.5–1.3)
EGFRCR SERPLBLD CKD-EPI 2021: >90 ML/MIN/1.73M*2
EOSINOPHIL # BLD AUTO: 0.19 X10*3/UL (ref 0–0.7)
EOSINOPHIL NFR BLD AUTO: 1.3 %
ERYTHROCYTE [DISTWIDTH] IN BLOOD BY AUTOMATED COUNT: 13.8 % (ref 11.5–14.5)
GLUCOSE SERPL-MCNC: 103 MG/DL (ref 74–99)
HCT VFR BLD AUTO: 43.4 % (ref 41–52)
HGB BLD-MCNC: 15.5 G/DL (ref 13.5–17.5)
HGB RETIC QN: 35 PG (ref 28–38)
IMM GRANULOCYTES # BLD AUTO: 0.06 X10*3/UL (ref 0–0.7)
IMM GRANULOCYTES NFR BLD AUTO: 0.4 % (ref 0–0.9)
IMMATURE RETIC FRACTION: 13.9 %
LYMPHOCYTES # BLD AUTO: 3.48 X10*3/UL (ref 1.2–4.8)
LYMPHOCYTES NFR BLD AUTO: 23.2 %
MCH RBC QN AUTO: 31.2 PG (ref 26–34)
MCHC RBC AUTO-ENTMCNC: 35.7 G/DL (ref 32–36)
MCV RBC AUTO: 87 FL (ref 80–100)
MONOCYTES # BLD AUTO: 1.14 X10*3/UL (ref 0.1–1)
MONOCYTES NFR BLD AUTO: 7.6 %
NEUTROPHILS # BLD AUTO: 10.12 X10*3/UL (ref 1.2–7.7)
NEUTROPHILS NFR BLD AUTO: 67.3 %
NRBC BLD-RTO: 0 /100 WBCS (ref 0–0)
PLATELET # BLD AUTO: 344 X10*3/UL (ref 150–450)
POTASSIUM SERPL-SCNC: 3.6 MMOL/L (ref 3.5–5.3)
PROT SERPL-MCNC: 6.3 G/DL (ref 6.4–8.2)
RBC # BLD AUTO: 4.97 X10*6/UL (ref 4.5–5.9)
RETICS #: 0.09 X10*6/UL (ref 0.02–0.12)
RETICS/RBC NFR AUTO: 1.8 % (ref 0.5–2)
SODIUM SERPL-SCNC: 141 MMOL/L (ref 136–145)
WBC # BLD AUTO: 15 X10*3/UL (ref 4.4–11.3)

## 2025-05-26 PROCEDURE — 36415 COLL VENOUS BLD VENIPUNCTURE: CPT | Performed by: EMERGENCY MEDICINE

## 2025-05-26 PROCEDURE — 96374 THER/PROPH/DIAG INJ IV PUSH: CPT

## 2025-05-26 PROCEDURE — 84075 ASSAY ALKALINE PHOSPHATASE: CPT | Performed by: EMERGENCY MEDICINE

## 2025-05-26 PROCEDURE — 96361 HYDRATE IV INFUSION ADD-ON: CPT

## 2025-05-26 PROCEDURE — 2500000004 HC RX 250 GENERAL PHARMACY W/ HCPCS (ALT 636 FOR OP/ED): Performed by: EMERGENCY MEDICINE

## 2025-05-26 PROCEDURE — 85045 AUTOMATED RETICULOCYTE COUNT: CPT | Performed by: EMERGENCY MEDICINE

## 2025-05-26 PROCEDURE — 96375 TX/PRO/DX INJ NEW DRUG ADDON: CPT

## 2025-05-26 PROCEDURE — 85025 COMPLETE CBC W/AUTO DIFF WBC: CPT | Performed by: EMERGENCY MEDICINE

## 2025-05-26 PROCEDURE — 99284 EMERGENCY DEPT VISIT MOD MDM: CPT | Performed by: EMERGENCY MEDICINE

## 2025-05-26 PROCEDURE — 96376 TX/PRO/DX INJ SAME DRUG ADON: CPT

## 2025-05-26 RX ORDER — KETOROLAC TROMETHAMINE 30 MG/ML
15 INJECTION, SOLUTION INTRAMUSCULAR; INTRAVENOUS ONCE
Status: COMPLETED | OUTPATIENT
Start: 2025-05-26 | End: 2025-05-26

## 2025-05-26 RX ORDER — ONDANSETRON HYDROCHLORIDE 2 MG/ML
4 INJECTION, SOLUTION INTRAVENOUS ONCE
Status: COMPLETED | OUTPATIENT
Start: 2025-05-26 | End: 2025-05-26

## 2025-05-26 RX ADMIN — SODIUM CHLORIDE 1000 ML: 0.9 INJECTION, SOLUTION INTRAVENOUS at 13:03

## 2025-05-26 RX ADMIN — HYDROMORPHONE HYDROCHLORIDE 2 MG: 2 INJECTION INTRAMUSCULAR; INTRAVENOUS; SUBCUTANEOUS at 13:04

## 2025-05-26 RX ADMIN — SODIUM CHLORIDE 1000 ML: 0.9 INJECTION, SOLUTION INTRAVENOUS at 11:11

## 2025-05-26 RX ADMIN — HYDROMORPHONE HYDROCHLORIDE 2 MG: 2 INJECTION INTRAMUSCULAR; INTRAVENOUS; SUBCUTANEOUS at 11:52

## 2025-05-26 RX ADMIN — ONDANSETRON 4 MG: 2 INJECTION, SOLUTION INTRAMUSCULAR; INTRAVENOUS at 11:10

## 2025-05-26 RX ADMIN — HYDROMORPHONE HYDROCHLORIDE 2 MG: 2 INJECTION INTRAMUSCULAR; INTRAVENOUS; SUBCUTANEOUS at 11:11

## 2025-05-26 RX ADMIN — KETOROLAC TROMETHAMINE 15 MG: 30 INJECTION, SOLUTION INTRAMUSCULAR at 11:52

## 2025-05-26 ASSESSMENT — PAIN - FUNCTIONAL ASSESSMENT
PAIN_FUNCTIONAL_ASSESSMENT: 0-10

## 2025-05-26 ASSESSMENT — PAIN SCALES - GENERAL
PAINLEVEL_OUTOF10: 2
PAINLEVEL_OUTOF10: 10 - WORST POSSIBLE PAIN
PAINLEVEL_OUTOF10: 4
PAINLEVEL_OUTOF10: 7

## 2025-05-26 ASSESSMENT — PAIN DESCRIPTION - LOCATION
LOCATION: BACK
LOCATION: BACK

## 2025-05-26 ASSESSMENT — PAIN DESCRIPTION - ORIENTATION: ORIENTATION: RIGHT;LEFT

## 2025-05-26 ASSESSMENT — PAIN DESCRIPTION - PAIN TYPE: TYPE: ACUTE PAIN

## 2025-05-26 ASSESSMENT — PAIN DESCRIPTION - PROGRESSION: CLINICAL_PROGRESSION: GRADUALLY IMPROVING

## 2025-05-26 NOTE — ED PROVIDER NOTES
Emergency Department Provider Note       History of Present Illness     History provided by: Patient  Limitations to History: None  External Records Reviewed with Brief Summary: None    HPI:  Xu Maya is a 44 y.o. male with past medical history of sickle cell disease status post dental transplant, PUD, anxiety who did present with complaint of his sickle cell pain crisis usually feels in his lower legs and lower back.  Somewhat of prior episodes.  No chest pain, palpitation or shortness of breath.  No cough.  No abdominal pain.    Physical Exam   Triage vitals:  T 36.4 °C (97.6 °F)  HR 84  /75  RR 16  O2 99 %      Physical Exam  Vitals and nursing note reviewed.   Constitutional:       General: He is not in acute distress.     Appearance: Normal appearance. He is normal weight. He is not ill-appearing.   HENT:      Head: Normocephalic and atraumatic.      Nose: Nose normal.      Mouth/Throat:      Mouth: Mucous membranes are moist.      Pharynx: Oropharynx is clear.   Eyes:      Extraocular Movements: Extraocular movements intact.      Conjunctiva/sclera: Conjunctivae normal.      Pupils: Pupils are equal, round, and reactive to light.   Cardiovascular:      Rate and Rhythm: Normal rate and regular rhythm.      Pulses: Normal pulses.      Heart sounds: Normal heart sounds.   Pulmonary:      Effort: Pulmonary effort is normal.      Breath sounds: Normal breath sounds. No wheezing.   Abdominal:      General: Abdomen is flat. Bowel sounds are normal. There is no distension.      Palpations: Abdomen is soft.      Tenderness: There is no abdominal tenderness. There is no right CVA tenderness, left CVA tenderness, guarding or rebound.   Musculoskeletal:         General: No tenderness or deformity. Normal range of motion.      Cervical back: Normal range of motion and neck supple.   Skin:     General: Skin is warm.      Capillary Refill: Capillary refill takes less than 2 seconds.      Coloration: Skin  is not pale.      Findings: No bruising.   Neurological:      General: No focal deficit present.      Mental Status: He is alert and oriented to person, place, and time. Mental status is at baseline.      Sensory: No sensory deficit.      Motor: No weakness.      Gait: Gait normal.   Psychiatric:         Mood and Affect: Mood normal.         Behavior: Behavior normal.         Thought Content: Thought content normal.         Judgment: Judgment normal.           Medical Decision Making & ED Course   Medical Decision Makin y.o. male past medical history of sickle cell disease status post transplant, PUD, anxiety does present with complaint of lower back pain and lower leg pain similar to prior episodes of sickle cell disease.  Denies any fever or chills.  Denies any chest pain, palpitation shortness of breath.  No signs for acute chest clinically.  IV was placed and labs are drawn and he is sent for Dilaudid 2 mg every hour as well as Zofran.  1 L normal saline bolus was started.  Following treatment does feel improved.  Tolerating p.o. braiding safely discharged home plan to follow-up with outpatient provider.  Return precautions are discussed.  ----      Differential diagnoses considered include but are not limited to: Sickle cell disease, acute chest, back pain    Social Determinants of Health which Significantly Impact Care: Social Determinants of Health which Significantly Impact Care: None identified     EKG Independent Interpretation: EKG not obtained    Independent Result Review and Interpretation: Relevant laboratory and radiographic results were reviewed and independently interpreted by myself.  As necessary, they are commented on in the ED Course.    Chronic conditions affecting the patient's care: As documented above in [facility]    The patient was discussed with the following consultants/services: None    Care Considerations: As documented above in [facility]    ED Course:  Diagnoses as of 25 1619   Sickle  cell pain crisis (Multi)       Disposition   As a result of the work-up, the patient was discharged home.  he was informed of his diagnosis and instructed to come back with any concerns or worsening of condition.  he and was agreeable to the plan as discussed above.  he was given the opportunity to ask questions.  All of the patient's questions were answered.    Procedures   Procedures        Mark Anthony oDty MD  Emergency Medicine                                                            Mark Anthony Doty MD  05/26/25 7944       Mark Anthony Doty MD  05/26/25 4438

## 2025-05-26 NOTE — DISCHARGE INSTRUCTIONS
Please continue your pain regimen at home.  Please follow-up with Dr. WHITNEY to continue care for your sickle cell.  Please return ED for worsening chest pain, palpitation or shortness of breath or any other concerning symptoms.

## 2025-05-28 ENCOUNTER — PHARMACY VISIT (OUTPATIENT)
Dept: PHARMACY | Facility: CLINIC | Age: 45
End: 2025-05-28
Payer: MEDICAID

## 2025-05-28 PROCEDURE — RXMED WILLOW AMBULATORY MEDICATION CHARGE

## 2025-06-09 ENCOUNTER — TELEPHONE (OUTPATIENT)
Dept: ADMISSION | Facility: HOSPITAL | Age: 45
End: 2025-06-09

## 2025-06-09 ENCOUNTER — APPOINTMENT (OUTPATIENT)
Dept: BEHAVIORAL HEALTH | Facility: CLINIC | Age: 45
End: 2025-06-09
Payer: COMMERCIAL

## 2025-06-09 DIAGNOSIS — F54 PSYCHOLOGICAL FACTORS AFFECTING MORBID OBESITY (MULTI): ICD-10-CM

## 2025-06-09 DIAGNOSIS — D57.00 SICKLE-CELL DISEASE WITH PAIN (MULTI): ICD-10-CM

## 2025-06-09 DIAGNOSIS — F43.23 ADJUSTMENT DISORDER WITH MIXED ANXIETY AND DEPRESSED MOOD: ICD-10-CM

## 2025-06-09 DIAGNOSIS — E66.01 PSYCHOLOGICAL FACTORS AFFECTING MORBID OBESITY (MULTI): ICD-10-CM

## 2025-06-09 PROCEDURE — 90791 PSYCH DIAGNOSTIC EVALUATION: CPT | Performed by: PSYCHOLOGIST

## 2025-06-09 NOTE — TELEPHONE ENCOUNTER
Refill and dose increase request received. Pt requests Dilaudid refill with increase from 2mg to 4mg as the 2mg is not helping with his pain.  Preferred pharmacy is [facility].  Message sent to Sickle Cell team,

## 2025-06-10 ENCOUNTER — PHARMACY VISIT (OUTPATIENT)
Dept: PHARMACY | Facility: CLINIC | Age: 45
End: 2025-06-10
Payer: MEDICAID

## 2025-06-10 PROCEDURE — RXMED WILLOW AMBULATORY MEDICATION CHARGE

## 2025-06-10 RX ORDER — HYDROMORPHONE HYDROCHLORIDE 4 MG/1
4 TABLET ORAL EVERY 4 HOURS PRN
Qty: 40 TABLET | Refills: 0 | Status: SHIPPED | OUTPATIENT
Start: 2025-06-10 | End: 2025-06-24

## 2025-06-15 ASSESSMENT — ENCOUNTER SYMPTOMS
VOMITING: 0
LEG SWELLING: 0
DIARRHEA: 0
CONSTIPATION: 1
HEMOPTYSIS: 0
ARTHRALGIAS: 0
ABDOMINAL PAIN: 0
FEVER: 0
CHEST TIGHTNESS: 0
NERVOUS/ANXIOUS: 0
FLANK PAIN: 0
SHORTNESS OF BREATH: 0
NECK PAIN: 0
LIGHT-HEADEDNESS: 0
FATIGUE: 1
NUMBNESS: 0
HEADACHES: 0
PALPITATIONS: 0
COUGH: 0
BRUISES/BLEEDS EASILY: 0
NAUSEA: 0
WHEEZING: 0
DEPRESSION: 0
WOUND: 0

## 2025-06-15 NOTE — PROGRESS NOTES
"Medical psychology evaluation    Verbal consent was requested and obtained from patient on this date for a telehealth visit.  The telehealth session connected with the patient at his home.    Xu Maya is a 44-year-old man referred by Dr. Contreras for behavioral evaluation and management of sickle cell pain.    Pain status.  He reports pain in his legs and lower back that he characterizes as \"not severe.\"  He quantifies his pain during evaluation as 5/10 in intensity with a range of 3-9/10.  Pain is worse with activity particularly if he does a lot of walking, or overdoes activity with his son.  Weather changes can also increase his pain.  It helps his pain if he stays hydrated, takes hot showers or uses Dilaudid 2 mg as prescribed by his hematologist.  Most helpful to him is the use of heating pads and music therapy.  He is also maintained on a regimen of duloxetine but finds that not to be helpful.    He had stem cell transplant sometime ago and \"everything got better except for the pain.\"  Since that time, however, he has not had to be hospitalized for sickle crises where prior to his transplant he was having sickle crises once or twice each month.    Psychological status.  He reports feeling depressed by the pain and feeling stressed by pain and its interference in his function.  He is frustrated that the transplant did not help him to the degree that he had hoped it might.  He has tried other strategies including peripheral nerve stimulators and transfusions that have not been helpful at all.  He characterizes himself as experiencing a diminished quality of life as a consequence of his pain.  He reports no history of suicidal or homicidal ideation and he reports no substance misuse.    He is in the midst of a particular stressor when he was accused of some inappropriate behavior and now is involved in a court case.  He lost his job as a consequence.  Both the accusation, which he denies, and the court " experiences have been enormously stressful to him.  He notes that his pain has increased as his court date approaches.    He has benefited in the past from support and recalls that he used to meet routinely with a counselor of some sort in the sickle cell clinic at [facility] at around the time of his transplant 7 years ago.  He found that enormously helpful and is unclear whether that kind of support is still available through that clinic.    Psychosocial status.  He is  with 2 sons and, as noted, recently lost his job.  He also is caring for 2 nieces at home.    Discussion.  We agreed to meet again to continue our evaluation and to follow along as he goes through the stress of what he tells me is an unfounded accusation.  We have scheduled a follow-up appointment.

## 2025-06-16 NOTE — PROGRESS NOTES
SICKLE CELL VIRTUAL VISIT NOTE  Patient ID: Xu Maya   Visit Type: Follow up visit     ASSESSMENT AND PLAN  Xu Maya is 44 y.o. 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 He remains in remission and currently has sickle cell trait, same as donor. He has chronic pain with concerns of OUD on chronic opioid therapy. He has chronic constipation secondary to opioids. MDD and GyAD are being managed by behavioral health     PLAN   - Continue to wean oral dilaudid by 2 tablets every refill with the plan to transition to buprenorphine. He is currently taking oral hydromorphone 4-8 mg every 4-6 hours. He is currently getting 44 tablets every 2 weeks   - Oral Tylenol 650 mg every 6 hours as needed and or ibuprofen 600 mg every 8 hours as needed for mild to moderate pain   - Cymbalta 60 mg every 12 hours   - Reviewed OARRS  - Follow up with behavioral health as scheduled   - Senna and or miralax as needed   - Referral placed to pain management referral placed to pain management  - Follow up office visit will be in 3 months      Chief Complaint: Follow-up for chronic pain post bone marrow transplant for hemoglobin SS sickle cell disease     Interval History: Xu Maya presents for virtual visit today, and informed consent obtained.  He continues to have chronic pain every day for which he has been taking oral Dilaudid 4 mg every 4-6 hours as needed.  He takes roughly anywhere from 3 to 4 tablets/day on the average.  Pain is usually at the baseline of 6-7/10, mainly in his legs, and lower back.  Weather changes, and stress trigger for him.  He was last seen in the emergency department on 5/14/2025 for pain, and required parenteral Dilaudid, which helped with his pain.  He has not had any hospitalizations.  He denies any headaches, dizziness, nausea, vomiting, abdominal pain, blurry vision, or focal neurologic  deficits.  His appetite and energy levels have been good and he continues to work full-time. Chronic constipation is well controlled with current bowel regimen.     Review of System:   Review of Systems   Constitutional:  Positive for fatigue. Negative for fever.   Respiratory:  Negative for chest tightness, cough, hemoptysis, shortness of breath and wheezing.    Cardiovascular:  Negative for chest pain, leg swelling and palpitations.   Gastrointestinal:  Positive for constipation. Negative for abdominal pain, diarrhea, nausea and vomiting.   Musculoskeletal:  Negative for arthralgias, flank pain and neck pain.   Skin:  Negative for itching, rash and wound.   Neurological:  Negative for headaches, light-headedness and numbness.   Hematological:  Does not bruise/bleed easily.   Psychiatric/Behavioral:  Negative for depression. The patient is not nervous/anxious.       Allergies:   Allergies   Allergen Reactions    Hydrocodone-Acetaminophen Hives    Naproxen Hives     Current Medications:   Medication Documentation Review Audit             Medication Order Taking? Sig Documenting Provider Last Dose Status   amitriptyline (Elavil) 25 mg tablet [id number]  Take 1 tablet (25 mg) by mouth. Historical Provider, MD  Active   ARIPiprazole (Abilify) 5 mg tablet [id number]  Take 1 tablet (5 mg) by mouth once daily. Yola Contreras MD  Active   calcium carbonate 500 mg calcium (1,250 mg) chewable tablet [id number]  Chew 1 tablet (500 mg of elemental calcium) once daily. Mihir Ricks MD  Active   diphenhydrAMINE (BENADryl) 25 mg capsule [id number]  Take 1 capsule (25 mg) by mouth every 6 hours if needed. Historical Provider, MD  Active   DULoxetine (Cymbalta) 60 mg DR capsule [id number]  Take 1 capsule (60 mg) by mouth 2 times a day. Yola Contreras MD  Active   ergocalciferol (Vitamin D2) 1250 mcg (50,000 units) capsule [id number]  Take 1 capsule (1.25 mg) by mouth 1 (one) time per week. Mihir Ricks MD  Active    heparin lock flush (porcine) 10 unit/mL injection 100 Units [id number]   Luis Armando Wilkerson APRN-CNP  Active   heparin lock flush (porcine) injection 500 Units [id number]   Luis Armando Wilkerson APRN-CNP  Active   HYDROmorphone (Dilaudid) 2 mg tablet [id number]  Take 1 tablet (2 mg) by mouth every 4 hours if needed for severe pain (7 - 10) for up to 14 days. May take (4 mg) by mouth as needed every 4 hours for more severe and persistent pain Do not fill before May 28, 2025. Radha Erazo APRN-CNP  Active   mirtazapine (Remeron) 7.5 mg tablet [id number]  Take 1 tablet (7.5 mg) by mouth once daily at bedtime. Yola Contreras MD  Active   naloxone (Narcan) 4 mg/0.1 mL nasal spray [id number]  Administer 1 spray (4 mg) into affected nostril(s) if needed for opioid reversal or respiratory depression. 1 spray to nostril for overdose, may repeat every 2-3 minutes until medical assistance arrives Mihir Ricks MD  Active   prazosin (Minipress) 1 mg capsule [id number]  Take 1 capsule (1 mg) by mouth once daily at bedtime. Yola Contreras MD   25 [id number]                   Past Medical History:    has a past medical history of Anxiety disorder, unspecified (2017), Depression, unspecified (2017), Diarrhea (10/07/2023), Family history of glaucoma (2017), Gastritis, unspecified, without bleeding (2017), Hematuria, unspecified (2017), Personal history of other diseases of the digestive system (2015), Priapism, unspecified (2017), and Sickle-cell disease without crisis (Multi) (2017).    Past Surgical History:    has a past surgical history that includes Gallbladder surgery (2017); IR CVC tunneled (2018); IR CVC tunneled (2018); IR CVC tunneled (3/1/2019); IR CVC tunneled (2019); IR CVC tunneled (2019); IR CVC tunneled (2019); IR CVC tunneled (2019); IR CVC tunneled (2019); IR CVC tunneled (10/9/2019); IR CVC tunneled (2019);  IR CVC tunneled (1/15/2020); IR CVC tunneled (2/19/2020); US guided needle liver biopsy (9/8/2020); IR CVC tunneled (1/4/2021); IR CVC tunneled (1/25/2021); MR angio head wo IV contrast (2/17/2017); MR angio neck wo IV contrast (2/17/2017); IR venogram hepatic (11/8/2017); IR CVC tunneled (8/21/2018); IR CVC tunneled (9/26/2018); IR CVC tunneled (11/5/2018); and IR CVC tunneled (12/19/2018).    Family History:   Family History   Problem Relation Name Age of Onset    Diabetes Father      Sickle cell anemia Other sibling     Cancer Other grandfather     Cancer Other uncle        Social History:   Xu Maya  reports that he has been smoking cigarettes. He started smoking about 27 years ago. He has been exposed to tobacco smoke. He has never used smokeless tobacco.  reports that he does not currently use drugs after having used the following drugs: Marijuana.      Physical Exam   There were no vitals taken for this visit.  There is no height or weight on file to calculate BSA.    LABS     Latest Reference Range & Units 05/14/25 00:28   GLUCOSE 74 - 99 mg/dL 105 (H)   SODIUM 136 - 145 mmol/L 135 (L)   POTASSIUM 3.5 - 5.3 mmol/L 3.4 (L)   CHLORIDE 98 - 107 mmol/L 105   Bicarbonate 21 - 32 mmol/L 24   Anion Gap 10 - 20 mmol/L 9 (L)   Blood Urea Nitrogen 6 - 23 mg/dL 11   Creatinine 0.50 - 1.30 mg/dL 0.84   EGFR >60 mL/min/1.73m*2 >90   Calcium 8.6 - 10.3 mg/dL 8.4 (L)   LDH 84 - 246 U/L 187   WBC 4.4 - 11.3 x10*3/uL 11.6 (H)   nRBC 0.0 - 0.0 /100 WBCs 0.0   RBC 4.50 - 5.90 x10*6/uL 4.88   HEMOGLOBIN 13.5 - 17.5 g/dL 15.2   HEMATOCRIT 41.0 - 52.0 % 42.3   MCV 80 - 100 fL 87   MCH 26.0 - 34.0 pg 31.1   MCHC 32.0 - 36.0 g/dL 35.9   RED CELL DISTRIBUTION WIDTH 11.5 - 14.5 % 13.2   Platelets 150 - 450 x10*3/uL 220   Neutrophils % 40.0 - 80.0 % 56.2   Immature Granulocytes %, Automated 0.0 - 0.9 % 0.3   Lymphocytes % 13.0 - 44.0 % 31.1   Monocytes % 2.0 - 10.0 % 11.2   Eosinophils % 0.0 - 6.0 % 0.9   Basophils % 0.0 - 2.0  % 0.3   Neutrophils Absolute 1.20 - 7.70 x10*3/uL 6.52   Immature Granulocytes Absolute, Automated 0.00 - 0.70 x10*3/uL 0.03   Lymphocytes Absolute 1.20 - 4.80 x10*3/uL 3.61   Monocytes Absolute 0.10 - 1.00 x10*3/uL 1.30 (H)   Eosinophils Absolute 0.00 - 0.70 x10*3/uL 0.11   Basophils Absolute 0.00 - 0.10 x10*3/uL 0.04   Retic % 0.5 - 2.0 % 1.1   Retic Absolute 0.022 - 0.118 x10*6/uL 0.053   Reticulocyte Hemoglobin 28 - 38 pg 35   Immature Retic fraction <=16.0 % 3.4     Virtual or Telephone Consent    An interactive audio and video telecommunication system which permits real time communications between the patient (at the originating site) and provider (at the distant site) was utilized to provide this telehealth service.   Verbal consent was requested and obtained from Xu Maya on this date, 05/21/2025 for a telehealth visit and the patient's location was confirmed at the time of the visit,    Start time: 1430  End time: 1449  Provider location: [facility]  Patient location: [facility].             Mihir Ricks MD

## 2025-06-17 ENCOUNTER — APPOINTMENT (OUTPATIENT)
Dept: PAIN MEDICINE | Facility: CLINIC | Age: 45
End: 2025-06-17
Payer: COMMERCIAL

## 2025-06-20 ENCOUNTER — HOSPITAL ENCOUNTER (EMERGENCY)
Facility: HOSPITAL | Age: 45
Discharge: HOME | End: 2025-06-21
Attending: EMERGENCY MEDICINE
Payer: COMMERCIAL

## 2025-06-20 ENCOUNTER — HOSPITAL ENCOUNTER (OUTPATIENT)
Dept: CARDIOLOGY | Facility: HOSPITAL | Age: 45
Discharge: HOME | End: 2025-06-20
Payer: COMMERCIAL

## 2025-06-20 ENCOUNTER — APPOINTMENT (OUTPATIENT)
Dept: CARDIOLOGY | Facility: HOSPITAL | Age: 45
End: 2025-06-20
Payer: COMMERCIAL

## 2025-06-20 DIAGNOSIS — D57.00 SICKLE CELL PAIN CRISIS (MULTI): Primary | ICD-10-CM

## 2025-06-20 PROCEDURE — 93005 ELECTROCARDIOGRAM TRACING: CPT

## 2025-06-20 PROCEDURE — 84075 ASSAY ALKALINE PHOSPHATASE: CPT | Performed by: EMERGENCY MEDICINE

## 2025-06-20 PROCEDURE — 85025 COMPLETE CBC W/AUTO DIFF WBC: CPT | Performed by: EMERGENCY MEDICINE

## 2025-06-20 PROCEDURE — 36415 COLL VENOUS BLD VENIPUNCTURE: CPT | Performed by: EMERGENCY MEDICINE

## 2025-06-20 PROCEDURE — 85045 AUTOMATED RETICULOCYTE COUNT: CPT | Performed by: EMERGENCY MEDICINE

## 2025-06-20 PROCEDURE — 83615 LACTATE (LD) (LDH) ENZYME: CPT | Performed by: EMERGENCY MEDICINE

## 2025-06-20 PROCEDURE — 99284 EMERGENCY DEPT VISIT MOD MDM: CPT | Performed by: EMERGENCY MEDICINE

## 2025-06-20 ASSESSMENT — PAIN DESCRIPTION - PAIN TYPE: TYPE: ACUTE PAIN;CHRONIC PAIN

## 2025-06-20 ASSESSMENT — PAIN DESCRIPTION - LOCATION: LOCATION: LEG

## 2025-06-20 ASSESSMENT — PAIN DESCRIPTION - FREQUENCY: FREQUENCY: CONSTANT/CONTINUOUS

## 2025-06-20 ASSESSMENT — PAIN DESCRIPTION - ORIENTATION: ORIENTATION: RIGHT;LEFT

## 2025-06-20 ASSESSMENT — PAIN SCALES - GENERAL: PAINLEVEL_OUTOF10: 8

## 2025-06-20 ASSESSMENT — PAIN - FUNCTIONAL ASSESSMENT: PAIN_FUNCTIONAL_ASSESSMENT: 0-10

## 2025-06-21 VITALS
HEIGHT: 67 IN | BODY MASS INDEX: 22.44 KG/M2 | RESPIRATION RATE: 18 BRPM | SYSTOLIC BLOOD PRESSURE: 116 MMHG | WEIGHT: 143 LBS | DIASTOLIC BLOOD PRESSURE: 74 MMHG | OXYGEN SATURATION: 96 % | HEART RATE: 74 BPM | TEMPERATURE: 98.1 F

## 2025-06-21 LAB
ALBUMIN SERPL BCP-MCNC: 4.2 G/DL (ref 3.4–5)
ALP SERPL-CCNC: 101 U/L (ref 33–120)
ALT SERPL W P-5'-P-CCNC: 7 U/L (ref 10–52)
ANION GAP SERPL CALC-SCNC: 13 MMOL/L (ref 10–20)
AST SERPL W P-5'-P-CCNC: 16 U/L (ref 9–39)
BASOPHILS # BLD AUTO: 0.02 X10*3/UL (ref 0–0.1)
BASOPHILS NFR BLD AUTO: 0.2 %
BILIRUB SERPL-MCNC: 0.5 MG/DL (ref 0–1.2)
BUN SERPL-MCNC: 11 MG/DL (ref 6–23)
CALCIUM SERPL-MCNC: 9.2 MG/DL (ref 8.6–10.3)
CHLORIDE SERPL-SCNC: 109 MMOL/L (ref 98–107)
CO2 SERPL-SCNC: 22 MMOL/L (ref 21–32)
CREAT SERPL-MCNC: 0.99 MG/DL (ref 0.5–1.3)
EGFRCR SERPLBLD CKD-EPI 2021: >90 ML/MIN/1.73M*2
EOSINOPHIL # BLD AUTO: 0.2 X10*3/UL (ref 0–0.7)
EOSINOPHIL NFR BLD AUTO: 1.7 %
ERYTHROCYTE [DISTWIDTH] IN BLOOD BY AUTOMATED COUNT: 14.2 % (ref 11.5–14.5)
GLUCOSE SERPL-MCNC: 88 MG/DL (ref 74–99)
HCT VFR BLD AUTO: 46.8 % (ref 41–52)
HGB BLD-MCNC: 16.5 G/DL (ref 13.5–17.5)
HGB RETIC QN: 34 PG (ref 28–38)
IMM GRANULOCYTES # BLD AUTO: 0.04 X10*3/UL (ref 0–0.7)
IMM GRANULOCYTES NFR BLD AUTO: 0.3 % (ref 0–0.9)
IMMATURE RETIC FRACTION: 6.6 %
LDH SERPL L TO P-CCNC: 257 U/L (ref 84–246)
LYMPHOCYTES # BLD AUTO: 5.06 X10*3/UL (ref 1.2–4.8)
LYMPHOCYTES NFR BLD AUTO: 43.5 %
MCH RBC QN AUTO: 32.4 PG (ref 26–34)
MCHC RBC AUTO-ENTMCNC: 35.3 G/DL (ref 32–36)
MCV RBC AUTO: 92 FL (ref 80–100)
MONOCYTES # BLD AUTO: 0.97 X10*3/UL (ref 0.1–1)
MONOCYTES NFR BLD AUTO: 8.3 %
NEUTROPHILS # BLD AUTO: 5.34 X10*3/UL (ref 1.2–7.7)
NEUTROPHILS NFR BLD AUTO: 46 %
NRBC BLD-RTO: 0 /100 WBCS (ref 0–0)
PLATELET # BLD AUTO: 296 X10*3/UL (ref 150–450)
POTASSIUM SERPL-SCNC: 4.3 MMOL/L (ref 3.5–5.3)
PROT SERPL-MCNC: 7 G/DL (ref 6.4–8.2)
RBC # BLD AUTO: 5.1 X10*6/UL (ref 4.5–5.9)
RETICS #: 0.08 X10*6/UL (ref 0.02–0.12)
RETICS/RBC NFR AUTO: 1.6 % (ref 0.5–2)
SODIUM SERPL-SCNC: 140 MMOL/L (ref 136–145)
WBC # BLD AUTO: 11.6 X10*3/UL (ref 4.4–11.3)

## 2025-06-21 PROCEDURE — 2500000004 HC RX 250 GENERAL PHARMACY W/ HCPCS (ALT 636 FOR OP/ED): Mod: JZ | Performed by: EMERGENCY MEDICINE

## 2025-06-21 PROCEDURE — 96374 THER/PROPH/DIAG INJ IV PUSH: CPT

## 2025-06-21 PROCEDURE — 96376 TX/PRO/DX INJ SAME DRUG ADON: CPT

## 2025-06-21 RX ADMIN — HYDROMORPHONE HYDROCHLORIDE 2 MG: 2 INJECTION INTRAMUSCULAR; INTRAVENOUS; SUBCUTANEOUS at 01:28

## 2025-06-21 RX ADMIN — HYDROMORPHONE HYDROCHLORIDE 2 MG: 2 INJECTION INTRAMUSCULAR; INTRAVENOUS; SUBCUTANEOUS at 03:14

## 2025-06-21 RX ADMIN — HYDROMORPHONE HYDROCHLORIDE 2 MG: 2 INJECTION INTRAMUSCULAR; INTRAVENOUS; SUBCUTANEOUS at 00:02

## 2025-06-21 ASSESSMENT — PAIN DESCRIPTION - LOCATION
LOCATION: LEG
LOCATION: LEG

## 2025-06-21 ASSESSMENT — PAIN SCALES - GENERAL
PAINLEVEL_OUTOF10: 7
PAINLEVEL_OUTOF10: 6

## 2025-06-21 ASSESSMENT — PAIN - FUNCTIONAL ASSESSMENT
PAIN_FUNCTIONAL_ASSESSMENT: 0-10
PAIN_FUNCTIONAL_ASSESSMENT: 0-10

## 2025-06-21 NOTE — PROGRESS NOTES
Please see the previous ED provider note for history of present illness, physical examination, and medical decision making up to the time of shift change.    I reassessed patient after third dose of Dilaudid and he reported significant improvement in symptoms and requesting to return home.    Diagnoses as of 06/21/25 0344   Sickle cell pain crisis (Multi)       Rashaad Qiu MD

## 2025-06-21 NOTE — ED PROVIDER NOTES
Emergency Department Provider Note       History of Present Illness     History provided by: Patient  Limitations to History: None  External Records Reviewed with Brief Summary: History of sickle cell disease, peptic ulcer disease, stem cell transplant, anxiety    HPI:  Xu Maya is a 44 y.o. male presents to the emergency department with sickle cell pain.  Patient reports the pain started this morning.  The pain is in both of his legs.  This is typical of his sickle cell pain.  He denies fevers and chills.  He denies chest pain.  Denies shortness of breath.  He tried taking his medicine at home without improvement.    Physical Exam   Triage vitals:  T 36.7 °C (98.1 °F)  HR 81  /85  RR 18  O2 98 % None (Room air)    Physical Exam  Vitals and nursing note reviewed.   HENT:      Head: Normocephalic and atraumatic.      Nose: Nose normal.   Eyes:      Conjunctiva/sclera: Conjunctivae normal.   Cardiovascular:      Rate and Rhythm: Normal rate and regular rhythm.      Pulses: Normal pulses.      Heart sounds: Normal heart sounds.   Pulmonary:      Effort: Pulmonary effort is normal.      Breath sounds: Normal breath sounds.   Abdominal:      General: Bowel sounds are normal.      Palpations: Abdomen is soft.   Musculoskeletal:         General: Normal range of motion.      Cervical back: Normal range of motion and neck supple.      Comments: Diffuse pain in both lower extremities without edema.  Normal vascular exam.   Skin:     Findings: No rash.   Neurological:      General: No focal deficit present.      Mental Status: He is alert and oriented to person, place, and time.   Psychiatric:         Mood and Affect: Mood normal.           Medical Decision Making & ED Course   Medical Decision Makin y.o. male who presents to the emergency department with sickle cell pain.  The patient has no shortness of breath or chest pain.  His pulse ox is 98% on room air.  Doubt acute chest.  The patient will be  further evaluated with labs.  He will be treated with 2 mg of Dilaudid every hour x 3.  The patient's EKG showed normal sinus rhythm, heart rate 68, no ectopy, no ST elevation.  Labs showed a minimally elevated white blood count of 11.6.  Hemoglobin was normal at 16.5.  LDH elevated at 257.  The patient was assessed after his second dose of pain medicine and he was having some improvement.  The patient likely will have enough improvement for discharge after his third dose.  ----      Differential diagnoses considered include but are not limited to: Sickle cell pain, anemia, dehydration, acute chest    Social Determinants of Health which Significantly Impact Care: Social Determinants of Health which Significantly Impact Care: None identified     EKG Independent Interpretation: EKG interpreted by myself. Please see ED Course for full interpretation.    Independent Result Review and Interpretation: Relevant laboratory and radiographic results were reviewed and independently interpreted by myself.  As necessary, they are commented on in the ED Course.    Chronic conditions affecting the patient's care: As documented above in [facility]    The patient was discussed with the following consultants/services: None    Care Considerations: As documented above in [facility]    ED Course:  Diagnoses as of 06/21/25 0159   Sickle cell pain crisis (Multi)       Disposition   As a result of the work-up, the patient was discharged home.  he was informed of his diagnosis and instructed to come back with any concerns or worsening of condition.  he and was agreeable to the plan as discussed above.  he was given the opportunity to ask questions.  All of the patient's questions were answered.    Procedures   Procedures        Vazquez Ryan MD  Emergency Medicine                                                       Vazquez Ryan MD  06/21/25 0203     1

## 2025-06-22 ENCOUNTER — HOSPITAL ENCOUNTER (EMERGENCY)
Facility: HOSPITAL | Age: 45
Discharge: HOME | End: 2025-06-23
Attending: EMERGENCY MEDICINE
Payer: COMMERCIAL

## 2025-06-22 DIAGNOSIS — D57.00 SICKLE CELL PAIN CRISIS (MULTI): Primary | ICD-10-CM

## 2025-06-22 LAB
ALBUMIN SERPL BCP-MCNC: 3.9 G/DL (ref 3.4–5)
ALP SERPL-CCNC: 88 U/L (ref 33–120)
ALT SERPL W P-5'-P-CCNC: 7 U/L (ref 10–52)
ANION GAP SERPL CALC-SCNC: 10 MMOL/L (ref 10–20)
AST SERPL W P-5'-P-CCNC: 13 U/L (ref 9–39)
BASOPHILS # BLD AUTO: 0.04 X10*3/UL (ref 0–0.1)
BASOPHILS NFR BLD AUTO: 0.3 %
BILIRUB SERPL-MCNC: 0.5 MG/DL (ref 0–1.2)
BUN SERPL-MCNC: 6 MG/DL (ref 6–23)
CALCIUM SERPL-MCNC: 8.7 MG/DL (ref 8.6–10.3)
CHLORIDE SERPL-SCNC: 108 MMOL/L (ref 98–107)
CO2 SERPL-SCNC: 25 MMOL/L (ref 21–32)
CREAT SERPL-MCNC: 0.76 MG/DL (ref 0.5–1.3)
EGFRCR SERPLBLD CKD-EPI 2021: >90 ML/MIN/1.73M*2
EOSINOPHIL # BLD AUTO: 0.27 X10*3/UL (ref 0–0.7)
EOSINOPHIL NFR BLD AUTO: 2.2 %
ERYTHROCYTE [DISTWIDTH] IN BLOOD BY AUTOMATED COUNT: 14 % (ref 11.5–14.5)
GLUCOSE SERPL-MCNC: 83 MG/DL (ref 74–99)
HCT VFR BLD AUTO: 44.6 % (ref 41–52)
HGB BLD-MCNC: 15.6 G/DL (ref 13.5–17.5)
HGB RETIC QN: 36 PG (ref 28–38)
HOWELL-JOLLY BOD BLD QL SMEAR: PRESENT
IMM GRANULOCYTES # BLD AUTO: 0.03 X10*3/UL (ref 0–0.7)
IMM GRANULOCYTES NFR BLD AUTO: 0.2 % (ref 0–0.9)
IMMATURE RETIC FRACTION: 6.6 %
LDH SERPL L TO P-CCNC: 236 U/L (ref 84–246)
LYMPHOCYTES # BLD AUTO: 5.52 X10*3/UL (ref 1.2–4.8)
LYMPHOCYTES NFR BLD AUTO: 45.8 %
MCH RBC QN AUTO: 32.1 PG (ref 26–34)
MCHC RBC AUTO-ENTMCNC: 35 G/DL (ref 32–36)
MCV RBC AUTO: 92 FL (ref 80–100)
MONOCYTES # BLD AUTO: 1.02 X10*3/UL (ref 0.1–1)
MONOCYTES NFR BLD AUTO: 8.5 %
NEUTROPHILS # BLD AUTO: 5.18 X10*3/UL (ref 1.2–7.7)
NEUTROPHILS NFR BLD AUTO: 43 %
NRBC BLD-RTO: 0 /100 WBCS (ref 0–0)
PLATELET # BLD AUTO: 260 X10*3/UL (ref 150–450)
POTASSIUM SERPL-SCNC: 4.1 MMOL/L (ref 3.5–5.3)
PROT SERPL-MCNC: 6.3 G/DL (ref 6.4–8.2)
RBC # BLD AUTO: 4.86 X10*6/UL (ref 4.5–5.9)
RBC MORPH BLD: NORMAL
RETICS #: 0.07 X10*6/UL (ref 0.02–0.12)
RETICS/RBC NFR AUTO: 1.5 % (ref 0.5–2)
SODIUM SERPL-SCNC: 139 MMOL/L (ref 136–145)
WBC # BLD AUTO: 12.1 X10*3/UL (ref 4.4–11.3)

## 2025-06-22 PROCEDURE — 2500000004 HC RX 250 GENERAL PHARMACY W/ HCPCS (ALT 636 FOR OP/ED): Mod: JZ | Performed by: EMERGENCY MEDICINE

## 2025-06-22 PROCEDURE — 85025 COMPLETE CBC W/AUTO DIFF WBC: CPT | Performed by: EMERGENCY MEDICINE

## 2025-06-22 PROCEDURE — 85045 AUTOMATED RETICULOCYTE COUNT: CPT | Performed by: EMERGENCY MEDICINE

## 2025-06-22 PROCEDURE — 84075 ASSAY ALKALINE PHOSPHATASE: CPT | Performed by: EMERGENCY MEDICINE

## 2025-06-22 PROCEDURE — 36415 COLL VENOUS BLD VENIPUNCTURE: CPT | Performed by: EMERGENCY MEDICINE

## 2025-06-22 PROCEDURE — 2500000001 HC RX 250 WO HCPCS SELF ADMINISTERED DRUGS (ALT 637 FOR MEDICARE OP): Performed by: EMERGENCY MEDICINE

## 2025-06-22 PROCEDURE — 83615 LACTATE (LD) (LDH) ENZYME: CPT | Performed by: EMERGENCY MEDICINE

## 2025-06-22 PROCEDURE — 99284 EMERGENCY DEPT VISIT MOD MDM: CPT | Mod: 25 | Performed by: EMERGENCY MEDICINE

## 2025-06-22 PROCEDURE — 96376 TX/PRO/DX INJ SAME DRUG ADON: CPT

## 2025-06-22 PROCEDURE — 96374 THER/PROPH/DIAG INJ IV PUSH: CPT

## 2025-06-22 RX ORDER — DIPHENHYDRAMINE HCL 25 MG
50 CAPSULE ORAL ONCE
Status: COMPLETED | OUTPATIENT
Start: 2025-06-22 | End: 2025-06-22

## 2025-06-22 RX ADMIN — HYDROMORPHONE HYDROCHLORIDE 2 MG: 2 INJECTION INTRAMUSCULAR; INTRAVENOUS; SUBCUTANEOUS at 21:15

## 2025-06-22 RX ADMIN — HYDROMORPHONE HYDROCHLORIDE 2 MG: 2 INJECTION INTRAMUSCULAR; INTRAVENOUS; SUBCUTANEOUS at 22:15

## 2025-06-22 RX ADMIN — HYDROMORPHONE HYDROCHLORIDE 2 MG: 2 INJECTION INTRAMUSCULAR; INTRAVENOUS; SUBCUTANEOUS at 23:29

## 2025-06-22 RX ADMIN — DIPHENHYDRAMINE HYDROCHLORIDE 50 MG: 25 CAPSULE ORAL at 23:29

## 2025-06-22 ASSESSMENT — PAIN DESCRIPTION - LOCATION: LOCATION: LEG

## 2025-06-22 ASSESSMENT — PAIN DESCRIPTION - PAIN TYPE: TYPE: ACUTE PAIN

## 2025-06-22 ASSESSMENT — PAIN - FUNCTIONAL ASSESSMENT: PAIN_FUNCTIONAL_ASSESSMENT: 0-10

## 2025-06-22 ASSESSMENT — PAIN SCALES - GENERAL
PAINLEVEL_OUTOF10: 6
PAINLEVEL_OUTOF10: 0 - NO PAIN
PAINLEVEL_OUTOF10: 8
PAINLEVEL_OUTOF10: 8

## 2025-06-22 ASSESSMENT — PAIN DESCRIPTION - PROGRESSION: CLINICAL_PROGRESSION: NOT CHANGED

## 2025-06-22 ASSESSMENT — PAIN DESCRIPTION - ORIENTATION: ORIENTATION: LEFT;RIGHT

## 2025-06-23 ENCOUNTER — APPOINTMENT (OUTPATIENT)
Dept: PAIN MEDICINE | Facility: CLINIC | Age: 45
End: 2025-06-23
Payer: COMMERCIAL

## 2025-06-23 ENCOUNTER — TELEPHONE (OUTPATIENT)
Dept: ADMISSION | Facility: HOSPITAL | Age: 45
End: 2025-06-23

## 2025-06-23 VITALS
BODY MASS INDEX: 23.36 KG/M2 | WEIGHT: 148.81 LBS | RESPIRATION RATE: 16 BRPM | HEIGHT: 67 IN | TEMPERATURE: 98.1 F | HEART RATE: 76 BPM | OXYGEN SATURATION: 92 % | DIASTOLIC BLOOD PRESSURE: 83 MMHG | SYSTOLIC BLOOD PRESSURE: 110 MMHG

## 2025-06-23 DIAGNOSIS — D57.1 SICKLE CELL DISEASE WITHOUT CRISIS (MULTI): ICD-10-CM

## 2025-06-23 DIAGNOSIS — D57.00 SICKLE-CELL DISEASE WITH PAIN (MULTI): ICD-10-CM

## 2025-06-23 PROCEDURE — RXMED WILLOW AMBULATORY MEDICATION CHARGE

## 2025-06-23 RX ORDER — HYDROMORPHONE HYDROCHLORIDE 2 MG/1
4 TABLET ORAL EVERY 4 HOURS PRN
Qty: 80 TABLET | Refills: 0 | Status: SHIPPED | OUTPATIENT
Start: 2025-06-23 | End: 2025-07-07

## 2025-06-23 ASSESSMENT — PAIN SCALES - GENERAL
PAINLEVEL_OUTOF10: 4
PAINLEVEL_OUTOF10: 4

## 2025-06-23 NOTE — ED PROVIDER NOTES
No chief complaint on file.    History of Present Illness   Xu Maya   MRN [id number]    44-year-old presents for evaluation of pain in both legs and hips started earlier today.  Patient was seen for sickle cell pain crisis in the emergency department on 6/20/2025 and symptoms had improved and he was discharged home.  He reports that symptoms are consistent with previous sickle cell pain crisis in terms of location of pain and quality.  No chest pain pressure or tightness, shortness of breath, fever, cough or sputum production.  No weakness or numbness of upper or lower extremities.  No nausea or vomiting or abdominal pain.  He has been able to ambulate and has not noticed focal joint swelling or warmth.      I reviewed most recent sickle cell virtual visit note from 5/21/2025.  Chronic medical conditions including sickle cell, stem cell transplant on 2/4/2021.    Physical Exam   T 36.7 °C (98.1 °F)  HR 80  /78  RR 18  O2 97 % None (Room air)    General:  No acute distress.  Head: Atraumatic.  Eyes: No conjunctival injection.   Ears Nose Throat: No epistaxis. Oral mucosa moist.  Neck: Supple.  Cardiovascular: Extremities warm.  Palpable dorsalis pedis pulses.  No lower leg edema.  Pulmonary: No stridor. Normal respiratory effort.  Abdominal: No distention.  Musculoskeletal: No deformity or joint swelling.  Normal hip internal/external rotation range of motion.  Normal knee and ankle flexion extension range of motion.  Skin: No rash.  Psychiatric: Does not appear to respond to internal stimuli.  Neurologic: Alert. Follows directions. Face symmetric. No aphasia or dysarthria. Symmetric movement of upper and lower extremities.    ED Course & Medical Decision Making   Patient reported to me that he has experienced symptoms consistent with typical sickle cell pain crisis.  No signs or symptoms to suggest skin soft tissue infection, septic arthritis, DVT, arterial insufficiency, compartment syndrome, acute  chest syndrome.  Ordered labs and Dilaudid 2 mg IV once per hour for 3 doses.  Suspect most likely uncomplicated sickle cell pain crisis.    Labs were reassuring and similar to recent baseline.  After administration of Dilaudid, bedside RN informed me that patient requested to return home.  I reassessed patient and he was well-appearing and stated that he did in fact feel improved and wished to return home.  I believe this is appropriate as presentation is most consistent with uncomplicated sickle cell pain crisis.  Advised to return if new or worsening symptoms.  Discharged in good condition.    Diagnoses as of 06/23/25 0040   Sickle cell pain crisis (Multi)            Rashaad Qiu MD  06/23/25 0040

## 2025-06-23 NOTE — PROGRESS NOTES
Chief Complain    No chief complaint on file.    History Of Present Illness  Xu Maya is a 44 y.o. male here for evaluation of ***, radiating to ***. The patient has been experiencing these symptoms for last ***{days/weeks/months:6122}. The patient describes the pain as {DESC; DULL;SHARP;BURNING;ACHY:44284}. The patient's current pain score is *** on a scale from 0-10. The pain is worsened by {AGGRAVATING FACTORS PMR:15953} and is alleviated by {harrelievin}. Since the start of the symptoms the pain has been {DESC; BETTER/WORSE:87758}.    The patient denies any fever, chills, weight loss, weakness, numbness, bladder/ bowel incontinence, history of cancer, history of IV drug abuse, recent trauma.      Past Medical History  He has a past medical history of Anxiety disorder, unspecified (2017), Depression, unspecified (2017), Diarrhea (10/07/2023), Family history of glaucoma (2017), Gastritis, unspecified, without bleeding (2017), Hematuria, unspecified (2017), Personal history of other diseases of the digestive system (2015), Priapism, unspecified (2017), and Sickle-cell disease without crisis (Multi) (2017).    Surgical History  He has a past surgical history that includes Gallbladder surgery (2017); IR CVC tunneled (2018); IR CVC tunneled (2018); IR CVC tunneled (3/1/2019); IR CVC tunneled (2019); IR CVC tunneled (2019); IR CVC tunneled (2019); IR CVC tunneled (2019); IR CVC tunneled (2019); IR CVC tunneled (10/9/2019); IR CVC tunneled (2019); IR CVC tunneled (1/15/2020); IR CVC tunneled (2020); US guided needle liver biopsy (2020); IR CVC tunneled (2021); IR CVC tunneled (2021); MR angio head wo IV contrast (2017); MR angio neck wo IV contrast (2017); IR venogram hepatic (2017); IR CVC tunneled (2018); IR CVC tunneled (2018); IR CVC tunneled (2018); and IR CVC  "tunneled (12/19/2018).    Social History  He reports that he has been smoking cigarettes. He started smoking about 27 years ago. He has been exposed to tobacco smoke. He has never used smokeless tobacco. He reports that he does not currently use alcohol. He reports that he does not currently use drugs after having used the following drugs: Marijuana.    Family History  Family History[1]     Allergies  Hydrocodone-acetaminophen and Naproxen    Review of Systems  Review of Systems     Physical Exam  Physical Exam      Last Recorded Vitals  There were no vitals taken for this visit.    Reviewed Images  Reviewed and independently interpreted {[place]:64285::\"MRI Lumbar spine ***\"}    Reviewed Labs  [unfilled]      Assessment/Plan   Encounter Diagnosis   Name Primary?    Sickle cell disease without crisis (Multi)         Xu Maya is a 44 y.o. male here for evaluation of ***    I spent *** minutes in the professional and overall care of this patient.       Michael Osorio MD       [1]   Family History  Problem Relation Name Age of Onset    Diabetes Father      Sickle cell anemia Other sibling     Cancer Other grandfather     Cancer Other uncle      "

## 2025-06-23 NOTE — ED TRIAGE NOTES
History Sickle Cell, ran out of rescue medication night prior and pain has reached 8/10. Denies taking any OTC medications this day. Reports pain in bilateral legs and hips, pain described as aching and throbbing.

## 2025-06-24 ENCOUNTER — PHARMACY VISIT (OUTPATIENT)
Dept: PHARMACY | Facility: CLINIC | Age: 45
End: 2025-06-24
Payer: MEDICAID

## 2025-07-03 ENCOUNTER — TELEMEDICINE (OUTPATIENT)
Dept: BEHAVIORAL HEALTH | Facility: HOSPITAL | Age: 45
End: 2025-07-03
Payer: COMMERCIAL

## 2025-07-03 DIAGNOSIS — F33.0 MILD EPISODE OF RECURRENT MAJOR DEPRESSIVE DISORDER: ICD-10-CM

## 2025-07-03 DIAGNOSIS — F41.9 ANXIETY: ICD-10-CM

## 2025-07-03 PROBLEM — F17.200 NICOTINE DEPENDENCE: Status: ACTIVE | Noted: 2025-07-03

## 2025-07-03 PROBLEM — M25.559 HIP PAIN: Status: ACTIVE | Noted: 2025-07-03

## 2025-07-03 PROCEDURE — 99214 OFFICE O/P EST MOD 30 MIN: CPT | Performed by: PSYCHIATRY & NEUROLOGY

## 2025-07-03 NOTE — PROGRESS NOTES
Outpatient Psychiatry FUV      Subjective   Xu Maya, a 44 y.o. male, for virtual FUV.     Virtual or Telephone Consent    An interactive audio and video telecommunication system which permits real time communications between the patient (at the originating site) and provider (at the distant site) was utilized to provide this telehealth service.   Verbal consent was requested and obtained from Xu Maya on this date, 07/03/25 for a telehealth visit.     At home for appt today      Assessment/Plan   Patient Discussion:  CONTINUE duloxetine 60mg 2x day  CONTINUE aripiprazole 5mg in the AM  CONTINUE mirtazapine 7.5mg at bedtime  HOLD prazosin 1mg at bedtime, can resume if further nightmares     RETURN to clinic 8/28 at 1PM for virtual FUV     call with questions or concerns. [phone]   Follow up with Dr. Chambers: [phone]     Assessment:   44 y.o.  M with SCC disease with depression, sickle cell disease now s/p BMT. Previously on suboxone for management of pain/high utilization but now off since transplant, still having pain now trialing scrambler      FUV 7/3/2025  pt reports mood ok despite stressors. Did meet with Dr. Chambers, interested in further follow up. Follow up 1-2 months sooner if needed    Diagnosis:   MDD, recurrent, mild  WILFRID  Chronic pain disorder    Treatment Plan/Recommendations:   1. Safety Assessment: no current SI, no h/o SI/SA. has guns at home but unloaded and locked with kids at home. PF include , no previous attempts, kids, Jehovah's witness. RF include depression, pain, father with completed suicide. Pt has moderate baseline risk based on static factors but is not an imminent risk and safety plan addressed     2. MDD, WILFRID  CONTINUE duloxetine 60mg PO BID, r/b/ae discussed including higher dose of SNRI, si/sx excess serotonin/SS  CONTINUE aripiprazole 5mg PO daily   CONTINUE mirtazapine 7.5mg PO at bedtime (doesn't use daily)  HOLD prazosin 1mg PO at bedtime, can resume if more  nightmares     continue supportive therapy during appt     3. opioid misuse in the context of chronic pain 2/2 sickle cell disease with acute exacerbations currently no concerns  continues to struggle with chronic pain, more sedation with methadone, had scrambler therapy not helpful  Following up with sickle cell and pain management, current plan is to wean opioids given remission status of sickle cell disease    Follow up with Dr. Chambers for therapy     nicotine use disorder --previous success with chantix but stopped and now smoking 3-4/day. monitor for now     4. Medical: SCC, back pain, GERD with h/o PUD: recent notes and labs reviewed. stable.   s/p BMT 1/2021  notes and labs reviewed,   coordinate care as needed with sickle cell team, BMT as needed  Ongoing pain, had scrambler therapy not helpful  Weaning opioids     5. Social: lives with kids and wife, moved to [place] after transplant, [place]. stepfather passed away from leukemia, sister passed from sickle cell. In marriage counseling     Reason for Visit:   FUV depression and anxiety    Subjective:  Last visit 4/17/25  Since then doing pretty good  Still in pre trial, working to get the charges dropped, lack of evidence, feeling more confident though still has to go through process    Notes things are good at home, busy with sons' sports    Discussed therapy for chronic pain, met with Dr. Chambers once, plans to follow up    Went to pain management, Dr. Osorio only does ketamine, 3 ED visits    No a/e to medication    Current Medications:    Current Outpatient Medications:     amitriptyline (Elavil) 25 mg tablet, Take 1 tablet (25 mg) by mouth., Disp: , Rfl:     ARIPiprazole (Abilify) 5 mg tablet, Take 1 tablet (5 mg) by mouth once daily., Disp: 90 tablet, Rfl: 3    calcium carbonate 500 mg calcium (1,250 mg) chewable tablet, Chew 1 tablet (500 mg of elemental calcium) once daily., Disp: 30 tablet, Rfl: 11    diphenhydrAMINE (BENADryl) 25 mg  capsule, Take 1 capsule (25 mg) by mouth every 6 hours if needed., Disp: , Rfl:     DULoxetine (Cymbalta) 60 mg DR capsule, Take 1 capsule (60 mg) by mouth 2 times a day., Disp: 180 capsule, Rfl: 3    ergocalciferol (Vitamin D2) 1250 mcg (50,000 units) capsule, Take 1 capsule (1.25 mg) by mouth 1 (one) time per week., Disp: 4 capsule, Rfl: 11    HYDROmorphone (Dilaudid) 2 mg tablet, Take 2 tablets (4 mg) by mouth as needed every 4 hours for more severe and persistent pain (7 - 10), Disp: 80 tablet, Rfl: 0    mirtazapine (Remeron) 7.5 mg tablet, Take 1 tablet (7.5 mg) by mouth once daily at bedtime., Disp: 90 tablet, Rfl: 1    naloxone (Narcan) 4 mg/0.1 mL nasal spray, Administer 1 spray (4 mg) into affected nostril(s) if needed for opioid reversal or respiratory depression. 1 spray to nostril for overdose, may repeat every 2-3 minutes until medical assistance arrives, Disp: 2 each, Rfl: 1    prazosin (Minipress) 1 mg capsule, Take 1 capsule (1 mg) by mouth once daily at bedtime., Disp: 30 capsule, Rfl: 2    Current Facility-Administered Medications:     heparin lock flush (porcine) 10 unit/mL injection 100 Units, 100 Units, intra-catheter, Once, CHRIS Garcia    heparin lock flush (porcine) injection 500 Units, 5 mL, intravenous, PRN, CHRIS Garcia  Medical History:  Past Medical History:   Diagnosis Date    Anxiety disorder, unspecified 04/17/2017    Anxiety    Depression, unspecified 04/17/2017    Depression    Diarrhea 10/07/2023    DIARRHEA      Family history of glaucoma 03/02/2017    Family history of glaucoma in father    Gastritis, unspecified, without bleeding 04/17/2017    Gastritis    Hematuria, unspecified 04/17/2017    Hematuria    Personal history of other diseases of the digestive system 12/02/2015    History of esophageal reflux    Priapism, unspecified 04/17/2017    Priapism    Sickle-cell disease without crisis (Multi) 04/17/2017    Sickle cell anemia       Surgical History:  Past  Surgical History:   Procedure Laterality Date    GALLBLADDER SURGERY  04/12/2017    Gallbladder Surgery    IR CVC TUNNELED  4/24/2018    IR CVC TUNNELED 4/24/2018 [facility]    IR CVC TUNNELED  6/5/2018    IR CVC TUNNELED 6/5/2018 [facility]    IR CVC TUNNELED  3/1/2019    IR CVC TUNNELED 3/1/2019 [facility]    IR CVC TUNNELED  6/4/2019    IR CVC TUNNELED 6/4/2019 [facility]    IR CVC TUNNELED  4/5/2019    IR CVC TUNNELED 4/5/2019 [facility]    IR CVC TUNNELED  7/17/2019    IR CVC TUNNELED 7/17/2019 [facility]    IR CVC TUNNELED  8/14/2019    IR CVC TUNNELED 8/14/2019 [facility]    IR CVC TUNNELED  12/18/2019    IR CVC TUNNELED 12/18/2019 [facility]    IR CVC TUNNELED  10/9/2019    IR CVC TUNNELED 10/9/2019 [facility]    IR CVC TUNNELED  11/13/2019    IR CVC TUNNELED 11/13/2019 [facility]    IR CVC TUNNELED  1/15/2020    IR CVC TUNNELED 1/15/2020 [facility]    IR CVC TUNNELED  2/19/2020    IR CVC TUNNELED 2/19/2020 [facility]    IR CVC TUNNELED  1/4/2021    IR CVC TUNNELED 1/4/2021 [facility]    IR CVC TUNNELED  1/25/2021    IR CVC TUNNELED 1/25/2021 [facility]    IR CVC TUNNELED  8/21/2018    IR CVC TUNNELED 8/21/2018 [facility]    IR CVC TUNNELED  9/26/2018    IR CVC TUNNELED 9/26/2018 [facility]    IR CVC TUNNELED  11/5/2018    IR CVC TUNNELED 11/5/2018 [facility]    IR CVC TUNNELED  12/19/2018    IR CVC TUNNELED 12/19/2018 [facility]    IR VENOGRAM HEPATIC  11/8/2017    IR VENOGRAM HEPATIC 11/8/2017 [facility]    MR HEAD ANGIO WO IV CONTRAST  2/17/2017    MR HEAD ANGIO WO IV CONTRAST 2/17/2017 [facility]    MR NECK ANGIO WO IV CONTRAST  2/17/2017    MR NECK ANGIO WO IV CONTRAST 2/17/2017 [facility]    US GUIDED NEEDLE LIVER BIOPSY  9/8/2020    US GUIDED NEEDLE LIVER BIOPSY 9/8/2020 [facility]       Family History:  Family History   Problem Relation Name Age of Onset    Diabetes  "Father      Sickle cell anemia Other sibling     Cancer Other grandfather     Cancer Other uncle        Social History:  Social History     Socioeconomic History    Marital status:      Spouse name: Not on file    Number of children: Not on file    Years of education: Not on file    Highest education level: Not on file   Occupational History    Not on file   Tobacco Use    Smoking status: Every Day     Types: Cigarettes     Start date: 1/1/1998     Passive exposure: Current    Smokeless tobacco: Never   Substance and Sexual Activity    Alcohol use: Not Currently     Comment: Occasional    Drug use: Not Currently     Types: Marijuana     Comment: Last use sometime in 2023.  No intention to re-start.    Sexual activity: Not on file   Other Topics Concern    Not on file   Social History Narrative    Not on file     Social Drivers of Health     Financial Resource Strain: Not on file   Food Insecurity: Not on file   Transportation Needs: Not on file   Physical Activity: Not on file   Stress: Not on file   Social Connections: Not on file   Intimate Partner Violence: Not on file   Housing Stability: Not on file              Medical Review Of Systems:  +pain, tapering opioids    Psychiatric Review Of Systems:  Depression ok  Sleep/dreams ok       Objective   Mental Status Exam:  Appearance: appropriate g/h.   Attitude: cooperative and engaged.   Behavior: good eye contact via video  Motor Activity: no tremor, spontaneous movement  Speech: regular in rate, volume, low tone, no dysarthria, no aphasia.   Mood: \"ok\"  Affect: congruent, appropriate range.   Thought Process: linear, goal directed.   Thought Content: no delusions, no SI/HI.   Thought Perception: no AVH.   Cognition: alert, oriented to person, place, time. attn intact.   Insight: fair.   Judgment: fair/intact.     Vitals:  There were no vitals filed for this visit.  Encounter Date: 04/19/25   ECG 12 lead   Result Value    Ventricular Rate 80    Atrial Rate " 80    VT Interval 158    QRS Duration 100    QT Interval 396    QTC Calculation(Bazett) 456    P Axis 79    R Axis -15    T Axis 56    QRS Count 14    Q Onset 211    P Onset 132    P Offset 188    T Offset 409    QTC Fredericia 436    Narrative    Normal sinus rhythm  Normal ECG  When compared with ECG of 08-MAR-2025 20:17,  QRS axis Shifted left  T wave inversion no longer evident in Inferior leads    See ED provider note for full interpretation and clinical correlation  Confirmed by Meli Rosenthal ([id number]) on 4/19/2025 10:26:46 PM     Lab Results   Component Value Date    WBC 12.1 (H) 06/22/2025    HGB 15.6 06/22/2025    HCT 44.6 06/22/2025    MCV 92 06/22/2025     06/22/2025     Lab Results   Component Value Date    GLUCOSE 83 06/22/2025    CALCIUM 8.7 06/22/2025     06/22/2025    K 4.1 06/22/2025    CO2 25 06/22/2025     (H) 06/22/2025    BUN 6 06/22/2025    CREATININE 0.76 06/22/2025     Psychotherapy       Time: 15 minutes  Type: supportive, insight oriented  Target: mood, anxiety  Strategies: problem solving, insight oriented  Goal: decreased sx burden  Follow up: next visit 1-2 months  Response: mayuri Contreras MD

## 2025-07-07 ENCOUNTER — TELEPHONE (OUTPATIENT)
Dept: HEMATOLOGY/ONCOLOGY | Facility: HOSPITAL | Age: 45
End: 2025-07-07
Payer: COMMERCIAL

## 2025-07-07 ENCOUNTER — PHARMACY VISIT (OUTPATIENT)
Dept: PHARMACY | Facility: CLINIC | Age: 45
End: 2025-07-07
Payer: MEDICAID

## 2025-07-07 DIAGNOSIS — D57.00 SICKLE-CELL DISEASE WITH PAIN (MULTI): ICD-10-CM

## 2025-07-07 PROCEDURE — RXMED WILLOW AMBULATORY MEDICATION CHARGE

## 2025-07-07 RX ORDER — HYDROMORPHONE HYDROCHLORIDE 4 MG/1
4 TABLET ORAL EVERY 4 HOURS PRN
Qty: 38 TABLET | Refills: 0 | Status: SHIPPED | OUTPATIENT
Start: 2025-07-07 | End: 2025-07-21

## 2025-07-07 NOTE — TELEPHONE ENCOUNTER
Refill request received for Hydromorphone 2mg.  Preferred pharmacy is [facility].  Message sent to Sickle Cell team.

## 2025-07-16 ENCOUNTER — APPOINTMENT (OUTPATIENT)
Dept: CARDIOLOGY | Facility: HOSPITAL | Age: 45
End: 2025-07-16
Payer: COMMERCIAL

## 2025-07-16 ENCOUNTER — HOSPITAL ENCOUNTER (EMERGENCY)
Facility: HOSPITAL | Age: 45
Discharge: HOME | End: 2025-07-17
Attending: EMERGENCY MEDICINE
Payer: COMMERCIAL

## 2025-07-16 VITALS
OXYGEN SATURATION: 97 % | WEIGHT: 144 LBS | SYSTOLIC BLOOD PRESSURE: 113 MMHG | DIASTOLIC BLOOD PRESSURE: 69 MMHG | RESPIRATION RATE: 18 BRPM | BODY MASS INDEX: 22.6 KG/M2 | HEIGHT: 67 IN | TEMPERATURE: 98.6 F | HEART RATE: 77 BPM

## 2025-07-16 DIAGNOSIS — D57.00 SICKLE CELL PAIN CRISIS (MULTI): Primary | ICD-10-CM

## 2025-07-16 LAB
ALBUMIN SERPL BCP-MCNC: 4 G/DL (ref 3.4–5)
ALP SERPL-CCNC: 100 U/L (ref 33–120)
ALT SERPL W P-5'-P-CCNC: 8 U/L (ref 10–52)
ANION GAP SERPL CALC-SCNC: 11 MMOL/L (ref 10–20)
AST SERPL W P-5'-P-CCNC: 13 U/L (ref 9–39)
BASOPHILS # BLD AUTO: 0.05 X10*3/UL (ref 0–0.1)
BASOPHILS NFR BLD AUTO: 0.5 %
BILIRUB SERPL-MCNC: 0.6 MG/DL (ref 0–1.2)
BUN SERPL-MCNC: 5 MG/DL (ref 6–23)
CALCIUM SERPL-MCNC: 9.1 MG/DL (ref 8.6–10.3)
CHLORIDE SERPL-SCNC: 108 MMOL/L (ref 98–107)
CO2 SERPL-SCNC: 25 MMOL/L (ref 21–32)
CREAT SERPL-MCNC: 0.86 MG/DL (ref 0.5–1.3)
EGFRCR SERPLBLD CKD-EPI 2021: >90 ML/MIN/1.73M*2
EOSINOPHIL # BLD AUTO: 0.18 X10*3/UL (ref 0–0.7)
EOSINOPHIL NFR BLD AUTO: 1.7 %
ERYTHROCYTE [DISTWIDTH] IN BLOOD BY AUTOMATED COUNT: 13.4 % (ref 11.5–14.5)
GLUCOSE SERPL-MCNC: 123 MG/DL (ref 74–99)
HCT VFR BLD AUTO: 46.7 % (ref 41–52)
HGB BLD-MCNC: 16.3 G/DL (ref 13.5–17.5)
HGB RETIC QN: 36 PG (ref 28–38)
IMM GRANULOCYTES # BLD AUTO: 0.03 X10*3/UL (ref 0–0.7)
IMM GRANULOCYTES NFR BLD AUTO: 0.3 % (ref 0–0.9)
IMMATURE RETIC FRACTION: 6.7 %
LDH SERPL L TO P-CCNC: 180 U/L (ref 84–246)
LYMPHOCYTES # BLD AUTO: 4.55 X10*3/UL (ref 1.2–4.8)
LYMPHOCYTES NFR BLD AUTO: 44 %
MCH RBC QN AUTO: 31.7 PG (ref 26–34)
MCHC RBC AUTO-ENTMCNC: 34.9 G/DL (ref 32–36)
MCV RBC AUTO: 91 FL (ref 80–100)
MONOCYTES # BLD AUTO: 0.86 X10*3/UL (ref 0.1–1)
MONOCYTES NFR BLD AUTO: 8.3 %
NEUTROPHILS # BLD AUTO: 4.67 X10*3/UL (ref 1.2–7.7)
NEUTROPHILS NFR BLD AUTO: 45.2 %
NRBC BLD-RTO: 0 /100 WBCS (ref 0–0)
PLATELET # BLD AUTO: 245 X10*3/UL (ref 150–450)
POTASSIUM SERPL-SCNC: 3.6 MMOL/L (ref 3.5–5.3)
PROT SERPL-MCNC: 6.9 G/DL (ref 6.4–8.2)
RBC # BLD AUTO: 5.15 X10*6/UL (ref 4.5–5.9)
RETICS #: 0.07 X10*6/UL (ref 0.02–0.12)
RETICS/RBC NFR AUTO: 1.3 % (ref 0.5–2)
SODIUM SERPL-SCNC: 140 MMOL/L (ref 136–145)
WBC # BLD AUTO: 10.3 X10*3/UL (ref 4.4–11.3)

## 2025-07-16 PROCEDURE — 36415 COLL VENOUS BLD VENIPUNCTURE: CPT | Performed by: EMERGENCY MEDICINE

## 2025-07-16 PROCEDURE — 96374 THER/PROPH/DIAG INJ IV PUSH: CPT

## 2025-07-16 PROCEDURE — 85025 COMPLETE CBC W/AUTO DIFF WBC: CPT | Performed by: EMERGENCY MEDICINE

## 2025-07-16 PROCEDURE — 83615 LACTATE (LD) (LDH) ENZYME: CPT | Performed by: EMERGENCY MEDICINE

## 2025-07-16 PROCEDURE — 85045 AUTOMATED RETICULOCYTE COUNT: CPT | Performed by: EMERGENCY MEDICINE

## 2025-07-16 PROCEDURE — 96376 TX/PRO/DX INJ SAME DRUG ADON: CPT

## 2025-07-16 PROCEDURE — 80053 COMPREHEN METABOLIC PANEL: CPT | Performed by: EMERGENCY MEDICINE

## 2025-07-16 PROCEDURE — 99284 EMERGENCY DEPT VISIT MOD MDM: CPT | Mod: 25 | Performed by: EMERGENCY MEDICINE

## 2025-07-16 PROCEDURE — 2500000004 HC RX 250 GENERAL PHARMACY W/ HCPCS (ALT 636 FOR OP/ED): Mod: JZ | Performed by: EMERGENCY MEDICINE

## 2025-07-16 PROCEDURE — 93005 ELECTROCARDIOGRAM TRACING: CPT

## 2025-07-16 RX ORDER — HYDROMORPHONE HYDROCHLORIDE 1 MG/ML
2 INJECTION, SOLUTION INTRAMUSCULAR; INTRAVENOUS; SUBCUTANEOUS
Status: COMPLETED | OUTPATIENT
Start: 2025-07-16 | End: 2025-07-16

## 2025-07-16 RX ADMIN — HYDROMORPHONE HYDROCHLORIDE 2 MG: 1 INJECTION, SOLUTION INTRAMUSCULAR; INTRAVENOUS; SUBCUTANEOUS at 21:35

## 2025-07-16 RX ADMIN — HYDROMORPHONE HYDROCHLORIDE 2 MG: 1 INJECTION, SOLUTION INTRAMUSCULAR; INTRAVENOUS; SUBCUTANEOUS at 22:45

## 2025-07-16 RX ADMIN — HYDROMORPHONE HYDROCHLORIDE 2 MG: 1 INJECTION, SOLUTION INTRAMUSCULAR; INTRAVENOUS; SUBCUTANEOUS at 23:30

## 2025-07-16 ASSESSMENT — PAIN - FUNCTIONAL ASSESSMENT
PAIN_FUNCTIONAL_ASSESSMENT: 0-10
PAIN_FUNCTIONAL_ASSESSMENT: 0-10

## 2025-07-16 ASSESSMENT — PAIN DESCRIPTION - ORIENTATION: ORIENTATION: LEFT;RIGHT

## 2025-07-16 ASSESSMENT — PAIN SCALES - GENERAL
PAINLEVEL_OUTOF10: 8
PAINLEVEL_OUTOF10: 7
PAINLEVEL_OUTOF10: 0 - NO PAIN

## 2025-07-16 ASSESSMENT — PAIN DESCRIPTION - LOCATION: LOCATION: LEG

## 2025-07-17 VITALS
HEIGHT: 67 IN | WEIGHT: 144 LBS | SYSTOLIC BLOOD PRESSURE: 114 MMHG | HEART RATE: 85 BPM | RESPIRATION RATE: 18 BRPM | OXYGEN SATURATION: 97 % | DIASTOLIC BLOOD PRESSURE: 92 MMHG | BODY MASS INDEX: 22.6 KG/M2 | TEMPERATURE: 98.7 F

## 2025-07-17 LAB
ATRIAL RATE: 90 BPM
P AXIS: 76 DEGREES
P OFFSET: 187 MS
P ONSET: 130 MS
PR INTERVAL: 158 MS
Q ONSET: 209 MS
QRS COUNT: 15 BEATS
QRS DURATION: 100 MS
QT INTERVAL: 388 MS
QTC CALCULATION(BAZETT): 474 MS
QTC FREDERICIA: 444 MS
R AXIS: -75 DEGREES
T AXIS: 25 DEGREES
T OFFSET: 403 MS
VENTRICULAR RATE: 90 BPM

## 2025-07-17 PROCEDURE — 99284 EMERGENCY DEPT VISIT MOD MDM: CPT | Performed by: EMERGENCY MEDICINE

## 2025-07-17 ASSESSMENT — PAIN SCALES - GENERAL: PAINLEVEL_OUTOF10: 10 - WORST POSSIBLE PAIN

## 2025-07-17 ASSESSMENT — PAIN DESCRIPTION - PAIN TYPE: TYPE: CHRONIC PAIN

## 2025-07-17 ASSESSMENT — PAIN - FUNCTIONAL ASSESSMENT: PAIN_FUNCTIONAL_ASSESSMENT: 0-10

## 2025-07-18 ENCOUNTER — HOSPITAL ENCOUNTER (EMERGENCY)
Facility: HOSPITAL | Age: 45
Discharge: HOME | End: 2025-07-18
Attending: EMERGENCY MEDICINE
Payer: COMMERCIAL

## 2025-07-18 ENCOUNTER — NURSE TRIAGE (OUTPATIENT)
Dept: HEMATOLOGY/ONCOLOGY | Facility: HOSPITAL | Age: 45
End: 2025-07-18
Payer: COMMERCIAL

## 2025-07-18 ENCOUNTER — TELEPHONE (OUTPATIENT)
Dept: HEMATOLOGY/ONCOLOGY | Facility: HOSPITAL | Age: 45
End: 2025-07-18

## 2025-07-18 ENCOUNTER — TELEPHONE (OUTPATIENT)
Dept: ADMISSION | Facility: HOSPITAL | Age: 45
End: 2025-07-18
Payer: COMMERCIAL

## 2025-07-18 DIAGNOSIS — D57.00 SICKLE-CELL DISEASE WITH PAIN (MULTI): ICD-10-CM

## 2025-07-18 DIAGNOSIS — D57.00 SICKLE CELL PAIN CRISIS (MULTI): Primary | ICD-10-CM

## 2025-07-18 PROCEDURE — RXMED WILLOW AMBULATORY MEDICATION CHARGE

## 2025-07-18 PROCEDURE — 2500000004 HC RX 250 GENERAL PHARMACY W/ HCPCS (ALT 636 FOR OP/ED): Mod: JZ | Performed by: EMERGENCY MEDICINE

## 2025-07-18 PROCEDURE — 96376 TX/PRO/DX INJ SAME DRUG ADON: CPT

## 2025-07-18 PROCEDURE — 96374 THER/PROPH/DIAG INJ IV PUSH: CPT

## 2025-07-18 RX ORDER — HYDROMORPHONE HYDROCHLORIDE 4 MG/1
4 TABLET ORAL EVERY 4 HOURS PRN
Qty: 38 TABLET | Refills: 0 | Status: SHIPPED | OUTPATIENT
Start: 2025-07-18

## 2025-07-18 RX ADMIN — HYDROMORPHONE HYDROCHLORIDE 2 MG: 2 INJECTION INTRAMUSCULAR; INTRAVENOUS; SUBCUTANEOUS at 04:02

## 2025-07-18 RX ADMIN — HYDROMORPHONE HYDROCHLORIDE 2 MG: 2 INJECTION INTRAMUSCULAR; INTRAVENOUS; SUBCUTANEOUS at 02:35

## 2025-07-18 RX ADMIN — HYDROMORPHONE HYDROCHLORIDE 2 MG: 2 INJECTION INTRAMUSCULAR; INTRAVENOUS; SUBCUTANEOUS at 03:13

## 2025-07-18 ASSESSMENT — PAIN - FUNCTIONAL ASSESSMENT: PAIN_FUNCTIONAL_ASSESSMENT: 0-10

## 2025-07-18 ASSESSMENT — PAIN SCALES - GENERAL: PAINLEVEL_OUTOF10: 8

## 2025-07-18 NOTE — TELEPHONE ENCOUNTER
Jason calls and he is having pain in back and legs.  This is his typical sickle cell pain.  6/10.  He was in the ED this am.  He had pain meds yesterday evening.    He does not have pain meds at home.    He describes it as sharp and shooting and achy.    He started with the pain about the past two weeks and it has been getting progessivley worse.    He states nothing he is doing at home helps.    It is constant now.      No headache, no chest pain, no change in breathing, no N/V/D, no urine issues, no muscle weakness,    The ED instructed him to call his Dr.   He last saw Dr. Ricks may 22nd and there is not a follow up appt.  He thought he was going to get a call to schedule.      Pharmacy:  [facility]    He had a 7 day supply of dilaudid on 7/7 from Dr. Ricks.  He did manage with that but now he is out.        Please advise    Message sent

## 2025-07-18 NOTE — ED TRIAGE NOTES
Pt c/o sickle cell crisis with no relief with pain meds at home. Pt last took 4mg dilaudid at 1730 today. States pain is all over

## 2025-07-18 NOTE — ED PROVIDER NOTES
HPI   Chief Complaint   Patient presents with    Sickle Cell Pain Crisis       HPI  The patient again presents with typical sickle cell pain in his back and his legs.  Denies any new fever, cough, chest pain, shortness of breath,, diarrhea or constipation.  He states he also has some pain in his shoulders.      Patient History   Medical History[1]  Surgical History[2]  Family History[3]  Social History[4]    Physical Exam   ED Triage Vitals [07/17/25 2308]   Temperature Heart Rate Respirations BP   37.1 °C (98.7 °F) 85 18 (!) 114/92      Pulse Ox Temp Source Heart Rate Source Patient Position   97 % Temporal Monitor --      BP Location FiO2 (%)     -- --       Physical Exam  Vitals and nursing note reviewed.   Constitutional:       General: He is not in acute distress.     Appearance: He is well-developed.   HENT:      Head: Normocephalic and atraumatic.      Nose: No rhinorrhea.      Mouth/Throat:      Mouth: Mucous membranes are moist.     Eyes:      Conjunctiva/sclera: Conjunctivae normal.       Cardiovascular:      Rate and Rhythm: Normal rate and regular rhythm.      Heart sounds: Normal heart sounds.   Pulmonary:      Effort: Pulmonary effort is normal. No respiratory distress.      Breath sounds: Normal breath sounds.   Abdominal:      Palpations: Abdomen is soft.      Tenderness: There is no abdominal tenderness.     Musculoskeletal:         General: No swelling.      Cervical back: Neck supple.     Skin:     General: Skin is warm.      Capillary Refill: Capillary refill takes less than 2 seconds.     Neurological:      General: No focal deficit present.      Mental Status: He is alert.      Cranial Nerves: No cranial nerve deficit.     Psychiatric:         Mood and Affect: Mood normal.           ED Course & MDM   Diagnoses as of 07/18/25 0527   Sickle cell pain crisis (Multi)                 No data recorded     Roseann Coma Scale Score: 15 (07/17/25 2311 : Russell Sheehan RN)                            Medical Decision Making  The patient does not appear to be in any distress.  The patient received 3 doses of IV pain with patient and felt improved and would like be discharged home.  He states he is running low on his p.o. medications at this time and ran out today.  He will call his hematologist tomorrow.    Procedure  Procedures       [1]   Past Medical History:  Diagnosis Date    Anxiety disorder, unspecified 04/17/2017    Anxiety    Depression, unspecified 04/17/2017    Depression    Diarrhea 10/07/2023    DIARRHEA      Family history of glaucoma 03/02/2017    Family history of glaucoma in father    Gastritis, unspecified, without bleeding 04/17/2017    Gastritis    Hematuria, unspecified 04/17/2017    Hematuria    Personal history of other diseases of the digestive system 12/02/2015    History of esophageal reflux    Priapism, unspecified 04/17/2017    Priapism    Sickle-cell disease without crisis (Multi) 04/17/2017    Sickle cell anemia   [2]   Past Surgical History:  Procedure Laterality Date    GALLBLADDER SURGERY  04/12/2017    Gallbladder Surgery    IR CVC TUNNELED  4/24/2018    IR CVC TUNNELED 4/24/2018 [facility]    IR CVC TUNNELED  6/5/2018    IR CVC TUNNELED 6/5/2018 [facility]    IR CVC TUNNELED  3/1/2019    IR CVC TUNNELED 3/1/2019 [facility]    IR CVC TUNNELED  6/4/2019    IR CVC TUNNELED 6/4/2019 [facility]    IR CVC TUNNELED  4/5/2019    IR CVC TUNNELED 4/5/2019 [facility]    IR CVC TUNNELED  7/17/2019    IR CVC TUNNELED 7/17/2019 [facility]    IR CVC TUNNELED  8/14/2019    IR CVC TUNNELED 8/14/2019 [facility]    IR CVC TUNNELED  12/18/2019    IR CVC TUNNELED 12/18/2019 [facility]    IR CVC TUNNELED  10/9/2019    IR CVC TUNNELED 10/9/2019 [facility]    IR CVC TUNNELED  11/13/2019    IR CVC TUNNELED 11/13/2019 [facility]    IR CVC TUNNELED  1/15/2020    IR CVC TUNNELED 1/15/2020 [facility]    IR CVC TUNNELED   2/19/2020    IR CVC TUNNELED 2/19/2020 [facility]    IR CVC TUNNELED  1/4/2021    IR CVC TUNNELED 1/4/2021 [facility]    IR CVC TUNNELED  1/25/2021    IR CVC TUNNELED 1/25/2021 [facility]    IR CVC TUNNELED  8/21/2018    IR CVC TUNNELED 8/21/2018 [facility]    IR CVC TUNNELED  9/26/2018    IR CVC TUNNELED 9/26/2018 [facility]    IR CVC TUNNELED  11/5/2018    IR CVC TUNNELED 11/5/2018 [facility]    IR CVC TUNNELED  12/19/2018    IR CVC TUNNELED 12/19/2018 [facility]    IR VENOGRAM HEPATIC  11/8/2017    IR VENOGRAM HEPATIC 11/8/2017 [facility]    MR HEAD ANGIO WO IV CONTRAST  2/17/2017    MR HEAD ANGIO WO IV CONTRAST 2/17/2017 [facility]    MR NECK ANGIO WO IV CONTRAST  2/17/2017    MR NECK ANGIO WO IV CONTRAST 2/17/2017 [facility]    US GUIDED NEEDLE LIVER BIOPSY  9/8/2020    US GUIDED NEEDLE LIVER BIOPSY 9/8/2020 [facility]   [3]   Family History  Problem Relation Name Age of Onset    Diabetes Father      Sickle cell anemia Other sibling     Cancer Other grandfather     Cancer Other uncle    [4]   Social History  Tobacco Use    Smoking status: Every Day     Types: Cigarettes     Start date: 1/1/1998     Passive exposure: Current    Smokeless tobacco: Never   Substance Use Topics    Alcohol use: Not Currently     Comment: Occasional    Drug use: Not Currently     Types: Marijuana     Comment: Last use sometime in 2023.  No intention to re-start.        Omega Arevalo MD  07/18/25 0528

## 2025-07-19 ENCOUNTER — PHARMACY VISIT (OUTPATIENT)
Dept: PHARMACY | Facility: CLINIC | Age: 45
End: 2025-07-19
Payer: COMMERCIAL

## 2025-07-27 ENCOUNTER — CLINICAL SUPPORT (OUTPATIENT)
Dept: EMERGENCY MEDICINE | Facility: HOSPITAL | Age: 45
End: 2025-07-27
Payer: COMMERCIAL

## 2025-07-27 ENCOUNTER — HOSPITAL ENCOUNTER (OUTPATIENT)
Dept: CARDIOLOGY | Facility: HOSPITAL | Age: 45
Discharge: HOME | End: 2025-07-27
Payer: COMMERCIAL

## 2025-07-27 PROCEDURE — 93010 ELECTROCARDIOGRAM REPORT: CPT | Performed by: EMERGENCY MEDICINE

## 2025-07-27 PROCEDURE — 99284 EMERGENCY DEPT VISIT MOD MDM: CPT | Performed by: EMERGENCY MEDICINE

## 2025-07-27 PROCEDURE — 99285 EMERGENCY DEPT VISIT HI MDM: CPT | Performed by: EMERGENCY MEDICINE

## 2025-07-27 PROCEDURE — 93005 ELECTROCARDIOGRAM TRACING: CPT

## 2025-07-28 ENCOUNTER — HOSPITAL ENCOUNTER (EMERGENCY)
Facility: HOSPITAL | Age: 45
Discharge: HOME | End: 2025-07-28
Attending: EMERGENCY MEDICINE
Payer: COMMERCIAL

## 2025-07-28 ENCOUNTER — CLINICAL SUPPORT (OUTPATIENT)
Dept: EMERGENCY MEDICINE | Facility: HOSPITAL | Age: 45
End: 2025-07-28
Payer: COMMERCIAL

## 2025-07-28 VITALS
RESPIRATION RATE: 18 BRPM | DIASTOLIC BLOOD PRESSURE: 81 MMHG | HEIGHT: 67 IN | WEIGHT: 144 LBS | HEART RATE: 73 BPM | SYSTOLIC BLOOD PRESSURE: 132 MMHG | BODY MASS INDEX: 22.6 KG/M2 | TEMPERATURE: 97.5 F | OXYGEN SATURATION: 95 %

## 2025-07-28 DIAGNOSIS — D57.00 SICKLE CELL PAIN CRISIS (MULTI): Primary | ICD-10-CM

## 2025-07-28 LAB
ALBUMIN SERPL BCP-MCNC: 4.2 G/DL (ref 3.4–5)
ALP SERPL-CCNC: 101 U/L (ref 33–120)
ALT SERPL W P-5'-P-CCNC: 14 U/L (ref 10–52)
ANION GAP SERPL CALC-SCNC: 12 MMOL/L (ref 10–20)
AST SERPL W P-5'-P-CCNC: 19 U/L (ref 9–39)
BASOPHILS # BLD AUTO: 0.04 X10*3/UL (ref 0–0.1)
BASOPHILS NFR BLD AUTO: 0.3 %
BILIRUB SERPL-MCNC: 0.5 MG/DL (ref 0–1.2)
BUN SERPL-MCNC: 9 MG/DL (ref 6–23)
CALCIUM SERPL-MCNC: 9.5 MG/DL (ref 8.6–10.6)
CHLORIDE SERPL-SCNC: 108 MMOL/L (ref 98–107)
CO2 SERPL-SCNC: 25 MMOL/L (ref 21–32)
CREAT SERPL-MCNC: 0.89 MG/DL (ref 0.5–1.3)
EGFRCR SERPLBLD CKD-EPI 2021: >90 ML/MIN/1.73M*2
EOSINOPHIL # BLD AUTO: 0.22 X10*3/UL (ref 0–0.7)
EOSINOPHIL NFR BLD AUTO: 1.7 %
ERYTHROCYTE [DISTWIDTH] IN BLOOD BY AUTOMATED COUNT: 12.4 % (ref 11.5–14.5)
GLUCOSE SERPL-MCNC: 100 MG/DL (ref 74–99)
HCT VFR BLD AUTO: 43.3 % (ref 41–52)
HGB BLD-MCNC: 15.5 G/DL (ref 13.5–17.5)
HGB RETIC QN: 36 PG (ref 28–38)
IMM GRANULOCYTES # BLD AUTO: 0.04 X10*3/UL (ref 0–0.7)
IMM GRANULOCYTES NFR BLD AUTO: 0.3 % (ref 0–0.9)
IMMATURE RETIC FRACTION: 6.6 %
LDH SERPL L TO P-CCNC: 211 U/L (ref 84–246)
LYMPHOCYTES # BLD AUTO: 5.43 X10*3/UL (ref 1.2–4.8)
LYMPHOCYTES NFR BLD AUTO: 40.9 %
MCH RBC QN AUTO: 31.5 PG (ref 26–34)
MCHC RBC AUTO-ENTMCNC: 35.8 G/DL (ref 32–36)
MCV RBC AUTO: 88 FL (ref 80–100)
MONOCYTES # BLD AUTO: 1.01 X10*3/UL (ref 0.1–1)
MONOCYTES NFR BLD AUTO: 7.6 %
NEUTROPHILS # BLD AUTO: 6.53 X10*3/UL (ref 1.2–7.7)
NEUTROPHILS NFR BLD AUTO: 49.2 %
NRBC BLD-RTO: 0 /100 WBCS (ref 0–0)
PLATELET # BLD AUTO: 229 X10*3/UL (ref 150–450)
POTASSIUM SERPL-SCNC: 3.8 MMOL/L (ref 3.5–5.3)
PROT SERPL-MCNC: 7 G/DL (ref 6.4–8.2)
RBC # BLD AUTO: 4.92 X10*6/UL (ref 4.5–5.9)
RETICS #: 0.07 X10*6/UL (ref 0.02–0.12)
RETICS/RBC NFR AUTO: 1.3 % (ref 0.5–2)
SODIUM SERPL-SCNC: 141 MMOL/L (ref 136–145)
WBC # BLD AUTO: 13.3 X10*3/UL (ref 4.4–11.3)

## 2025-07-28 PROCEDURE — 80053 COMPREHEN METABOLIC PANEL: CPT

## 2025-07-28 PROCEDURE — 96374 THER/PROPH/DIAG INJ IV PUSH: CPT

## 2025-07-28 PROCEDURE — 36415 COLL VENOUS BLD VENIPUNCTURE: CPT

## 2025-07-28 PROCEDURE — 85045 AUTOMATED RETICULOCYTE COUNT: CPT

## 2025-07-28 PROCEDURE — 96376 TX/PRO/DX INJ SAME DRUG ADON: CPT

## 2025-07-28 PROCEDURE — 2500000004 HC RX 250 GENERAL PHARMACY W/ HCPCS (ALT 636 FOR OP/ED): Mod: JZ,SE

## 2025-07-28 PROCEDURE — 93005 ELECTROCARDIOGRAM TRACING: CPT

## 2025-07-28 PROCEDURE — 83615 LACTATE (LD) (LDH) ENZYME: CPT

## 2025-07-28 PROCEDURE — 85025 COMPLETE CBC W/AUTO DIFF WBC: CPT

## 2025-07-28 RX ADMIN — HYDROMORPHONE HYDROCHLORIDE 2 MG: 2 INJECTION, SOLUTION INTRAMUSCULAR; INTRAVENOUS; SUBCUTANEOUS at 03:28

## 2025-07-28 RX ADMIN — HYDROMORPHONE HYDROCHLORIDE 2 MG: 2 INJECTION, SOLUTION INTRAMUSCULAR; INTRAVENOUS; SUBCUTANEOUS at 02:06

## 2025-07-28 RX ADMIN — HYDROMORPHONE HYDROCHLORIDE 2 MG: 2 INJECTION, SOLUTION INTRAMUSCULAR; INTRAVENOUS; SUBCUTANEOUS at 04:27

## 2025-07-28 ASSESSMENT — PAIN SCALES - GENERAL
PAINLEVEL_OUTOF10: 5 - MODERATE PAIN
PAINLEVEL_OUTOF10: 5 - MODERATE PAIN
PAINLEVEL_OUTOF10: 8
PAINLEVEL_OUTOF10: 8

## 2025-07-28 ASSESSMENT — PAIN DESCRIPTION - PAIN TYPE
TYPE: ACUTE PAIN

## 2025-07-28 ASSESSMENT — PAIN DESCRIPTION - ORIENTATION
ORIENTATION: RIGHT;LEFT

## 2025-07-28 ASSESSMENT — PAIN - FUNCTIONAL ASSESSMENT
PAIN_FUNCTIONAL_ASSESSMENT: 0-10

## 2025-07-28 ASSESSMENT — PAIN DESCRIPTION - LOCATION
LOCATION: LEG

## 2025-07-28 ASSESSMENT — PAIN DESCRIPTION - PROGRESSION
CLINICAL_PROGRESSION: GRADUALLY IMPROVING
CLINICAL_PROGRESSION: NOT CHANGED
CLINICAL_PROGRESSION: GRADUALLY IMPROVING
CLINICAL_PROGRESSION: NOT CHANGED

## 2025-07-28 ASSESSMENT — PAIN DESCRIPTION - DESCRIPTORS
DESCRIPTORS: SHARP

## 2025-07-28 NOTE — ED PROVIDER NOTES
Emergency Department Provider Note        History of Present Illness     History provided by: Patient  Limitations to History: None    HPI:  Patient is a 44-year-old with a history of sickle cell disease presents emergency department for sickle cell pain crisis.  Patient states having pain in the lower extremity states he said of the last 24 hours.  Patient states this is his typical sickle cell pain.  Patient has no chest pain cough congestion or shortness of breath he has no history of acute chest or PE.  Physical Exam   Triage vitals:  T 36.1 °C (96.9 °F)  HR 82  /77  RR 16  O2 96 % None (Room air)    Physical Exam  Constitutional:       Appearance: Normal appearance.   HENT:      Head: Normocephalic.     Eyes:      Extraocular Movements: Extraocular movements intact.       Cardiovascular:      Rate and Rhythm: Normal rate.   Pulmonary:      Effort: Pulmonary effort is normal.   Abdominal:      General: Abdomen is flat.     Musculoskeletal:         General: Normal range of motion.      Cervical back: Normal range of motion.      Comments: Pain in the bilateral lower extremity with movement/touch     Skin:     General: Skin is warm.     Neurological:      Mental Status: He is alert. Mental status is at baseline.     Psychiatric:         Mood and Affect: Mood normal.         Behavior: Behavior normal.          Medical Decision Making & ED Course   Medical Decision Making:    Patient presents emergency department for concern for sickle cell pain crisis.  Patient has pain in the lower extremities.  States that his typical sickle cell pain crisis no chest pain shortness of breath he is not tachypneic or tachycardic satting well on room air no history of acute chest or PE no cough or congestion low concern for acute chest or PE at this time.  Patient has no concern electrolyte abnormalities at this time.  Mild leukocytosis of 13.3.  Patient started on his sickle cell pain plan of 2 mg Dilaudid IV x 3 every  hour.  Patient was signed out pending the rest of his pain management.       EKG Independent Interpretation: Normal sinus rhythm, heart rate 85, QTc 454, no ST elevations        The patient was discussed with the following consultants/services: None      Diagnoses as of 07/29/25 0780   Sickle cell pain crisis (Multi)          Disposition   Patient was signed out to oncoming provider pending completion of their work-up.  Please see the next provider's transition of care note for the remainder of the patient's care.     Procedures   Procedures    Patient seen and discussed with ED attending physician.    Kathleen Adams MD  Emergency Medicine     Kathleen Adams MD  Resident  07/29/25 7229

## 2025-07-28 NOTE — ED TRIAGE NOTES
Pt endorses sickle cell pain crisis. Pt endorses pain in bilat LE. Pt states he just got back from vacation in [place]. Pt states his pain was ok until he traveled ion airplane. Pt denies CP/SOB

## 2025-07-29 ENCOUNTER — HOSPITAL ENCOUNTER (EMERGENCY)
Facility: HOSPITAL | Age: 45
Discharge: HOME | End: 2025-07-29
Attending: EMERGENCY MEDICINE
Payer: COMMERCIAL

## 2025-07-29 ENCOUNTER — TELEPHONE (OUTPATIENT)
Dept: ADMISSION | Facility: HOSPITAL | Age: 45
End: 2025-07-29
Payer: COMMERCIAL

## 2025-07-29 VITALS
HEART RATE: 62 BPM | SYSTOLIC BLOOD PRESSURE: 107 MMHG | TEMPERATURE: 97.2 F | OXYGEN SATURATION: 100 % | BODY MASS INDEX: 22.6 KG/M2 | RESPIRATION RATE: 14 BRPM | WEIGHT: 144 LBS | DIASTOLIC BLOOD PRESSURE: 69 MMHG | HEIGHT: 67 IN

## 2025-07-29 DIAGNOSIS — D57.00 SICKLE CELL PAIN CRISIS (MULTI): Primary | ICD-10-CM

## 2025-07-29 DIAGNOSIS — D57.00 SICKLE-CELL DISEASE WITH PAIN (MULTI): ICD-10-CM

## 2025-07-29 LAB
ALBUMIN SERPL BCP-MCNC: 4.1 G/DL (ref 3.4–5)
ALP SERPL-CCNC: 99 U/L (ref 33–120)
ALT SERPL W P-5'-P-CCNC: 11 U/L (ref 10–52)
ANION GAP SERPL CALC-SCNC: 12 MMOL/L (ref 10–20)
AST SERPL W P-5'-P-CCNC: 15 U/L (ref 9–39)
ATRIAL RATE: 68 BPM
ATRIAL RATE: 90 BPM
BASOPHILS # BLD AUTO: 0.04 X10*3/UL (ref 0–0.1)
BASOPHILS NFR BLD AUTO: 0.3 %
BILIRUB SERPL-MCNC: 0.6 MG/DL (ref 0–1.2)
BUN SERPL-MCNC: 7 MG/DL (ref 6–23)
CALCIUM SERPL-MCNC: 8.5 MG/DL (ref 8.6–10.3)
CHLORIDE SERPL-SCNC: 109 MMOL/L (ref 98–107)
CO2 SERPL-SCNC: 22 MMOL/L (ref 21–32)
CREAT SERPL-MCNC: 0.75 MG/DL (ref 0.5–1.3)
EGFRCR SERPLBLD CKD-EPI 2021: >90 ML/MIN/1.73M*2
EOSINOPHIL # BLD AUTO: 0.25 X10*3/UL (ref 0–0.7)
EOSINOPHIL NFR BLD AUTO: 2.2 %
ERYTHROCYTE [DISTWIDTH] IN BLOOD BY AUTOMATED COUNT: 13 % (ref 11.5–14.5)
GLUCOSE SERPL-MCNC: 94 MG/DL (ref 74–99)
HCT VFR BLD AUTO: 44 % (ref 41–52)
HGB BLD-MCNC: 16.1 G/DL (ref 13.5–17.5)
HGB RETIC QN: 36 PG (ref 28–38)
IMM GRANULOCYTES # BLD AUTO: 0.03 X10*3/UL (ref 0–0.7)
IMM GRANULOCYTES NFR BLD AUTO: 0.3 % (ref 0–0.9)
IMMATURE RETIC FRACTION: 5.2 %
LDH SERPL L TO P-CCNC: 228 U/L (ref 84–246)
LYMPHOCYTES # BLD AUTO: 3.92 X10*3/UL (ref 1.2–4.8)
LYMPHOCYTES NFR BLD AUTO: 34.3 %
MCH RBC QN AUTO: 32 PG (ref 26–34)
MCHC RBC AUTO-ENTMCNC: 36.6 G/DL (ref 32–36)
MCV RBC AUTO: 88 FL (ref 80–100)
MONOCYTES # BLD AUTO: 0.84 X10*3/UL (ref 0.1–1)
MONOCYTES NFR BLD AUTO: 7.3 %
NEUTROPHILS # BLD AUTO: 6.36 X10*3/UL (ref 1.2–7.7)
NEUTROPHILS NFR BLD AUTO: 55.6 %
NRBC BLD-RTO: 0 /100 WBCS (ref 0–0)
P AXIS: 62 DEGREES
P AXIS: 76 DEGREES
P OFFSET: 183 MS
P OFFSET: 187 MS
P ONSET: 117 MS
P ONSET: 130 MS
PLATELET # BLD AUTO: 272 X10*3/UL (ref 150–450)
POTASSIUM SERPL-SCNC: 3.6 MMOL/L (ref 3.5–5.3)
PR INTERVAL: 158 MS
PR INTERVAL: 186 MS
PROT SERPL-MCNC: 6.7 G/DL (ref 6.4–8.2)
Q ONSET: 209 MS
Q ONSET: 210 MS
QRS COUNT: 12 BEATS
QRS COUNT: 15 BEATS
QRS DURATION: 100 MS
QRS DURATION: 98 MS
QT INTERVAL: 388 MS
QT INTERVAL: 422 MS
QTC CALCULATION(BAZETT): 448 MS
QTC CALCULATION(BAZETT): 474 MS
QTC FREDERICIA: 440 MS
QTC FREDERICIA: 444 MS
R AXIS: -57 DEGREES
R AXIS: -75 DEGREES
RBC # BLD AUTO: 5.03 X10*6/UL (ref 4.5–5.9)
RETICS #: 0.06 X10*6/UL (ref 0.02–0.12)
RETICS/RBC NFR AUTO: 1.2 % (ref 0.5–2)
SODIUM SERPL-SCNC: 139 MMOL/L (ref 136–145)
T AXIS: 25 DEGREES
T AXIS: 3 DEGREES
T OFFSET: 403 MS
T OFFSET: 421 MS
VENTRICULAR RATE: 68 BPM
VENTRICULAR RATE: 90 BPM
WBC # BLD AUTO: 11.4 X10*3/UL (ref 4.4–11.3)

## 2025-07-29 PROCEDURE — 80053 COMPREHEN METABOLIC PANEL: CPT

## 2025-07-29 PROCEDURE — 99284 EMERGENCY DEPT VISIT MOD MDM: CPT | Mod: 25 | Performed by: EMERGENCY MEDICINE

## 2025-07-29 PROCEDURE — 2500000004 HC RX 250 GENERAL PHARMACY W/ HCPCS (ALT 636 FOR OP/ED): Mod: JZ | Performed by: EMERGENCY MEDICINE

## 2025-07-29 PROCEDURE — 36415 COLL VENOUS BLD VENIPUNCTURE: CPT

## 2025-07-29 PROCEDURE — 96376 TX/PRO/DX INJ SAME DRUG ADON: CPT

## 2025-07-29 PROCEDURE — 83615 LACTATE (LD) (LDH) ENZYME: CPT

## 2025-07-29 PROCEDURE — 93005 ELECTROCARDIOGRAM TRACING: CPT

## 2025-07-29 PROCEDURE — 85045 AUTOMATED RETICULOCYTE COUNT: CPT

## 2025-07-29 PROCEDURE — 96374 THER/PROPH/DIAG INJ IV PUSH: CPT

## 2025-07-29 PROCEDURE — 85025 COMPLETE CBC W/AUTO DIFF WBC: CPT

## 2025-07-29 RX ADMIN — HYDROMORPHONE HYDROCHLORIDE 2 MG: 2 INJECTION INTRAMUSCULAR; INTRAVENOUS; SUBCUTANEOUS at 13:08

## 2025-07-29 RX ADMIN — HYDROMORPHONE HYDROCHLORIDE 2 MG: 2 INJECTION INTRAMUSCULAR; INTRAVENOUS; SUBCUTANEOUS at 15:49

## 2025-07-29 RX ADMIN — HYDROMORPHONE HYDROCHLORIDE 2 MG: 2 INJECTION INTRAMUSCULAR; INTRAVENOUS; SUBCUTANEOUS at 14:44

## 2025-07-29 ASSESSMENT — PAIN SCALES - GENERAL
PAINLEVEL_OUTOF10: 8
PAINLEVEL_OUTOF10: 7
PAINLEVEL_OUTOF10: 5 - MODERATE PAIN

## 2025-07-29 ASSESSMENT — PAIN DESCRIPTION - ORIENTATION
ORIENTATION: RIGHT;LEFT
ORIENTATION: LOWER

## 2025-07-29 ASSESSMENT — PAIN - FUNCTIONAL ASSESSMENT
PAIN_FUNCTIONAL_ASSESSMENT: 0-10

## 2025-07-29 ASSESSMENT — PAIN DESCRIPTION - PROGRESSION: CLINICAL_PROGRESSION: GRADUALLY IMPROVING

## 2025-07-29 ASSESSMENT — PAIN DESCRIPTION - PAIN TYPE: TYPE: ACUTE PAIN

## 2025-07-29 ASSESSMENT — PAIN DESCRIPTION - FREQUENCY: FREQUENCY: CONSTANT/CONTINUOUS

## 2025-07-29 ASSESSMENT — PAIN DESCRIPTION - LOCATION
LOCATION: LEG
LOCATION: LEG

## 2025-07-29 NOTE — DISCHARGE INSTRUCTIONS
Please take your pain medicine at home and return to the ER if you have any new or concerning symptoms.

## 2025-07-29 NOTE — TELEPHONE ENCOUNTER
Pt is requesting a refill of Dilaudid 4 mg tablet, takes every 4 hrs PRN, 38 tablets, last refilled on 7/18/25  Preferred pharmacy is [facility]

## 2025-07-29 NOTE — ED TRIAGE NOTES
As provider-in-triage, I performed a medical screening history and physical exam on this patient.  HISTORY OF PRESENT ILLNESS  Patient is a 44-year-old male presenting to the ED with concern for sickle cell pain crisis.  Patient states he had a plane ride home on Sunday but at that time he had bilateral leg pain.  He was seen in the ED at that point where he was given Dilaudid and states that pain improved but did not entirely go away.  Pain is back worse today.  Pain is located down the entirety of his bilateral legs.  Denies pain elsewhere.  Denies chest pain.  States pain feels similar to previous sickle cell pain.     PHYSICAL EXAM  Vital Signs reviewed.  Cardiovascular: Regular rate and rhythm. No m/g/r  Respiratory: No respiratory distress. No accessory muscle use. Breath sounds are present and equal b/l. No adventitious sounds.          MDM  Workup initiated. Pt stable pending bed availability and further evaluation. Please see subsequent provider note for further details and disposition.         I evaluated this patient in triage with the RN. Due to the patients complaint labs and or imaging were ordered by myself in an attempt to expedite patient care however I am not participating in care after evaluation. This is a preliminary assessment. Pt does not appear in acute distress at this time. They will have a full evaluation as soon as possible. They will be cared for by another provider who will possibly order more labs, imaging or interventions. Pt did not have a full ROS or PE completed by myself however above is a summary with reasons for orders.  For the remainder of the patient's workup and ED course, please refer to the main ED provider note. We discussed need for diagnostic testing including laboratory studies and imaging.  We also discussed that patient may be asked to wait in the waiting room while these tests are pending.  They understand that if they choose to leave without having the testing  completed or resulted that we cannot rule out acute life threatening illnesses and the risks involved could lead to worsening condition, permanent disability or even death.

## 2025-07-29 NOTE — ED PROVIDER NOTES
HPI   Chief Complaint   Patient presents with    Sickle Cell Pain Crisis       44-year-old male with history of sickle cell disease who is presenting for pain in the bilateral lower extremities that is consistent with prior sickle cell crises.  He was seen in the ER on main campus yesterday for the same.  He states the trigger was travel back from [place].  He has no swelling of the legs, no history of DVTs.  He states that yesterday the pain was controlled and that is why he went home, he is out of his home pain medications were just sent in a refill.  No chest pain, shortness of breath, fever.              Patient History   Medical History[1]  Surgical History[2]  Family History[3]  Social History[4]    Physical Exam   ED Triage Vitals [07/29/25 1227]   Temperature Heart Rate Respirations BP   36.6 °C (97.9 °F) 78 18 104/80      Pulse Ox Temp Source Heart Rate Source Patient Position   100 % Tympanic -- --      BP Location FiO2 (%)     -- --       Physical Exam  Vitals and nursing note reviewed.   Constitutional:       General: He is in acute distress.      Appearance: Normal appearance.   HENT:      Head: Normocephalic.     Eyes:      Extraocular Movements: Extraocular movements intact.      Pupils: Pupils are equal, round, and reactive to light.       Cardiovascular:      Rate and Rhythm: Normal rate and regular rhythm.      Pulses: Normal pulses.      Heart sounds: Normal heart sounds.   Pulmonary:      Effort: Pulmonary effort is normal.      Breath sounds: Normal breath sounds.   Abdominal:      General: Abdomen is flat.      Tenderness: There is no abdominal tenderness. There is no right CVA tenderness or left CVA tenderness.     Musculoskeletal:         General: Normal range of motion.      Cervical back: Normal range of motion and neck supple.      Right lower leg: No edema.      Left lower leg: No edema.     Skin:     General: Skin is warm.      Capillary Refill: Capillary refill takes less than 2  seconds.      Findings: No bruising.     Neurological:      General: No focal deficit present.      Mental Status: He is alert and oriented to person, place, and time. Mental status is at baseline.      Sensory: No sensory deficit.      Motor: No weakness.      Coordination: Coordination normal.     Psychiatric:         Mood and Affect: Mood normal.         Thought Content: Thought content normal.           ED Course & MDM   Diagnoses as of 07/29/25 1608   Sickle cell pain crisis (Multi)                 No data recorded     [place] Coma Scale Score: 15 (07/29/25 1228 : Renetta Dodson RN)                           Medical Decision Making  44-year-old male presenting with bilateral lower extremity pain that appears consistent with prior sickle cell crisis.  He does appear to be in pain.  There is no swelling to the lower extremities and I have low suspicion for septic joint, DVT.    Patient received 3 doses of pain medication and feels improved enough to go home, he did send in a refill of his home pain medication and will pick that up.        Procedure  Procedures       [1]   Past Medical History:  Diagnosis Date    Anxiety disorder, unspecified 04/17/2017    Anxiety    Depression, unspecified 04/17/2017    Depression    Diarrhea 10/07/2023    DIARRHEA      Family history of glaucoma 03/02/2017    Family history of glaucoma in father    Gastritis, unspecified, without bleeding 04/17/2017    Gastritis    Hematuria, unspecified 04/17/2017    Hematuria    Personal history of other diseases of the digestive system 12/02/2015    History of esophageal reflux    Priapism, unspecified 04/17/2017    Priapism    Sickle-cell disease without crisis (Multi) 04/17/2017    Sickle cell anemia   [2]   Past Surgical History:  Procedure Laterality Date    GALLBLADDER SURGERY  04/12/2017    Gallbladder Surgery    IR CVC TUNNELED  4/24/2018    IR CVC TUNNELED 4/24/2018 [facility] AIB LEGACY    IR CVC TUNNELED  6/5/2018    IR CVC TUNNELED  6/5/2018 [facility]IB LEGACY    IR CVC TUNNELED  3/1/2019    IR CVC TUNNELED 3/1/2019 [facility]    IR CVC TUNNELED  6/4/2019    IR CVC TUNNELED 6/4/2019 [facility] CLINICAL LEGACY    IR CVC TUNNELED  4/5/2019    IR CVC TUNNELED 4/5/2019 [facility]    IR CVC TUNNELED  7/17/2019    IR CVC TUNNELED 7/17/2019 [facility]    IR CVC TUNNELED  8/14/2019    IR CVC TUNNELED 8/14/2019 [facility]    IR CVC TUNNELED  12/18/2019    IR CVC TUNNELED 12/18/2019 [facility] CLINICAL LEGACY    IR CVC TUNNELED  10/9/2019    IR CVC TUNNELED 10/9/2019 [facility]    IR CVC TUNNELED  11/13/2019    IR CVC TUNNELED 11/13/2019 [facility] CLINICAL LEGACY    IR CVC TUNNELED  1/15/2020    IR CVC TUNNELED 1/15/2020 [facility] CLINICAL LEGACY    IR CVC TUNNELED  2/19/2020    IR CVC TUNNELED 2/19/2020 [facility] CLINICAL LEGACY    IR CVC TUNNELED  1/4/2021    IR CVC TUNNELED 1/4/2021 [facility]    IR CVC TUNNELED  1/25/2021    IR CVC TUNNELED 1/25/2021 [facility]CILLARY LEGACY    IR CVC TUNNELED  8/21/2018    IR CVC TUNNELED 8/21/2018 [facility]    IR CVC TUNNELED  9/26/2018    IR CVC TUNNELED 9/26/2018 [facility]    IR CVC TUNNELED  11/5/2018    IR CVC TUNNELED 11/5/2018 [facility]    IR CVC TUNNELED  12/19/2018    IR CVC TUNNELED 12/19/2018 [facility]    IR VENOGRAM HEPATIC  11/8/2017    IR VENOGRAM HEPATIC 11/8/2017 [facility]IB [facility]    MR HEAD ANGIO WO IV CONTRAST  2/17/2017    MR HEAD ANGIO WO IV CONTRAST 2/17/2017 [facility] CLINICAL LEGACY    MR NECK ANGIO WO IV CONTRAST  2/17/2017    MR NECK ANGIO WO IV CONTRAST 2/17/2017 [facility] CLINICAL LEGACY    US GUIDED NEEDLE LIVER BIOPSY  9/8/2020    US GUIDED NEEDLE LIVER BIOPSY 9/8/2020 [facility]GAC[facility]   [3]   Family History  Problem Relation Name Age of Onset    Diabetes Father      Sickle cell anemia Other sibling     Cancer Other grandfather     Cancer Other uncle    [4]   Social History  Tobacco Use    Smoking status: Every Day     Types: Cigarettes     Start date: 1/1/1998     Passive exposure: Current     Smokeless tobacco: Never   Substance Use Topics    Alcohol use: Not Currently     Comment: Occasional    Drug use: Not Currently     Types: Marijuana     Comment: Last use sometime in 2023.  No intention to re-start.        Orlin Alexis MD  07/29/25 2100     Skin normal color for race, warm, dry and intact. No evidence of rash.

## 2025-07-29 NOTE — ED TRIAGE NOTES
Presents to the ED with sickle cell pain crisis, states pain in bilateral legs at this time. Denies CP. States pain started after coming back on airplane from [place] on 07/27/25.

## 2025-07-30 LAB
ATRIAL RATE: 85 BPM
P AXIS: 71 DEGREES
P OFFSET: 180 MS
P ONSET: 123 MS
PR INTERVAL: 174 MS
Q ONSET: 210 MS
QRS COUNT: 13 BEATS
QRS DURATION: 104 MS
QT INTERVAL: 382 MS
QTC CALCULATION(BAZETT): 454 MS
QTC FREDERICIA: 429 MS
R AXIS: -60 DEGREES
T AXIS: 10 DEGREES
T OFFSET: 401 MS
VENTRICULAR RATE: 85 BPM

## 2025-07-30 RX ORDER — HYDROMORPHONE HYDROCHLORIDE 4 MG/1
4 TABLET ORAL EVERY 4 HOURS PRN
Qty: 38 TABLET | Refills: 0 | Status: SHIPPED | OUTPATIENT
Start: 2025-07-30

## 2025-07-31 ENCOUNTER — PHARMACY VISIT (OUTPATIENT)
Dept: PHARMACY | Facility: CLINIC | Age: 45
End: 2025-07-31
Payer: MEDICAID

## 2025-07-31 PROCEDURE — RXMED WILLOW AMBULATORY MEDICATION CHARGE

## 2025-08-09 ENCOUNTER — HOSPITAL ENCOUNTER (EMERGENCY)
Facility: HOSPITAL | Age: 45
Discharge: HOME | End: 2025-08-09
Attending: EMERGENCY MEDICINE
Payer: COMMERCIAL

## 2025-08-09 VITALS
OXYGEN SATURATION: 96 % | SYSTOLIC BLOOD PRESSURE: 106 MMHG | BODY MASS INDEX: 22.76 KG/M2 | WEIGHT: 145 LBS | DIASTOLIC BLOOD PRESSURE: 79 MMHG | RESPIRATION RATE: 17 BRPM | HEART RATE: 68 BPM | TEMPERATURE: 97.9 F | HEIGHT: 67 IN

## 2025-08-09 DIAGNOSIS — D57.00 SICKLE CELL PAIN CRISIS (MULTI): Primary | ICD-10-CM

## 2025-08-09 DIAGNOSIS — D57.1 SICKLE CELL DISEASE WITHOUT CRISIS (MULTI): ICD-10-CM

## 2025-08-09 DIAGNOSIS — D89.813 GRAFT-VERSUS-HOST DISEASE, UNSPECIFIED (MULTI): ICD-10-CM

## 2025-08-09 DIAGNOSIS — Z94.9 ORGAN OR TISSUE REPLACED BY TRANSPLANT: ICD-10-CM

## 2025-08-09 LAB
ALBUMIN SERPL BCP-MCNC: 4.2 G/DL (ref 3.4–5)
ALP SERPL-CCNC: 112 U/L (ref 33–120)
ALT SERPL W P-5'-P-CCNC: 10 U/L (ref 10–52)
ANION GAP SERPL CALC-SCNC: 14 MMOL/L (ref 10–20)
AST SERPL W P-5'-P-CCNC: 14 U/L (ref 9–39)
BASOPHILS # BLD AUTO: 0.05 X10*3/UL (ref 0–0.1)
BASOPHILS NFR BLD AUTO: 0.4 %
BILIRUB SERPL-MCNC: 0.7 MG/DL (ref 0–1.2)
BUN SERPL-MCNC: 8 MG/DL (ref 6–23)
CALCIUM SERPL-MCNC: 9.4 MG/DL (ref 8.6–10.3)
CHLORIDE SERPL-SCNC: 108 MMOL/L (ref 98–107)
CO2 SERPL-SCNC: 24 MMOL/L (ref 21–32)
CREAT SERPL-MCNC: 0.93 MG/DL (ref 0.5–1.3)
EGFRCR SERPLBLD CKD-EPI 2021: >90 ML/MIN/1.73M*2
EOSINOPHIL # BLD AUTO: 0.26 X10*3/UL (ref 0–0.7)
EOSINOPHIL NFR BLD AUTO: 1.9 %
ERYTHROCYTE [DISTWIDTH] IN BLOOD BY AUTOMATED COUNT: 13.5 % (ref 11.5–14.5)
GLUCOSE SERPL-MCNC: 96 MG/DL (ref 74–99)
HCT VFR BLD AUTO: 48.7 % (ref 41–52)
HGB BLD-MCNC: 16.7 G/DL (ref 13.5–17.5)
HGB RETIC QN: 36 PG (ref 28–38)
IMM GRANULOCYTES # BLD AUTO: 0.05 X10*3/UL (ref 0–0.7)
IMM GRANULOCYTES NFR BLD AUTO: 0.4 % (ref 0–0.9)
IMMATURE RETIC FRACTION: 11 %
LDH SERPL L TO P-CCNC: 198 U/L (ref 84–246)
LYMPHOCYTES # BLD AUTO: 3.66 X10*3/UL (ref 1.2–4.8)
LYMPHOCYTES NFR BLD AUTO: 27.3 %
MCH RBC QN AUTO: 31.4 PG (ref 26–34)
MCHC RBC AUTO-ENTMCNC: 34.3 G/DL (ref 32–36)
MCV RBC AUTO: 92 FL (ref 80–100)
MONOCYTES # BLD AUTO: 0.97 X10*3/UL (ref 0.1–1)
MONOCYTES NFR BLD AUTO: 7.2 %
NEUTROPHILS # BLD AUTO: 8.43 X10*3/UL (ref 1.2–7.7)
NEUTROPHILS NFR BLD AUTO: 62.8 %
NRBC BLD-RTO: 0 /100 WBCS (ref 0–0)
PLATELET # BLD AUTO: 267 X10*3/UL (ref 150–450)
POTASSIUM SERPL-SCNC: 4 MMOL/L (ref 3.5–5.3)
PROT SERPL-MCNC: 6.7 G/DL (ref 6.4–8.2)
RBC # BLD AUTO: 5.32 X10*6/UL (ref 4.5–5.9)
RETICS #: 0.09 X10*6/UL (ref 0.02–0.12)
RETICS/RBC NFR AUTO: 1.7 % (ref 0.5–2)
SODIUM SERPL-SCNC: 142 MMOL/L (ref 136–145)
WBC # BLD AUTO: 13.4 X10*3/UL (ref 4.4–11.3)

## 2025-08-09 PROCEDURE — 80053 COMPREHEN METABOLIC PANEL: CPT | Performed by: EMERGENCY MEDICINE

## 2025-08-09 PROCEDURE — 85045 AUTOMATED RETICULOCYTE COUNT: CPT | Performed by: EMERGENCY MEDICINE

## 2025-08-09 PROCEDURE — 84443 ASSAY THYROID STIM HORMONE: CPT | Performed by: STUDENT IN AN ORGANIZED HEALTH CARE EDUCATION/TRAINING PROGRAM

## 2025-08-09 PROCEDURE — 96374 THER/PROPH/DIAG INJ IV PUSH: CPT

## 2025-08-09 PROCEDURE — 36415 COLL VENOUS BLD VENIPUNCTURE: CPT | Performed by: EMERGENCY MEDICINE

## 2025-08-09 PROCEDURE — 84403 ASSAY OF TOTAL TESTOSTERONE: CPT | Mod: AHULAB | Performed by: STUDENT IN AN ORGANIZED HEALTH CARE EDUCATION/TRAINING PROGRAM

## 2025-08-09 PROCEDURE — 99284 EMERGENCY DEPT VISIT MOD MDM: CPT | Performed by: EMERGENCY MEDICINE

## 2025-08-09 PROCEDURE — 83615 LACTATE (LD) (LDH) ENZYME: CPT | Performed by: EMERGENCY MEDICINE

## 2025-08-09 PROCEDURE — 85025 COMPLETE CBC W/AUTO DIFF WBC: CPT | Performed by: EMERGENCY MEDICINE

## 2025-08-09 PROCEDURE — 96376 TX/PRO/DX INJ SAME DRUG ADON: CPT

## 2025-08-09 PROCEDURE — 2500000004 HC RX 250 GENERAL PHARMACY W/ HCPCS (ALT 636 FOR OP/ED): Mod: JZ | Performed by: EMERGENCY MEDICINE

## 2025-08-09 RX ADMIN — HYDROMORPHONE HYDROCHLORIDE 2 MG: 2 INJECTION INTRAMUSCULAR; INTRAVENOUS; SUBCUTANEOUS at 09:52

## 2025-08-09 RX ADMIN — HYDROMORPHONE HYDROCHLORIDE 2 MG: 2 INJECTION INTRAMUSCULAR; INTRAVENOUS; SUBCUTANEOUS at 09:00

## 2025-08-09 RX ADMIN — HYDROMORPHONE HYDROCHLORIDE 2 MG: 2 INJECTION INTRAMUSCULAR; INTRAVENOUS; SUBCUTANEOUS at 08:06

## 2025-08-09 ASSESSMENT — PAIN SCALES - GENERAL
PAINLEVEL_OUTOF10: 8
PAINLEVEL_OUTOF10: 7
PAINLEVEL_OUTOF10: 8

## 2025-08-09 ASSESSMENT — PAIN - FUNCTIONAL ASSESSMENT: PAIN_FUNCTIONAL_ASSESSMENT: 0-10

## 2025-08-09 NOTE — ED PROVIDER NOTES
HPI   Chief Complaint   Patient presents with    Sickle Cell Pain Crisis       The patient is a 44-year-old male past medical history of sickle cell disease presenting with cramping leg/low back pain.  No symptoms ongoing over the last 1-2 days, refractory to home pain medications.  Describes pain is exactly similar to sickle cell pain crises he has experienced in the past.  Denies any extremity edema or pain.  Denies any chest pain, cough, dyspnea fevers, chills, nausea, vomiting, diarrhea.              Patient History   Medical History[1]  Surgical History[2]  Family History[3]  Social History[4]    Physical Exam   ED Triage Vitals [08/09/25 0720]   Temperature Heart Rate Respirations BP   35.6 °C (96 °F) 83 18 105/86      Pulse Ox Temp src Heart Rate Source Patient Position   99 % -- -- --      BP Location FiO2 (%)     -- --       Physical Exam  Vitals and nursing note reviewed.   Constitutional:       General: He is not in acute distress.     Appearance: Normal appearance. He is not ill-appearing or toxic-appearing.   HENT:      Head: Normocephalic and atraumatic.      Nose: No congestion or rhinorrhea.      Mouth/Throat:      Mouth: Mucous membranes are moist.      Pharynx: Oropharynx is clear. No oropharyngeal exudate or posterior oropharyngeal erythema.     Eyes:      Extraocular Movements: Extraocular movements intact.      Right eye: Normal extraocular motion.      Left eye: Normal extraocular motion.      Conjunctiva/sclera: Conjunctivae normal.      Pupils: Pupils are equal, round, and reactive to light.       Cardiovascular:      Rate and Rhythm: Normal rate and regular rhythm.      Pulses: Normal pulses.           Dorsalis pedis pulses are 2+ on the right side and 2+ on the left side.      Heart sounds: Normal heart sounds, S1 normal and S2 normal. No murmur heard.     No friction rub. No gallop.   Pulmonary:      Effort: Pulmonary effort is normal. No respiratory distress.      Breath sounds: Normal  breath sounds. No stridor. No wheezing, rhonchi or rales.   Abdominal:      General: Abdomen is flat. Bowel sounds are normal. There is no distension.      Palpations: Abdomen is soft.      Tenderness: There is no abdominal tenderness. There is no right CVA tenderness, left CVA tenderness, guarding or rebound.     Musculoskeletal:      Cervical back: Full passive range of motion without pain.      Right lower leg: No edema.      Left lower leg: No edema.      Comments: Mild tenderness over bilateral calves/thighs.  No edema, induration, erythema, warmth.     Skin:     General: Skin is warm and dry.     Neurological:      General: No focal deficit present.      Mental Status: He is alert and oriented to person, place, and time.      GCS: GCS eye subscore is 4. GCS verbal subscore is 5. GCS motor subscore is 6.      Cranial Nerves: No cranial nerve deficit.      Sensory: No sensory deficit.      Motor: No weakness, tremor or abnormal muscle tone.     Psychiatric:         Mood and Affect: Mood normal.           ED Course & MDM   Diagnoses as of 08/09/25 1058   Sickle cell pain crisis (Multi)                 No data recorded     [place] Coma Scale Score: 15 (08/09/25 0726 : Keerthi Barth RN)                           Medical Decision Making  Patient presenting with bodyaches, extremity pain that patient describes as exactly similar to 6 pain crises he has experienced the past.  No cough, dyspnea, chest pain to suggest acute chest syndrome.  Has an otherwise reassuring physical examination.  Will obtain screening labs as well as treat with IV analgesics for symptom control.  Disposition pending labs results, reassessment after medication administration.    Patient's labs largely unremarkable/at baseline.  Patient has received 3 doses of IV hydromorphone after which she notes pain is much improved, did discuss admission for further symptom control versus discharge home, patient feels he would prefer to attempt  discharge and home management, will return for any new or worsening symptoms.  Patient discharged in good condition.        Procedure  Procedures         [1]   Past Medical History:  Diagnosis Date    Anxiety disorder, unspecified 04/17/2017    Anxiety    Depression, unspecified 04/17/2017    Depression    Diarrhea 10/07/2023    DIARRHEA      Family history of glaucoma 03/02/2017    Family history of glaucoma in father    Gastritis, unspecified, without bleeding 04/17/2017    Gastritis    Hematuria, unspecified 04/17/2017    Hematuria    Personal history of other diseases of the digestive system 12/02/2015    History of esophageal reflux    Priapism, unspecified 04/17/2017    Priapism    Sickle-cell disease without crisis (Multi) 04/17/2017    Sickle cell anemia   [2]   Past Surgical History:  Procedure Laterality Date    GALLBLADDER SURGERY  04/12/2017    Gallbladder Surgery    IR CVC TUNNELED  4/24/2018    IR CVC TUNNELED 4/24/2018 [facility]    IR CVC TUNNELED  6/5/2018    IR CVC TUNNELED 6/5/2018 [facility]    IR CVC TUNNELED  3/1/2019    IR CVC TUNNELED 3/1/2019 [facility]    IR CVC TUNNELED  6/4/2019    IR CVC TUNNELED 6/4/2019 [facility] CLINICAL LEGACY    IR CVC TUNNELED  4/5/2019    IR CVC TUNNELED 4/5/2019 [facility]    IR CVC TUNNELED  7/17/2019    IR CVC TUNNELED 7/17/2019 [facility]    IR CVC TUNNELED  8/14/2019    IR CVC TUNNELED 8/14/2019 [facility]    IR CVC TUNNELED  12/18/2019    IR CVC TUNNELED 12/18/2019 [facility] CLINICAL LEGACY    IR CVC TUNNELED  10/9/2019    IR CVC TUNNELED 10/9/2019 [facility]    IR CVC TUNNELED  11/13/2019    IR CVC TUNNELED 11/13/2019 [facility] CLINICAL LEGACY    IR CVC TUNNELED  1/15/2020    IR CVC TUNNELED 1/15/2020 [facility] CLINICAL LEGACY    IR CVC TUNNELED  2/19/2020    IR CVC TUNNELED 2/19/2020 [facility] CLINICAL LEGACY    IR CVC TUNNELED  1/4/2021    IR CVC TUNNELED 1/4/2021 [facility]    IR CVC TUNNELED  1/25/2021    IR CVC TUNNELED 1/25/2021 [facility] ANCILLARY  LEGACY    IR CVC TUNNELED  8/21/2018    IR CVC TUNNELED 8/21/2018 [facility]    IR CVC TUNNELED  9/26/2018    IR CVC TUNNELED 9/26/2018 [facility]    IR CVC TUNNELED  11/5/2018    IR CVC TUNNELED 11/5/2018 [facility]    IR CVC TUNNELED  12/19/2018    IR CVC TUNNELED 12/19/2018 [facility]    IR VENOGRAM HEPATIC  11/8/2017    IR VENOGRAM HEPATIC 11/8/2017 [facility]    MR HEAD ANGIO WO IV CONTRAST  2/17/2017    MR HEAD ANGIO WO IV CONTRAST 2/17/2017 [facility] CLINICAL LEGACY    MR NECK ANGIO WO IV CONTRAST  2/17/2017    MR NECK ANGIO WO IV CONTRAST 2/17/2017 [facility] CLINICAL LEGACY    US GUIDED NEEDLE LIVER BIOPSY  9/8/2020    US GUIDED NEEDLE LIVER BIOPSY 9/8/2020 [facility] AIB LEGAC[facility]   [3]   Family History  Problem Relation Name Age of Onset    Diabetes Father      Sickle cell anemia Other sibling     Cancer Other grandfather     Cancer Other uncle    [4]   Social History  Tobacco Use    Smoking status: Every Day     Types: Cigarettes     Start date: 1/1/1998     Passive exposure: Current    Smokeless tobacco: Never   Substance Use Topics    Alcohol use: Not Currently     Comment: Occasional    Drug use: Not Currently     Types: Marijuana     Comment: Last use sometime in 2023.  No intention to re-start.        Ace Bliss MD  08/10/25 1008

## 2025-08-09 NOTE — ED TRIAGE NOTES
Patient to ED for sickle cell crisis. Patient reports 8/10 pain in legs and back. Patient denies any SOB and CP at this time.

## 2025-08-12 ENCOUNTER — HOSPITAL ENCOUNTER (EMERGENCY)
Facility: HOSPITAL | Age: 45
Discharge: HOME | End: 2025-08-12
Attending: EMERGENCY MEDICINE
Payer: COMMERCIAL

## 2025-08-12 VITALS
DIASTOLIC BLOOD PRESSURE: 74 MMHG | OXYGEN SATURATION: 98 % | SYSTOLIC BLOOD PRESSURE: 116 MMHG | WEIGHT: 144 LBS | BODY MASS INDEX: 22.6 KG/M2 | HEART RATE: 72 BPM | TEMPERATURE: 97.1 F | HEIGHT: 67 IN | RESPIRATION RATE: 14 BRPM

## 2025-08-12 DIAGNOSIS — D57.00 SICKLE CELL DISEASE WITH CRISIS (MULTI): Primary | ICD-10-CM

## 2025-08-12 PROCEDURE — 2500000004 HC RX 250 GENERAL PHARMACY W/ HCPCS (ALT 636 FOR OP/ED): Mod: JZ

## 2025-08-12 PROCEDURE — 96376 TX/PRO/DX INJ SAME DRUG ADON: CPT

## 2025-08-12 PROCEDURE — 99284 EMERGENCY DEPT VISIT MOD MDM: CPT | Mod: 25 | Performed by: EMERGENCY MEDICINE

## 2025-08-12 PROCEDURE — 96374 THER/PROPH/DIAG INJ IV PUSH: CPT

## 2025-08-12 RX ADMIN — HYDROMORPHONE HYDROCHLORIDE 2 MG: 2 INJECTION INTRAMUSCULAR; INTRAVENOUS; SUBCUTANEOUS at 13:07

## 2025-08-12 RX ADMIN — HYDROMORPHONE HYDROCHLORIDE 2 MG: 2 INJECTION INTRAMUSCULAR; INTRAVENOUS; SUBCUTANEOUS at 12:00

## 2025-08-12 RX ADMIN — HYDROMORPHONE HYDROCHLORIDE 2 MG: 2 INJECTION INTRAMUSCULAR; INTRAVENOUS; SUBCUTANEOUS at 11:26

## 2025-08-12 RX ADMIN — HYDROMORPHONE HYDROCHLORIDE 2 MG: 2 INJECTION INTRAMUSCULAR; INTRAVENOUS; SUBCUTANEOUS at 14:31

## 2025-08-12 ASSESSMENT — PAIN SCALES - GENERAL
PAINLEVEL_OUTOF10: 8
PAINLEVEL_OUTOF10: 5 - MODERATE PAIN
PAINLEVEL_OUTOF10: 7
PAINLEVEL_OUTOF10: 5 - MODERATE PAIN
PAINLEVEL_OUTOF10: 0 - NO PAIN
PAINLEVEL_OUTOF10: 8
PAINLEVEL_OUTOF10: 8

## 2025-08-12 ASSESSMENT — PAIN - FUNCTIONAL ASSESSMENT
PAIN_FUNCTIONAL_ASSESSMENT: 0-10

## 2025-08-12 ASSESSMENT — PAIN DESCRIPTION - ONSET: ONSET: GRADUAL

## 2025-08-12 ASSESSMENT — PAIN DESCRIPTION - LOCATION: LOCATION: LEG

## 2025-08-12 ASSESSMENT — PAIN DESCRIPTION - PAIN TYPE: TYPE: ACUTE PAIN

## 2025-08-12 ASSESSMENT — PAIN DESCRIPTION - ORIENTATION: ORIENTATION: RIGHT;LEFT

## 2025-08-12 ASSESSMENT — ACTIVITIES OF DAILY LIVING (ADL): EFFECT OF PAIN ON DAILY ACTIVITIES: DECREASED

## 2025-08-12 ASSESSMENT — PAIN DESCRIPTION - PROGRESSION: CLINICAL_PROGRESSION: GRADUALLY WORSENING

## 2025-08-12 ASSESSMENT — PAIN DESCRIPTION - FREQUENCY: FREQUENCY: CONSTANT/CONTINUOUS

## 2025-08-12 ASSESSMENT — PAIN DESCRIPTION - DESCRIPTORS: DESCRIPTORS: SHARP;TENDER;ACHING

## 2025-08-13 ENCOUNTER — TELEPHONE (OUTPATIENT)
Dept: HEMATOLOGY/ONCOLOGY | Facility: HOSPITAL | Age: 45
End: 2025-08-13
Payer: COMMERCIAL

## 2025-08-13 DIAGNOSIS — D57.00 SICKLE-CELL DISEASE WITH PAIN (MULTI): ICD-10-CM

## 2025-08-13 LAB
TESTOST SERPL-MCNC: 773 NG/DL (ref 240–1000)
TSH SERPL-ACNC: 1.69 MIU/L (ref 0.44–3.98)

## 2025-08-13 PROCEDURE — RXMED WILLOW AMBULATORY MEDICATION CHARGE

## 2025-08-13 RX ORDER — HYDROMORPHONE HYDROCHLORIDE 4 MG/1
4 TABLET ORAL EVERY 4 HOURS PRN
Qty: 36 TABLET | Refills: 0 | Status: SHIPPED | OUTPATIENT
Start: 2025-08-13

## 2025-08-14 ENCOUNTER — PHARMACY VISIT (OUTPATIENT)
Dept: PHARMACY | Facility: CLINIC | Age: 45
End: 2025-08-14
Payer: MEDICAID

## 2025-08-15 ENCOUNTER — OFFICE VISIT (OUTPATIENT)
Dept: HEMATOLOGY/ONCOLOGY | Facility: HOSPITAL | Age: 45
End: 2025-08-15
Payer: COMMERCIAL

## 2025-08-15 VITALS
OXYGEN SATURATION: 97 % | TEMPERATURE: 97.2 F | HEART RATE: 74 BPM | RESPIRATION RATE: 17 BRPM | SYSTOLIC BLOOD PRESSURE: 107 MMHG | DIASTOLIC BLOOD PRESSURE: 74 MMHG | BODY MASS INDEX: 23.72 KG/M2 | WEIGHT: 151.46 LBS

## 2025-08-15 DIAGNOSIS — E55.9 VITAMIN D DEFICIENCY: ICD-10-CM

## 2025-08-15 DIAGNOSIS — D57.1 SICKLE CELL DISEASE WITHOUT CRISIS (MULTI): ICD-10-CM

## 2025-08-15 DIAGNOSIS — D89.813 GRAFT-VERSUS-HOST DISEASE, UNSPECIFIED (MULTI): Primary | ICD-10-CM

## 2025-08-15 DIAGNOSIS — Z94.9 ORGAN OR TISSUE REPLACED BY TRANSPLANT: ICD-10-CM

## 2025-08-15 DIAGNOSIS — Z94.84 HISTORY OF STEM CELL TRANSPLANT (MULTI): ICD-10-CM

## 2025-08-15 PROCEDURE — 99215 OFFICE O/P EST HI 40 MIN: CPT | Performed by: STUDENT IN AN ORGANIZED HEALTH CARE EDUCATION/TRAINING PROGRAM

## 2025-08-15 ASSESSMENT — ENCOUNTER SYMPTOMS
PSYCHIATRIC NEGATIVE: 1
MUSCULOSKELETAL NEGATIVE: 1
EYE PROBLEMS: 1
CARDIOVASCULAR NEGATIVE: 1
FATIGUE: 1
ENDOCRINE NEGATIVE: 1
HEMATOLOGIC/LYMPHATIC NEGATIVE: 1
NEUROLOGICAL NEGATIVE: 1
RESPIRATORY NEGATIVE: 1
GASTROINTESTINAL NEGATIVE: 1

## 2025-08-15 ASSESSMENT — PAIN SCALES - GENERAL: PAINLEVEL_OUTOF10: 0-NO PAIN

## 2025-08-24 ENCOUNTER — HOSPITAL ENCOUNTER (EMERGENCY)
Facility: HOSPITAL | Age: 45
Discharge: HOME | End: 2025-08-24
Attending: EMERGENCY MEDICINE
Payer: COMMERCIAL

## 2025-08-24 VITALS
WEIGHT: 152 LBS | DIASTOLIC BLOOD PRESSURE: 75 MMHG | SYSTOLIC BLOOD PRESSURE: 106 MMHG | HEIGHT: 67 IN | BODY MASS INDEX: 23.86 KG/M2 | HEART RATE: 70 BPM | OXYGEN SATURATION: 100 % | TEMPERATURE: 98.5 F | RESPIRATION RATE: 20 BRPM

## 2025-08-24 DIAGNOSIS — D57.00 SICKLE CELL PAIN CRISIS (MULTI): Primary | ICD-10-CM

## 2025-08-24 LAB
ALBUMIN SERPL BCP-MCNC: 4.1 G/DL (ref 3.4–5)
ALP SERPL-CCNC: 119 U/L (ref 33–120)
ALT SERPL W P-5'-P-CCNC: 9 U/L (ref 10–52)
ANION GAP SERPL CALC-SCNC: 13 MMOL/L (ref 10–20)
AST SERPL W P-5'-P-CCNC: 18 U/L (ref 9–39)
BASOPHILS # BLD AUTO: 0.03 X10*3/UL (ref 0–0.1)
BASOPHILS NFR BLD AUTO: 0.4 %
BILIRUB DIRECT SERPL-MCNC: 0.1 MG/DL (ref 0–0.3)
BILIRUB SERPL-MCNC: 0.6 MG/DL (ref 0–1.2)
BUN SERPL-MCNC: 7 MG/DL (ref 6–23)
CALCIUM SERPL-MCNC: 9.1 MG/DL (ref 8.6–10.3)
CHLORIDE SERPL-SCNC: 106 MMOL/L (ref 98–107)
CO2 SERPL-SCNC: 23 MMOL/L (ref 21–32)
CREAT SERPL-MCNC: 0.85 MG/DL (ref 0.5–1.3)
EGFRCR SERPLBLD CKD-EPI 2021: >90 ML/MIN/1.73M*2
EOSINOPHIL # BLD AUTO: 0.21 X10*3/UL (ref 0–0.7)
EOSINOPHIL NFR BLD AUTO: 2.7 %
ERYTHROCYTE [DISTWIDTH] IN BLOOD BY AUTOMATED COUNT: 13.6 % (ref 11.5–14.5)
GLUCOSE SERPL-MCNC: 86 MG/DL (ref 74–99)
HCT VFR BLD AUTO: 48.1 % (ref 41–52)
HGB BLD-MCNC: 17 G/DL (ref 13.5–17.5)
HGB RETIC QN: 36 PG (ref 28–38)
IMM GRANULOCYTES # BLD AUTO: 0.02 X10*3/UL (ref 0–0.7)
IMM GRANULOCYTES NFR BLD AUTO: 0.3 % (ref 0–0.9)
IMMATURE RETIC FRACTION: 6.7 %
LDH SERPL L TO P-CCNC: 215 U/L (ref 84–246)
LYMPHOCYTES # BLD AUTO: 3.57 X10*3/UL (ref 1.2–4.8)
LYMPHOCYTES NFR BLD AUTO: 45.2 %
MCH RBC QN AUTO: 32.4 PG (ref 26–34)
MCHC RBC AUTO-ENTMCNC: 35.3 G/DL (ref 32–36)
MCV RBC AUTO: 92 FL (ref 80–100)
MONOCYTES # BLD AUTO: 0.63 X10*3/UL (ref 0.1–1)
MONOCYTES NFR BLD AUTO: 8 %
NEUTROPHILS # BLD AUTO: 3.43 X10*3/UL (ref 1.2–7.7)
NEUTROPHILS NFR BLD AUTO: 43.4 %
NRBC BLD-RTO: 0 /100 WBCS (ref 0–0)
PLATELET # BLD AUTO: 275 X10*3/UL (ref 150–450)
POTASSIUM SERPL-SCNC: 4.1 MMOL/L (ref 3.5–5.3)
PROT SERPL-MCNC: 6.7 G/DL (ref 6.4–8.2)
RBC # BLD AUTO: 5.25 X10*6/UL (ref 4.5–5.9)
RETICS #: 0.08 X10*6/UL (ref 0.02–0.12)
RETICS/RBC NFR AUTO: 1.5 % (ref 0.5–2)
SODIUM SERPL-SCNC: 138 MMOL/L (ref 136–145)
WBC # BLD AUTO: 7.9 X10*3/UL (ref 4.4–11.3)

## 2025-08-24 PROCEDURE — 96374 THER/PROPH/DIAG INJ IV PUSH: CPT

## 2025-08-24 PROCEDURE — 96375 TX/PRO/DX INJ NEW DRUG ADDON: CPT

## 2025-08-24 PROCEDURE — 80053 COMPREHEN METABOLIC PANEL: CPT | Performed by: EMERGENCY MEDICINE

## 2025-08-24 PROCEDURE — 96376 TX/PRO/DX INJ SAME DRUG ADON: CPT

## 2025-08-24 PROCEDURE — 85025 COMPLETE CBC W/AUTO DIFF WBC: CPT | Performed by: EMERGENCY MEDICINE

## 2025-08-24 PROCEDURE — 99284 EMERGENCY DEPT VISIT MOD MDM: CPT | Mod: 25 | Performed by: EMERGENCY MEDICINE

## 2025-08-24 PROCEDURE — 2500000004 HC RX 250 GENERAL PHARMACY W/ HCPCS (ALT 636 FOR OP/ED): Performed by: EMERGENCY MEDICINE

## 2025-08-24 PROCEDURE — 36415 COLL VENOUS BLD VENIPUNCTURE: CPT | Performed by: EMERGENCY MEDICINE

## 2025-08-24 PROCEDURE — 82248 BILIRUBIN DIRECT: CPT | Performed by: EMERGENCY MEDICINE

## 2025-08-24 PROCEDURE — 85045 AUTOMATED RETICULOCYTE COUNT: CPT | Performed by: EMERGENCY MEDICINE

## 2025-08-24 PROCEDURE — 83615 LACTATE (LD) (LDH) ENZYME: CPT | Performed by: EMERGENCY MEDICINE

## 2025-08-24 RX ORDER — HYDROMORPHONE HYDROCHLORIDE 1 MG/ML
2 INJECTION, SOLUTION INTRAMUSCULAR; INTRAVENOUS; SUBCUTANEOUS ONCE
Status: DISCONTINUED | OUTPATIENT
Start: 2025-08-24 | End: 2025-08-24 | Stop reason: HOSPADM

## 2025-08-24 RX ORDER — HYDROMORPHONE HYDROCHLORIDE 1 MG/ML
2 INJECTION, SOLUTION INTRAMUSCULAR; INTRAVENOUS; SUBCUTANEOUS
Status: COMPLETED | OUTPATIENT
Start: 2025-08-24 | End: 2025-08-24

## 2025-08-24 RX ORDER — ONDANSETRON HYDROCHLORIDE 2 MG/ML
4 INJECTION, SOLUTION INTRAVENOUS ONCE AS NEEDED
Status: COMPLETED | OUTPATIENT
Start: 2025-08-24 | End: 2025-08-24

## 2025-08-24 RX ADMIN — HYDROMORPHONE HYDROCHLORIDE 2 MG: 1 INJECTION, SOLUTION INTRAMUSCULAR; INTRAVENOUS; SUBCUTANEOUS at 14:31

## 2025-08-24 RX ADMIN — HYDROMORPHONE HYDROCHLORIDE 2 MG: 1 INJECTION, SOLUTION INTRAMUSCULAR; INTRAVENOUS; SUBCUTANEOUS at 15:45

## 2025-08-24 RX ADMIN — HYDROMORPHONE HYDROCHLORIDE 2 MG: 1 INJECTION, SOLUTION INTRAMUSCULAR; INTRAVENOUS; SUBCUTANEOUS at 17:13

## 2025-08-24 RX ADMIN — ONDANSETRON 4 MG: 2 INJECTION INTRAMUSCULAR; INTRAVENOUS at 14:31

## 2025-08-24 ASSESSMENT — PAIN - FUNCTIONAL ASSESSMENT: PAIN_FUNCTIONAL_ASSESSMENT: 0-10

## 2025-08-24 ASSESSMENT — PAIN SCALES - GENERAL: PAINLEVEL_OUTOF10: 9

## 2025-08-25 ENCOUNTER — TELEPHONE (OUTPATIENT)
Dept: ADMISSION | Facility: HOSPITAL | Age: 45
End: 2025-08-25
Payer: COMMERCIAL

## 2025-08-25 DIAGNOSIS — G89.4 CHRONIC PAIN SYNDROME: Primary | ICD-10-CM

## 2025-08-25 DIAGNOSIS — D57.00 SICKLE-CELL DISEASE WITH PAIN (MULTI): ICD-10-CM

## 2025-08-26 ENCOUNTER — PHARMACY VISIT (OUTPATIENT)
Dept: PHARMACY | Facility: CLINIC | Age: 45
End: 2025-08-26
Payer: MEDICAID

## 2025-08-26 ENCOUNTER — HOSPITAL ENCOUNTER (EMERGENCY)
Facility: HOSPITAL | Age: 45
Discharge: HOME | End: 2025-08-26
Attending: EMERGENCY MEDICINE
Payer: COMMERCIAL

## 2025-08-26 ENCOUNTER — APPOINTMENT (OUTPATIENT)
Dept: RADIOLOGY | Facility: HOSPITAL | Age: 45
End: 2025-08-26
Payer: COMMERCIAL

## 2025-08-26 PROCEDURE — RXMED WILLOW AMBULATORY MEDICATION CHARGE

## 2025-08-26 RX ORDER — HYDROMORPHONE HYDROCHLORIDE 4 MG/1
4 TABLET ORAL EVERY 4 HOURS PRN
Qty: 36 TABLET | Refills: 0 | Status: SHIPPED | OUTPATIENT
Start: 2025-08-26

## 2025-08-26 ASSESSMENT — PAIN SCALES - GENERAL
PAINLEVEL_OUTOF10: 6
PAINLEVEL_OUTOF10: 9
PAINLEVEL_OUTOF10: 7
PAINLEVEL_OUTOF10: 9

## 2025-08-26 ASSESSMENT — PAIN DESCRIPTION - LOCATION
LOCATION: LEG

## 2025-08-26 ASSESSMENT — PAIN DESCRIPTION - DESCRIPTORS: DESCRIPTORS: ACHING

## 2025-08-26 ASSESSMENT — PAIN - FUNCTIONAL ASSESSMENT
PAIN_FUNCTIONAL_ASSESSMENT: 0-10

## 2025-08-26 ASSESSMENT — PAIN DESCRIPTION - ORIENTATION
ORIENTATION: LEFT;RIGHT
ORIENTATION: LEFT;RIGHT
ORIENTATION: RIGHT;LEFT

## 2025-08-26 ASSESSMENT — PAIN DESCRIPTION - PAIN TYPE: TYPE: ACUTE PAIN

## 2025-08-28 DIAGNOSIS — Z00.6 RESEARCH SUBJECT: Primary | ICD-10-CM

## 2025-09-03 ENCOUNTER — APPOINTMENT (OUTPATIENT)
Dept: CARDIOLOGY | Facility: HOSPITAL | Age: 45
End: 2025-09-03
Payer: COMMERCIAL

## 2025-09-03 ENCOUNTER — HOSPITAL ENCOUNTER (EMERGENCY)
Dept: CARDIOLOGY | Facility: HOSPITAL | Age: 45
Discharge: HOME | End: 2025-09-03
Payer: COMMERCIAL

## 2025-09-03 VITALS
BODY MASS INDEX: 23.86 KG/M2 | TEMPERATURE: 98.2 F | SYSTOLIC BLOOD PRESSURE: 109 MMHG | DIASTOLIC BLOOD PRESSURE: 80 MMHG | OXYGEN SATURATION: 98 % | HEIGHT: 67 IN | HEART RATE: 81 BPM | WEIGHT: 152 LBS | RESPIRATION RATE: 18 BRPM

## 2025-09-03 LAB
ALBUMIN SERPL BCP-MCNC: 4.4 G/DL (ref 3.4–5)
ALP SERPL-CCNC: 116 U/L (ref 33–120)
ALT SERPL W P-5'-P-CCNC: 10 U/L (ref 10–52)
ANION GAP SERPL CALC-SCNC: 9 MMOL/L (ref 10–20)
AST SERPL W P-5'-P-CCNC: 32 U/L (ref 9–39)
BASOPHILS # BLD AUTO: 0.04 X10*3/UL (ref 0–0.1)
BASOPHILS NFR BLD AUTO: 0.3 %
BILIRUB SERPL-MCNC: 0.5 MG/DL (ref 0–1.2)
BUN SERPL-MCNC: 8 MG/DL (ref 6–23)
CALCIUM SERPL-MCNC: 9 MG/DL (ref 8.6–10.3)
CHLORIDE SERPL-SCNC: 108 MMOL/L (ref 98–107)
CO2 SERPL-SCNC: 26 MMOL/L (ref 21–32)
CREAT SERPL-MCNC: 0.92 MG/DL (ref 0.5–1.3)
EGFRCR SERPLBLD CKD-EPI 2021: >90 ML/MIN/1.73M*2
EOSINOPHIL # BLD AUTO: 0.24 X10*3/UL (ref 0–0.7)
EOSINOPHIL NFR BLD AUTO: 1.9 %
ERYTHROCYTE [DISTWIDTH] IN BLOOD BY AUTOMATED COUNT: 13.7 % (ref 11.5–14.5)
GLUCOSE SERPL-MCNC: 105 MG/DL (ref 74–99)
HCT VFR BLD AUTO: 49.3 % (ref 41–52)
HGB BLD-MCNC: 17.3 G/DL (ref 13.5–17.5)
HGB RETIC QN: 35 PG (ref 28–38)
IMM GRANULOCYTES # BLD AUTO: 0.04 X10*3/UL (ref 0–0.7)
IMM GRANULOCYTES NFR BLD AUTO: 0.3 % (ref 0–0.9)
IMMATURE RETIC FRACTION: 6.5 %
LDH SERPL L TO P-CCNC: 595 U/L (ref 84–246)
LYMPHOCYTES # BLD AUTO: 4.33 X10*3/UL (ref 1.2–4.8)
LYMPHOCYTES NFR BLD AUTO: 34.8 %
MCH RBC QN AUTO: 32.4 PG (ref 26–34)
MCHC RBC AUTO-ENTMCNC: 35.1 G/DL (ref 32–36)
MCV RBC AUTO: 92 FL (ref 80–100)
MONOCYTES # BLD AUTO: 1.04 X10*3/UL (ref 0.1–1)
MONOCYTES NFR BLD AUTO: 8.4 %
NEUTROPHILS # BLD AUTO: 6.74 X10*3/UL (ref 1.2–7.7)
NEUTROPHILS NFR BLD AUTO: 54.3 %
NRBC BLD-RTO: 0 /100 WBCS (ref 0–0)
PLATELET # BLD AUTO: 261 X10*3/UL (ref 150–450)
POTASSIUM SERPL-SCNC: 5.8 MMOL/L (ref 3.5–5.3)
PROT SERPL-MCNC: 7.6 G/DL (ref 6.4–8.2)
RBC # BLD AUTO: 5.34 X10*6/UL (ref 4.5–5.9)
RETICS #: 0.09 X10*6/UL (ref 0.02–0.12)
RETICS/RBC NFR AUTO: 1.7 % (ref 0.5–2)
SODIUM SERPL-SCNC: 137 MMOL/L (ref 136–145)
WBC # BLD AUTO: 12.4 X10*3/UL (ref 4.4–11.3)

## 2025-09-03 PROCEDURE — 93005 ELECTROCARDIOGRAM TRACING: CPT

## 2025-09-03 PROCEDURE — 36415 COLL VENOUS BLD VENIPUNCTURE: CPT | Performed by: INTERNAL MEDICINE

## 2025-09-03 PROCEDURE — 85025 COMPLETE CBC W/AUTO DIFF WBC: CPT | Performed by: INTERNAL MEDICINE

## 2025-09-03 PROCEDURE — 2500000004 HC RX 250 GENERAL PHARMACY W/ HCPCS (ALT 636 FOR OP/ED): Mod: JZ | Performed by: INTERNAL MEDICINE

## 2025-09-03 PROCEDURE — 80053 COMPREHEN METABOLIC PANEL: CPT | Performed by: INTERNAL MEDICINE

## 2025-09-03 PROCEDURE — 85045 AUTOMATED RETICULOCYTE COUNT: CPT | Performed by: INTERNAL MEDICINE

## 2025-09-03 PROCEDURE — 83615 LACTATE (LD) (LDH) ENZYME: CPT | Performed by: INTERNAL MEDICINE

## 2025-09-03 PROCEDURE — 99284 EMERGENCY DEPT VISIT MOD MDM: CPT | Performed by: EMERGENCY MEDICINE

## 2025-09-03 RX ORDER — KETOROLAC TROMETHAMINE 30 MG/ML
30 INJECTION, SOLUTION INTRAMUSCULAR; INTRAVENOUS ONCE
Status: DISCONTINUED | OUTPATIENT
Start: 2025-09-03 | End: 2025-09-04 | Stop reason: HOSPADM

## 2025-09-03 RX ADMIN — HYDROMORPHONE HYDROCHLORIDE 2 MG: 2 INJECTION INTRAMUSCULAR; INTRAVENOUS; SUBCUTANEOUS at 22:24

## 2025-09-03 RX ADMIN — HYDROMORPHONE HYDROCHLORIDE 2 MG: 2 INJECTION INTRAMUSCULAR; INTRAVENOUS; SUBCUTANEOUS at 23:23

## 2025-09-03 ASSESSMENT — PAIN SCALES - GENERAL: PAINLEVEL_OUTOF10: 10 - WORST POSSIBLE PAIN

## 2025-09-03 ASSESSMENT — PAIN - FUNCTIONAL ASSESSMENT: PAIN_FUNCTIONAL_ASSESSMENT: 0-10

## 2025-09-04 ENCOUNTER — OFFICE VISIT (OUTPATIENT)
Dept: HEMATOLOGY/ONCOLOGY | Facility: HOSPITAL | Age: 45
End: 2025-09-04
Payer: COMMERCIAL

## 2025-09-04 ENCOUNTER — LAB (OUTPATIENT)
Dept: LAB | Facility: HOSPITAL | Age: 45
End: 2025-09-04
Payer: COMMERCIAL

## 2025-09-04 ENCOUNTER — HOSPITAL ENCOUNTER (EMERGENCY)
Facility: HOSPITAL | Age: 45
Discharge: HOME | End: 2025-09-04
Attending: EMERGENCY MEDICINE
Payer: COMMERCIAL

## 2025-09-04 ENCOUNTER — PHARMACY VISIT (OUTPATIENT)
Dept: PHARMACY | Facility: CLINIC | Age: 45
End: 2025-09-04
Payer: MEDICAID

## 2025-09-04 VITALS
BODY MASS INDEX: 24.03 KG/M2 | DIASTOLIC BLOOD PRESSURE: 78 MMHG | SYSTOLIC BLOOD PRESSURE: 108 MMHG | WEIGHT: 153.44 LBS | RESPIRATION RATE: 20 BRPM | HEART RATE: 68 BPM | OXYGEN SATURATION: 99 % | TEMPERATURE: 96.8 F

## 2025-09-04 DIAGNOSIS — D89.89: ICD-10-CM

## 2025-09-04 DIAGNOSIS — G89.4 CHRONIC PAIN SYNDROME: ICD-10-CM

## 2025-09-04 DIAGNOSIS — D57.00 SICKLE CELL PAIN CRISIS (MULTI): Primary | ICD-10-CM

## 2025-09-04 DIAGNOSIS — F11.988: Primary | ICD-10-CM

## 2025-09-04 DIAGNOSIS — K51.00 ULCERATIVE PANCOLITIS (MULTI): ICD-10-CM

## 2025-09-04 DIAGNOSIS — Z00.6 RESEARCH SUBJECT: ICD-10-CM

## 2025-09-04 DIAGNOSIS — K74.60 HEPATIC CIRRHOSIS, UNSPECIFIED HEPATIC CIRRHOSIS TYPE, UNSPECIFIED WHETHER ASCITES PRESENT (MULTI): ICD-10-CM

## 2025-09-04 LAB
AMPHETAMINES UR QL SCN: ABNORMAL
BARBITURATES UR QL SCN: ABNORMAL
BENZODIAZ UR QL SCN: ABNORMAL
BZE UR QL SCN: ABNORMAL
CANNABINOIDS UR QL SCN: ABNORMAL
CREAT UR-MCNC: 32.6 MG/DL (ref 20–370)
FENTANYL+NORFENTANYL UR QL SCN: ABNORMAL
HOLD SPECIMEN: NORMAL
METHADONE UR QL SCN: ABNORMAL
MICROALBUMIN UR-MCNC: <7 MG/L
MICROALBUMIN/CREAT UR: NORMAL MG/G{CREAT}
OPIATES UR QL SCN: ABNORMAL
OXYCODONE+OXYMORPHONE UR QL SCN: ABNORMAL
PCP UR QL SCN: ABNORMAL
POTASSIUM SERPL-SCNC: 4.5 MMOL/L (ref 3.5–5.3)

## 2025-09-04 PROCEDURE — RXMED WILLOW AMBULATORY MEDICATION CHARGE

## 2025-09-04 PROCEDURE — 84132 ASSAY OF SERUM POTASSIUM: CPT | Performed by: INTERNAL MEDICINE

## 2025-09-04 PROCEDURE — 36415 COLL VENOUS BLD VENIPUNCTURE: CPT | Performed by: INTERNAL MEDICINE

## 2025-09-04 PROCEDURE — 82043 UR ALBUMIN QUANTITATIVE: CPT

## 2025-09-04 PROCEDURE — 99214 OFFICE O/P EST MOD 30 MIN: CPT | Performed by: NURSE PRACTITIONER

## 2025-09-04 PROCEDURE — 36415 COLL VENOUS BLD VENIPUNCTURE: CPT

## 2025-09-04 PROCEDURE — 2500000004 HC RX 250 GENERAL PHARMACY W/ HCPCS (ALT 636 FOR OP/ED): Mod: JZ

## 2025-09-04 PROCEDURE — 80307 DRUG TEST PRSMV CHEM ANLYZR: CPT

## 2025-09-04 RX ADMIN — HYDROMORPHONE HYDROCHLORIDE 2 MG: 2 INJECTION INTRAMUSCULAR; INTRAVENOUS; SUBCUTANEOUS at 01:45

## 2025-09-04 ASSESSMENT — PAIN SCALES - GENERAL
PAINLEVEL_OUTOF10: 6
PAINLEVEL_OUTOF10: 5 - MODERATE PAIN

## 2025-09-04 ASSESSMENT — PAIN DESCRIPTION - LOCATION: LOCATION: LEG

## 2025-09-04 ASSESSMENT — PAIN DESCRIPTION - ORIENTATION: ORIENTATION: RIGHT;LEFT

## 2025-09-04 ASSESSMENT — PAIN - FUNCTIONAL ASSESSMENT: PAIN_FUNCTIONAL_ASSESSMENT: 0-10

## 2025-09-05 LAB
ATRIAL RATE: 80 BPM
P AXIS: 71 DEGREES
P OFFSET: 178 MS
P ONSET: 124 MS
PR INTERVAL: 172 MS
Q ONSET: 210 MS
QRS COUNT: 14 BEATS
QRS DURATION: 98 MS
QT INTERVAL: 392 MS
QTC CALCULATION(BAZETT): 452 MS
QTC FREDERICIA: 431 MS
R AXIS: -66 DEGREES
T AXIS: -1 DEGREES
T OFFSET: 406 MS
VENTRICULAR RATE: 80 BPM